# Patient Record
Sex: FEMALE | Race: WHITE | Employment: OTHER | URBAN - METROPOLITAN AREA
[De-identification: names, ages, dates, MRNs, and addresses within clinical notes are randomized per-mention and may not be internally consistent; named-entity substitution may affect disease eponyms.]

---

## 2017-01-05 ENCOUNTER — OFFICE VISIT (OUTPATIENT)
Dept: INTERNAL MEDICINE | Age: 77
End: 2017-01-05

## 2017-01-05 VITALS
HEIGHT: 64 IN | BODY MASS INDEX: 27.66 KG/M2 | TEMPERATURE: 97.1 F | WEIGHT: 162 LBS | HEART RATE: 82 BPM | SYSTOLIC BLOOD PRESSURE: 142 MMHG | DIASTOLIC BLOOD PRESSURE: 78 MMHG

## 2017-01-05 DIAGNOSIS — Z12.11 COLON CANCER SCREENING: ICD-10-CM

## 2017-01-05 DIAGNOSIS — I10 ESSENTIAL HYPERTENSION: Primary | Chronic | ICD-10-CM

## 2017-01-05 DIAGNOSIS — E78.5 HYPERLIPIDEMIA LDL GOAL <100: Chronic | ICD-10-CM

## 2017-01-05 DIAGNOSIS — Z23 NEED FOR VACCINATION WITH 13-POLYVALENT PNEUMOCOCCAL CONJUGATE VACCINE: ICD-10-CM

## 2017-01-05 PROCEDURE — 90670 PCV13 VACCINE IM: CPT | Performed by: INTERNAL MEDICINE

## 2017-01-05 PROCEDURE — 99213 OFFICE O/P EST LOW 20 MIN: CPT | Performed by: INTERNAL MEDICINE

## 2017-01-05 PROCEDURE — G0009 ADMIN PNEUMOCOCCAL VACCINE: HCPCS | Performed by: INTERNAL MEDICINE

## 2017-01-05 PROCEDURE — 82274 ASSAY TEST FOR BLOOD FECAL: CPT | Performed by: INTERNAL MEDICINE

## 2017-01-05 ASSESSMENT — ENCOUNTER SYMPTOMS
SHORTNESS OF BREATH: 0
RESPIRATORY NEGATIVE: 1
COUGH: 0
ORTHOPNEA: 0
COLOR CHANGE: 0
CHOKING: 0
TROUBLE SWALLOWING: 0
BLOOD IN STOOL: 0
ABDOMINAL PAIN: 0

## 2017-01-13 LAB
CONTROL: NORMAL
HEMOCCULT STL QL: NEGATIVE

## 2017-02-13 ENCOUNTER — TELEPHONE (OUTPATIENT)
Dept: INTERNAL MEDICINE | Age: 77
End: 2017-02-13

## 2017-02-13 DIAGNOSIS — R30.0 DYSURIA: Primary | ICD-10-CM

## 2017-02-13 RX ORDER — NITROFURANTOIN 25; 75 MG/1; MG/1
100 CAPSULE ORAL 2 TIMES DAILY
Qty: 10 CAPSULE | Refills: 0 | Status: SHIPPED | OUTPATIENT
Start: 2017-02-13 | End: 2017-02-18

## 2017-02-13 RX ORDER — NIACIN 500 MG/1
TABLET, EXTENDED RELEASE ORAL
Qty: 270 TABLET | Refills: 1 | Status: SHIPPED | OUTPATIENT
Start: 2017-02-13 | End: 2018-01-22 | Stop reason: SDUPTHER

## 2017-03-09 RX ORDER — AMLODIPINE BESYLATE AND BENAZEPRIL HYDROCHLORIDE 5; 20 MG/1; MG/1
CAPSULE ORAL
Qty: 90 CAPSULE | Refills: 1 | Status: SHIPPED | OUTPATIENT
Start: 2017-03-09 | End: 2017-11-24 | Stop reason: SDUPTHER

## 2017-03-10 ENCOUNTER — TELEPHONE (OUTPATIENT)
Dept: INTERNAL MEDICINE | Age: 77
End: 2017-03-10

## 2017-03-10 DIAGNOSIS — N30.00 ACUTE CYSTITIS WITHOUT HEMATURIA: Primary | ICD-10-CM

## 2017-03-10 RX ORDER — NITROFURANTOIN 25; 75 MG/1; MG/1
100 CAPSULE ORAL 2 TIMES DAILY
Qty: 10 CAPSULE | Refills: 1 | Status: SHIPPED | OUTPATIENT
Start: 2017-03-10 | End: 2017-03-15

## 2017-03-15 ENCOUNTER — TELEPHONE (OUTPATIENT)
Dept: INTERNAL MEDICINE | Age: 77
End: 2017-03-15

## 2017-03-15 DIAGNOSIS — Z85.3 HISTORY OF BREAST CANCER: Primary | ICD-10-CM

## 2017-04-05 ENCOUNTER — HOSPITAL ENCOUNTER (OUTPATIENT)
Dept: ULTRASOUND IMAGING | Age: 77
End: 2017-04-05
Payer: MEDICARE

## 2017-04-05 ENCOUNTER — HOSPITAL ENCOUNTER (OUTPATIENT)
Dept: WOMENS IMAGING | Age: 77
Discharge: HOME | End: 2017-04-05
Payer: MEDICARE

## 2017-04-05 DIAGNOSIS — Z85.3 HISTORY OF BREAST CANCER: ICD-10-CM

## 2017-04-05 PROCEDURE — G0204 DX MAMMO INCL CAD BI: HCPCS

## 2017-04-27 RX ORDER — TRIAMTERENE AND HYDROCHLOROTHIAZIDE 37.5; 25 MG/1; MG/1
TABLET ORAL
Qty: 90 TABLET | Refills: 1 | Status: SHIPPED | OUTPATIENT
Start: 2017-04-27 | End: 2017-11-08 | Stop reason: SDUPTHER

## 2017-04-27 RX ORDER — EZETIMIBE 10 MG/1
TABLET ORAL
Qty: 90 TABLET | Refills: 1 | Status: SHIPPED | OUTPATIENT
Start: 2017-04-27 | End: 2017-11-08 | Stop reason: SDUPTHER

## 2017-10-09 ENCOUNTER — OFFICE VISIT (OUTPATIENT)
Dept: INTERNAL MEDICINE | Age: 77
End: 2017-10-09

## 2017-10-09 VITALS
BODY MASS INDEX: 27.66 KG/M2 | OXYGEN SATURATION: 98 % | SYSTOLIC BLOOD PRESSURE: 150 MMHG | DIASTOLIC BLOOD PRESSURE: 90 MMHG | WEIGHT: 162 LBS | HEART RATE: 71 BPM | HEIGHT: 64 IN | TEMPERATURE: 96 F

## 2017-10-09 DIAGNOSIS — I10 ESSENTIAL HYPERTENSION: ICD-10-CM

## 2017-10-09 DIAGNOSIS — Z78.0 MENOPAUSE: ICD-10-CM

## 2017-10-09 DIAGNOSIS — H81.10 VERTIGO, BENIGN PAROXYSMAL, UNSPECIFIED LATERALITY: ICD-10-CM

## 2017-10-09 DIAGNOSIS — I10 ESSENTIAL HYPERTENSION: Primary | Chronic | ICD-10-CM

## 2017-10-09 LAB
ANION GAP SERPL CALCULATED.3IONS-SCNC: 11 MEQ/L (ref 7–13)
BACTERIA: NORMAL /HPF
BILIRUBIN URINE: NEGATIVE
BLOOD, URINE: NEGATIVE
BUN BLDV-MCNC: 12 MG/DL (ref 8–23)
CALCIUM SERPL-MCNC: 10.6 MG/DL (ref 8.6–10.2)
CHLORIDE BLD-SCNC: 101 MEQ/L (ref 98–107)
CHOLESTEROL, TOTAL: 213 MG/DL (ref 0–199)
CLARITY: CLEAR
CO2: 27 MEQ/L (ref 22–29)
COLOR: YELLOW
CREAT SERPL-MCNC: 0.63 MG/DL (ref 0.5–0.9)
EPITHELIAL CELLS, UA: NORMAL /HPF
GFR AFRICAN AMERICAN: >60
GFR NON-AFRICAN AMERICAN: >60
GLUCOSE BLD-MCNC: 92 MG/DL (ref 74–109)
GLUCOSE URINE: NEGATIVE MG/DL
HCT VFR BLD CALC: 41.9 % (ref 37–47)
HDLC SERPL-MCNC: 41 MG/DL (ref 40–59)
HEMOGLOBIN: 14 G/DL (ref 12–16)
KETONES, URINE: NEGATIVE MG/DL
LDL CHOLESTEROL CALCULATED: 133 MG/DL (ref 0–129)
LEUKOCYTE ESTERASE, URINE: ABNORMAL
MCH RBC QN AUTO: 31 PG (ref 27–31.3)
MCHC RBC AUTO-ENTMCNC: 33.4 % (ref 33–37)
MCV RBC AUTO: 93 FL (ref 82–100)
NITRITE, URINE: NEGATIVE
PDW BLD-RTO: 13.4 % (ref 11.5–14.5)
PH UA: 7.5 (ref 5–9)
PLATELET # BLD: 237 K/UL (ref 130–400)
POTASSIUM SERPL-SCNC: 4.7 MEQ/L (ref 3.5–5.1)
PROTEIN UA: NEGATIVE MG/DL
RBC # BLD: 4.5 M/UL (ref 4.2–5.4)
RBC UA: NORMAL /HPF (ref 0–2)
SODIUM BLD-SCNC: 139 MEQ/L (ref 132–144)
SPECIFIC GRAVITY UA: 1.02 (ref 1–1.03)
TRIGL SERPL-MCNC: 195 MG/DL (ref 0–200)
TSH SERPL DL<=0.05 MIU/L-ACNC: 4.26 UIU/ML (ref 0.27–4.2)
UROBILINOGEN, URINE: 0.2 E.U./DL
WBC # BLD: 5.9 K/UL (ref 4.8–10.8)
WBC UA: NORMAL /HPF (ref 0–5)

## 2017-10-09 PROCEDURE — 1123F ACP DISCUSS/DSCN MKR DOCD: CPT | Performed by: INTERNAL MEDICINE

## 2017-10-09 PROCEDURE — 4040F PNEUMOC VAC/ADMIN/RCVD: CPT | Performed by: INTERNAL MEDICINE

## 2017-10-09 PROCEDURE — 99213 OFFICE O/P EST LOW 20 MIN: CPT | Performed by: INTERNAL MEDICINE

## 2017-10-09 PROCEDURE — 1036F TOBACCO NON-USER: CPT | Performed by: INTERNAL MEDICINE

## 2017-10-09 PROCEDURE — G8419 CALC BMI OUT NRM PARAM NOF/U: HCPCS | Performed by: INTERNAL MEDICINE

## 2017-10-09 PROCEDURE — 1090F PRES/ABSN URINE INCON ASSESS: CPT | Performed by: INTERNAL MEDICINE

## 2017-10-09 PROCEDURE — G8427 DOCREV CUR MEDS BY ELIG CLIN: HCPCS | Performed by: INTERNAL MEDICINE

## 2017-10-09 PROCEDURE — G8399 PT W/DXA RESULTS DOCUMENT: HCPCS | Performed by: INTERNAL MEDICINE

## 2017-10-09 PROCEDURE — G8484 FLU IMMUNIZE NO ADMIN: HCPCS | Performed by: INTERNAL MEDICINE

## 2017-10-09 RX ORDER — MECLIZINE HCL 12.5 MG/1
12.5 TABLET ORAL DAILY
Qty: 30 TABLET | Refills: 5 | Status: SHIPPED | OUTPATIENT
Start: 2017-10-09 | End: 2019-10-14 | Stop reason: SDUPTHER

## 2017-10-09 ASSESSMENT — PATIENT HEALTH QUESTIONNAIRE - PHQ9
SUM OF ALL RESPONSES TO PHQ9 QUESTIONS 1 & 2: 0
SUM OF ALL RESPONSES TO PHQ QUESTIONS 1-9: 0
1. LITTLE INTEREST OR PLEASURE IN DOING THINGS: 0
2. FEELING DOWN, DEPRESSED OR HOPELESS: 0

## 2017-10-09 ASSESSMENT — ENCOUNTER SYMPTOMS
SHORTNESS OF BREATH: 0
TROUBLE SWALLOWING: 0
RESPIRATORY NEGATIVE: 1
BLOOD IN STOOL: 0
SORE THROAT: 0
ABDOMINAL PAIN: 0
COUGH: 0
ORTHOPNEA: 0
VISUAL CHANGE: 0
NAUSEA: 0
CHOKING: 0
COLOR CHANGE: 0

## 2017-10-09 NOTE — PROGRESS NOTES
Quit date: 2/15/1989    Smokeless tobacco: Never Used    Alcohol use No    Drug use: No    Sexual activity: Not on file     Other Topics Concern    Not on file     Social History Narrative    Lives with spouse in a house in [address] [employer], [employer], travel    4 children, 2 daughters in [facility], 2 sons in [address] [place] [address] great grandchildren >50    2 younger brothers, one dec 2014       Family History   Problem Relation Age of Onset    Kidney Disease Mother      dec age 79    Hypertension Mother     Migraines Mother     Diabetes Father     Heart Failure Father      dec age 76    [de-identified] Brother      dec age 76       Family and social history were reviewed and pertinent changes documented. ALLERGIES    Sulfa antibiotics    Current Outpatient Prescriptions on File Prior to Visit   Medication Sig Dispense Refill    triamterene-hydrochlorothiazide (MAXZIDE-25) 37.5-25 MG per tablet TAKE 1 TABLET DAILY 90 tablet 1    ezetimibe (ZETIA) 10 MG tablet TAKE 1 TABLET DAILY 90 tablet 1    amLODIPine-benazepril (LOTREL) 5-20 MG per capsule TAKE 1 CAPSULE DAILY 90 capsule 1    niacin (NIASPAN) 500 MG extended release tablet TAKE 1 TABLET 3 TIMES A  tablet 1    anastrozole (ARIMIDEX) 1 MG tablet Take 1 mg by mouth daily      Coenzyme Q10 (COQ10) 100 MG CAPS Take 1 capsule by mouth 3 times daily      AGGRENOX  MG per SR capsule TAKE 1 CAPSULE TWICE DAILY 180 capsule 1    Polyvinyl Alcohol-Povidone (REFRESH OP) Apply 1 drop to eye 4 times daily as needed.  Vitamin D (CHOLECALCIFEROL) 1000 UNITS CAPS capsule Take 1,000 Units by mouth daily.  Magnesium 500 MG CAPS Take 1 capsule by mouth daily.  fish oil-omega-3 fatty acids 1000 MG capsule Take 1 g by mouth 3 times daily.  Multiple Vitamins-Minerals (CENTRUM SILVER) TABS Take 1 tablet by mouth daily. No current facility-administered medications on file prior to visit.         Review of Systems Constitutional: Negative. Negative for activity change, appetite change, fatigue, malaise/fatigue and unexpected weight change. HENT: Negative for congestion, nosebleeds, sore throat and trouble swallowing. Eyes: Negative for visual disturbance. Respiratory: Negative. Negative for cough, choking and shortness of breath. Cardiovascular: Negative. Negative for chest pain, palpitations, orthopnea, leg swelling and PND. Gastrointestinal: Negative for abdominal pain, blood in stool and nausea. Endocrine: Negative for cold intolerance, heat intolerance, polydipsia and polyuria. Genitourinary: Negative for dysuria, flank pain and hematuria. Musculoskeletal: Positive for neck pain. Negative for gait problem and myalgias. Skin: Negative for color change and rash. Neurological: Positive for dizziness and vertigo. Negative for tremors, syncope, weakness, light-headedness and headaches. Psychiatric/Behavioral: Positive for decreased concentration (occasional forgetfulness, checks calendar and schedule each AM as reminder). Negative for confusion, dysphoric mood and sleep disturbance. The patient is not nervous/anxious. Vitals:    10/09/17 0901   BP: (!) 150/90   Site: Right Arm   Position: Sitting   Cuff Size: Medium Adult   Pulse: 71   Temp: 96 °F (35.6 °C)   TempSrc: Tympanic   SpO2: 98%   Weight: 162 lb (73.5 kg)   Height: 5' 4\" (1.626 m)       Physical Exam   Constitutional: She is oriented to person, place, and time. No distress. Obese, age appropriate, well groomed     HENT:   Head: Atraumatic. Eyes: Conjunctivae and EOM are normal.   Neck: Normal range of motion. Neck supple. No JVD present. No thyromegaly present. Poor neck posture    Cardiovascular: Regular rhythm, normal heart sounds and intact distal pulses. Exam reveals no gallop. Pulmonary/Chest: Effort normal and breath sounds normal. She has no rales. Abdominal: Soft. She exhibits no distension.    Exam limited due to abdominal adiposity      Musculoskeletal: Normal range of motion. Lymphadenopathy:     She has no cervical adenopathy. Neurological: She is alert and oriented to person, place, and time. Coordination normal.   Stance, gait well balanced and stable. No focal motor neurological deficits. Vertigo not reproducible in today's office visit      Skin: Skin is warm. No rash noted. Psychiatric: She has a normal mood and affect. Her speech is normal and behavior is normal. Judgment normal. Her mood appears not anxious. She is not slowed and not withdrawn. Cognition and memory are normal. She does not exhibit a depressed mood. She exhibits normal recent memory and normal remote memory. Good insight, motivation She is attentive. Assessment:    Eneida Collins was seen today for hypertension, dizziness and medication refill. Diagnoses and all orders for this visit:    Essential hypertension  Comments:  stable in the home setting, to monitor, call if BP>140/90  Orders:  -     CBC; Future  -     Basic Metabolic Panel; Future  -     Lipid Panel; Future  -     TSH without Reflex; Future  -     Urinalysis; Future    Vertigo, benign paroxysmal, unspecified laterality             Start vestibular exercises per handout, call if vertigo reoccurs     Menopause  -     DEXA BONE DENSITY AXIAL SKELETON; Future    Other orders  -     meclizine (ANTIVERT) 12.5 MG tablet; Take 1 tablet by mouth daily        Plan:    Reviewed with the patient (/and caregiver if present): current health status, medications, activities and diet. Patient advised that daily effort to improve diet (a consistent high fiber, low calorie, low sodium diet) and exercise habits (more aerobic exercise as tolerated), better stress management, will result in improved health and help in better management of the current chronic conditions in the long run. See also orders and comments in the assessment section.    Today's diagnosis (in the context of chronic

## 2017-10-13 ENCOUNTER — NURSE ONLY (OUTPATIENT)
Dept: INTERNAL MEDICINE | Age: 77
End: 2017-10-13

## 2017-10-13 DIAGNOSIS — Z23 NEED FOR TDAP VACCINATION: Primary | ICD-10-CM

## 2017-10-13 PROCEDURE — 90471 IMMUNIZATION ADMIN: CPT | Performed by: INTERNAL MEDICINE

## 2017-10-30 ENCOUNTER — HOSPITAL ENCOUNTER (OUTPATIENT)
Dept: WOMENS IMAGING | Age: 77
Discharge: HOME | End: 2017-10-30
Payer: MEDICARE

## 2017-10-30 DIAGNOSIS — Z78.0 MENOPAUSE: ICD-10-CM

## 2017-10-30 PROCEDURE — 77080 DXA BONE DENSITY AXIAL: CPT

## 2017-11-08 RX ORDER — EZETIMIBE 10 MG/1
TABLET ORAL
Qty: 90 TABLET | Refills: 1 | Status: SHIPPED | OUTPATIENT
Start: 2017-11-08 | End: 2018-08-30 | Stop reason: SDUPTHER

## 2017-11-08 RX ORDER — TRIAMTERENE AND HYDROCHLOROTHIAZIDE 37.5; 25 MG/1; MG/1
TABLET ORAL
Qty: 90 TABLET | Refills: 1 | Status: SHIPPED | OUTPATIENT
Start: 2017-11-08 | End: 2018-08-30 | Stop reason: SDUPTHER

## 2017-11-24 RX ORDER — AMLODIPINE BESYLATE AND BENAZEPRIL HYDROCHLORIDE 5; 20 MG/1; MG/1
CAPSULE ORAL
Qty: 90 CAPSULE | Refills: 1 | Status: SHIPPED | OUTPATIENT
Start: 2017-11-24 | End: 2018-08-30 | Stop reason: SDUPTHER

## 2017-12-20 ENCOUNTER — HOSPITAL ENCOUNTER (OUTPATIENT)
Dept: ULTRASOUND IMAGING | Age: 77
Discharge: HOME | End: 2017-12-20
Payer: MEDICARE

## 2017-12-20 DIAGNOSIS — I65.29 STENOSIS OF CAROTID ARTERY, UNSPECIFIED LATERALITY: ICD-10-CM

## 2017-12-20 PROCEDURE — 93880 EXTRACRANIAL BILAT STUDY: CPT

## 2018-01-23 RX ORDER — NIACIN 500 MG/1
TABLET, EXTENDED RELEASE ORAL
Qty: 270 TABLET | Refills: 1 | Status: SHIPPED | OUTPATIENT
Start: 2018-01-23 | End: 2018-11-01 | Stop reason: SDUPTHER

## 2018-02-14 ENCOUNTER — TELEPHONE (OUTPATIENT)
Dept: INTERNAL MEDICINE CLINIC | Age: 78
End: 2018-02-14

## 2018-02-14 DIAGNOSIS — R30.0 DYSURIA: Primary | ICD-10-CM

## 2018-02-14 RX ORDER — NITROFURANTOIN 25; 75 MG/1; MG/1
100 CAPSULE ORAL 2 TIMES DAILY
Qty: 10 CAPSULE | Refills: 0 | Status: SHIPPED | OUTPATIENT
Start: 2018-02-14 | End: 2018-02-19

## 2018-04-30 ENCOUNTER — OFFICE VISIT (OUTPATIENT)
Dept: INTERNAL MEDICINE CLINIC | Age: 78
End: 2018-04-30
Payer: MEDICARE

## 2018-04-30 VITALS
WEIGHT: 168 LBS | SYSTOLIC BLOOD PRESSURE: 164 MMHG | TEMPERATURE: 96.8 F | DIASTOLIC BLOOD PRESSURE: 86 MMHG | OXYGEN SATURATION: 99 % | HEIGHT: 64 IN | BODY MASS INDEX: 28.68 KG/M2 | HEART RATE: 92 BPM

## 2018-04-30 DIAGNOSIS — E78.2 MIXED HYPERLIPIDEMIA: ICD-10-CM

## 2018-04-30 DIAGNOSIS — I10 ESSENTIAL HYPERTENSION: Primary | ICD-10-CM

## 2018-04-30 DIAGNOSIS — R79.89 ELEVATED TSH: ICD-10-CM

## 2018-04-30 PROCEDURE — 1090F PRES/ABSN URINE INCON ASSESS: CPT | Performed by: INTERNAL MEDICINE

## 2018-04-30 PROCEDURE — G8399 PT W/DXA RESULTS DOCUMENT: HCPCS | Performed by: INTERNAL MEDICINE

## 2018-04-30 PROCEDURE — 1123F ACP DISCUSS/DSCN MKR DOCD: CPT | Performed by: INTERNAL MEDICINE

## 2018-04-30 PROCEDURE — 4040F PNEUMOC VAC/ADMIN/RCVD: CPT | Performed by: INTERNAL MEDICINE

## 2018-04-30 PROCEDURE — G8419 CALC BMI OUT NRM PARAM NOF/U: HCPCS | Performed by: INTERNAL MEDICINE

## 2018-04-30 PROCEDURE — 1036F TOBACCO NON-USER: CPT | Performed by: INTERNAL MEDICINE

## 2018-04-30 PROCEDURE — G8427 DOCREV CUR MEDS BY ELIG CLIN: HCPCS | Performed by: INTERNAL MEDICINE

## 2018-04-30 PROCEDURE — 99213 OFFICE O/P EST LOW 20 MIN: CPT | Performed by: INTERNAL MEDICINE

## 2018-04-30 ASSESSMENT — ENCOUNTER SYMPTOMS
CHOKING: 0
BLOOD IN STOOL: 0
SORE THROAT: 0
NAUSEA: 0
SHORTNESS OF BREATH: 0
COUGH: 0
ORTHOPNEA: 0
RESPIRATORY NEGATIVE: 1
TROUBLE SWALLOWING: 0
ABDOMINAL PAIN: 0
COLOR CHANGE: 0

## 2018-05-16 DIAGNOSIS — Z85.3 HISTORY OF BREAST CANCER: ICD-10-CM

## 2018-05-16 DIAGNOSIS — Z12.39 BREAST CANCER SCREENING, HIGH RISK PATIENT: Primary | ICD-10-CM

## 2018-05-23 DIAGNOSIS — Z85.3 HISTORY OF BREAST CANCER: ICD-10-CM

## 2018-05-23 DIAGNOSIS — Z12.39 BREAST CANCER SCREENING: Primary | ICD-10-CM

## 2018-06-06 ENCOUNTER — HOSPITAL ENCOUNTER (OUTPATIENT)
Dept: WOMENS IMAGING | Age: 78
Discharge: HOME | End: 2018-06-08
Payer: MEDICARE

## 2018-06-06 ENCOUNTER — HOSPITAL ENCOUNTER (OUTPATIENT)
Dept: ULTRASOUND IMAGING | Age: 78
Discharge: HOME | End: 2018-06-08
Payer: MEDICARE

## 2018-06-06 DIAGNOSIS — Z12.39 BREAST CANCER SCREENING: ICD-10-CM

## 2018-06-06 DIAGNOSIS — Z85.3 HISTORY OF BREAST CANCER: ICD-10-CM

## 2018-06-06 DIAGNOSIS — Z12.39 BREAST CANCER SCREENING, HIGH RISK PATIENT: ICD-10-CM

## 2018-06-06 PROCEDURE — 77066 DX MAMMO INCL CAD BI: CPT

## 2018-06-12 ENCOUNTER — HOSPITAL ENCOUNTER (OUTPATIENT)
Dept: LAB | Age: 78
Discharge: HOME | End: 2018-06-12
Payer: MEDICARE

## 2018-06-12 LAB
CHOLESTEROL, TOTAL: 199 MG/DL (ref 0–199)
HCT VFR BLD CALC: 42.4 % (ref 37–47)
HDLC SERPL-MCNC: 49 MG/DL (ref 40–59)
HEMOGLOBIN: 14.3 G/DL (ref 12–16)
LDL CHOLESTEROL CALCULATED: 129 MG/DL (ref 0–129)
MCH RBC QN AUTO: 31.9 PG (ref 27–31.3)
MCHC RBC AUTO-ENTMCNC: 33.8 % (ref 33–37)
MCV RBC AUTO: 94.4 FL (ref 82–100)
PDW BLD-RTO: 13.6 % (ref 11.5–14.5)
PLATELET # BLD: 233 K/UL (ref 130–400)
RBC # BLD: 4.49 M/UL (ref 4.2–5.4)
TRIGL SERPL-MCNC: 106 MG/DL (ref 0–200)
TSH SERPL DL<=0.05 MIU/L-ACNC: 2.92 UIU/ML (ref 0.27–4.2)
WBC # BLD: 4.9 K/UL (ref 4.8–10.8)

## 2018-06-12 PROCEDURE — 85027 COMPLETE CBC AUTOMATED: CPT

## 2018-06-12 PROCEDURE — 80061 LIPID PANEL: CPT

## 2018-06-12 PROCEDURE — 84443 ASSAY THYROID STIM HORMONE: CPT

## 2018-08-31 RX ORDER — AMLODIPINE BESYLATE AND BENAZEPRIL HYDROCHLORIDE 5; 20 MG/1; MG/1
CAPSULE ORAL
Qty: 90 CAPSULE | Refills: 1 | Status: SHIPPED | OUTPATIENT
Start: 2018-08-31 | End: 2019-02-08 | Stop reason: SDUPTHER

## 2018-08-31 RX ORDER — EZETIMIBE 10 MG/1
TABLET ORAL
Qty: 90 TABLET | Refills: 1 | Status: SHIPPED | OUTPATIENT
Start: 2018-08-31 | End: 2019-03-11 | Stop reason: SDUPTHER

## 2018-08-31 RX ORDER — TRIAMTERENE AND HYDROCHLOROTHIAZIDE 37.5; 25 MG/1; MG/1
TABLET ORAL
Qty: 90 TABLET | Refills: 1 | Status: SHIPPED | OUTPATIENT
Start: 2018-08-31 | End: 2019-03-11 | Stop reason: SDUPTHER

## 2018-10-31 ENCOUNTER — OFFICE VISIT (OUTPATIENT)
Dept: INTERNAL MEDICINE CLINIC | Age: 78
End: 2018-10-31
Payer: MEDICARE

## 2018-10-31 VITALS
HEIGHT: 65 IN | HEART RATE: 72 BPM | OXYGEN SATURATION: 98 % | BODY MASS INDEX: 26.66 KG/M2 | WEIGHT: 160 LBS | DIASTOLIC BLOOD PRESSURE: 62 MMHG | SYSTOLIC BLOOD PRESSURE: 134 MMHG

## 2018-10-31 DIAGNOSIS — E78.5 HYPERLIPIDEMIA, UNSPECIFIED HYPERLIPIDEMIA TYPE: ICD-10-CM

## 2018-10-31 DIAGNOSIS — H81.10 BENIGN PAROXYSMAL POSITIONAL VERTIGO, UNSPECIFIED LATERALITY: Primary | ICD-10-CM

## 2018-10-31 PROCEDURE — 99213 OFFICE O/P EST LOW 20 MIN: CPT | Performed by: INTERNAL MEDICINE

## 2018-10-31 PROCEDURE — G8484 FLU IMMUNIZE NO ADMIN: HCPCS | Performed by: INTERNAL MEDICINE

## 2018-10-31 PROCEDURE — G8427 DOCREV CUR MEDS BY ELIG CLIN: HCPCS | Performed by: INTERNAL MEDICINE

## 2018-10-31 PROCEDURE — G8419 CALC BMI OUT NRM PARAM NOF/U: HCPCS | Performed by: INTERNAL MEDICINE

## 2018-10-31 PROCEDURE — 1036F TOBACCO NON-USER: CPT | Performed by: INTERNAL MEDICINE

## 2018-10-31 PROCEDURE — 4040F PNEUMOC VAC/ADMIN/RCVD: CPT | Performed by: INTERNAL MEDICINE

## 2018-10-31 PROCEDURE — 1101F PT FALLS ASSESS-DOCD LE1/YR: CPT | Performed by: INTERNAL MEDICINE

## 2018-10-31 PROCEDURE — 1123F ACP DISCUSS/DSCN MKR DOCD: CPT | Performed by: INTERNAL MEDICINE

## 2018-10-31 PROCEDURE — 1090F PRES/ABSN URINE INCON ASSESS: CPT | Performed by: INTERNAL MEDICINE

## 2018-10-31 PROCEDURE — G8399 PT W/DXA RESULTS DOCUMENT: HCPCS | Performed by: INTERNAL MEDICINE

## 2018-10-31 RX ORDER — INFLUENZA A VIRUS A/MICHIGAN/45/2015 X-275 (H1N1) ANTIGEN (FORMALDEHYDE INACTIVATED), INFLUENZA A VIRUS A/SINGAPORE/INFIMH-16-0019/2016 IVR-186 (H3N2) ANTIGEN (FORMALDEHYDE INACTIVATED), AND INFLUENZA B VIRUS B/MARYLAND/15/2016 BX-69A (A B/COLORADO/6/2017-LIKE VIRUS) ANTIGEN (FORMALDEHYDE INACTIVATED) 60; 60; 60 UG/.5ML; UG/.5ML; UG/.5ML
INJECTION, SUSPENSION INTRAMUSCULAR
Refills: 0 | COMMUNITY
Start: 2018-09-23 | End: 2019-04-19 | Stop reason: ALTCHOICE

## 2018-10-31 ASSESSMENT — ENCOUNTER SYMPTOMS
SORE THROAT: 0
COLOR CHANGE: 0
VOMITING: 0
ABDOMINAL PAIN: 0
CHOKING: 0
COUGH: 0
SHORTNESS OF BREATH: 0
NAUSEA: 0
TROUBLE SWALLOWING: 0
BLOOD IN STOOL: 0

## 2018-10-31 NOTE — PROGRESS NOTES
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    Dr Favio Schaeffer (ortho)    Vitamin D deficiency        Past Surgical History:   Procedure Laterality Date    BLADDER SUSPENSION      BREAST BIOPSY Right 2/14/14    BREAST LUMPECTOMY Right 3/4/2014    Lumpectomy with SLNB, Dr [address] in [address] Marital status:      Spouse name: N/A    Number of children: 3    Years of education: N/A     Occupational History    , retired      Social History Main Topics    Smoking status: Former Smoker     Quit date: 2/15/1989    Smokeless tobacco: Never Used    Alcohol use No    Drug use: No    Sexual activity: Not on file     Other Topics Concern    Not on file     Social History Narrative    Born in [place], 2 younger brothers, one dec 2014    , retired     Lives with spouse in a house in [address] [employer], travel    4 children, 2 daughters in [facility], 2 sons in [address] [place] [address] great grandchildren >50           Family History   Problem Relation Age of Onset    Kidney Disease Mother         dec age 79    Hypertension Mother    [facility] Migraines Mother     Diabetes Father     Heart Failure Father         dec age 76    [de-identified] Brother         dec age 76       Family and socialhistory were reviewed and pertinent changes documented.     ALLERGIES    Sulfa antibiotics and Statins    Current Outpatient Prescriptions on File Prior to Visit   Medication Sig Dispense Refill    amLODIPine-benazepril (LOTREL) 5-20 MG per capsule TAKE 1 CAPSULE DAILY 90 capsule 1    triamterene-hydrochlorothiazide (MAXZIDE-25) 37.5-25 MG per tablet TAKE 1 TABLET DAILY 90 tablet 1    ezetimibe (ZETIA) 10 MG tablet TAKE 1 TABLET DAILY 90 tablet 1    niacin (NIASPAN)

## 2018-11-01 RX ORDER — NIACIN 500 MG/1
TABLET, EXTENDED RELEASE ORAL
Qty: 270 TABLET | Refills: 1 | Status: SHIPPED | OUTPATIENT
Start: 2018-11-01 | End: 2019-06-10 | Stop reason: SDUPTHER

## 2018-11-08 PROBLEM — Z17.0 ESTROGEN RECEPTOR POSITIVE STATUS (ER+): Status: ACTIVE | Noted: 2018-11-08

## 2018-11-08 PROBLEM — C77.3 SECONDARY AND UNSPECIFIED MALIGNANT NEOPLASM OF AXILLA AND UPPER LIMB LYMPH NODES (HCC): Status: ACTIVE | Noted: 2018-11-08

## 2018-11-19 DIAGNOSIS — C77.3 SECONDARY AND UNSPECIFIED MALIGNANT NEOPLASM OF AXILLA AND UPPER LIMB LYMPH NODES (HCC): ICD-10-CM

## 2018-11-19 DIAGNOSIS — Z85.3 PERSONAL HISTORY OF MALIGNANT NEOPLASM OF BREAST: ICD-10-CM

## 2018-11-19 LAB
ALBUMIN SERPL-MCNC: 4 G/DL (ref 3.9–4.9)
ALP BLD-CCNC: 106 U/L (ref 40–130)
ALT SERPL-CCNC: 17 U/L (ref 0–33)
ANION GAP SERPL CALCULATED.3IONS-SCNC: 12 MEQ/L (ref 7–13)
AST SERPL-CCNC: 19 U/L (ref 0–35)
BILIRUB SERPL-MCNC: 0.3 MG/DL (ref 0–1.2)
BUN BLDV-MCNC: 13 MG/DL (ref 8–23)
CALCIUM SERPL-MCNC: 10.8 MG/DL (ref 8.6–10.2)
CHLORIDE BLD-SCNC: 103 MEQ/L (ref 98–107)
CO2: 26 MEQ/L (ref 22–29)
CREAT SERPL-MCNC: 0.62 MG/DL (ref 0.5–0.9)
GFR AFRICAN AMERICAN: >60
GFR NON-AFRICAN AMERICAN: >60
GLOBULIN: 2.8 G/DL (ref 2.3–3.5)
GLUCOSE BLD-MCNC: 87 MG/DL (ref 74–109)
POTASSIUM SERPL-SCNC: 4 MEQ/L (ref 3.5–5.1)
SODIUM BLD-SCNC: 141 MEQ/L (ref 132–144)
TOTAL PROTEIN: 6.8 G/DL (ref 6.4–8.1)

## 2018-11-24 LAB — CA 27-29: 20 U/ML (ref 0–38)

## 2019-01-18 ENCOUNTER — HOSPITAL ENCOUNTER (OUTPATIENT)
Dept: ULTRASOUND IMAGING | Age: 79
Discharge: HOME | End: 2019-01-20
Payer: MEDICARE

## 2019-01-18 DIAGNOSIS — I65.22 STENOSIS OF LEFT CAROTID ARTERY: ICD-10-CM

## 2019-01-18 DIAGNOSIS — I65.21 STENOSIS OF RIGHT CAROTID ARTERY: ICD-10-CM

## 2019-01-18 PROCEDURE — 93880 EXTRACRANIAL BILAT STUDY: CPT

## 2019-02-08 RX ORDER — AMLODIPINE BESYLATE AND BENAZEPRIL HYDROCHLORIDE 5; 20 MG/1; MG/1
CAPSULE ORAL
Qty: 90 CAPSULE | Refills: 1 | Status: SHIPPED | OUTPATIENT
Start: 2019-02-08 | End: 2019-08-26 | Stop reason: SDUPTHER

## 2019-03-11 RX ORDER — TRIAMTERENE AND HYDROCHLOROTHIAZIDE 37.5; 25 MG/1; MG/1
TABLET ORAL
Qty: 90 TABLET | Refills: 1 | Status: SHIPPED | OUTPATIENT
Start: 2019-03-11 | End: 2019-08-26 | Stop reason: SDUPTHER

## 2019-03-11 RX ORDER — EZETIMIBE 10 MG/1
TABLET ORAL
Qty: 90 TABLET | Refills: 1 | Status: SHIPPED | OUTPATIENT
Start: 2019-03-11 | End: 2019-08-26 | Stop reason: SDUPTHER

## 2019-04-19 ENCOUNTER — OFFICE VISIT (OUTPATIENT)
Dept: INTERNAL MEDICINE CLINIC | Age: 79
End: 2019-04-19
Payer: MEDICARE

## 2019-04-19 VITALS
HEART RATE: 78 BPM | OXYGEN SATURATION: 95 % | BODY MASS INDEX: 27.19 KG/M2 | RESPIRATION RATE: 14 BRPM | DIASTOLIC BLOOD PRESSURE: 79 MMHG | TEMPERATURE: 96.9 F | SYSTOLIC BLOOD PRESSURE: 123 MMHG | HEIGHT: 66 IN | WEIGHT: 169.2 LBS

## 2019-04-19 DIAGNOSIS — I10 ESSENTIAL HYPERTENSION: Primary | ICD-10-CM

## 2019-04-19 DIAGNOSIS — E78.5 HYPERLIPIDEMIA, UNSPECIFIED HYPERLIPIDEMIA TYPE: ICD-10-CM

## 2019-04-19 PROCEDURE — 4040F PNEUMOC VAC/ADMIN/RCVD: CPT | Performed by: INTERNAL MEDICINE

## 2019-04-19 PROCEDURE — G8598 ASA/ANTIPLAT THER USED: HCPCS | Performed by: INTERNAL MEDICINE

## 2019-04-19 PROCEDURE — G8427 DOCREV CUR MEDS BY ELIG CLIN: HCPCS | Performed by: INTERNAL MEDICINE

## 2019-04-19 PROCEDURE — G8510 SCR DEP NEG, NO PLAN REQD: HCPCS | Performed by: INTERNAL MEDICINE

## 2019-04-19 PROCEDURE — 99213 OFFICE O/P EST LOW 20 MIN: CPT | Performed by: INTERNAL MEDICINE

## 2019-04-19 PROCEDURE — 1090F PRES/ABSN URINE INCON ASSESS: CPT | Performed by: INTERNAL MEDICINE

## 2019-04-19 PROCEDURE — 1036F TOBACCO NON-USER: CPT | Performed by: INTERNAL MEDICINE

## 2019-04-19 PROCEDURE — 1123F ACP DISCUSS/DSCN MKR DOCD: CPT | Performed by: INTERNAL MEDICINE

## 2019-04-19 PROCEDURE — G8399 PT W/DXA RESULTS DOCUMENT: HCPCS | Performed by: INTERNAL MEDICINE

## 2019-04-19 PROCEDURE — 3288F FALL RISK ASSESSMENT DOCD: CPT | Performed by: INTERNAL MEDICINE

## 2019-04-19 PROCEDURE — G8419 CALC BMI OUT NRM PARAM NOF/U: HCPCS | Performed by: INTERNAL MEDICINE

## 2019-04-19 RX ORDER — TIZANIDINE 4 MG/1
4 TABLET ORAL PRN
COMMUNITY
End: 2019-12-09 | Stop reason: ALTCHOICE

## 2019-04-19 ASSESSMENT — PATIENT HEALTH QUESTIONNAIRE - PHQ9
2. FEELING DOWN, DEPRESSED OR HOPELESS: 0
1. LITTLE INTEREST OR PLEASURE IN DOING THINGS: 0
SUM OF ALL RESPONSES TO PHQ QUESTIONS 1-9: 0
SUM OF ALL RESPONSES TO PHQ9 QUESTIONS 1 & 2: 0
SUM OF ALL RESPONSES TO PHQ QUESTIONS 1-9: 0

## 2019-04-19 ASSESSMENT — ENCOUNTER SYMPTOMS
SORE THROAT: 0
ABDOMINAL PAIN: 0
COUGH: 0
CHOKING: 0
TROUBLE SWALLOWING: 0
ORTHOPNEA: 0
BLOOD IN STOOL: 0
SHORTNESS OF BREATH: 0
NAUSEA: 0

## 2019-04-19 NOTE — PROGRESS NOTES
Piedad Steinberg is a [de-identified] y.o. female nonsmoker, history of ischemic strokes, osteoarthritis, asthma, vitamin D deficiency, who presents with     Chief Complaint   Patient presents with    Hypertension    Hyperlipidemia       Interim history: Since her last office visit with me 6 months ago, the patient has been well, no emergency room or hospital admissions. Medication refills requests were received by the office and done electronically. Has been following with her oncologist and with her neurologist.  She has gained 9 pounds in the interim. Mammogram is due in 2 months. The patient continues to live at home. Remains fully independent in all activities of daily living and with no perceived decline in memory or physical abilities. The following laboratory reports since the past visit were reviewed (the ones pertinent to today's visit were discussed with the patient/ caregiver): No visits with results within 3 Month(s) from this visit. Latest known visit with results is:   Orders Only on 11/19/2018   Component Date Value Ref Range Status    Sodium 11/19/2018 141  132 - 144 mEq/L Final    Potassium 11/19/2018 4.0  3.5 - 5.1 mEq/L Final    Chloride 11/19/2018 103  98 - 107 mEq/L Final    CO2 11/19/2018 26  22 - 29 mEq/L Final    Anion Gap 11/19/2018 12  7 - 13 mEq/L Final    Glucose 11/19/2018 87  74 - 109 mg/dL Final    BUN 11/19/2018 13  8 - 23 mg/dL Final    CREATININE 11/19/2018 0.62  0.50 - 0.90 mg/dL Final    GFR Non- 11/19/2018 >60.0  >60 Final    Comment: >60 mL/min/1.73m2 EGFR, calc. for ages 25 and older using the  MDRD formula (not corrected for weight), is valid for stable  renal function.  GFR  11/19/2018 >60.0  >60 Final    Comment: >60 mL/min/1.73m2 EGFR, calc. for ages 25 and older using the  MDRD formula (not corrected for weight), is valid for stable  renal function.       Calcium 11/19/2018 10.8* 8.6 - 10.2 mg/dL Final    Total Protein 11/19/2018 6.8  6.4 - 8.1 g/dL Final    Alb 11/19/2018 4.0  3.9 - 4.9 g/dL Final    Total Bilirubin 11/19/2018 0.3  0.0 - 1.2 mg/dL Final    Alkaline Phosphatase 11/19/2018 106  40 - 130 U/L Final    ALT 11/19/2018 17  0 - 33 U/L Final    AST 11/19/2018 19  0 - 35 U/L Final    Globulin 11/19/2018 2.8  2.3 - 3.5 g/dL Final    CA 27-29 11/19/2018 20  0 - 38 U/mL Final    [facility] [address] [facility], [address] [place] [address] [phone]       HPI:    Hypertension   This is a chronic problem. The current episode started more than 1 year ago. The problem is unchanged. The problem is controlled. Associated symptoms include anxiety. Pertinent negatives include no chest pain, headaches, malaise/fatigue, neck pain, orthopnea, palpitations, peripheral edema, PND or shortness of breath. Risk factors for coronary artery disease include dyslipidemia, obesity, post-menopausal state and sedentary lifestyle. Past treatments include calcium channel blockers, ACE inhibitors and diuretics. The current treatment provides moderate improvement. Compliance problems include diet and exercise. Hypertensive end-organ damage includes CVA. There is no history of angina or PVD. There is no history of renovascular disease, sleep apnea or a thyroid problem. Hyperlipidemia   This is a chronic problem. The current episode started more than 1 year ago. Recent lipid tests were reviewed and are variable. Exacerbating diseases include obesity. She has no history of diabetes, hypothyroidism or liver disease. Factors aggravating her hyperlipidemia include thiazides. Pertinent negatives include no chest pain, focal weakness, leg pain, myalgias or shortness of breath. Current antihyperlipidemic treatment includes ezetimibe and diet change. The current treatment provides mild improvement of lipids. Compliance problems include adherence to diet, adherence to exercise and medication side effects.   Risk factors for coronary artery disease include hypertension, obesity, a sedentary lifestyle, post-menopausal and dyslipidemia. More detail above in the chiefcomplaint(s), interim history and below in the review of systems.      Past Medical History:   Diagnosis Date    Asthma in remission     Elevated TSH     History of breast cancer 2014    Invasive ductal cancer right breast, Dr Pretty Vu, Dr Sona Patel HTN (hypertension)     was with [facility]    Hx of ischemic left MCA stroke     no residual, Dr Dulce Field Hx of ischemic right MCA stroke 2014    no residual, frontal lobe    Hyperlipidemia     Osteoarthritis     Tendinopathy of right gluteal region 2018    Dr My Burns (ortho)    Vitamin D deficiency        Past Surgical History:   Procedure Laterality Date    BLADDER SUSPENSION      BREAST BIOPSY Right 14    BREAST LUMPECTOMY Right 3/4/2014    Lumpectomy with SLNB, Dr Raven Noel [address] in [address] [place] [address] History     Socioeconomic History    Marital status:      Spouse name: Not on file    Number of children: 3    Years of education: Not on file    Highest education level: Not on file   Occupational History    Occupation: , retired   Social Needs    Financial resource strain: Not on file    Food insecurity:     Worry: Not on file     Inability: Not on file   [employer] needs:     Medical: Not on file     Non-medical: Not on file   Tobacco Use    Smoking status: Former Smoker     Packs/day: 0.25     Years: 37.00     Pack years: 9.25     Types: Cigarettes     Start date: 5/15/1952     Last attempt to quit: 2/15/1989     Years since quittin.1    Smokeless tobacco: Never Used   Substance and Sexual Activity    Alcohol use: No    Drug use: No    Sexual activity: Not on file   Lifestyle    Physical activity:     Days per week: Not on file     Minutes per session: Not on file    Stress: Not on file   Relationships    Social connections:     Talks on phone: Not on file     Gets together: Not on file     Attends [employer] service: Not on file     Active member of club or organization: Not on file     Attends meetings of clubs or organizations: Not on file     Relationship status: Not on file    Intimate partner violence:     Fear of current or ex partner: Not on file     Emotionally abused: Not on file     Physically abused: Not on file     Forced sexual activity: Not on file   Other Topics Concern    Not on file   Social History Narrative    Born in [place], 2 younger brothers, one dec 2014, one in [place]    , retired     Lives with spouse in a house in [facility]     of first spouse    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, owned a convenient store    [employer], travel    4 children, 2 daughters in [place] ([place] and [place]), 2 sons in [address] [place] [address] great grandchildren >50       Family History   Problem Relation Age of Onset    Kidney Disease Mother         dec age 79    Hypertension Mother     Migraines Mother     Diabetes Father     Heart Failure Father         dec age 65    Cancer Brother         dec age 76   [address] [address] Paternal Grandmother     Stroke Paternal Grandfather        Family and socialhistory were reviewed and pertinent changes documented. ALLERGIES    Sulfa antibiotics;  Iodinated diagnostic agents; and Statins    Current Outpatient Medications on File Prior to Visit   Medication Sig Dispense Refill    tiZANidine (ZANAFLEX) 4 MG tablet Take 4 mg by mouth as needed Take one half of a tablet daily      ezetimibe (ZETIA) 10 MG tablet TAKE 1 TABLET DAILY 90 tablet 1    triamterene-hydrochlorothiazide (MAXZIDE-25) 37.5-25 MG per tablet TAKE 1 TABLET DAILY 90 tablet 1    amLODIPine-benazepril (LOTREL) 5-20 MG per capsule TAKE 1 CAPSULE DAILY 90 capsule 1    gelatin 650 MG capsule Take 8 capsules by mouth daily      CINNAMON PO Take 1 capsule by mouth daily       acetaminophen (TYLENOL) 500 MG tablet Take 500 mg by mouth every 6 hours as needed for Pain      calcium carbonate (TUMS) 500 MG chewable tablet Take 1 tablet by mouth as needed for Heartburn      niacin (NIASPAN) 500 MG extended release tablet TAKE 1 TABLET 3 TIMES A  tablet 1    meclizine (ANTIVERT) 12.5 MG tablet Take 1 tablet by mouth daily 30 tablet 5    AGGRENOX  MG per SR capsule TAKE 1 CAPSULE TWICE DAILY 180 capsule 1    Polyvinyl Alcohol-Povidone (REFRESH OP) Apply 1 drop to eye 4 times daily as needed.  Vitamin D (CHOLECALCIFEROL) 1000 UNITS CAPS capsule Take 1,000 Units by mouth daily.  Magnesium 500 MG CAPS Take 1 capsule by mouth daily.  fish oil-omega-3 fatty acids 1000 MG capsule Take 1 g by mouth 3 times daily. No current facility-administered medications on file prior to visit. Review of Systems   Constitutional: Positive for unexpected weight change (weifght gain). Negative for activity change, appetite change, fatigue and malaise/fatigue. HENT: Negative for congestion, nosebleeds, sore throat and trouble swallowing. Eyes: Negative for visual disturbance. Respiratory: Negative for cough, choking and shortness of breath. Cardiovascular: Negative for chest pain, palpitations, orthopnea, leg swelling and PND. Gastrointestinal: Negative for abdominal pain, blood in stool and nausea. Endocrine: Negative for cold intolerance, heat intolerance, polydipsia and polyuria. Genitourinary: Negative for dysuria, flank pain and hematuria. Musculoskeletal: Negative for gait problem, myalgias and neck pain. Skin: Negative for rash and wound. Neurological: Negative for dizziness, tremors, focal weakness, syncope, weakness, light-headedness and headaches. Psychiatric/Behavioral: Positive for decreased concentration (occasional forgetfulness, checks calendar and schedule each AM as reminder).  Negative for confusion, dysphoric Future    Hyperlipidemia, unspecified hyperlipidemia type               Intolerance to statin due to myalgias, has been tolerating Zetia, continue to monitor lipids, continue to focus on lifestyle especially low animal fat diet        Plan:    Reviewed with the patient (/and caregiver if present): current health status, medications, activities and diet. See also orders and comments in the assessment section. Today's diagnosis (in the context ofchronic problems) was discussed with patient (/and caregiver if present), questions answered. The current medical regimen is effective;  continue present plan and medications. Laboratories ordered to monitor chronic illness status and to help direct management. Age appropriate screening tests ordered per current clinical guidelines and based on patient's age and risk factors. Orders Placed This Encounter   Procedures    Basic Metabolic Panel     Standing Status:   Future     Standing Expiration Date:   4/18/2020    CBC     Standing Status:   Future     Standing Expiration Date:   4/18/2020    Lipid Panel     Standing Status:   Future     Standing Expiration Date:   7/19/2019     Order Specific Question:   Is Patient Fasting?/# of Hours     Answer:   Yes, 12 Hours    Urinalysis     Standing Status:   Future     Standing Expiration Date:   4/18/2020     Order Specific Question:   SPECIFY(EX-CATH,MIDSTREAM,CYSTO,ETC)? Answer:   Midstream     Further workup and plan will be determined based on clinical progression and follow up test/ treatment results. Close follow up needed to evaluate treatment results and for care coordination. Return in about 6 months (around 10/19/2019). I have reviewed the patient's medical and surgical, family and social history, health maintenance schedule, and updated the computerized patient record. Please note this report has been partially produced by using speech recognition hardware.  It may contain errors related to the system, including grammar, punctuation and spelling as well as words and phrases that may seem inaccurate. For anyquestions or concerns, please feel free to contact me for clarification.         Electronically signed by Claudia Andujar MD

## 2019-06-10 ENCOUNTER — OFFICE VISIT (OUTPATIENT)
Dept: FAMILY MEDICINE CLINIC | Age: 79
End: 2019-06-10
Payer: MEDICARE

## 2019-06-10 VITALS
RESPIRATION RATE: 14 BRPM | OXYGEN SATURATION: 100 % | WEIGHT: 171.6 LBS | DIASTOLIC BLOOD PRESSURE: 68 MMHG | SYSTOLIC BLOOD PRESSURE: 118 MMHG | HEART RATE: 76 BPM | TEMPERATURE: 97 F | BODY MASS INDEX: 27.58 KG/M2 | HEIGHT: 66 IN

## 2019-06-10 DIAGNOSIS — I10 ESSENTIAL HYPERTENSION: ICD-10-CM

## 2019-06-10 DIAGNOSIS — Z12.39 SCREENING FOR BREAST CANCER: ICD-10-CM

## 2019-06-10 DIAGNOSIS — I10 ESSENTIAL HYPERTENSION: Primary | ICD-10-CM

## 2019-06-10 DIAGNOSIS — Z86.73 HX OF ISCHEMIC LEFT MCA STROKE: ICD-10-CM

## 2019-06-10 DIAGNOSIS — Z23 NEED FOR PNEUMOCOCCAL VACCINATION: ICD-10-CM

## 2019-06-10 DIAGNOSIS — Z86.73 HX OF ISCHEMIC RIGHT MCA STROKE: ICD-10-CM

## 2019-06-10 DIAGNOSIS — J45.20 MILD INTERMITTENT ASTHMA WITHOUT COMPLICATION: ICD-10-CM

## 2019-06-10 LAB
BILIRUBIN URINE: NEGATIVE
BLOOD, URINE: NEGATIVE
CLARITY: CLEAR
COLOR: YELLOW
CREATININE URINE: 32.3 MG/DL
GLUCOSE URINE: NEGATIVE MG/DL
KETONES, URINE: NEGATIVE MG/DL
LEUKOCYTE ESTERASE, URINE: NEGATIVE
MICROALBUMIN UR-MCNC: <1.2 MG/DL
MICROALBUMIN/CREAT UR-RTO: NORMAL MG/G (ref 0–30)
NITRITE, URINE: NEGATIVE
PH UA: 7.5 (ref 5–9)
PROTEIN UA: NEGATIVE MG/DL
SPECIFIC GRAVITY UA: 1.01 (ref 1–1.03)
UROBILINOGEN, URINE: 0.2 E.U./DL

## 2019-06-10 PROCEDURE — 4040F PNEUMOC VAC/ADMIN/RCVD: CPT | Performed by: FAMILY MEDICINE

## 2019-06-10 PROCEDURE — 1090F PRES/ABSN URINE INCON ASSESS: CPT | Performed by: FAMILY MEDICINE

## 2019-06-10 PROCEDURE — G8598 ASA/ANTIPLAT THER USED: HCPCS | Performed by: FAMILY MEDICINE

## 2019-06-10 PROCEDURE — 99214 OFFICE O/P EST MOD 30 MIN: CPT | Performed by: FAMILY MEDICINE

## 2019-06-10 PROCEDURE — G8419 CALC BMI OUT NRM PARAM NOF/U: HCPCS | Performed by: FAMILY MEDICINE

## 2019-06-10 PROCEDURE — G8399 PT W/DXA RESULTS DOCUMENT: HCPCS | Performed by: FAMILY MEDICINE

## 2019-06-10 PROCEDURE — G8427 DOCREV CUR MEDS BY ELIG CLIN: HCPCS | Performed by: FAMILY MEDICINE

## 2019-06-10 PROCEDURE — G0009 ADMIN PNEUMOCOCCAL VACCINE: HCPCS | Performed by: FAMILY MEDICINE

## 2019-06-10 PROCEDURE — 1123F ACP DISCUSS/DSCN MKR DOCD: CPT | Performed by: FAMILY MEDICINE

## 2019-06-10 PROCEDURE — 90732 PPSV23 VACC 2 YRS+ SUBQ/IM: CPT | Performed by: FAMILY MEDICINE

## 2019-06-10 PROCEDURE — 1036F TOBACCO NON-USER: CPT | Performed by: FAMILY MEDICINE

## 2019-06-10 RX ORDER — NIACIN 500 MG/1
TABLET, EXTENDED RELEASE ORAL
Qty: 270 TABLET | Refills: 1 | Status: SHIPPED | OUTPATIENT
Start: 2019-06-10 | End: 2019-08-23 | Stop reason: SDUPTHER

## 2019-06-10 NOTE — PROGRESS NOTES
Subjective  Mary Rogers, 78 y.o. female presents today with:  Chief Complaint   Patient presents with    Established New Doctor     no concerns            HPI    This is a new patient to me. I have reviewed the past medical and surgical history, social history and family history provided. I have reviewed  medication, previous testing and working diagnoses. I have reviewed the allergies and health maintenance information and correlated it into my decision making for this patient for care, diagnostics, consultations and treatment for today's visit. 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 Patient states that she has no neuromuscular deficits but that her short-term memory is not where she would expected to be.                         [facility], [address] [facility]                                   VASCULAR REPORT     PATIENT NAME: Yvon Peres                 :        1940  MED REC NO:   [id number]                            ROOM:  ACCOUNT NO:   [de-identified]                           ADMIT DATE: 2017  PROVIDER:     Axel Santoro MD     DATE OF PROCEDURE:  2017     REFERRING PHYSICIAN:  Dr. Dashawn Arevalo.     Melissa Goodness:  The patient has a history of stroke. Duplex scan  was done to look for any significant disease in the cervical portion.     RIGHT CAROTID SYSTEM:  Imaging of the right common carotid artery shows  intimal thickening. Plaque formation is seen in the right internal carotid  artery.     Narrative   EXAMINATION  CT BRAIN WITHOUT CONTRAST        CLINICAL INFORMATION  LEFT-SIDED NUMBNESS FOR 2 DAYS.       FINDINGS  There are no findings of acute intracranial pathology.     There are faint tiny hypodensities in the region of the basal ganglia,   probably representing lacunar infarctions of indeterminate age. Genie Dennis   is no intracranial hemorrhage or mass effect.  There are no extra axial   fluid collections.       There are no skull lesions.  The mastoids and middle ears are clear. Visualized portions of the paranasal sinuses and orbits are   unremarkable.       IMPRESSION  TINY BASAL GANGLIA LACUNAR INFARCTIONS OF INDETERMINATE   AGE.  NO INTRACRANIAL HEMORRHAGE OR MASS EFFECT.              -  RADWHERE        Read By- Jason Suarez   Released By- Jason Suarez   Released Date Time- 01/08/13 8714    This document has been electronically signed. Doppler of the right internal carotid artery shows a peak velocity of 127  cm per second with the ratio being 1.44. This shall be consistent with  50-59%.     Blood flow is antegrade in the right vertebral artery.     LEFT CAROTID SYSTEM:  Imaging of the left common carotid artery shows  intimal thickening. Plaque formation is seen in the left internal carotid  artery.     Doppler of the left internal carotid artery shows a peak velocity of 86 cm  per second. This shall be consistent with 1-15% stenosis.     Blood flow is antegrade in the left vertebral artery.     CONCLUSION:  1.  50-59% stenosis of right internal carotid artery. 2.  1-15% stenosis of left internal carotid artery. 3.  Blood flow is antegrade in the vertebral arteries. MRI OF THE BRAIN WITHOUT CONTRAST       CLINICAL HISTORY  Left-sided numbness, right-sided headache       COMPARISON  None available       FINDINGS  Multiplanar, multisequence MRI images of the brain were   obtained without contrast enhancement.       Diffusion weighted images show multiple focal areas of restricted   diffusion within the right occipital lobe, right parietal lobe and   right frontal lobe.  These may represent focal infarcts.  The scattered   distribution suggests that they are embolic in etiology.  T2 weighted   and FLAIR images show minimal edema in the area of the abnormal   diffusion.  No significant mass effect or midline shift is seen.  No   extra-axial fluid collections are evident.  There are scattered focal   nonspecific periventricular white matter changes bilaterally.  T1   weighted images show moderate generalized atrophy.       IMPRESSION  SCATTERED FOCAL AREAS OF ABNORMAL DIFFUSION WITHIN THE   RIGHT PARIETAL, FRONTAL AND OCCIPITAL LOBES SUGGESTING FOCAL ACUTE   INFARCTS. [address] BE EMBOLIC IN ETIOLOGY.       MODERATE ATROPHY.  NO SIGNIFICANT MASS EFFECT.       INTRACRANIAL MRA       CLINICAL HISTORY Headache, left-sided numbness       COMPARISON  None Available       FINDINGS  Three-D, time of flight MRA images of the [place]   and its major branches were obtained without contrast enhancement.       The images show no evidence of aneurysmal dilation, occlusion or   significant stenosis in the [place] or its major branches.  No   signal drop out in the distal arterial branches to suggest significant   distal atherosclerotic disease is evident.       IMPRESSION  NEGATIVE INTRACRANIAL MRA.                -  RADWHERE        Read By- CARLOS Jauregui   Released By- CARLOS Jauregui   Released Date Time- 01/09/13 1119     MRI BRAIN WITHOUT CONTRAST       COMPARISONS  1/9/13       CLINICAL HISTORY RIGHT-SIDED HEADACHE WITH LEFT ARM NUMBNESS. HISTORY   OF STROKE.       TECHNIQUE Multiplanar, multi-sequence MRI was performed on a 1.[employer]es[employer]   [facility] magnet.       FINDINGS  There is abnormal restricted diffusion within the right   frontal lobe, in the anterior insular region, MCA distribution. This is   consistent with acute infarct in this region. No other sites of   abnormal restricted diffusion. The ventricles are normal In position. There is no evidence for intraaxial or extraaxial hemorrhage. [place] is   no evidence for mass effect.  There is no shift of the midline   structures.  There are multiple foci of increased signal on FLAIR and   T2 ,  in the periventricular deep white matter and corona radiata,   which may be secondary to small vessel ischemic changes, demyelination,   or aging. These findings are similar to previous exam. There is one new   small lesion in the left parietal deep white matter. There is mild   prominence of the cerebral sulci and ventricles, commensurate with the   patient's age.  Flow-voids in the major intracranial blood vessels are   identified and are patent by spin-echo criteria.       The paranasal sinuses are clear. . Mastoid air cells are clear.  There   is generalized thinning of the corpus callosum.   The cerebellar   tonsils are not ectopic. The seventh eighth nerve complexes are   symmetrical.  No evidence for mass in the cerebellopontine angle. The   pituitary gland is unremarkable.       IMPRESSION       ACUTE INFARCT IN THE RIGHT FRONTAL LOBE, RIGHT ANTERIOR INSULAR REGION,   RIGHT MCA DISTRIBUTION.       CHRONIC SMALL VESSEL ISCHEMIC CHANGES DEEP WHITE MATTER.           MRA BRAIN       COMPARISONS  NONE       CLINICAL HISTORY  See above.       TECHNIQUE 3-D TIME-OF-FLIGHT MRA IMAGES OF THE [place] WERE   OBTAINED.       FINDINGS  Flow is visualized in both distal vertebral arteries,   basilar artery and posterior cerebral arteries.   Flow is visualized in   the intracranial internal carotid arteries and its branches including   anterior, middle and cerebral arteries.   No demonstrable aneurysm, AVM   is seen. There is lack of flow to a branch of the right MCA within the   right anterior insular region. This corresponds to the site of the   patient's acute CVA.       IMPRESSION  LACK OF FLOW TO A BRANCH OF THE RIGHT MCA, WITHIN THE   RIGHT ANTERIOR INSULAR REGION. THIS WOULD INDICATE OCCLUSION OR   HIGH-GRADE STENOSIS.                      -  RADWHERE        Read Shazia Garza   Released ByAntionette Garza   Released Date Time- 12/23/14 0842     Patient is here for f/u HTN. Is compliant with meds and has no side effects from them. Avoids added salt.  Tries to quit: 2/15/1989     Years since quittin.3    Smokeless tobacco: Never Used   Substance and Sexual Activity    Alcohol use: No    Drug use: No    Sexual activity: Not on file   Lifestyle    Physical activity:     Days per week: Not on file     Minutes per session: Not on file    Stress: Not on file   Relationships    Social connections:     Talks on phone: Not on file     Gets together: Not on file     Attends [employer] service: Not on file     Active member of club or organization: Not on file     Attends meetings of clubs or organizations: Not on file     Relationship status: Not on file    Intimate partner violence:     Fear of current or ex partner: Not on file     Emotionally abused: Not on file     Physically abused: Not on file     Forced sexual activity: Not on file   Other Topics Concern    Not on file   Social History Narrative    Born in [place], 2 younger brothers, one dec 2014, one in [place]    , retired     Lives with spouse in a house in [facility]     of first spouse    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, owned a convenient store    [place], travel    4 children, 2 daughters in [place] ([employer] [facility] and [place]), 2 sons in [address] [place] [address] great grandchildren >50     Family History   Problem Relation Age of Onset    Kidney Disease Mother         dec age 79    Hypertension Mother    Berman Migraines Mother     Diabetes Father     Heart Failure Father         dec age 76    Cancer Brother         dec age 76    Heart Attack Paternal Grandmother     Stroke Paternal Grandfather      Allergies   Allergen Reactions    Sulfa Antibiotics     Iodinated Diagnostic Agents      Oral & IV dye group    Statins Other (See Comments)     Current Outpatient Medications   Medication Sig Dispense Refill    tiZANidine (ZANAFLEX) 4 MG tablet Take 4 mg by mouth as needed Take one half of a tablet daily      ezetimibe (ZETIA) 10 MG tablet TAKE 1 TABLET DAILY 90 tablet 1    triamterene-hydrochlorothiazide (MAXZIDE-25) 37.5-25 MG per tablet TAKE 1 TABLET DAILY 90 tablet 1    amLODIPine-benazepril (LOTREL) 5-20 MG per capsule TAKE 1 CAPSULE DAILY 90 capsule 1    gelatin 650 MG capsule Take 8 capsules by mouth daily      CINNAMON PO Take 1 capsule by mouth daily       acetaminophen (TYLENOL) 500 MG tablet Take 500 mg by mouth every 6 hours as needed for Pain      calcium carbonate (TUMS) 500 MG chewable tablet Take 1 tablet by mouth as needed for Heartburn      niacin (NIASPAN) 500 MG extended release tablet TAKE 1 TABLET 3 TIMES A  tablet 1    meclizine (ANTIVERT) 12.5 MG tablet Take 1 tablet by mouth daily 30 tablet 5    AGGRENOX  MG per SR capsule TAKE 1 CAPSULE TWICE DAILY 180 capsule 1    Polyvinyl Alcohol-Povidone (REFRESH OP) Apply 1 drop to eye 4 times daily as needed.  Vitamin D (CHOLECALCIFEROL) 1000 UNITS CAPS capsule Take 1,000 Units by mouth daily.  Magnesium 500 MG CAPS Take 1 capsule by mouth daily.  fish oil-omega-3 fatty acids 1000 MG capsule Take 1 g by mouth 3 times daily. No current facility-administered medications for this visit. PMH, Surgical Hx, Family Hx, and Social Hxreviewed and updated. Health Maintenance reviewed. Objective    Vitals:    06/10/19 1336   BP: 118/68   Site: Left Upper Arm   Position: Sitting   Cuff Size: Medium Adult   Pulse: 76   Resp: 14   Temp: 97 °F (36.1 °C)   TempSrc: Tympanic   SpO2: 100%   Weight: 171 lb 9.6 oz (77.8 kg)   Height: 5' 5.5\" (1.664 m)        Physical Exam   Constitutional: She is oriented to person, place, and time. She appears well-developed and well-nourished. HENT:   Head: Normocephalic and atraumatic. Eyes: Conjunctivae are normal.   Neck: Neck supple. Carotid bruit is not present. No thyromegaly present. Cardiovascular: Normal rate, regular rhythm, S1 normal, S2 normal, normal heart sounds and intact distal pulses. Pulmonary/Chest: Effort normal. She has no wheezes. She has no rales. Musculoskeletal: She exhibits edema (mild, trace B/L pretibial). Lymphadenopathy:     She has no cervical adenopathy. Neurological: She is alert and oriented to person, place, and time. Skin: Skin is warm and dry. Psychiatric: She has a normal mood and affect. No results found for: LABA1C  Lab Results   Component Value Date    CREATININE 0.62 11/19/2018     Lab Results   Component Value Date    ALT 17 11/19/2018    AST 19 11/19/2018     Lab Results   Component Value Date    CHOL 199 06/12/2018    TRIG 106 06/12/2018    HDL 49 06/12/2018    LDLCALC 129 06/12/2018        Assessment & Plan   Visit Diagnoses and Associated Orders     Essential hypertension    -  Primary    Stable and well controlled on amlodipine, benazepril, triamterene with hydrochlorothiazide. Urinalysis [[id number] Custom]   - Future Order    Microalbumin / Creatinine Urine Ratio [[id number] Custom]   - Future Order    CBC With Auto Differential [[id number] Custom]   - Future Order    Basic Metabolic Panel [[id number] Custom]   - Future Order         Hx of ischemic left MCA stroke        Stable and well-controlled on Aggrenox with adequate high blood pressure control and lipid-lowering agents including Zetia and Niaspan         Hx of ischemic right MCA stroke        See above. Mild intermittent asthma without complication        Stable with no meds.          Screening for breast cancer        HUBER DIGITAL SCREEN W OR WO CAD BILATERAL [[id number] Custom]   - Future Order         Need for pneumococcal vaccination        Pneumococcal polysaccharide vaccine 23-valent greater than or equal to 1yo subcutaneous/IM [[id number] Custom]                   Reviewed with the patient: all disease processes, current clinical status, medications, activities and diet.      Side effects, adverse effects of the medication prescribed today, as well as treatment plan/ rationale and result expectations have been discussed with the patient who expresses understanding and desires to proceed.     Close follow up to evaluate treatment results and for coordination of care. I have reviewed the patient's medical history in detail and updated the computerized patient record. More than 50% of the appointment was spent in face-to-face counseling, education and care coordination. Please note this report has been partially produced using speech recognition software and may contain mistakes related to that system including errors in grammar, punctuation and spelling as well as words and phrases that may seem inappropriate. If there are questions or concerns, please feel free to contact me to clarify. Orders Placed This Encounter   Procedures    HUBER DIGITAL SCREEN W OR WO CAD BILATERAL     Standing Status:   Future     Standing Expiration Date:   8/10/2020     Order Specific Question:   Reason for exam:     Answer:   screening, breast CA dx 2014    Pneumococcal polysaccharide vaccine 23-valent greater than or equal to 3yo subcutaneous/IM     NDC 5987-9103-30 Use for 1 MDV PER PACKAGE  PNEUMOVAX 23 is approved for use in persons 48years of age or  older and persons aged . 2 years who are at increased risk for  pneumococcal disease    Urinalysis     Standing Status:   Future     Standing Expiration Date:   6/10/2020    Microalbumin / Creatinine Urine Ratio     Standing Status:   Future     Standing Expiration Date:   8/10/2019    CBC With Auto Differential     Standing Status:   Future     Standing Expiration Date:   6/10/2020    Basic Metabolic Panel     Standing Status:   Future     Standing Expiration Date:   6/10/2020     No orders of the defined types were placed in this encounter. There are no discontinued medications. Return in about 6 months (around 12/10/2019) for HTN, CVD. Controlled Substance Monitoring:    Acute and Chronic Pain Monitoring:   No flowsheet data found.         Franco Bledsoe Keri García MD

## 2019-06-18 ENCOUNTER — HOSPITAL ENCOUNTER (OUTPATIENT)
Dept: LAB | Age: 79
Discharge: HOME | End: 2019-06-18
Payer: MEDICARE

## 2019-06-18 LAB
ANION GAP SERPL CALCULATED.3IONS-SCNC: 16 MEQ/L (ref 9–15)
BASOPHILS ABSOLUTE: 0 K/UL (ref 0–0.2)
BASOPHILS RELATIVE PERCENT: 0.9 %
BUN BLDV-MCNC: 11 MG/DL (ref 8–23)
CALCIUM SERPL-MCNC: 10.1 MG/DL (ref 8.5–9.9)
CHLORIDE BLD-SCNC: 97 MEQ/L (ref 95–107)
CO2: 24 MEQ/L (ref 20–31)
CREAT SERPL-MCNC: 0.7 MG/DL (ref 0.5–0.9)
EOSINOPHILS ABSOLUTE: 0.1 K/UL (ref 0–0.7)
EOSINOPHILS RELATIVE PERCENT: 1.6 %
GFR AFRICAN AMERICAN: >60
GFR NON-AFRICAN AMERICAN: >60
GLUCOSE BLD-MCNC: 110 MG/DL (ref 70–99)
HCT VFR BLD CALC: 39.3 % (ref 37–47)
HEMOGLOBIN: 13.8 G/DL (ref 12–16)
LYMPHOCYTES ABSOLUTE: 1.6 K/UL (ref 1–4.8)
LYMPHOCYTES RELATIVE PERCENT: 29.5 %
MCH RBC QN AUTO: 32.1 PG (ref 27–31.3)
MCHC RBC AUTO-ENTMCNC: 35 % (ref 33–37)
MCV RBC AUTO: 91.8 FL (ref 82–100)
MONOCYTES ABSOLUTE: 0.6 K/UL (ref 0.2–0.8)
MONOCYTES RELATIVE PERCENT: 10.2 %
NEUTROPHILS ABSOLUTE: 3.2 K/UL (ref 1.4–6.5)
NEUTROPHILS RELATIVE PERCENT: 57.8 %
PDW BLD-RTO: 13.6 % (ref 11.5–14.5)
PLATELET # BLD: 245 K/UL (ref 130–400)
POTASSIUM SERPL-SCNC: 3.6 MEQ/L (ref 3.4–4.9)
RBC # BLD: 4.29 M/UL (ref 4.2–5.4)
SODIUM BLD-SCNC: 137 MEQ/L (ref 135–144)
WBC # BLD: 5.5 K/UL (ref 4.8–10.8)

## 2019-06-18 PROCEDURE — 85025 COMPLETE CBC W/AUTO DIFF WBC: CPT

## 2019-06-18 PROCEDURE — 36415 COLL VENOUS BLD VENIPUNCTURE: CPT

## 2019-06-18 PROCEDURE — 80048 BASIC METABOLIC PNL TOTAL CA: CPT

## 2019-06-19 NOTE — RESULT ENCOUNTER NOTE
Sugar was a little bit high at 110. I would like to recheck it again in 3 months when you are fasting.

## 2019-07-29 ENCOUNTER — HOSPITAL ENCOUNTER (OUTPATIENT)
Dept: WOMENS IMAGING | Age: 79
Discharge: HOME | End: 2019-07-31
Payer: MEDICARE

## 2019-07-29 DIAGNOSIS — Z12.39 SCREENING FOR BREAST CANCER: ICD-10-CM

## 2019-07-29 PROCEDURE — 77067 SCR MAMMO BI INCL CAD: CPT

## 2019-08-21 ENCOUNTER — OFFICE VISIT (OUTPATIENT)
Dept: FAMILY MEDICINE CLINIC | Age: 79
End: 2019-08-21
Payer: MEDICARE

## 2019-08-21 VITALS
WEIGHT: 168.8 LBS | BODY MASS INDEX: 27.13 KG/M2 | OXYGEN SATURATION: 98 % | SYSTOLIC BLOOD PRESSURE: 136 MMHG | DIASTOLIC BLOOD PRESSURE: 68 MMHG | HEART RATE: 62 BPM | TEMPERATURE: 98.6 F | RESPIRATION RATE: 16 BRPM | HEIGHT: 66 IN

## 2019-08-21 DIAGNOSIS — R30.0 DYSURIA: Primary | ICD-10-CM

## 2019-08-21 DIAGNOSIS — N30.00 ACUTE CYSTITIS WITHOUT HEMATURIA: ICD-10-CM

## 2019-08-21 LAB
BILIRUBIN, POC: ABNORMAL
BLOOD URINE, POC: ABNORMAL
CLARITY, POC: ABNORMAL
COLOR, POC: YELLOW
GLUCOSE URINE, POC: ABNORMAL
KETONES, POC: ABNORMAL
LEUKOCYTE EST, POC: ABNORMAL
NITRITE, POC: ABNORMAL
PH, POC: 6
PROTEIN, POC: ABNORMAL
SPECIFIC GRAVITY, POC: 1.01
UROBILINOGEN, POC: ABNORMAL

## 2019-08-21 PROCEDURE — 99213 OFFICE O/P EST LOW 20 MIN: CPT | Performed by: PHYSICIAN ASSISTANT

## 2019-08-21 PROCEDURE — G8419 CALC BMI OUT NRM PARAM NOF/U: HCPCS | Performed by: PHYSICIAN ASSISTANT

## 2019-08-21 PROCEDURE — 81002 URINALYSIS NONAUTO W/O SCOPE: CPT | Performed by: PHYSICIAN ASSISTANT

## 2019-08-21 PROCEDURE — 4040F PNEUMOC VAC/ADMIN/RCVD: CPT | Performed by: PHYSICIAN ASSISTANT

## 2019-08-21 PROCEDURE — 1036F TOBACCO NON-USER: CPT | Performed by: PHYSICIAN ASSISTANT

## 2019-08-21 PROCEDURE — G8399 PT W/DXA RESULTS DOCUMENT: HCPCS | Performed by: PHYSICIAN ASSISTANT

## 2019-08-21 PROCEDURE — G8598 ASA/ANTIPLAT THER USED: HCPCS | Performed by: PHYSICIAN ASSISTANT

## 2019-08-21 PROCEDURE — 1123F ACP DISCUSS/DSCN MKR DOCD: CPT | Performed by: PHYSICIAN ASSISTANT

## 2019-08-21 PROCEDURE — 1090F PRES/ABSN URINE INCON ASSESS: CPT | Performed by: PHYSICIAN ASSISTANT

## 2019-08-21 PROCEDURE — G8427 DOCREV CUR MEDS BY ELIG CLIN: HCPCS | Performed by: PHYSICIAN ASSISTANT

## 2019-08-21 RX ORDER — NITROFURANTOIN 25; 75 MG/1; MG/1
100 CAPSULE ORAL 2 TIMES DAILY
Qty: 14 CAPSULE | Refills: 0 | Status: SHIPPED | OUTPATIENT
Start: 2019-08-21 | End: 2019-08-28

## 2019-08-21 ASSESSMENT — ENCOUNTER SYMPTOMS
VOMITING: 0
NAUSEA: 0

## 2019-08-21 NOTE — PROGRESS NOTES
16   Temp: 98.6 °F (37 °C)   TempSrc: Temporal   SpO2: 98%   Weight: 168 lb 12.8 oz (76.6 kg)   Height: 5' 5.5\" (1.664 m)       Physical Exam   Constitutional: She appears well-developed and well-nourished. HENT:   Head: Normocephalic and atraumatic. Eyes: Conjunctivae are normal. Right eye exhibits no discharge. Left eye exhibits no discharge. Cardiovascular: Normal rate, regular rhythm and normal heart sounds. Pulmonary/Chest: Effort normal and breath sounds normal.   Abdominal: Soft. Bowel sounds are normal. She exhibits no distension and no mass. There is no tenderness. There is no rebound, no guarding and no CVA tenderness. Skin: She is not diaphoretic. Vitals reviewed. Assessment and Plan      ICD-10-CM    1. Dysuria R30.0 POCT Urinalysis no Micro   2. Acute cystitis without hematuria N30.00 Urine Culture       Orders Placed This Encounter   Procedures    Urine Culture     Standing Status:   Future     Standing Expiration Date:   8/21/2020     Order Specific Question:   Specify (ex-cath, midstream, cysto, etc)? Answer:   midstream    POCT Urinalysis no Micro       Orders Placed This Encounter   Medications    nitrofurantoin, macrocrystal-monohydrate, (MACROBID) 100 MG capsule     Sig: Take 1 capsule by mouth 2 times daily for 7 days     Dispense:  14 capsule     Refill:  0       Reviewed with the patient: current clinicalstatus, medications, activities and diet. Side effects, adverse effects of the medication prescribed today, as well as treatment plan and result expectations have been discussed with the patient who expressesunderstanding and desires to proceed. Close follow up to evaluate treatment results and for coordination of care. I have reviewed the patient's medical history in detail and updated the computerized patientrecord. Return if symptoms worsen or fail to improve, for follow up with PCP.     ALYCIA Burch

## 2019-08-24 LAB
ORGANISM: ABNORMAL
URINE CULTURE, ROUTINE: ABNORMAL

## 2019-08-26 RX ORDER — TRIAMTERENE AND HYDROCHLOROTHIAZIDE 37.5; 25 MG/1; MG/1
TABLET ORAL
Qty: 90 TABLET | Refills: 1 | Status: SHIPPED | OUTPATIENT
Start: 2019-08-26 | End: 2020-03-16 | Stop reason: SDUPTHER

## 2019-08-26 RX ORDER — EZETIMIBE 10 MG/1
TABLET ORAL
Qty: 90 TABLET | Refills: 1 | Status: SHIPPED | OUTPATIENT
Start: 2019-08-26 | End: 2020-03-16 | Stop reason: SDUPTHER

## 2019-08-26 RX ORDER — AMLODIPINE BESYLATE AND BENAZEPRIL HYDROCHLORIDE 5; 20 MG/1; MG/1
CAPSULE ORAL
Qty: 90 CAPSULE | Refills: 1 | Status: SHIPPED | OUTPATIENT
Start: 2019-08-26 | End: 2020-02-05

## 2019-08-26 RX ORDER — NIACIN 500 MG/1
TABLET, EXTENDED RELEASE ORAL
Qty: 270 TABLET | Refills: 1 | Status: SHIPPED | OUTPATIENT
Start: 2019-08-26 | End: 2020-03-16 | Stop reason: SDUPTHER

## 2019-08-28 ENCOUNTER — TELEPHONE (OUTPATIENT)
Dept: FAMILY MEDICINE CLINIC | Age: 79
End: 2019-08-28

## 2019-08-28 RX ORDER — CIPROFLOXACIN 500 MG/1
500 TABLET, FILM COATED ORAL 2 TIMES DAILY
Qty: 14 TABLET | Refills: 0 | Status: SHIPPED | OUTPATIENT
Start: 2019-08-28 | End: 2019-08-29

## 2019-08-28 NOTE — TELEPHONE ENCOUNTER
I spoke with Dennis Arrington. She has a new prescription was never called in for her. I will call in the new prescription now and informed her to stop the Macrobid due to resistance. She understands.

## 2019-08-29 ENCOUNTER — TELEPHONE (OUTPATIENT)
Dept: FAMILY MEDICINE CLINIC | Age: 79
End: 2019-08-29

## 2019-08-29 RX ORDER — AMPICILLIN 500 MG/1
500 CAPSULE ORAL 4 TIMES DAILY
Qty: 28 CAPSULE | Refills: 0 | Status: SHIPPED | OUTPATIENT
Start: 2019-08-29 | End: 2019-08-30 | Stop reason: SDUPTHER

## 2019-08-30 RX ORDER — AMPICILLIN 500 MG/1
500 CAPSULE ORAL 4 TIMES DAILY
Qty: 28 CAPSULE | Refills: 0 | Status: SHIPPED | OUTPATIENT
Start: 2019-08-30 | End: 2019-09-06

## 2019-08-30 NOTE — TELEPHONE ENCOUNTER
Patient stated her medication went to the wrong pharmacy. She needs it to go to [facility] in [employer] [facility].

## 2019-09-05 ENCOUNTER — TELEPHONE (OUTPATIENT)
Dept: FAMILY MEDICINE CLINIC | Age: 79
End: 2019-09-05

## 2019-09-27 ENCOUNTER — HOSPITAL ENCOUNTER (OUTPATIENT)
Dept: WOMENS IMAGING | Age: 79
Discharge: HOME | End: 2019-09-29
Payer: MEDICARE

## 2019-09-27 DIAGNOSIS — M81.0 POSTMENOPAUSAL OSTEOPOROSIS: ICD-10-CM

## 2019-09-27 PROCEDURE — 77080 DXA BONE DENSITY AXIAL: CPT

## 2019-10-14 RX ORDER — MECLIZINE HCL 12.5 MG/1
12.5 TABLET ORAL DAILY
Qty: 90 TABLET | Refills: 1 | Status: SHIPPED | OUTPATIENT
Start: 2019-10-14 | End: 2021-05-03 | Stop reason: SDUPTHER

## 2019-12-09 ENCOUNTER — OFFICE VISIT (OUTPATIENT)
Dept: FAMILY MEDICINE CLINIC | Age: 79
End: 2019-12-09
Payer: MEDICARE

## 2019-12-09 VITALS
TEMPERATURE: 97.2 F | DIASTOLIC BLOOD PRESSURE: 80 MMHG | SYSTOLIC BLOOD PRESSURE: 134 MMHG | OXYGEN SATURATION: 99 % | HEART RATE: 68 BPM | RESPIRATION RATE: 16 BRPM | HEIGHT: 66 IN | WEIGHT: 167 LBS | BODY MASS INDEX: 26.84 KG/M2

## 2019-12-09 DIAGNOSIS — E78.5 HYPERLIPIDEMIA, UNSPECIFIED HYPERLIPIDEMIA TYPE: ICD-10-CM

## 2019-12-09 DIAGNOSIS — J45.20 MILD INTERMITTENT ASTHMA WITHOUT COMPLICATION: ICD-10-CM

## 2019-12-09 DIAGNOSIS — I10 ESSENTIAL HYPERTENSION: Primary | ICD-10-CM

## 2019-12-09 DIAGNOSIS — I67.9 CEREBROVASCULAR DISEASE: Chronic | ICD-10-CM

## 2019-12-09 DIAGNOSIS — I10 ESSENTIAL HYPERTENSION: ICD-10-CM

## 2019-12-09 PROBLEM — C77.3 SECONDARY AND UNSPECIFIED MALIGNANT NEOPLASM OF AXILLA AND UPPER LIMB LYMPH NODES (HCC): Status: RESOLVED | Noted: 2018-11-08 | Resolved: 2019-12-09

## 2019-12-09 LAB
ALBUMIN SERPL-MCNC: 4.3 G/DL (ref 3.5–4.6)
ALP BLD-CCNC: 133 U/L (ref 40–130)
ALT SERPL-CCNC: 17 U/L (ref 0–33)
ANION GAP SERPL CALCULATED.3IONS-SCNC: 12 MEQ/L (ref 9–15)
AST SERPL-CCNC: 20 U/L (ref 0–35)
BILIRUB SERPL-MCNC: 0.3 MG/DL (ref 0.2–0.7)
BUN BLDV-MCNC: 11 MG/DL (ref 8–23)
CALCIUM SERPL-MCNC: 10 MG/DL (ref 8.5–9.9)
CHLORIDE BLD-SCNC: 101 MEQ/L (ref 95–107)
CHOLESTEROL, FASTING: 195 MG/DL (ref 0–199)
CO2: 26 MEQ/L (ref 20–31)
CREAT SERPL-MCNC: 0.74 MG/DL (ref 0.5–0.9)
GFR AFRICAN AMERICAN: >60
GFR NON-AFRICAN AMERICAN: >60
GLOBULIN: 2.9 G/DL (ref 2.3–3.5)
GLUCOSE BLD-MCNC: 100 MG/DL (ref 70–99)
HDLC SERPL-MCNC: 61 MG/DL (ref 40–59)
LDL CHOLESTEROL CALCULATED: 118 MG/DL (ref 0–129)
POTASSIUM SERPL-SCNC: 3.6 MEQ/L (ref 3.4–4.9)
SODIUM BLD-SCNC: 139 MEQ/L (ref 135–144)
TOTAL PROTEIN: 7.2 G/DL (ref 6.3–8)
TRIGLYCERIDE, FASTING: 79 MG/DL (ref 0–150)

## 2019-12-09 PROCEDURE — G8399 PT W/DXA RESULTS DOCUMENT: HCPCS | Performed by: FAMILY MEDICINE

## 2019-12-09 PROCEDURE — 1090F PRES/ABSN URINE INCON ASSESS: CPT | Performed by: FAMILY MEDICINE

## 2019-12-09 PROCEDURE — G8482 FLU IMMUNIZE ORDER/ADMIN: HCPCS | Performed by: FAMILY MEDICINE

## 2019-12-09 PROCEDURE — 1036F TOBACCO NON-USER: CPT | Performed by: FAMILY MEDICINE

## 2019-12-09 PROCEDURE — 1123F ACP DISCUSS/DSCN MKR DOCD: CPT | Performed by: FAMILY MEDICINE

## 2019-12-09 PROCEDURE — 4040F PNEUMOC VAC/ADMIN/RCVD: CPT | Performed by: FAMILY MEDICINE

## 2019-12-09 PROCEDURE — 99214 OFFICE O/P EST MOD 30 MIN: CPT | Performed by: FAMILY MEDICINE

## 2019-12-09 PROCEDURE — G8427 DOCREV CUR MEDS BY ELIG CLIN: HCPCS | Performed by: FAMILY MEDICINE

## 2019-12-09 PROCEDURE — G8598 ASA/ANTIPLAT THER USED: HCPCS | Performed by: FAMILY MEDICINE

## 2019-12-09 PROCEDURE — G8417 CALC BMI ABV UP PARAM F/U: HCPCS | Performed by: FAMILY MEDICINE

## 2019-12-10 RX ORDER — ASPIRIN AND DIPYRIDAMOLE 25; 200 MG/1; MG/1
1 CAPSULE, EXTENDED RELEASE ORAL 2 TIMES DAILY
Qty: 180 CAPSULE | Refills: 0 | Status: SHIPPED | OUTPATIENT
Start: 2019-12-10 | End: 2020-02-04 | Stop reason: SDUPTHER

## 2020-01-27 ENCOUNTER — HOSPITAL ENCOUNTER (OUTPATIENT)
Dept: ULTRASOUND IMAGING | Age: 80
Discharge: HOME | End: 2020-01-29
Payer: MEDICARE

## 2020-01-27 PROCEDURE — 93880 EXTRACRANIAL BILAT STUDY: CPT

## 2020-02-03 NOTE — TELEPHONE ENCOUNTER
Pharmacy is requesting medication refill.  Please approve or deny this request.    Rx requested:  Requested Prescriptions     Pending Prescriptions Disp Refills    amLODIPine-benazepril (LOTREL) 5-20 MG per capsule [Pharmacy Med Name: AMLOD/BENAZE CAP 5-20MG] 90 capsule 1     Sig: TAKE 1 CAPSULE DAILY         Last Office Visit:   12/9/2019      Next Visit Date:  Future Appointments   Date Time Provider [facility]   2/4/2020  1:45 PM Betzy Whiteside  [place]   6/8/2020  9:30 AM Gillian Jeans, MD [address] [place] [address]

## 2020-02-04 ENCOUNTER — OFFICE VISIT (OUTPATIENT)
Dept: NEUROLOGY | Age: 80
End: 2020-02-04
Payer: MEDICARE

## 2020-02-04 VITALS — WEIGHT: 166.8 LBS | BODY MASS INDEX: 27.33 KG/M2 | DIASTOLIC BLOOD PRESSURE: 64 MMHG | SYSTOLIC BLOOD PRESSURE: 136 MMHG

## 2020-02-04 PROBLEM — G25.0 ESSENTIAL TREMOR: Status: ACTIVE | Noted: 2020-02-04

## 2020-02-04 PROBLEM — H81.13 BENIGN PAROXYSMAL VERTIGO OF BOTH EARS: Status: ACTIVE | Noted: 2020-02-04

## 2020-02-04 PROBLEM — G43.909 MIGRAINES: Status: ACTIVE | Noted: 2020-02-04

## 2020-02-04 PROBLEM — R42 DIZZINESS AND GIDDINESS: Status: ACTIVE | Noted: 2020-02-04

## 2020-02-04 PROBLEM — I66.01 OCCLUSION AND STENOSIS OF RIGHT MIDDLE CEREBRAL ARTERY: Status: ACTIVE | Noted: 2020-02-04

## 2020-02-04 PROCEDURE — G8399 PT W/DXA RESULTS DOCUMENT: HCPCS | Performed by: PSYCHIATRY & NEUROLOGY

## 2020-02-04 PROCEDURE — 1123F ACP DISCUSS/DSCN MKR DOCD: CPT | Performed by: PSYCHIATRY & NEUROLOGY

## 2020-02-04 PROCEDURE — 4040F PNEUMOC VAC/ADMIN/RCVD: CPT | Performed by: PSYCHIATRY & NEUROLOGY

## 2020-02-04 PROCEDURE — 1036F TOBACCO NON-USER: CPT | Performed by: PSYCHIATRY & NEUROLOGY

## 2020-02-04 PROCEDURE — G8417 CALC BMI ABV UP PARAM F/U: HCPCS | Performed by: PSYCHIATRY & NEUROLOGY

## 2020-02-04 PROCEDURE — G8482 FLU IMMUNIZE ORDER/ADMIN: HCPCS | Performed by: PSYCHIATRY & NEUROLOGY

## 2020-02-04 PROCEDURE — 99214 OFFICE O/P EST MOD 30 MIN: CPT | Performed by: PSYCHIATRY & NEUROLOGY

## 2020-02-04 PROCEDURE — 1090F PRES/ABSN URINE INCON ASSESS: CPT | Performed by: PSYCHIATRY & NEUROLOGY

## 2020-02-04 PROCEDURE — G8427 DOCREV CUR MEDS BY ELIG CLIN: HCPCS | Performed by: PSYCHIATRY & NEUROLOGY

## 2020-02-04 RX ORDER — ASPIRIN AND DIPYRIDAMOLE 25; 200 MG/1; MG/1
1 CAPSULE, EXTENDED RELEASE ORAL 2 TIMES DAILY
Qty: 180 CAPSULE | Refills: 3 | Status: SHIPPED | OUTPATIENT
Start: 2020-02-04 | End: 2021-04-07

## 2020-02-04 RX ORDER — TIZANIDINE 4 MG/1
4 TABLET ORAL EVERY 6 HOURS PRN
COMMUNITY
End: 2020-06-22 | Stop reason: ALTCHOICE

## 2020-02-04 ASSESSMENT — ENCOUNTER SYMPTOMS
NAUSEA: 0
SHORTNESS OF BREATH: 0
BACK PAIN: 0
VOMITING: 0
TROUBLE SWALLOWING: 0
PHOTOPHOBIA: 0
CHOKING: 0

## 2020-02-04 NOTE — PROGRESS NOTES
Subjective:      Patient ID: Charlotte Mir is a 78 y.o. female who presents today for:  Chief Complaint   Patient presents with    Follow-up     pateitn state that she is doing pretty good at this time. HPI 79-year right-handed female with history of transient ischemic events without recurrence. Patient is  actually doing well on Aggrenox. Patient has not had  any recurrent symptoms since we have started on Aggrenox many years ago. She does not have any headaches with this. She has some minor tremors which we are following. She does have some vestibular dysfunction with a mild palatal tremor which has not worsened she started difficulty swallowing. Patient still continues to have the palatal tremor is not bothersome to her. She is not any memory issues and she functions independently in her activity of daily living. One mechanical fall without injuries. A carotid ultrasound shows less than 50% stenosis.     Past Medical History:   Diagnosis Date    Asthma     Asthma in remission 1990    Elevated TSH     History of artificial lens replacement 3/24/2015    History of breast cancer 02/2014    Invasive ductal cancer right breast,  [facility], Dr Harris Ruiz HTN (hypertension) 1980    was with [facility]    Hx of ischemic left MCA stroke 2013    no residual, Dr Nini Solis Hx of ischemic right MCA stroke 12/2014    no residual, frontal lobe    Hyperlipidemia     Osteoarthritis     Secondary and unspecified malignant neoplasm of axilla and upper limb lymph nodes ([address] 11/8/2018    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    Dr Lily Maria (ortho)    Vitamin D deficiency      Past Surgical History:   Procedure Laterality Date    BLADDER SUSPENSION      BREAST BIOPSY Right 2/14/14    BREAST LUMPECTOMY Right 3/4/2014    Lumpectomy with SLNB, Dr Carlyn Little Dr in [address] [place] [address] History     Socioeconomic History    Marital status: Hypertension Mother    Jeffry Benítez Migraines Mother     Diabetes Father     Heart Failure Father         dec age 76    [de-identified] Brother         dec age 76    Heart Attack Paternal Grandmother     Stroke Paternal Grandfather      Allergies   Allergen Reactions    Sulfa Antibiotics     Iodinated Diagnostic Agents      Oral & IV dye group    Statins Other (See Comments)         Review of Systems   Constitutional: Negative for fever. HENT: Negative for ear pain, tinnitus and trouble swallowing. Eyes: Negative for photophobia and visual disturbance. Respiratory: Negative for choking and shortness of breath. Cardiovascular: Negative for chest pain and palpitations. Gastrointestinal: Negative for nausea and vomiting. Musculoskeletal: Negative for back pain, gait problem, joint swelling, myalgias, neck pain and neck stiffness. Neurological: Negative for dizziness, tremors, seizures, syncope, facial asymmetry, speech difficulty, weakness, light-headedness, numbness and headaches. Psychiatric/Behavioral: Negative for behavioral problems, confusion, hallucinations and sleep disturbance. Objective:   /64 (Site: Left Upper Arm, Position: Sitting, Cuff Size: Medium Adult)   Wt 166 lb 12.8 oz (75.7 kg)   BMI 27.33 kg/m²     Physical Exam  Vitals signs reviewed. Eyes:      Pupils: Pupils are equal, round, and reactive to light. Neck:      Musculoskeletal: Normal range of motion. Cardiovascular:      Rate and Rhythm: Normal rate and regular rhythm. Heart sounds: No murmur. Pulmonary:      Effort: Pulmonary effort is normal.      Breath sounds: Normal breath sounds. Musculoskeletal: Normal range of motion. Neurological:      Mental Status: She is alert and oriented to person, place, and time. Cranial Nerves: No cranial nerve deficit. Sensory: No sensory deficit. Motor: No abnormal muscle tone.       Coordination: Coordination normal.      Deep Tendon Reflexes: Reflexes are normal and symmetric. Babinski sign absent on the right side. Babinski sign absent on the left side. Us Carotid Artery Bilateral    Result Date: 1/27/2020  Study: Carotid artery duplex Reason for exam: Hypertension Technique: Imaging and analysis of flow which incorporated color and spectral Doppler waveform performed bilaterally. Findings: Imaging and analysis of flow revealed no significant stenosis in the carotid vasculature on either side. Estimated stenosis less than 50% in the carotid vasculature bilaterally. Antegrade vertebral flow noted bilaterally. Velocities in centimeters per second recorded as follows: Right side: Common carotid artery 99, proximal internal carotid 116, mid internal carotid 111, distal internal carotid 85, external carotid artery 101, ICA/CCA ratio 1.23. Left side:  Common carotid artery 94, proximal internal carotid 70, mid internal carotid 80, distal internal carotid 80, external carotid artery 125, ICA/CCA ratio 0.85. NARROWING IN THE CAROTID VASCULATURE LESS THAN 50% BILATERALLY. NO CHANGE FROM JANUARY 18, 2019. Stenosis based on consensus criteria of Society of Ultrasound Radiologists.        Lab Results   Component Value Date    WBC 5.5 06/18/2019    RBC 4.29 06/18/2019    RBC 4.66 05/16/2012    HGB 13.8 06/18/2019    HCT 39.3 06/18/2019    MCV 91.8 06/18/2019    MCH 32.1 06/18/2019    MCHC 35.0 06/18/2019    RDW 13.6 06/18/2019     06/18/2019    MPV 8.1 05/11/2015     Lab Results   Component Value Date     12/09/2019    K 3.6 12/09/2019     12/09/2019    CO2 26 12/09/2019    BUN 11 12/09/2019    CREATININE 0.74 12/09/2019    GFRAA >60.0 12/09/2019    LABGLOM >60.0 12/09/2019    GLUCOSE 100 12/09/2019    GLUCOSE 91 05/16/2012    PROT 7.2 12/09/2019    LABALBU 4.3 12/09/2019    LABALBU 4.7 05/16/2012    CALCIUM 10.0 12/09/2019    BILITOT 0.3 12/09/2019    ALKPHOS 133 12/09/2019    AST 20 12/09/2019    ALT 17 12/09/2019     Lab Results   Component Value Date    PROTIME 10.0 12/22/2014    INR 1.0 12/22/2014     Lab Results   Component Value Date    TSH 2.920 06/12/2018     Lab Results   Component Value Date    TRIG 106 06/12/2018    HDL 61 12/09/2019    LDLCALC 118 12/09/2019     No results found for: Oneal Elise, LABBENZ, CANNAB, COCAINESCRN, LABMETH, OPIATESCREENURINE, PHENCYCLIDINESCREENURINE, PPXUR, ETOH  No results found for: LITHIUM, DILFRTOT, VALPROATE    Assessment:       Diagnosis Orders   1. Cerebrovascular accident (CVA) due to stenosis of right middle cerebral artery ([address]  US Carotid Artery Bilateral   2. Hx of ischemic left MCA stroke     3. Essential tremor     4. Hx of ischemic right MCA stroke     Transient Ischemic attack without any recurrence . Patient has been on Aggrenox for multiple years without any side effects and without any recurrence. Patient does have minor tremors which he had not noticed. These are essential tremors that she has a palatal tremor which is noticeable. This has not worsened. She is not any recent falls injuries or trauma she denies any bleeding bruising choking drooling and she is independent in her activity of daily living. We have reviewed her risk factors for cerebrovascular disease and she actually is doing quite well on this. We will continue to follow her with a carotid ultrasound in the next 12 months. Her recent carotid ultrasounds did not show anything significant. Plan:      Orders Placed This Encounter   Procedures    US Carotid Artery Bilateral     Standing Status:   Future     Standing Expiration Date:   2/4/2021     Scheduling Instructions: One week before appt     Orders Placed This Encounter   Medications    aspirin-dipyridamole (AGGRENOX)  MG per extended release capsule     Sig: Take 1 capsule by mouth 2 times daily     Dispense:  180 capsule     Refill:  3       Return in about 1 year (around 2/4/2021).       Evy Pink MD

## 2020-02-05 RX ORDER — AMLODIPINE BESYLATE AND BENAZEPRIL HYDROCHLORIDE 5; 20 MG/1; MG/1
CAPSULE ORAL
Qty: 90 CAPSULE | Refills: 1 | Status: SHIPPED | OUTPATIENT
Start: 2020-02-05 | End: 2020-03-16 | Stop reason: SDUPTHER

## 2020-03-16 RX ORDER — TRIAMTERENE AND HYDROCHLOROTHIAZIDE 37.5; 25 MG/1; MG/1
TABLET ORAL
Qty: 90 TABLET | Refills: 1 | Status: SHIPPED | OUTPATIENT
Start: 2020-03-16 | End: 2020-11-16

## 2020-03-16 RX ORDER — EZETIMIBE 10 MG/1
TABLET ORAL
Qty: 90 TABLET | Refills: 1 | Status: SHIPPED | OUTPATIENT
Start: 2020-03-16 | End: 2020-08-24

## 2020-03-16 RX ORDER — AMLODIPINE BESYLATE AND BENAZEPRIL HYDROCHLORIDE 5; 20 MG/1; MG/1
1 CAPSULE ORAL DAILY
Qty: 90 CAPSULE | Refills: 1 | Status: SHIPPED | OUTPATIENT
Start: 2020-03-16 | End: 2020-08-24

## 2020-03-16 RX ORDER — NIACIN 500 MG/1
TABLET, EXTENDED RELEASE ORAL
Qty: 270 TABLET | Refills: 1 | Status: SHIPPED | OUTPATIENT
Start: 2020-03-16 | End: 2020-11-16

## 2020-06-08 ENCOUNTER — VIRTUAL VISIT (OUTPATIENT)
Dept: FAMILY MEDICINE CLINIC | Age: 80
End: 2020-06-08
Payer: MEDICARE

## 2020-06-08 PROCEDURE — 99441 PR PHYS/QHP TELEPHONE EVALUATION 5-10 MIN: CPT | Performed by: FAMILY MEDICINE

## 2020-06-08 NOTE — PROGRESS NOTES
Smokeless tobacco: Never Used   Substance Use Topics    Alcohol use: No    Drug use: No        Allergies   Allergen Reactions    Sulfa Antibiotics     Iodinated Diagnostic Agents      Oral & IV dye group    Statins Other (See Comments)   ,   Past Medical History:   Diagnosis Date    Asthma     Asthma in remission 1990    Elevated TSH     History of artificial lens replacement 3/24/2015    History of breast cancer 02/2014    Invasive ductal cancer right breast, Dr Maynard, Dr Tamra Murillo HTN (hypertension) 1980    [facility]    Hx of ischemic left MCA stroke 2013    no residual, Dr Betsy Mahmood Hx of ischemic right MCA stroke 12/2014    no residual, frontal lobe    Hyperlipidemia     Osteoarthritis     Secondary and unspecified malignant neoplasm of axilla and upper limb lymph nodes ([address] 11/8/2018    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 Respiratory effort appears normal. Mood appears stable and speech and thought are grossly normal.    Lab Results   Component Value Date    WBC 5.5 06/18/2019    HGB 13.8 06/18/2019    HCT 39.3 06/18/2019     06/18/2019    CHOL 199 06/12/2018    TRIG 106 06/12/2018    HDL 61 (H) 12/09/2019    ALT 17 12/09/2019    AST 20 12/09/2019     12/09/2019    K 3.6 12/09/2019     12/09/2019    CREATININE 0.74 12/09/2019    BUN 11 12/09/2019    CO2 26 12/09/2019    TSH 2.920 06/12/2018    INR 1.0 12/22/2014    LABMICR <1.20 06/10/2019       ASSESSMENT/PLAN:    There are no diagnoses linked to this encounter.       Bernard Flores is a 78 y.o. female being evaluated by a Virtual Visit (telephonic visit) encounter to address

## 2020-06-22 ENCOUNTER — VIRTUAL VISIT (OUTPATIENT)
Dept: FAMILY MEDICINE CLINIC | Age: 80
End: 2020-06-22
Payer: MEDICARE

## 2020-06-22 PROCEDURE — 99441 PR PHYS/QHP TELEPHONE EVALUATION 5-10 MIN: CPT | Performed by: FAMILY MEDICINE

## 2020-06-22 RX ORDER — CIPROFLOXACIN 250 MG/1
250 TABLET, FILM COATED ORAL 2 TIMES DAILY
Qty: 10 TABLET | Refills: 0 | Status: SHIPPED | OUTPATIENT
Start: 2020-06-22 | End: 2020-09-24 | Stop reason: SDUPTHER

## 2020-06-22 NOTE — PROGRESS NOTES
Amara Griffiths is a 78 y.o. female evaluated via telephone on 2020. Consent:  She and/or health care decision maker is aware that that she may receive a bill for this telephone service, depending on her insurance coverage, and has provided verbal consent to proceed: Yes      Documentation:  I communicated with the patient and/or health care decision maker about see below. Details of this discussion including any medical advice provided: see below    I affirm this is a Patient Initiated Episode with an Established Patient who has not had a related appointment within my department in the past 7 days or scheduled within the next 24 hours. Total Time: 5-10 minutes  Note: not billable if this call serves to triage the patient into an appointment for the relevant concern      Josette FLYNN     2020    TELEHEALTH EVALUATION -- Audio (During [address] emergency)    HPI:    Amara Griffiths (:  1940) has requested an audio evaluation for the following concern(s):    Urinary Tract Infection  Patient complains of dysuria, frequency, urgency, burning with urination, nocturia She has had symptoms for 1 day. Patient also complains of none. Patient denies back pain, fever and stomach ache. Patient does not have a history of recurrent UTI. Patient does not have a history of pyelonephritis. Review of Systems    Prior to Visit Medications    Medication Sig Taking?  Authorizing Provider   ciprofloxacin (CIPRO) 250 MG tablet Take 1 tablet by mouth 2 times daily for 5 days Yes Galina Sarmiento MD   niacin (NIASPAN) 500 MG extended release tablet TAKE 1 TABLET 3 TIMES A DAY Yes Galina Sarmiento MD   ezetimibe (ZETIA) 10 MG tablet TAKE 1 TABLET DAILY Yes Galina Sarmiento MD   triamterene-hydrochlorothiazide (MAXZIDE-25) 37.5-25 MG per tablet TAKE 1 TABLET DAILY Yes Galina Sarmiento MD   amLODIPine-benazepril (LOTREL) 5-20 MG per

## 2020-07-08 ENCOUNTER — TELEPHONE (OUTPATIENT)
Dept: NEUROLOGY | Age: 80
End: 2020-07-08

## 2020-07-08 NOTE — TELEPHONE ENCOUNTER
[facility] Zan called, generic aggrenox 25-200mg is on backorder and there is no time frame for when it will be back. I called the local [facility] use and it is not in stock there either. He did a search and is showing availible at a few other [facility] stores locally. I was not able to reach patient by phone to discuss sending to a local [facility].     If we are unable to reach her is there something else that this can be changed to? Please advise.  Thanks    REF # [id number]  [phone] callback ##

## 2020-08-06 ENCOUNTER — OFFICE VISIT (OUTPATIENT)
Dept: FAMILY MEDICINE CLINIC | Age: 80
End: 2020-08-06
Payer: MEDICARE

## 2020-08-06 VITALS
RESPIRATION RATE: 15 BRPM | SYSTOLIC BLOOD PRESSURE: 136 MMHG | DIASTOLIC BLOOD PRESSURE: 86 MMHG | TEMPERATURE: 97.8 F | WEIGHT: 166 LBS | OXYGEN SATURATION: 98 % | HEIGHT: 66 IN | BODY MASS INDEX: 26.68 KG/M2 | HEART RATE: 79 BPM

## 2020-08-06 LAB
BILIRUBIN, POC: NORMAL
BLOOD URINE, POC: NORMAL
CLARITY, POC: CLEAR
COLOR, POC: YELLOW
GLUCOSE URINE, POC: NORMAL
KETONES, POC: NORMAL
LEUKOCYTE EST, POC: NORMAL
NITRITE, POC: NORMAL
PH, POC: 7
PROTEIN, POC: NORMAL
SPECIFIC GRAVITY, POC: 1.01
UROBILINOGEN, POC: NORMAL

## 2020-08-06 PROCEDURE — 1036F TOBACCO NON-USER: CPT | Performed by: NURSE PRACTITIONER

## 2020-08-06 PROCEDURE — 99213 OFFICE O/P EST LOW 20 MIN: CPT | Performed by: NURSE PRACTITIONER

## 2020-08-06 PROCEDURE — G0444 DEPRESSION SCREEN ANNUAL: HCPCS | Performed by: NURSE PRACTITIONER

## 2020-08-06 PROCEDURE — G8417 CALC BMI ABV UP PARAM F/U: HCPCS | Performed by: NURSE PRACTITIONER

## 2020-08-06 PROCEDURE — 4040F PNEUMOC VAC/ADMIN/RCVD: CPT | Performed by: NURSE PRACTITIONER

## 2020-08-06 PROCEDURE — 1123F ACP DISCUSS/DSCN MKR DOCD: CPT | Performed by: NURSE PRACTITIONER

## 2020-08-06 PROCEDURE — 81003 URINALYSIS AUTO W/O SCOPE: CPT | Performed by: NURSE PRACTITIONER

## 2020-08-06 PROCEDURE — G8399 PT W/DXA RESULTS DOCUMENT: HCPCS | Performed by: NURSE PRACTITIONER

## 2020-08-06 PROCEDURE — G8427 DOCREV CUR MEDS BY ELIG CLIN: HCPCS | Performed by: NURSE PRACTITIONER

## 2020-08-06 PROCEDURE — 1090F PRES/ABSN URINE INCON ASSESS: CPT | Performed by: NURSE PRACTITIONER

## 2020-08-06 RX ORDER — AMOXICILLIN AND CLAVULANATE POTASSIUM 875; 125 MG/1; MG/1
1 TABLET, FILM COATED ORAL 2 TIMES DAILY
Qty: 14 TABLET | Refills: 0 | Status: SHIPPED | OUTPATIENT
Start: 2020-08-06 | End: 2020-08-13

## 2020-08-06 ASSESSMENT — ENCOUNTER SYMPTOMS
SINUS PAIN: 0
VOMITING: 0
ABDOMINAL PAIN: 0
WHEEZING: 0
COLOR CHANGE: 0
COUGH: 0
SORE THROAT: 0
SHORTNESS OF BREATH: 0
DIARRHEA: 0
NAUSEA: 0

## 2020-08-06 ASSESSMENT — PATIENT HEALTH QUESTIONNAIRE - PHQ9
4. FEELING TIRED OR HAVING LITTLE ENERGY: 1
10. IF YOU CHECKED OFF ANY PROBLEMS, HOW DIFFICULT HAVE THESE PROBLEMS MADE IT FOR YOU TO DO YOUR WORK, TAKE CARE OF THINGS AT HOME, OR GET ALONG WITH OTHER PEOPLE: 0
3. TROUBLE FALLING OR STAYING ASLEEP: 0
5. POOR APPETITE OR OVEREATING: 3
7. TROUBLE CONCENTRATING ON THINGS, SUCH AS READING THE NEWSPAPER OR WATCHING TELEVISION: 3
SUM OF ALL RESPONSES TO PHQ QUESTIONS 1-9: 10
2. FEELING DOWN, DEPRESSED OR HOPELESS: 2
SUM OF ALL RESPONSES TO PHQ QUESTIONS 1-9: 10
6. FEELING BAD ABOUT YOURSELF - OR THAT YOU ARE A FAILURE OR HAVE LET YOURSELF OR YOUR FAMILY DOWN: 0
8. MOVING OR SPEAKING SO SLOWLY THAT OTHER PEOPLE COULD HAVE NOTICED. OR THE OPPOSITE, BEING SO FIGETY OR RESTLESS THAT YOU HAVE BEEN MOVING AROUND A LOT MORE THAN USUAL: 0
9. THOUGHTS THAT YOU WOULD BE BETTER OFF DEAD, OR OF HURTING YOURSELF: 0
1. LITTLE INTEREST OR PLEASURE IN DOING THINGS: 1
SUM OF ALL RESPONSES TO PHQ9 QUESTIONS 1 & 2: 3

## 2020-08-06 NOTE — PATIENT INSTRUCTIONS
Urine culture sent, we will call if medicine needs changed  See PCP if symptoms continue  Fever, chills, back pain n/v occur go to ER  Patient Education        Painful Urination (Dysuria): Care Instructions  Your Care Instructions  Burning pain with urination (dysuria) is a common symptom of a urinary tract infection or other urinary problems. The bladder may become inflamed. This can cause pain when the bladder fills and empties. You may also feel pain if the tube that carries urine from the bladder to the outside of the body (urethra) gets irritated or infected. Sexually transmitted infections (STIs) also may cause pain when you urinate. Sometimes the pain can be caused by things other than an infection. The urethra can be irritated by soaps, perfumes, or foreign objects in the urethra. Kidney stones can cause pain when they pass through the urethra. The cause may be hard to find. You may need tests. Treatment for painful urination depends on the cause. Follow-up care is a key part of your treatment and safety. Be sure to make and go to all appointments, and call your doctor if you are having problems. It's also a good idea to know your test results and keep a list of the medicines you take. How can you care for yourself at home? · Drink extra water for the next day or two. This will help make the urine less concentrated. (If you have kidney, heart, or liver disease and have to limit fluids, talk with your doctor before you increase the amount of fluids you drink.)  · Avoid drinks that are carbonated or have caffeine. They can irritate the bladder. · Urinate often. Try to empty your bladder each time. For women:  · Urinate right after you have sex. · After going to the bathroom, wipe from front to back. · Avoid douches, bubble baths, and feminine hygiene sprays. And avoid other feminine hygiene products that have deodorants. When should you call for help?    Call your doctor now or seek immediate medical care if:  · You have new symptoms, such as fever, nausea, or vomiting. · You have new or worse symptoms of a urinary problem. For example:  ? You have blood or pus in your urine. ? You have chills or body aches. ? It hurts worse to urinate. ? You have groin or belly pain. ? You have pain in your back just below your rib cage (the flank area). Watch closely for changes in your health, and be sure to contact your doctor if you have any problems. Where can you learn more? Go to [email or web address] and sign in to your [employer] account. Enter Z613 in the [employer] box to learn more about \"Painful Urination (Dysuria): Care Instructions. \"     If you do not have an account, please click on the \"Sign Up Now\" link. Current as of: August 22, 2019               Content Version: 12.5  © 0576-9222 [employer]wise, Incorporated. Care instructions adapted under license by [facility] ([facility]). If you have questions about a medical condition or this instruction, always ask your healthcare professional. [place] [address] any warranty or liability for your use of this information.

## 2020-08-06 NOTE — PROGRESS NOTES
Subjective  Amanda Locus, 78 y.o. female presents today with:  Chief Complaint   Patient presents with    Dysuria     Patient present today with C/O pain with urination. Patient states that this has been going on for ten days and has showed no improvement. HPI  Patient reports dysuria, frequency and urgency x 10 days   She takes diuretic for bp so urgency is common   no blood in her urine   No fevers, no chills. No flank pain or abd pain no n/v/d     Feels like uti hx of uti in past last UTI 6/22 never hospitalized for kidney infection       Review of Systems   Constitutional: Negative for chills and fever. HENT: Negative for congestion, sinus pain and sore throat. Respiratory: Negative for cough, shortness of breath and wheezing. Cardiovascular: Negative for chest pain and palpitations. Gastrointestinal: Negative for abdominal pain, diarrhea, nausea and vomiting. Genitourinary: Positive for dysuria and urgency. Negative for flank pain and hematuria. Musculoskeletal: Negative for arthralgias and myalgias. Skin: Negative for color change and wound. Allergic/Immunologic: Negative for environmental allergies and food allergies. Neurological: Positive for dizziness (occ takes antivert). Negative for weakness and headaches.        Past Medical History:   Diagnosis Date    Asthma     Asthma in remission 1990    Elevated TSH     History of artificial lens replacement 3/24/2015    History of breast cancer 02/2014    Invasive ductal cancer right breast, Dr Josey Guzman, Dr Bert Stevens HTN (hypertension) 1980    was with [facility]    Hx of ischemic left MCA stroke 2013    no residual, Dr Shawn Dunham Hx of ischemic right MCA stroke 12/2014    no residual, frontal lobe    Hyperlipidemia     Osteoarthritis     Secondary and unspecified malignant neoplasm of axilla and upper limb lymph nodes ([facility] [address] 11/8/2018    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    Dr Tomasz Harley (ortho)    Vitamin D deficiency      Past Surgical History:   Procedure Laterality Date    BLADDER SUSPENSION      BREAST BIOPSY Right 14    BREAST LUMPECTOMY Right 3/4/2014    Lumpectomy with SLNB, Dr Marlys Storm [address] in [address] History     Socioeconomic History    Marital status:      Spouse name: Not on file    Number of children: 3    Years of education: Not on file    Highest education level: Not on file   Occupational History    Occupation: , retired   Social Needs    Financial resource strain: Not on file    Food insecurity     Worry: Not on file     Inability: Not on file   [employer] needs     Medical: Not on file     Non-medical: Not on file   Tobacco Use    Smoking status: Former Smoker     Packs/day: 0.25     Years: 37.00     Pack years: 9.25     Types: Cigarettes     Start date: 5/15/1952     Last attempt to quit: 2/15/1989     Years since quittin.4    Smokeless tobacco: Never Used   Substance and Sexual Activity    Alcohol use: No    Drug use: No    Sexual activity: Not on file   Lifestyle    Physical activity     Days per week: Not on file     Minutes per session: Not on file    Stress: Not on file   Relationships    Social connections     Talks on phone: Not on file     Gets together: Not on file     Attends Christianity service: Not on file     Active member of club or organization: Not on file     Attends meetings of clubs or organizations: Not on file     Relationship status: Not on file    Intimate partner violence     Fear of current or ex partner: Not on file     Emotionally abused: Not on file     Physically abused: Not on file     Forced sexual activity: Not on file   Other Topics Concern    Not on file   Social History Narrative    Born in [place], 2 younger brothers, one dec 2014, one in [place]    , retired     Lives with spouse in a house in [facility]     of first spouse    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, owned a convenient store    [address], travel    4 children, 2 daughters in [place] ([place] and [place]), 2 sons in [address] [place] [address] great grandchildren >50     Family History   Problem Relation Age of Onset    Kidney Disease Mother         dec age 79    Hypertension Mother    [employer] Mother     Diabetes Father     Heart Failure Father         dec age 76    Cancer Brother         dec age 76    Heart Attack Paternal Grandmother     Stroke Paternal Grandfather      Allergies   Allergen Reactions    Sulfa Antibiotics      Unsure of reaction     Iodinated Diagnostic Agents      Oral & IV dye group    Statins Other (See Comments)     Current Outpatient Medications   Medication Sig Dispense Refill    amoxicillin-clavulanate (AUGMENTIN) 875-125 MG per tablet Take 1 tablet by mouth 2 times daily for 7 days 14 tablet 0    niacin (NIASPAN) 500 MG extended release tablet TAKE 1 TABLET 3 TIMES A  tablet 1    ezetimibe (ZETIA) 10 MG tablet TAKE 1 TABLET DAILY 90 tablet 1    triamterene-hydrochlorothiazide (MAXZIDE-25) 37.5-25 MG per tablet TAKE 1 TABLET DAILY 90 tablet 1    amLODIPine-benazepril (LOTREL) 5-20 MG per capsule Take 1 capsule by mouth daily 90 capsule 1    Cyanocobalamin (VITAMIN B 12 PO) Take by mouth      meclizine (ANTIVERT) 12.5 MG tablet Take 1 tablet by mouth daily 90 tablet 1    gelatin 650 MG capsule Take 8 capsules by mouth daily      CINNAMON PO Take 1 capsule by mouth daily       acetaminophen (TYLENOL) 500 MG tablet Take 500 mg by mouth every 6 hours as needed for Pain      calcium carbonate (TUMS) 500 MG chewable tablet Take 1 tablet by mouth as needed for Heartburn      Polyvinyl Alcohol-Povidone (REFRESH OP) Apply 1 drop to eye 4 times daily as needed.  Vitamin D (CHOLECALCIFEROL) 1000 UNITS CAPS capsule Take 1,000 Units by mouth daily.         Magnesium 500 MG CAPS Take 1 capsule by mouth daily.  fish oil-omega-3 fatty acids 1000 MG capsule Take 1 g by mouth 3 times daily.  aspirin-dipyridamole (AGGRENOX)  MG per extended release capsule Take 1 capsule by mouth 2 times daily 180 capsule 3     No current facility-administered medications for this visit. PMH, Surgical Hx, Family Hx, and Social Hx reviewed and updated. Health Maintenance reviewed. Objective  Vitals:    08/06/20 1126   BP: 136/86   Pulse: 79   Resp: 15   Temp: 97.8 °F (36.6 °C)   TempSrc: Oral   SpO2: 98%   Weight: 166 lb (75.3 kg)   Height: 5' 5.5\" (1.664 m)     BP Readings from Last 3 Encounters:   08/06/20 136/86   02/04/20 136/64   12/09/19 134/80     Wt Readings from Last 3 Encounters:   08/06/20 166 lb (75.3 kg)   02/04/20 166 lb 12.8 oz (75.7 kg)   12/09/19 167 lb (75.8 kg)     Physical Exam  Constitutional:       Appearance: Normal appearance. Neck:      Musculoskeletal: Normal range of motion. Cardiovascular:      Rate and Rhythm: Normal rate and regular rhythm. Pulses: Normal pulses. Heart sounds: Normal heart sounds. Pulmonary:      Effort: Pulmonary effort is normal. No respiratory distress. Breath sounds: Normal breath sounds. No wheezing. Abdominal:      General: Bowel sounds are normal. There is no distension. Palpations: Abdomen is soft. Tenderness: There is no abdominal tenderness. There is no right CVA tenderness or left CVA tenderness. Genitourinary:     Comments: Deferred   Musculoskeletal: Normal range of motion. Skin:     General: Skin is warm and dry. Neurological:      General: No focal deficit present. Mental Status: She is alert and oriented to person, place, and time. Psychiatric:         Mood and Affect: Mood normal.         Behavior: Behavior normal.       Assessment & Plan    Diagnosis Orders   1. Dysuria  POCT Urinalysis No Micro (Auto)    Culture, Urine    amoxicillin-clavulanate (AUGMENTIN) 875-125 MG per tablet   2.  Cystitis amoxicillin-clavulanate (AUGMENTIN) 875-125 MG per tablet   urine neg, will send for culture hx of uti in the past, afebrile , no flank pain  patient's last culture resistance to macrobid, sulfa allergy . Discussed side effects cipro patient would like to try PCN. discussed s/s to go toER or return   If culture neg should see PCP to discuss other conditions that can cause dysuria   Orders Placed This Encounter   Procedures    Culture, Urine     Standing Status:   Future     Standing Expiration Date:   8/6/2021     Order Specific Question:   Specify (ex-cath, midstream, cysto, etc)? Answer:   midstream    POCT Urinalysis No Micro (Auto)     Orders Placed This Encounter   Medications    amoxicillin-clavulanate (AUGMENTIN) 875-125 MG per tablet     Sig: Take 1 tablet by mouth 2 times daily for 7 days     Dispense:  14 tablet     Refill:  0     There are no discontinued medications. Return in about 1 week (around 8/13/2020), or if symptoms worsen or fail to improve. Reviewed with the patient: current clinical status,medications, activities and diet. Side effects, adverse effects of the medication prescribed today, as well as treatment plan/ rationale and result expectations have been discussed with the patient who expresses understanding and desires to proceed. Close follow up to evaluate treatment results and for coordination of care. I have reviewed the patient's medical history in detail and updated the computerized patient record.     Frandy Oquendo, CORONA - CNP

## 2020-08-07 DIAGNOSIS — R30.0 DYSURIA: ICD-10-CM

## 2020-08-09 LAB
ORGANISM: ABNORMAL
URINE CULTURE, ROUTINE: ABNORMAL

## 2020-08-18 ENCOUNTER — TELEPHONE (OUTPATIENT)
Dept: NEUROLOGY | Age: 80
End: 2020-08-18

## 2020-08-18 NOTE — TELEPHONE ENCOUNTER
Patient called, stated [facility] contacted her, they are no longer able to get Aggrenox and wanted you to give an alternative.   Please advise  Thanks  Margie Yeh

## 2020-08-20 ENCOUNTER — PATIENT MESSAGE (OUTPATIENT)
Dept: FAMILY MEDICINE CLINIC | Age: 80
End: 2020-08-20

## 2020-08-20 RX ORDER — CLOPIDOGREL BISULFATE 75 MG/1
75 TABLET ORAL DAILY
Qty: 90 TABLET | Refills: 3 | Status: SHIPPED | OUTPATIENT
Start: 2020-08-20 | End: 2021-06-06 | Stop reason: CLARIF

## 2020-08-20 NOTE — TELEPHONE ENCOUNTER
Need to know what their alternative is,  If  no substitute , then will change to plavix,  75 mgs, please pend

## 2020-08-20 NOTE — TELEPHONE ENCOUNTER
From: Dimitri Portillo  To: Martinez Messina MD  Sent: 8/20/2020 2:49 PM EDT  Subject: Prescription Question    I recently read that it is not safe for anyone to be taking Niacin for life. When can I stop?  Is there any reason I have to continue it? jagdish

## 2020-08-23 NOTE — TELEPHONE ENCOUNTER
Pharmacy is  requesting medication refill.  Please approve or deny this request.    Rx requested:  Requested Prescriptions     Pending Prescriptions Disp Refills    amLODIPine-benazepril (LOTREL) 5-20 MG per capsule [Pharmacy Med Name: AMLOD/BENAZE CAP 5-20MG] 90 capsule 1     Sig: TAKE 1 CAPSULE DAILY    ezetimibe (Congolese Sacha) 10 MG tablet [Pharmacy Med Name: EZETIMIBE TAB 10MG] 90 tablet 1     Sig: TAKE 1 TABLET DAILY         Last Office Visit:   6/22/2020      Next Visit Date:  Future Appointments   Date Time Provider Jose Manzo   12/9/2020 11:30 AM Gayathri Henderson MD [facility]   2/9/2021  2:15 PM Lillie Churchill MD [address] [place] [place] [address]

## 2020-08-24 RX ORDER — EZETIMIBE 10 MG/1
TABLET ORAL
Qty: 90 TABLET | Refills: 1 | Status: SHIPPED | OUTPATIENT
Start: 2020-08-24 | End: 2021-01-27

## 2020-08-24 RX ORDER — AMLODIPINE BESYLATE AND BENAZEPRIL HYDROCHLORIDE 5; 20 MG/1; MG/1
CAPSULE ORAL
Qty: 90 CAPSULE | Refills: 1 | Status: SHIPPED | OUTPATIENT
Start: 2020-08-24 | End: 2021-04-02 | Stop reason: SDUPTHER

## 2020-09-24 ENCOUNTER — VIRTUAL VISIT (OUTPATIENT)
Dept: FAMILY MEDICINE CLINIC | Age: 80
End: 2020-09-24
Payer: MEDICARE

## 2020-09-24 PROCEDURE — 99214 OFFICE O/P EST MOD 30 MIN: CPT | Performed by: FAMILY MEDICINE

## 2020-09-24 PROCEDURE — 1123F ACP DISCUSS/DSCN MKR DOCD: CPT | Performed by: FAMILY MEDICINE

## 2020-09-24 PROCEDURE — 4040F PNEUMOC VAC/ADMIN/RCVD: CPT | Performed by: FAMILY MEDICINE

## 2020-09-24 PROCEDURE — G8399 PT W/DXA RESULTS DOCUMENT: HCPCS | Performed by: FAMILY MEDICINE

## 2020-09-24 PROCEDURE — 1090F PRES/ABSN URINE INCON ASSESS: CPT | Performed by: FAMILY MEDICINE

## 2020-09-24 PROCEDURE — G8427 DOCREV CUR MEDS BY ELIG CLIN: HCPCS | Performed by: FAMILY MEDICINE

## 2020-09-24 RX ORDER — CIPROFLOXACIN 250 MG/1
250 TABLET, FILM COATED ORAL 2 TIMES DAILY
Qty: 14 TABLET | Refills: 0 | Status: SHIPPED | OUTPATIENT
Start: 2020-09-24 | End: 2020-10-01

## 2020-09-24 NOTE — PROGRESS NOTES
Nuno Chambers is a 78 y.o. female evaluated via telephone on 2020. Consent:  She and/or health care decision maker is aware that that she may receive a bill for this telephone service, depending on her insurance coverage, and has provided verbal consent to proceed: Yes      Documentation:  I communicated with the patient and/or health care decision maker about see below. Details of this discussion including any medical advice provided: see below      I affirm this is a Patient Initiated Episode with an Established Patient who has not had a related appointment within my department in the past 7 days or scheduled within the next 24 hours. Total Time: minutes: 11-20 minutes    Note: not billable if this call serves to triage the patient into an appointment for the relevant concern      Yusuf Obando GEE     2020    TELEHEALTH EVALUATION -- Audio (During [facility] public health emergency)    HPI:    Nuno Chambers (:  1940) has requested an audio evaluation for the following concern(s):    Urinary Tract Infection    Urinary Tract Infection  Patient complains of dysuria, frequency, urgency, burning with urination She has had symptoms for 2 days. Patient also complains of back pain. Patient denies fever and stomach ache. Patient does have a history of recurrent UTI. Patient does not have a history of pyelonephritis. Review of Systems    Prior to Visit Medications    Medication Sig Taking?  Authorizing Provider   ciprofloxacin (CIPRO) 250 MG tablet Take 1 tablet by mouth 2 times daily for 7 days Yes Pancho Joiner MD   amLODIPine-benazepril (LOTREL) 5-20 MG per capsule TAKE 1 CAPSULE DAILY  Pancho Joiner MD   ezetimibe (ZETIA) 10 MG tablet TAKE 1 TABLET DAILY  Pancho Joiner MD   clopidogrel (PLAVIX) 75 MG tablet Take 1 tablet by mouth daily  Andres Ho MD   niacin (NIASPAN) 500 MG extended release tablet TAKE 1 TABLET 3 TIMES A Aurelia aDvis MD   triamterene-hydrochlorothiazide (MAXZIDE-25) 37.5-25 MG per tablet TAKE 1 TABLET DAILY  Francisco Javier Lu MD   Cyanocobalamin (VITAMIN B 12 PO) Take by mouth  Historical Provider, MD   aspirin-dipyridamole (AGGRENOX)  MG per extended release capsule Take 1 capsule by mouth 2 times daily  Amira Taylor MD   meclizine (ANTIVERT) 12.5 MG tablet Take 1 tablet by mouth daily  Francisco Javier Lu MD   gelatin 650 MG capsule Take 8 capsules by mouth daily  Historical Provider, MD   CINNAMON PO Take 1 capsule by mouth daily   Historical Provider, MD   acetaminophen (TYLENOL) 500 MG tablet Take 500 mg by mouth every 6 hours as needed for Pain  Historical Provider, MD   calcium carbonate (TUMS) 500 MG chewable tablet Take 1 tablet by mouth as needed for Heartburn  Historical Provider, MD   Polyvinyl Alcohol-Povidone (REFRESH OP) Apply 1 drop to eye 4 times daily as needed. Historical Provider, MD   Vitamin D (CHOLECALCIFEROL) 1000 UNITS CAPS capsule Take 1,000 Units by mouth daily. Historical Provider, MD   Magnesium 500 MG CAPS Take 1 capsule by mouth daily. Historical Provider, MD   fish oil-omega-3 fatty acids 1000 MG capsule Take 1 g by mouth 3 times daily.   Historical Provider, MD       Social History     Tobacco Use    Smoking status: Former Smoker     Packs/day: 0.25     Years: 37.00     Pack years: 9.25     Types: Cigarettes     Start date: 5/15/1952     Last attempt to quit: 2/15/1989     Years since quittin.6    Smokeless tobacco: Never Used   Substance Use Topics    Alcohol use: No    Drug use: No        Allergies   Allergen Reactions    Sulfa Antibiotics      Unsure of reaction     Iodinated Diagnostic Agents      Oral & IV dye group    Statins Other (See Comments)   ,   Past Medical History:   Diagnosis Date    Asthma     Asthma in remission     Elevated TSH     History of artificial lens replacement 3/24/2015    History of without hematuria  Comments:  Cipro 250 mg BID x 5 days  Orders:  -     ciprofloxacin (CIPRO) 250 MG tablet; Take 1 tablet by mouth 2 times daily for 7 days          Return if symptoms worsen or fail to improve. Juan F Madrigal is a 78 y.o. female being evaluated by a Virtual Visit (telephonic visit) encounter to address concerns as mentioned above. A caregiver was present when appropriate. Due to this being a TeleHealth encounter (During [facility] hospital[facility] public [facility] emergency), evaluation of the following organ systems was limited: Vitals/Constitutional/EENT/Resp/CV/GI//MS/Neuro/Skin/Heme-Lymph-Imm. Pursuant to the emergency declaration under the [address] [place] [address] authority and the [employer] and [employer] Act, this Virtual Visit was conducted with patient's (and/or legal guardian's) consent, to reduce the patient's risk of exposure to COVID-19 and provide necessary medical care. The patient (and/or legal guardian) has also been advised to contact this office for worsening conditions or problems, and seek emergency medical treatment and/or call 911 if deemed necessary. Services were provided through a telephonic synchronous discussion virtually to substitute for in-person clinic visit. Patient and provider were located at their individual homes. --Angie Carias MD on 9/26/2020 at 11:51 AM    An electronic signature was used to authenticate this note.

## 2020-10-20 ENCOUNTER — HOSPITAL ENCOUNTER (OUTPATIENT)
Dept: WOMENS IMAGING | Age: 80
Discharge: HOME | End: 2020-10-22
Payer: MEDICARE

## 2020-10-20 PROCEDURE — 77063 BREAST TOMOSYNTHESIS BI: CPT

## 2020-11-30 ENCOUNTER — VIRTUAL VISIT (OUTPATIENT)
Dept: FAMILY MEDICINE CLINIC | Age: 80
End: 2020-11-30
Payer: MEDICARE

## 2020-11-30 PROBLEM — N39.0 RECURRENT UTI: Status: ACTIVE | Noted: 2020-11-30

## 2020-11-30 PROCEDURE — 4040F PNEUMOC VAC/ADMIN/RCVD: CPT | Performed by: FAMILY MEDICINE

## 2020-11-30 PROCEDURE — 1090F PRES/ABSN URINE INCON ASSESS: CPT | Performed by: FAMILY MEDICINE

## 2020-11-30 PROCEDURE — 99214 OFFICE O/P EST MOD 30 MIN: CPT | Performed by: FAMILY MEDICINE

## 2020-11-30 PROCEDURE — 1123F ACP DISCUSS/DSCN MKR DOCD: CPT | Performed by: FAMILY MEDICINE

## 2020-11-30 PROCEDURE — G8399 PT W/DXA RESULTS DOCUMENT: HCPCS | Performed by: FAMILY MEDICINE

## 2020-11-30 PROCEDURE — G8427 DOCREV CUR MEDS BY ELIG CLIN: HCPCS | Performed by: FAMILY MEDICINE

## 2020-11-30 RX ORDER — CEPHALEXIN 250 MG/1
1 TABLET ORAL DAILY
Qty: 90 TABLET | Refills: 4 | Status: SHIPPED | OUTPATIENT
Start: 2020-11-30 | End: 2021-04-01 | Stop reason: SDUPTHER

## 2020-11-30 RX ORDER — CIPROFLOXACIN 250 MG/1
250 TABLET, FILM COATED ORAL 2 TIMES DAILY
Qty: 14 TABLET | Refills: 0 | Status: SHIPPED | OUTPATIENT
Start: 2020-11-30 | End: 2020-12-07

## 2020-11-30 NOTE — PROGRESS NOTES
Claudia Arriaga is a 78 y.o. female evaluated via telephone on 2020. Consent:  She and/or health care decision maker is aware that that she may receive a bill for this telephone service, depending on her insurance coverage, and has provided verbal consent to proceed: Yes      Documentation:  I communicated with the patient and/or health care decision maker about see below. Details of this discussion including any medical advice provided: see below      I affirm this is a Patient Initiated Episode with an Established Patient who has not had a related appointment within my department in the past 7 days or scheduled within the next 24 hours. Total Time: minutes: 5-10 minutes    Note: not billable if this call serves to triage the patient into an appointment for the relevant concern      [place]-BIANCA     2020    TELEHEALTH EVALUATION -- Audio (During [facility]-11 public health emergency)    HPI:    Claudia Arriaga (:  1940) has requested an audio evaluation for the following concern(s):    Urinary Tract Infection (x2 weeks)    Presumed UTI x 2 weeks. Frequency, nocturia, dysuria are worsening. Urine is malodorous. Occasional, intermittent, low abdominal pain. No nausea, vomiting, dizziness or fevers. No diarrhea except for the one day prior to start of these symptoms. She states she is drinking lots of water. Several UTIs this year. Most recent 1 have been due to E. coli and Klebsiella pneumoniae sensitive to penicillin, first generation cephalosporin, ciprofloxacin and Bactrim to which she is allergic. She was most recently treated with Cipro but has been treated with amoxicillin or Augmentin many times in the recent past.    Review of Systems See above. Prior to Visit Medications    Medication Sig Taking?  Authorizing Provider   ciprofloxacin (CIPRO) 250 MG tablet Take 1 tablet by mouth 2 times daily for 7 days Yes Saturnino Garcia MD   Cephalexin 250 MG TABS Take 1 tablet by mouth daily Yes Katlyn Miles MD   triamterene-hydroCHLOROthiazide (MAXZIDE-25) 37.5-25 MG per tablet TAKE 1 TABLET DAILY Yes Katlyn Miles MD   niacin (NIASPAN) 500 MG extended release tablet TAKE 1 TABLET 3 TIMES A DAY Yes Katlyn Miles MD   amLODIPine-benazepril (LOTREL) 5-20 MG per capsule TAKE 1 CAPSULE DAILY Yes Kaltyn Miles MD   ezetimibe (ZETIA) 10 MG tablet TAKE 1 TABLET DAILY Yes Katyln Miles MD   Cyanocobalamin (VITAMIN B 12 PO) Take by mouth Yes Historical Provider, MD   meclizine (ANTIVERT) 12.5 MG tablet Take 1 tablet by mouth daily Yes Katlyn Miles MD   gelatin 650 MG capsule Take 8 capsules by mouth daily Yes Historical Provider, MD   CINNAMON PO Take 1 capsule by mouth daily  Yes Historical Provider, MD   acetaminophen (TYLENOL) 500 MG tablet Take 500 mg by mouth every 6 hours as needed for Pain Yes Historical Provider, MD   calcium carbonate (TUMS) 500 MG chewable tablet Take 1 tablet by mouth as needed for Heartburn Yes Historical Provider, MD   Polyvinyl Alcohol-Povidone (REFRESH OP) Apply 1 drop to eye 4 times daily as needed. Yes Historical Provider, MD   Vitamin D (CHOLECALCIFEROL) 1000 UNITS CAPS capsule Take 1,000 Units by mouth daily. Yes Historical Provider, MD   Magnesium 500 MG CAPS Take 1 capsule by mouth daily. Yes Historical Provider, MD   fish oil-omega-3 fatty acids 1000 MG capsule Take 1 g by mouth 3 times daily.  Yes Historical Provider, MD   clopidogrel (PLAVIX) 75 MG tablet Take 1 tablet by mouth daily  Arnaldo Miller MD   aspirin-dipyridamole (AGGRENOX)  MG per extended release capsule Take 1 capsule by mouth 2 times daily  Keke Mcguire MD       Social History     Tobacco Use    Smoking status: Former Smoker     Packs/day: 0.25     Years: 14.00     Pack years: 3.50     Types: Cigarettes    Smokeless tobacco: Never Used    Tobacco comment: started at age 11yo and quit at age 31yo   Substance Use Topics    Alcohol use: No    Drug use: No        Allergies   Allergen Reactions    Sulfa Antibiotics      Unsure of reaction     Iodinated Diagnostic Agents      Oral & IV dye group    Statins Other (See Comments)   ,   Past Medical History:   Diagnosis Date    Asthma     Asthma in remission 1990    Elevated TSH     History of artificial lens replacement 3/24/2015    History of breast cancer 02/2014    Invasive ductal cancer right breast, Dr Chi Woodson, Dr Fannie Dai    HTN (hypertension) 1980    was with [facility]    Hx of ischemic left MCA stroke 2013    no residual, Dr Stephanie Christian    Hx of ischemic right MCA stroke 12/2014    no residual, frontal lobe    Hyperlipidemia     Osteoarthritis     Secondary and unspecified malignant neoplasm of axilla and upper limb lymph nodes ([facility] [address] 11/8/2018    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    Dr Theodora Smith (ortho)    Vitamin D deficiency    ,   Past Surgical History:   Procedure Laterality Date    BLADDER SUSPENSION      BREAST BIOPSY Right 2/14/14    BREAST LUMPECTOMY Right 3/4/2014    Lumpectomy with SLNB, Dr Mirta [address] in [address]     ,   Social History     Tobacco Use    Smoking status: Former Smoker     Packs/day: 0.25     Years: 14.00     Pack years: 3.50     Types: Cigarettes    Smokeless tobacco: Never Used    Tobacco comment: started at age 11yo and quit at age 31yo   Substance Use Topics    Alcohol use: No    Drug use: No       PHYSICAL EXAMINATION:  [ INSTRUCTIONS:  \"[x]\" Indicates a positive item  \"[]\" Indicates a negative item  -- DELETE ALL ITEMS NOT EXAMINED]  Vital Signs: unavailable    Patient appears to be alert and oriented to person, place, time, situation and is in no acute distress.   Respiratory effort appears normal. Mood appears stable and speech and thought are grossly normal.    ASSESSMENT/PLAN:  Jessie Franco was seen today for urinary tract infection. Diagnoses and all orders for this visit:    Recurrent UTI  Comments:  Cipro 250 p.o. twice daily for 7 days then Keflex 250 mg daily for prophylaxis. Orders:  -     ciprofloxacin (CIPRO) 250 MG tablet; Take 1 tablet by mouth 2 times daily for 7 days  -     Cephalexin 250 MG TABS; Take 1 tablet by mouth daily          Return for Dec 9 for scheduled appt. Sai Vivar is a 78 y.o. female being evaluated by a Virtual Visit (telephonic visit) encounter to address concerns as mentioned above. A caregiver was present when appropriate. Due to this being a TeleHealth encounter (During [facility]-94 public health emergency), evaluation of the following organ systems was limited: Vitals/Constitutional/EENT/Resp/CV/GI//MS/Neuro/Skin/Heme-Lymph-Imm. Pursuant to the emergency declaration under the [address] [place] [address], [address] [place] [address] and the [employer] and [employer] Act, this Virtual Visit was conducted with patient's (and/or legal guardian's) consent, to reduce the patient's risk of exposure to COVID-19 and provide necessary medical care. The patient (and/or legal guardian) has also been advised to contact this office for worsening conditions or problems, and seek emergency medical treatment and/or call 911 if deemed necessary. Services were provided through a telephonic synchronous discussion virtually to substitute for in-person clinic visit. Patient and provider were located at their individual homes. --Joanne Mir MD on 11/30/2020 at 12:03 PM    An electronic signature was used to authenticate this note.

## 2020-12-08 ENCOUNTER — TELEPHONE (OUTPATIENT)
Dept: INTERNAL MEDICINE CLINIC | Age: 80
End: 2020-12-08

## 2020-12-08 NOTE — TELEPHONE ENCOUNTER
Disregard this patient call please. Patient thought today was her audio visit with doctor Ashok Payne. Her audio visit is Wednesday, 12/09/20 at 11:30.

## 2020-12-09 ENCOUNTER — VIRTUAL VISIT (OUTPATIENT)
Dept: FAMILY MEDICINE CLINIC | Age: 80
End: 2020-12-09
Payer: MEDICARE

## 2020-12-09 PROCEDURE — 1090F PRES/ABSN URINE INCON ASSESS: CPT | Performed by: FAMILY MEDICINE

## 2020-12-09 PROCEDURE — 1123F ACP DISCUSS/DSCN MKR DOCD: CPT | Performed by: FAMILY MEDICINE

## 2020-12-09 PROCEDURE — G8399 PT W/DXA RESULTS DOCUMENT: HCPCS | Performed by: FAMILY MEDICINE

## 2020-12-09 PROCEDURE — 99214 OFFICE O/P EST MOD 30 MIN: CPT | Performed by: FAMILY MEDICINE

## 2020-12-09 PROCEDURE — G8427 DOCREV CUR MEDS BY ELIG CLIN: HCPCS | Performed by: FAMILY MEDICINE

## 2020-12-09 PROCEDURE — 4040F PNEUMOC VAC/ADMIN/RCVD: CPT | Performed by: FAMILY MEDICINE

## 2020-12-09 NOTE — PROGRESS NOTES
Akhil Lim is a 78 y.o. female evaluated via telephone on 2020. Consent:  She and/or health care decision maker is aware that that she may receive a bill for this telephone service, depending on her insurance coverage, and has provided verbal consent to proceed: Yes      Documentation:  I communicated with the patient and/or health care decision maker about see below. Details of this discussion including any medical advice provided: see below      I affirm this is a Patient Initiated Episode with an Established Patient who has not had a related appointment within my department in the past 7 days or scheduled within the next 24 hours. Total Time: minutes: 11-20 minutes    Note: not billable if this call serves to triage the patient into an appointment for the relevant concern      Osmar FLYNN     2020    TELEHEALTH EVALUATION -- Audio (During [facility] public health emergency)    HPI:    Akhil Lim (:  1940) has requested an audio evaluation for the following concern(s):    6 Month Follow-Up (HTN,CVA,HDL) and Health Maintenance (Pt do for AWV)      Patient is here for f/u HTN. Is compliant with meds and has no side effects from them. Avoids added salt. Tries to eat healthy. Exercises occasionally. Has no chest pain, shortness of breath, palpitations or edema. No dizziness or headaches. CVA. No new deficits. No facial droop. No speech impairment. No unilateral weakness. Tends to list to the left since CVA. No falls. Patient is here for hyperlipidemia. Is compliant with medications and has no apparent side effects from them. Recurrent UTI - improving. Less irritate than it was. Review of Systems No fevers, chills, sweats. No unintended weight loss. No abdominal pain, nausea, vomiting, diarrhea, constipation, bloody stools, black tarry stools. No rashes. No swollen glands. Prior to Visit Medications    Medication Sig Taking?  Authorizing Provider   Cephalexin 250 MG TABS Take 1 tablet by mouth daily Yes Chloé Ambrose MD   triamterene-hydroCHLOROthiazide (MAXZIDE-25) 37.5-25 MG per tablet TAKE 1 TABLET DAILY Yes Chloé Ambrose MD   niacin (NIASPAN) 500 MG extended release tablet TAKE 1 TABLET 3 TIMES A DAY Yes Chloé Ambrose MD   amLODIPine-benazepril (LOTREL) 5-20 MG per capsule TAKE 1 CAPSULE DAILY Yes Chloé Ambrose MD   ezetimibe (ZETIA) 10 MG tablet TAKE 1 TABLET DAILY Yes Chloé Ambrose MD   Cyanocobalamin (VITAMIN B 12 PO) Take by mouth Yes Historical Provider, MD   aspirin-dipyridamole (AGGRENOX)  MG per extended release capsule Take 1 capsule by mouth 2 times daily Yes Efe Lyles MD   meclizine (ANTIVERT) 12.5 MG tablet Take 1 tablet by mouth daily Yes Chloé Ambrose MD   gelatin 650 MG capsule Take 8 capsules by mouth daily Yes Historical Provider, MD   CINNAMON PO Take 1 capsule by mouth daily  Yes Historical Provider, MD   acetaminophen (TYLENOL) 500 MG tablet Take 500 mg by mouth every 6 hours as needed for Pain Yes Historical Provider, MD   calcium carbonate (TUMS) 500 MG chewable tablet Take 1 tablet by mouth as needed for Heartburn Yes Historical Provider, MD   Polyvinyl Alcohol-Povidone (REFRESH OP) Apply 1 drop to eye 4 times daily as needed. Yes Historical Provider, MD   Vitamin D (CHOLECALCIFEROL) 1000 UNITS CAPS capsule Take 1,000 Units by mouth daily. Yes Historical Provider, MD   Magnesium 500 MG CAPS Take 1 capsule by mouth daily. Yes Historical Provider, MD   fish oil-omega-3 fatty acids 1000 MG capsule Take 1 g by mouth 3 times daily.  Yes Historical Provider, MD   clopidogrel (PLAVIX) 75 MG tablet Take 1 tablet by mouth daily  Efe Lyles MD       Social History     Tobacco Use    Smoking status: Former Smoker     Packs/day: 0.25     Years: 14.00     Pack years: 3.50     Types: Cigarettes    Smokeless tobacco: Never Used   Erika Kaljordy Tobacco comment: started at age 11yo and quit at age 31yo   Substance Use Topics    Alcohol use: No    Drug use: No        Allergies   Allergen Reactions    Sulfa Antibiotics      Unsure of reaction     Iodinated Diagnostic Agents      Oral & IV dye group    Statins Other (See Comments)   ,   Past Medical History:   Diagnosis Date    Asthma     Asthma in remission 1990    Elevated TSH     History of artificial lens replacement 3/24/2015    History of breast cancer 02/2014    Invasive ductal cancer right breast, Dr Donald Carias, Dr Ivanna Billings    HTN (hypertension) 1980    was with [facility]    Hx of ischemic left MCA stroke 2013    no residual, Dr Armin Espinal    Hx of ischemic right MCA stroke 12/2014    no residual, frontal lobe    Hyperlipidemia     Osteoarthritis     Secondary and unspecified malignant neoplasm of axilla and upper limb lymph nodes ([address] 11/8/2018    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    Dr Kelsi Machado (ortho)    Vitamin D deficiency    ,   Past Surgical History:   Procedure Laterality Date    BLADDER SUSPENSION      BREAST BIOPSY Right 2/14/14    BREAST LUMPECTOMY Right 3/4/2014    Lumpectomy with SLNB, Dr Roman [address] in [address]     ,   Social History     Tobacco Use    Smoking status: Former Smoker     Packs/day: 0.25     Years: 14.00     Pack years: 3.50     Types: Cigarettes    Smokeless tobacco: Never Used    Tobacco comment: started at age 11yo and quit at age 31yo   Substance Use Topics    Alcohol use: No    Drug use: No   ,   Family History   Problem Relation Age of Onset    Kidney Disease Mother         dec age 79    Hypertension Mother    [facility] Migraines Mother     Diabetes Father     Heart Failure Father         dec age 76    [de-identified] Brother         dec age 76    Heart Attack Paternal Grandmother     Stroke Paternal Grandfather        PHYSICAL EXAMINATION:  [ INSTRUCTIONS:  \"[x]\" Indicates a positive item guardian) has also been advised to contact this office for worsening conditions or problems, and seek emergency medical treatment and/or call 911 if deemed necessary. Services were provided through a telephonic synchronous discussion virtually to substitute for in-person clinic visit. Patient and provider were located at their individual homes. --Katlyn Ballesteros MD on 12/9/2020 at 12:05 PM    An electronic signature was used to authenticate this note.

## 2021-01-08 ENCOUNTER — OFFICE VISIT (OUTPATIENT)
Dept: FAMILY MEDICINE CLINIC | Age: 81
End: 2021-01-08
Payer: MEDICARE

## 2021-01-08 DIAGNOSIS — Z00.00 ROUTINE GENERAL MEDICAL EXAMINATION AT A HEALTH CARE FACILITY: ICD-10-CM

## 2021-01-08 DIAGNOSIS — I10 ESSENTIAL HYPERTENSION: Primary | ICD-10-CM

## 2021-01-08 PROCEDURE — G0439 PPPS, SUBSEQ VISIT: HCPCS | Performed by: FAMILY MEDICINE

## 2021-01-08 PROCEDURE — G8484 FLU IMMUNIZE NO ADMIN: HCPCS | Performed by: FAMILY MEDICINE

## 2021-01-08 PROCEDURE — 4040F PNEUMOC VAC/ADMIN/RCVD: CPT | Performed by: FAMILY MEDICINE

## 2021-01-08 PROCEDURE — 1123F ACP DISCUSS/DSCN MKR DOCD: CPT | Performed by: FAMILY MEDICINE

## 2021-01-08 SDOH — ECONOMIC STABILITY: TRANSPORTATION INSECURITY
IN THE PAST 12 MONTHS, HAS THE LACK OF TRANSPORTATION KEPT YOU FROM MEDICAL APPOINTMENTS OR FROM GETTING MEDICATIONS?: NO

## 2021-01-08 SDOH — ECONOMIC STABILITY: INCOME INSECURITY: HOW HARD IS IT FOR YOU TO PAY FOR THE VERY BASICS LIKE FOOD, HOUSING, MEDICAL CARE, AND HEATING?: NOT HARD AT ALL

## 2021-01-08 ASSESSMENT — PATIENT HEALTH QUESTIONNAIRE - PHQ9
2. FEELING DOWN, DEPRESSED OR HOPELESS: 0
1. LITTLE INTEREST OR PLEASURE IN DOING THINGS: 1
SUM OF ALL RESPONSES TO PHQ9 QUESTIONS 1 & 2: 2
SUM OF ALL RESPONSES TO PHQ QUESTIONS 1-9: 2
SUM OF ALL RESPONSES TO PHQ QUESTIONS 1-9: 2
SUM OF ALL RESPONSES TO PHQ QUESTIONS 1-9: 0
1. LITTLE INTEREST OR PLEASURE IN DOING THINGS: 0

## 2021-01-08 ASSESSMENT — LIFESTYLE VARIABLES
AUDIT TOTAL SCORE: INCOMPLETE
HOW OFTEN DO YOU HAVE A DRINK CONTAINING ALCOHOL: NEVER
HOW OFTEN DO YOU HAVE A DRINK CONTAINING ALCOHOL: 0
HOW OFTEN DO YOU HAVE A DRINK CONTAINING ALCOHOL: 0
AUDIT-C TOTAL SCORE: INCOMPLETE

## 2021-01-08 NOTE — PROGRESS NOTES
Medicare Annual Wellness Visit  Are Name: Cricket Adams Date: 2021   MRN: [id number] Sex: Female   Age: 78 y.o. Ethnicity: Non-/Non    : 1940 Race: Jefe Oliva is here for Medicare AWV    Screenings for behavioral, psychosocial and functional/safety risks, and cognitive dysfunction are all negative except as indicated below. These results, as well as other patient data from the [address] form, are documented in Flowsheets linked to this Encounter. Allergies   Allergen Reactions    Sulfa Antibiotics      Unsure of reaction     Iodinated Diagnostic Agents      Oral & IV dye group    Statins Other (See Comments)       Prior to Visit Medications    Medication Sig Taking?  Authorizing Provider   Cephalexin 250 MG TABS Take 1 tablet by mouth daily Yes Bethene Brunner, MD   triamterene-hydroCHLOROthiazide (MAXZIDE-25) 37.5-25 MG per tablet TAKE 1 TABLET DAILY Yes Bethene Brunner, MD   niacin (NIASPAN) 500 MG extended release tablet TAKE 1 TABLET 3 TIMES A DAY Yes Bethene Brunner, MD   amLODIPine-benazepril (LOTREL) 5-20 MG per capsule TAKE 1 CAPSULE DAILY Yes Bethene Brunner, MD   ezetimibe (ZETIA) 10 MG tablet TAKE 1 TABLET DAILY Yes Bethene Brunner, MD   Cyanocobalamin (VITAMIN B 12 PO) Take by mouth Yes Historical Provider, MD   meclizine (ANTIVERT) 12.5 MG tablet Take 1 tablet by mouth daily Yes Bethene Brunner, MD   gelatin 650 MG capsule Take 8 capsules by mouth daily Yes Historical Provider, MD   CINNAMON PO Take 1 capsule by mouth daily  Yes Historical Provider, MD   acetaminophen (TYLENOL) 500 MG tablet Take 500 mg by mouth every 6 hours as needed for Pain Yes Historical Provider, MD   calcium carbonate (TUMS) 500 MG chewable tablet Take 1 tablet by mouth as needed for Heartburn Yes Historical Provider, MD Polyvinyl Alcohol-Povidone (REFRESH OP) Apply 1 drop to eye 4 times daily as needed. Yes Historical Provider, MD   Vitamin D (CHOLECALCIFEROL) 1000 UNITS CAPS capsule Take 1,000 Units by mouth daily. Yes Historical Provider, MD   Magnesium 500 MG CAPS Take 1 capsule by mouth daily. Yes Historical Provider, MD   fish oil-omega-3 fatty acids 1000 MG capsule Take 1 g by mouth 3 times daily.  Yes Historical Provider, MD   clopidogrel (PLAVIX) 75 MG tablet Take 1 tablet by mouth daily  Arnaldo Rojo MD   aspirin-dipyridamole (AGGRENOX)  MG per extended release capsule Take 1 capsule by mouth 2 times daily  Chapis Lopez MD       Past Medical History:   Diagnosis Date    Asthma     Asthma in remission 1990    Elevated TSH     History of artificial lens replacement 3/24/2015    History of breast cancer 02/2014    Invasive ductal cancer right breast, Dr Shira Vega, Dr Lazo Men HTN (hypertension) 1980    was with [facility]    Hx of ischemic left MCA stroke 2013    no residual, Dr Sharon Streeter    Hx of ischemic right MCA stroke 12/2014    no residual, frontal lobe    Hyperlipidemia     Osteoarthritis     Secondary and unspecified malignant neoplasm of axilla and upper limb lymph nodes ([facility] [address] 11/8/2018    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    Dr Citlali Jones (ortho)    Vitamin D deficiency        Past Surgical History:   Procedure Laterality Date    BLADDER SUSPENSION      BREAST BIOPSY Right 2/14/14    BREAST LUMPECTOMY Right 3/4/2014    Lumpectomy with SLNB, Dr Sumanth Zuniga Dr in [address]         Family History   Problem Relation Age of Onset    Kidney Disease Mother         dec age 79    Hypertension Mother     Migraines Mother     Diabetes Father     Heart Failure Father         dec age 76    [de-identified] Brother         dec age 76    Heart Attack Paternal Grandmother     Stroke Paternal Grandfather Have you had an eye exam within the past year?: Yes  Hearing/Vision Interventions:  · Hearing concerns:  patient declines any further evaluation/treatment for hearing issues  Has wax blockage in both ears and has been using drops and flushes in left ear. No dizziness. Personalized Preventive Plan   Current Health Maintenance Status  Immunization History   Administered Date(s) Administered    Influenza Vaccine, unspecified formulation 11/08/2007, 09/28/2011, 09/22/2015, 11/01/2016    Influenza Virus Vaccine 10/20/2014, 10/01/2019    Influenza Whole 10/20/2014    Influenza, High Dose (Fluzone 65 yrs and older) 11/01/2016, 09/25/2017, 09/23/2018, 10/01/2019    Pneumococcal Conjugate 13-valent ([id number]) 01/05/2017    Pneumococcal Polysaccharide ([id number]) 10/28/2005, 06/10/2019    Tdap (Boostrix, Adacel) 10/13/2017    Zoster Live (Zostavax) 09/25/2012        Health Maintenance   Topic Date Due    Shingles Vaccine (2 of 3) 11/20/2012    Annual Wellness Visit (AWV)  05/29/2019    Potassium monitoring  12/09/2020    Creatinine monitoring  12/09/2020    DTaP/Tdap/Td vaccine (2 - Td) 10/13/2027    DEXA (modify frequency per FRAX score)  Completed    Flu vaccine  Completed    Pneumococcal 65+ years Vaccine  Completed    Hepatitis A vaccine  Aged Out    Hepatitis B vaccine  Aged Out    Hib vaccine  Aged Out    Meningococcal (ACWY) vaccine  Aged Out     Recommendations for [place] Due: see orders and patient instructions/AVS.  . Recommended screening schedule for the next 5-10 years is provided to the patient in written form: see Patient Instructions/AVS.    There are no diagnoses linked to this encounter. Victor Manuel Keller is a 78 y.o. female being evaluated by a Virtual Visit (phone) encounter to address concerns as mentioned above. A caregiver was present when appropriate. Due to this being a TeleHealth encounter (During [facility]-76 public health emergency), evaluation of the following organ systems was limited: Vitals/Constitutional/EENT/Resp/CV/GI//MS/Neuro/Skin/Heme-Lymph-Imm. Pursuant to the emergency declaration under the [address] [place] [address] and the [employer] Act, this Virtual Visit was conducted with patient's (and/or legal guardian's) consent, to reduce the patient's risk of exposure to COVID-19 and provide necessary medical care. The patient (and/or legal guardian) has also been advised to contact this office for worsening conditions or problems, and seek emergency medical treatment and/or call 911 if deemed necessary. Patient identification was verified at the start of the visit: Yes    Services were provided through phone to substitute for in-person clinic visit. Patient and provider were located at their individual homes. --Mike Block MD on 1/8/2021 at 12:31 PM    An electronic signature was used to authenticate this note.

## 2021-01-08 NOTE — PATIENT INSTRUCTIONS
Personalized Preventive Plan for Gilmar Li - 1/8/2021  Medicare offers a range of preventive health benefits. Some of the tests and screenings are paid in full while other may be subject to a deductible, co-insurance, and/or copay. Some of these benefits include a comprehensive review of your medical history including lifestyle, illnesses that may run in your family, and various assessments and screenings as appropriate. After reviewing your medical record and screening and assessments performed today your provider may have ordered immunizations, labs, imaging, and/or referrals for you. A list of these orders (if applicable) as well as your Preventive Care list are included within your After Visit Summary for your review. Other Preventive Recommendations:    · A preventive eye exam performed by an eye specialist is recommended every 1-2 years to screen for glaucoma; cataracts, macular degeneration, and other eye disorders. · A preventive dental visit is recommended every 6 months. · Try to get at least 150 minutes of exercise per week or 10,000 steps per day on a pedometer . · Order or download the FREE \"Exercise & Physical Activity: Your Everyday Guide\" from [employer]. Call 9-559.656.5272 or search [employer] online. · You need 2233-5602 mg of calcium and 7100-6077 IU of vitamin D per day. It is possible to meet your calcium requirement with diet alone, but a vitamin D supplement is usually necessary to meet this goal.  · When exposed to the sun, use a sunscreen that protects against both UVA and UVB radiation with an SPF of 30 or greater. Reapply every 2 to 3 hours or after sweating, drying off with a towel, or swimming. · Always wear a seat belt when traveling in a car. Always wear a helmet when riding a bicycle or motorcycle. Heart-Healthy Diet   Sodium, Fat, and Cholesterol Controlled Diet       What Is a Heart Healthy Diet? A heart-healthy diet is one that limits sodium , certain types of fat , and cholesterol . This type of diet is recommended for:   People with any form of cardiovascular disease (eg, coronary heart disease , peripheral vascular disease , previous heart attack , previous stroke )   People with risk factors for cardiovascular disease, such as high blood pressure , high cholesterol , or diabetes   Anyone who wants to lower their risk of developing cardiovascular disease   Sodium    Sodium is a mineral found in many foods. In general, most people consume much more sodium than they need. Diets high in sodium can increase blood pressure and lead to edema (water retention). On a heart-healthy diet, you should consume no more than 2,300 mg (milligrams) of sodium per dayabout the amount in one teaspoon of table salt. The foods highest in sodium include table salt (about 50% sodium), processed foods, convenience foods, and preserved foods. Cholesterol    Cholesterol is a fat-like, waxy substance in your blood. Our bodies make some cholesterol. It is also found in animal products, with the highest amounts in fatty meat, egg yolks, whole milk, cheese, shellfish, and organ meats. On a heart-healthy diet, you should limit your cholesterol intake to less than 200 mg per day. It is normal and important to have some cholesterol in your bloodstream. But too much cholesterol can cause plaque to build up within your arteries, which can eventually lead to a heart attack or stroke. The two types of cholesterol that are most commonly referred to are:   Low-density lipoprotein (LDL) cholesterol  Also known as bad cholesterol, this is the cholesterol that tends to build up along your arteries. Bad cholesterol levels are increased by eating fats that are saturated or hydrogenated. Optimal level of this cholesterol is less than 100. Over 130 starts to get risky for heart disease. High-density lipoprotein (HDL) cholesterol  Also known as good cholesterol, this type of cholesterol actually carries cholesterol away from your arteries and may, therefore, help lower your risk of having a heart attack. You want this level to be high (ideally greater than 60). It is a risk to have a level less than 40. You can raise this good cholesterol by eating olive oil, canola oil, avocados, or nuts. Exercise raises this level, too. Fat    Fat is calorie dense and packs a lot of calories into a small amount of food. Even though fats should be limited due to their high calorie content, not all fats are bad. In fact, some fats are quite healthful. Fat can be broken down into four main types.    The good-for-you fats are:   Monounsaturated fat  found in oils such as olive and canola, avocados, and nuts and natural nut butters; can decrease cholesterol levels, while keeping levels of HDL cholesterol high   Polyunsaturated fat  found in oils such as safflower, sunflower, soybean, corn, and sesame; can decrease total cholesterol and LDL cholesterol   Omega-3 fatty acids  particularly those found in fatty fish (such as salmon, trout, tuna, mackerel, herring, and sardines); can decrease risk of arrhythmias, decrease triglyceride levels, and slightly lower blood pressure   The fats that you want to limit are:   Saturated fat  found in animal products, many fast foods, and a few vegetables; increases total blood cholesterol, including LDL levels   Animal fats that are saturated include: butter, lard, whole-milk dairy products, meat fat, and poultry skin   Vegetable fats that are saturated include: hydrogenated shortening, palm oil, coconut oil, cocoa butter   Hydrogenated or trans fat  found in margarine and vegetable shortening, most shelf stable snack foods, and fried foods; increases LDL and decreases HDL Lean cuts of fresh or frozen beef, veal, lamb, or pork (look for the word loin) Fresh or frozen poultry without the skin Fresh or frozen fish and some shellfish Egg whites and egg substitutes (Limit whole eggs to three per week) Tofu Nuts or seeds (unsalted, dry-roasted), low-sodium peanut butter Dried peas, beans, and lentils   Any smoked, cured, salted, or canned meat, fish, or poultry (including persaud, chipped beef, cold cuts, hot dogs, sausages, sardines, and anchovies) Poultry skins Breaded and/or fried fish or meats Canned peas, beans, and lentils Salted nuts   Fats and Oils   Olive oil and canola oil Low-sodium, low-fat salad dressings and mayonnaise   Butter, margarine, coconut and palm oils, persaud fat   Snacks, Sweets, and Condiments   Low-sodium or unsalted versions of broths, soups, soy sauce, and condiments Pepper, herbs, and spices; vinegar, lemon, or lime juice Low-fat frozen desserts (yogurt, sherbet, fruit bars) Sugar, cocoa powder, honey, syrup, jam, and preserves Low-fat, trans-fat free cookies, cakes, and pies Tavo and animal crackers, fig bars, halima snaps   High-fat desserts Broth, soups, gravies, and sauces, made from instant mixes or other high-sodium ingredients Salted snack foods Canned olives Meat tenderizers, seasoning salt, and most flavored vinegars   Beverages   Low-sodium carbonated beverages Tea and coffee in moderation Soy milk   Commercially softened water   Suggestions   Make whole grains, fruits, and vegetables the base of your diet. Choose heart-healthy fats such as canola, olive, and flaxseed oil, and foods high in heart-healthy fats, such as nuts, seeds, soybeans, tofu, and fish. Eat fish at least twice per week; the fish highest in omega-3 fatty acids and lowest in mercury include salmon, herring, mackerel, sardines, and canned chunk light tuna. If you eat fish less than twice per week or have high triglycerides, talk to your doctor about taking fish oil supplements. Read food labels. For products low in fat and cholesterol, look for fat free, low-fat, cholesterol free, saturated fat free, and trans fat freeAlso scan the Nutrition Facts Label, which lists saturated fat, trans fat, and cholesterol amounts. For products low in sodium, look for sodium free, very low sodium, low sodium, no added salt, and unsalted   Skip the salt when cooking or at the table; if food needs more flavor, get creative and try out different herbs and spices. Garlic and onion also add substantial flavor to foods. Trim any visible fat off meat and poultry before cooking, and drain the fat off after echavarria. Use cooking methods that require little or no added fat, such as grilling, boiling, baking, poaching, broiling, roasting, steaming, stir-frying, and sauting. Avoid fast food and convenience food. They tend to be high in saturated and trans fat and have a lot of added salt. Talk to a registered dietitian for individualized diet advice. Last Reviewed: March 2011 Vishal Brandon MS, MPH, RD   Updated: 3/29/2011   ·   Patient information: Weight loss treatments    INTRODUCTION  Obesity is a major international problem, and Americans are among the heaviest people in the world. The percentage of obese people in the [place] has risen steadily. Many people find that although they initially lose weight by dieting, they quickly regain the weight after the diet ends. Because it so hard to keep weight off over time, it is important to have as much information and support as possible before starting a diet. You are most likely to be successful in losing weight and keeping it off when you believe that your body weight can be controlled. STARTING A WEIGHT LOSS PROGRAM  Some people like to talk to their doctor or nurse to get help choosing the best plan, monitoring progress, and getting advice and support along the way. To know what treatment (or combination of treatments) will work best, determine your body mass index (BMI) and waist circumference (measurement). The BMI is calculated from your height and weight. A person with a BMI between 25 and 29.9 is considered overweight   A person with a BMI of 30 or greater is considered to be obese  A waist circumference greater than 35 inches (88 cm) in women and 40 inches (102 cm) in men increases the risk of obesity-related complications, such as heart disease and diabetes. People who are obese and who have a larger waist size may need more aggressive weight loss treatment than others. Talk to your doctor or nurse for advice. Types of treatment  Based on your measurements and your medical history, your doctor or nurse can determine what combination of weight loss treatments would work best for you. Treatments may include changes in lifestyle, exercise, dieting, and, in some cases, weight loss medicines or weight loss surgery. Weight loss surgery, also called bariatric surgery, is reserved for people with severe obesity who have not responded to other weight loss treatments. SETTING A WEIGHT LOSS GOAL  It is important to set a realistic weight loss goal. Your first goal should be to avoid gaining more weight and staying at your current weight (or within 5 percent). Many people have a \"dream\" weight that is difficult or impossible to achieve. People at high risk of developing diabetes who are able to lose 5 percent of their body weight and maintain this weight will reduce their risk of developing diabetes by about 50 percent and reduce their blood pressure. This is a success. Losing more than 15 percent of your body weight and staying at this weight is an extremely good result, even if you never reach your \"dream\" or \"ideal\" weight. LIFESTYLE CHANGES  Programs that help you to change your lifestyle are usually run by psychologists or other professionals. The goals of lifestyle changes are to help you change your eating habits, become more active, and be more aware of how much you eat and exercise, helping you to make healthier choices. This type of treatment can be broken down into three steps: The triggers that make you want to eat   Eating   What happens after you eat  Triggers to eat  Determining what triggers you to eat involves figuring out what foods you eat and where and when you eat. To figure out what triggers you to eat, keep a record for a few days of everything you eat, the places where you eat, how often you eat, and the emotions you were feeling when you ate. For some people, the trigger is related to a certain time of day or night. For others, the trigger is related to a certain place, like sitting at a desk working. Eating  You can change your eating habits by breaking the chain of events between the trigger for eating and eating itself. There are many ways to do this.  For instance, you can:  Limit where you eat to a few places (eg, dining room)   Restrict the number of utensils (eg, only a fork) used for eating   Drink a sip of water between each bite Chew your food a certain number of times   Get up and stop eating every few minutes  What happens after you eat  Rewarding yourself for good eating behaviors can help you to develop better habits. This is not a reward for weight loss; instead, it is a reward for changing unhealthy behaviors. Do not use food as a reward. Some people find money, clothing, or personal care (eg, a hair cut, manicure, or massage) to be effective rewards. Treat yourself immediately after making better eating choices to reinforce the value of the good behavior. You need to have clear behavior goals, and you must have a time frame for reaching your goals. Reward small changes along the way to your final goal.  Other factors that contribute to successful weight loss  Changing your behavior involves more than just changing unhealthy eating habits; it also involves finding people around you to support your weight loss, reducing stress, and learning to be strong when tempted by food. Establish a \"narayan\" system  Having a friend or family member available to provide support and reinforce good behavior is very helpful. The support person needs to understand your goals. Learn to be strong  Learning to be strong when tempted by food is an important part of losing weight. As an example, you will need to learn how to say \"no\" and continue to say no when urged to eat at parties and social gatherings. Develop strategies for events before you go, such as eating before you go or taking low-calorie snacks and drinks with you. Develop a support system  Having a support system is helpful when losing weight. This is why many commercial groups are successful. Family support is also essential; if your family does not support your efforts to lose weight, this can slow your progress or even keep you from losing weight. Positive thinking  People often have conversations with themselves in their head; these conversations can be positive or negative. If you eat a piece of cake that was not planned, you may respond by thinking, \"Oh, you stupid idiot, you've blown your diet! \" and as a result, you may eat more cake. A positive thought for the same event could be, \"Well, I ate cake when it was not on my plan. Now I should do something to get back on track. \" A positive approach is much more likely to be successful than a negative one. Reduce stress  Although stress is a part of everyday life, it can trigger uncontrolled eating in some people. It is important to find a way to get through these difficult times without eating or by eating low-calorie food, like raw vegetables. It may be helpful to imagine a relaxing place that allows you to temporarily escape from stress. With deep breaths and closed eyes, you can imagine this relaxing place for a few minutes. Self-help programs  Self-help programs like [employer] Watchers®, Overeaters Anonymous®, and Take Off Bertin ([employer])© work for some people. As with all weight loss programs, you are most likely to be successful with these plans if you make long-term changes in how you eat. CHOOSING A DIET  A calorie is a unit of energy found in food. Your body needs calories to function. The goal of any diet is to burn up more calories than you eat. How quickly you lose weight depends upon several factors, such as your age, gender, and starting weight. Older people have a slower metabolism than young people, so they lose weight more slowly. Men lose more weight than women of similar height and weight when dieting because they use more energy. People who are extremely overweight lose weight more quickly than those who are only mildly overweight. Try not to drink alcohol or drinks with added sugar, and most sweets (candy, cakes, cookies), since they rarely contain important nutrients. Portion-controlled diets  One simple way to diet is to buy packaged foods, like frozen low-calorie meals or meal-replacement canned drinks. A typical meal plan for one day may include:  A meal-replacement drink or breakfast bar for breakfast   A meal-replacement drink or a frozen low-calorie (250 to 350 calories) meal for lunch   A frozen low-calorie meal or other prepackaged, calorie-controlled meal, along with extra vegetables for dinner  This would give you 1000 to 1500 calories per day. Low-fat diet  To reduce the amount of fat in your diet, you can:  Eat low-fat foods. Low-fat foods are those that contain less than 30 percent of calories from fat. Fat is listed on the food facts label (figure 1). Count fat grams. For a 1500 calorie diet, this would mean about 45 g or fewer of fat per day. Low-carbohydrate diet  Low- and very-low-carbohydrate diets (eg, Atkins diet, [employer]) have become popular ways to lose weight quickly. With a very-low-carbohydrate diet, you eat between 0 and 60 grams of carbohydrates per day (a standard diet contains 200 to 300 grams of carbohydrates)   With a low-carbohydrate diet, you eat between 60 and 130 grams of carbohydrates per day  Carbohydrates are found in fruits, vegetables, and grains (including breads, rice, pasta, and cereal), alcoholic beverages, and in dairy products. Meat and fish do not contain carbohydrates. Side effects of very-low-carbohydrate diets can include constipation, headache, bad breath, muscle cramps, diarrhea, and weakness. Mediterranean diet  The term \"Mediterranean diet\" refers to a way of eating that is common in [place]growing regions around the [facility]. Although there is some variation in Mediterranean diets, there are some similarities.  Most Mediterranean diets include: A high level of monounsaturated fats (from olive or canola oil, walnuts, pecans, almonds) and a low level of saturated fats (from butter)   A high amount of vegetables, fruits, legumes, and grains (7 to 10 servings of fruits and vegetables per day)   A moderate amount of milk and dairy products, mostly in the form of cheese. Use low-fat dairy products (skim milk, fat-free yogurt, low-fat cheese). A relatively low amount of red meat and meat products. Substitute fish or poultry for red meat. For those who drink alcohol, a modest amount (mainly as red wine) may help to protect against cardiovascular disease. A modest amount is up to one (4 ounce) glass per day for women and up to two glasses per day for men. Which diet is best?  Studies have compared different diets, including:  Very-low-carbohydrate (Atkins)   Macronutrient balance controlling glycemic load (Zone®)   Reduced-calorie (Weight Watchers®)   Very-low-fat (Ornish)  No one diet is \"best\" for weight loss. Any diet will help you to lose weight if you stick with the diet. Therefore, it is important to choose a diet that includes foods you like. Fad diets  Fad diets often promise quick weight loss (more than 1 to 2 pounds per week) and may claim that you do not need to exercise or give up favorite foods. Some fad diets cost a lot of money, because you have to pay for seminars or pills. Fad diets generally lack any scientific evidence that they are safe and effective, but instead rely on \"before\" and \"after\" photos or testimonials. Diets that sound too good to be true usually are. These plans are a waste of time and money and are not recommended. A doctor, nurse, or nutritionist can help you find a safe and effective way to lose weight and keep it off. WEIGHT LOSS [place] a weight loss medicine may be helpful when used in combination with diet, exercise, and lifestyle changes. However, it is important to understand the risks and benefits of these medicines. It is also important to be realistic about your goal weight using a weight loss medicine; you may not reach your \"dream\" weight, but you may be able to reduce your risk of diabetes or heart disease. Weight loss medicines may be recommended for people who have not been able to lose weight with diet and exercise who have a:  BMI of 30 or more    BMI between 27 and 29.9 and have other medical problems, such as diabetes, high cholesterol, or high blood pressure  Two weight loss medicines are approved in the [place] for long-term use. These are sibutramine and orlistat. Other weight loss medicines (phentermine, diethylpropion) are available but are only approved for short-term use (up to 12 weeks). Sibutramine  Sibutramine (Meridia®, Reductil®) is a medicine that reduces your appetite. In people who take the medicine for one year, the average weight loss is 10 percent of the initial body weight (5 percent more than those who took a placebo treatment). Side effects of sibutramine include insomnia, dry mouth, and constipation. Increases in blood pressure can occur. Therefore, blood pressure is usually monitored during treatment. There is no evidence that sibutramine causes heart or lung problems (like dexfenfluramine and fenfluramine (Phen/Fen)). However, experts agree that sibutramine should not used by people with coronary heart disease, heart failure, uncontrolled hypertension, stroke, irregular heart rhythms, or peripheral vascular disease (poor circulation in the legs). Orlistat  Orlistat (Xenical® 120 mg capsules) is a medicine that reduces the amount of fat your body absorbs from the foods you eat. A lower-dose version is now available without a prescription (Bill® 60 mg capsules) in many countries, including the [place]. The medicine is recommended three times per day, taken with a meal; you can skip a dose if you skip a meal or if the meal contains no fat. After one year of treatment with orlistat, the average weight loss is approximately 8 to 10 percent of initial body weight (4 percent more than in those who took a placebo). Cholesterol levels often improve, and blood pressure sometimes falls. In people with diabetes, orlistat may help control blood sugar levels. Side effects occur in 15 to 10 percent of people and may include stomach cramps, gas, diarrhea, leakage of stool, or oily stools. These problems are more likely when you take orlistat with a high-fat meal (if more than 30 percent of calories in the meal are from fat). Side effects usually improve as you learn to avoid high-fat foods. Severe liver injury has been reported rarely in patients taking orlistat, but it is not known if orlistat caused the liver problems. Diet supplements  Diet supplements are widely used by people who are trying to lose weight, although the safety and efficacy of these supplements are often unproven. A few of the more common diet supplements are discussed below; none of these are recommended because they have not been studied carefully, and there is no proof they are safe or effective. Chitosan and wheat dextrin are ineffective for weight loss, and their use is not recommended. Ephedra, a compound related to ephedrine, is no longer available in the [place] due to safety concerns. Many nonprescription diet pills previously contained ephedra. Although some studies have shown that ephedra helps with weight loss, there can be serious side effects (psychiatric symptoms, palpitations, and stomach upset), including death. There are not enough data about the safety and efficacy of chromium, ginseng, glucomannan, green tea, hydroxycitric acid, L carnitine, psyllium, pyruvate supplements, Bates wort, and conjugated linoleic acid. Two supplements from [facility], [address] [place] (also known as the Nathalie Hernandez 15 pill) and Herbathin dietary supplement, have been shown to contain prescription drugs. Hoodia gordonii is a dietary supplement derived from a plant in [place]. It is not recommended because there is no proof that it is safe or effective. Bitter orange (Citrus aurantium) can increase your heart rate and blood pressure and is not recommended. SHOULD I HAVE SURGERY TO LOSE WEIGHT?  Weight loss surgery is recommended ONLY for people with one of the following:  Severe obesity (body mass index above 40) (calculator 1 and calculator 2) who have not responded to diet, exercise, or weight loss medicines   Body mass index between 35 and 40, along with a serious medical problem (including diabetes, severe joint pain, or sleep apnea) that would improve with weight loss  You should be sure that you understand the potential risks and benefits of weight loss surgery. You must be motivated and willing to make lifelong changes in how you eat to reach and maintain a healthier weight after surgery. You must also be realistic about weight loss after surgery (see 'Effectiveness of weight loss surgery' below). PREPARING FOR WEIGHT LOSS SURGERY  Most people who have weight loss surgery will meet with several specialists before surgery is scheduled. This often includes a dietitian, mental health counselor, a doctor who specializes in care of obese people, and a surgeon who performs weight loss surgery (bariatric surgeon). You may need to work with these providers for several weeks or months before surgery. The nutritionist will explain what and how much you will be able to eat after surgery. You may also need to lose a small amount of weight before surgery. The mental health specialist will help you to cope with stress and other factors that can make it harder to lose weight or trigger you to eat   The medical doctor will determine whether you need other tests, counseling, or treatment before surgery. He or she might also help you begin a medical weight loss program so that you can lose some weight before surgery. The bariatric surgeon will meet with you to discuss the surgeries available to treat obesity. He or she will also make sure you are a good candidate for surgery. TYPES OF WEIGHT LOSS SURGERY  There are several types of weight loss surgeries, the most common being lap banding, gastric bypass, and gastric sleeve. Lap banding  Laparoscopic adjustable gastric banding (LAGB), or lap banding, is a surgery that uses an adjustable band around the opening to the stomach (figure 1). This reduces the amount of food that you can eat at one time. Lap banding is done through small incisions, with a laparoscope. The band can be adjusted after surgery, allowing you to eat more or less food. Adjustments to the size and tightness of the band are made by using a needle to add or remove fluid from a port (a small container under the skin that is connected to the band). Adding fluid to the band makes it tighter which restricts the amount of food you can eat and may help you to lose more weight. Lap banding is a popular choice because it is relatively simple to perform, can be adjusted or removed, and has a low risk of serious complications immediately after surgery. However, weight loss with the lap band depends on your ability to follow the program closely. You will need to prepare nutritious meals that [facility] with\" the band, not against it. For example, the lap band will not work well if you eat or drink a large amount of liquid calories (like ice cream). The band will not help you to feel full when you eat/drink liquid calories. Weight loss ranges from 45 to 75 percent after two years. As an example, a person who is 120 pounds overweight could expect to lose approximately 54 to 90 pounds in the two years after lap banding. Gastric bypass  Cory-en-Y gastric bypass, also called gastric bypass, helps you to lose weight by reducing the amount of food you can eat and reducing the number of calories and nutrients you absorb from the food you eat. To perform gastric bypass, a surgeon creates a small stomach pouch by dividing the stomach and attaching it to the small intestine. This helps you to lose weight in two ways: The smaller stomach can hold less food than before surgery. This causes you to feel full after eating a very small amount of food or liquid. Over time, the pouch might stretch, allowing you to eat more food. The body absorbs fewer calories, since food bypasses most of the stomach as well as the upper small intestine. This new arrangement seems to decrease your appetite and change how you break down foods by changing the release of various hormones. Gastric bypass can be performed as open surgery (through an incision on the abdomen) or laparoscopically, which uses smaller incisions and smaller instruments. Both the laparoscopic and open techniques have risks and benefits. You and your surgeon should work together to decide which surgery, if any, is right for you. Gastric bypass has a high success rate, and people lose an average of 62 to 68 percent of their excess body weight in the first year. Weight loss typically levels off after one to two years, with an overall excess weight loss between 50 and 75 percent. For a person who is 120 pounds overweight, an average of 60 to 90 pounds of weight loss would be expected. Gastric sleeve  Gastric sleeve, also known as sleeve gastrectomy, is a surgery that reduces the size of the stomach and makes it into a narrow tube (figure 3). The new stomach is much smaller and produces less of the hormone (ghrelin) that causes hunger, helping you feel satisfied with less food. Sleeve gastrectomy is safer than gastric bypass because the intestines are not rearranged, and there is less chance of malnutrition. It also appears to control hunger better than lap banding. It might be safer than the lap banding because no foreign materials are used. The gastric sleeve has a good success rate, and people lose an average of 33 percent of their excess body weight in the first year. For a person who is 120 pounds overweight, this would mean losing about 40 pounds in the first year. WEIGHT LOSS SURGERY COMPLICATIONS  A variety of complications can occur with weight loss surgery. The risks of surgery depend upon which surgery you have and any medical problems you had before surgery. Some of the more common early surgical complications (one to six weeks after surgery) include:  Bleeding   Infection   Blockage or tear in the bowels   Need for further surgery  Important medical complications after surgery can include blood clots in the legs or lungs, heart attack, pneumonia, and urinary tract infection. Complications are less likely when surgery is performed in centers that are experienced in weight loss surgery.  In general, centers with experience in weight loss surgery have:  Board-certified doctors and surgeons A team of support staff (dietitians, counselors, nurses)   Long-term follow-up after surgery   Hospital staff experienced with the care of weight loss patients. This includes nurses who are trained in the care of patients immediately after surgery and anesthesiologists who are experienced in caring for the morbidly obese. EFFECTIVENESS OF WEIGHT LOSS SURGERY  The goal of weight loss surgery is to reduce the risk of illness or death associated with obesity. Weight loss surgery can also help you to feel and look better, reduce the amount of money you spend on medicines, and cut down on sick days. As an example, weight loss surgery can improve health problems related to obesity (diabetes, high blood pressure, high cholesterol, sleep apnea) to the point that you need less or no medicine. Finally, weight loss surgery might reduce your risk of developing heart disease, cancer, and certain infections. AFTER WEIGHT LOSS SURGERY  You will need to stay in the hospital until your team feels that it is safe for you to leave (on average, one to three days). Do not drive if you are taking prescription pain medicine. Begin exercising as soon as possible once you have healed; most weight loss centers will design an exercise program for you. Once you are home, it is important to eat and drink exactly what your doctor and dietitian recommend. You will see your doctor, nurse, and dietitian on a regular basis after surgery to monitor your health, diet, and weight loss.    You will be able to slowly increase how much you eat over time, although it will always be important to:  Eat small, frequent meals and not skip meals   Chew your food slowly and completely   Avoid eating while \"distracted\" (such as eating while watching TV)   Stop eating when you feel full   Drink liquids at least 30 minutes before or after eating   Avoid foods high in fat or sugar   Take vitamin supplements, as recommended It can take several months to learn to listen to your body so that you know when you are hungry and when you are full. You may dislike foods you previously loved, and you may begin to prefer new foods. This can be a frustrating process for some people, so talk to your dietitian if you are having trouble. It usually takes between one and two years to lose weight after surgery. After reaching their goal weight, some people have plastic surgery (called \"body contouring\") to remove excess skin from the body, particularly in the abdominal area. Before you decide to have weight loss surgery, you must commit to staying healthy for life. This includes following up with your healthcare team, exercising most days of the week, and eating a sensible diet every day. It can be difficult to develop new eating and exercise habits after weight loss surgery, and you will have to work hard to stick to your goals. Recovering from surgery and losing weight can be stressful and emotional, and it is important to have the support of family and friends. Working with a , therapist, or support group can help you through the ups and downs. WHERE TO GET MORE INFORMATION  Your healthcare provider is the best source of information for questions and concerns related to your medical problem. This article will be updated as needed every four months on our Web site ([email or web address])  ·     High-Fiber Diet     What Is Fiber? Dietary fiber is a form of carbohydrate found in plants that cannot be digested by humans. All plants contain fiber, including fruits, vegetables, grains, and legumes. Fiber is often classified into two categories: soluble and insoluble. Soluble fiber draws water into the bowel and can help slow digestion. Examples of foods that are high in soluble fiber include oatmeal, oat bran, barley, legumes (eg, beans and peas), apples, and strawberries. Insoluble fiber speeds digestion and can add bulk to the stool. Examples of foods that are high in insoluble fiber include whole-wheat products, wheat bran, cauliflower, green beans, and potatoes. Why Follow a High-Fiber Diet? A high-fiber diet is often recommended to prevent and treat constipation , hemorrhoids , diverticulitis , and irritable bowel syndrome . Eating a high-fiber diet can also help improve your cholesterol levels, lower your risk of coronary heart disease , reduce your risk of type 2 diabetes , and lower your weight. For people with type 1 or 2 diabetes, a high-fiber diet can also help stabilize blood sugar levels. How Much Fiber Should I Eat? A high-fiber diet should contain  20-35 grams  of fiber a day. This is actually the amount recommended for the general adult population; however, most Americans eat only 15 grams of fiber per day. Digestion of Fiber   Eating a higher fiber diet than usual can take some getting used to by your body's digestive system. To avoid the side effects of sudden increases in dietary fiber (eg, gas, cramping, bloating, and diarrhea), increase fiber gradually and be sure to drink plenty of fluids every day. Tips for Increasing Fiber Intake   Whenever possible, choose whole grains over refined grains (eg, brown rice instead of white rice, whole-wheat bread instead of white bread). Include a variety of grains in your diet, such as wheat, rye, barley, oats, quinoa, and bulgur. Eat more vegetarian-based meals. Here are some ideas: black bean burgers, eggplant lasagna, and veggie tofu stir-españa. Choose high-fiber snacks, such as fruits, popcorn, whole-grain crackers, and nuts. Make whole-grain cereal or whole-grain toast part of your daily breakfast regime. When eating out, whether ordering a sandwich or dinner, ask for extra vegetables. When baking, replace part of the white flour with whole-wheat flour. Whole-wheat flour is particularly easy to incorporate into a recipe. High-Fiber Diet Eating Guide   Food Category   Foods Recommended   Notes   Grains   Whole-grain breads, muffins, bagels, or marlena bread Rye bread Whole-wheat crackers or crisp breads Whole-grain or bran cereals Oatmeal, oat bran, or grits Wheat germ Whole-wheat pasta and brown rice   Read the ingredients list on food labels. Look for products that list \"whole\" as the first ingredient (eg, whole-wheat, whole oats). Choose cereals with at least 2 grams of fiber per serving. Vegetables   All vegetables, especially asparagus, bean sprouts, broccoli, [place] sprouts, cabbage, carrots, cauliflower, celery, corn, greens, green beans, green pepper, onions, peas, potatoes (with skin), snow peas, spinach, squash, sweet potatoes, tomatoes, zucchini   For maximum fiber intake, eat the peels of fruits and vegetablesjust be sure to wash them well first.   Fruits   All fruits, especially apples, berries, grapefruits, mangoes, nectarines, oranges, peaches, pears, dried fruits (figs, dates, prunes, raisins)   Choose raw fruits and vegetables over juice, cooked, or cannedraw fruit has more fiber. Dried fruit is also a good source of fiber. Milk   With the exception of yogurt containing inulin (a type of fiber), dairy foods provide little fiber. Add more fiber by topping your yogurt or cottage cheese with fresh fruit, whole grain or bran cereals, nuts, or seeds.    Meats and Beans   All beans and peas, especially Garbanzo beans, kidney beans, lentils, lima beans, split peas, and grijalva beans All nuts and seeds, especially almonds, peanuts, [place] nuts, cashews, peanut butter, walnuts, sesame and sunflower seeds All meat, poultry, fish, and eggs Increase fiber in meat dishes by adding grijalva beans, kidney beans, black-eyed peas, bran, or oatmeal. If you are following a low-fat diet, use nuts and seeds only in moderation. Fats and Oils   All in moderation   Fats and oils do not provide fiber   Snacks, Sweets, and Condiments   Fruit Nuts Popcorn, whole-wheat pretzels, or trail mix made with dried fruits, nuts, and seeds Cakes, breads, and cookies made with oatmeal or whole-wheat flour   Most snack foods do not provide much fiber. Choose snacks with at least 2 grams of fiber per serving. Last Reviewed: March 2011 Tamica Coleman MS, MPH, RD   Updated: 3/29/2011   ·     Keep Your Memory [place]       Many factors can affect your ability to remembera hectic lifestyle, aging, stress, chronic disease, and certain medicines. But, there are steps you can take to sharpen your mind and help preserve your memory. Challenge Your Brain   Regularly challenging your mind may help keeps it in top shape. Good mental exercises include:   Crossword puzzlesUse a dictionary if you need it; you will learn more that way. Brainteasers Try some! Crafts, such as wood working and sewing   Hobbies, such as gardening and building model airplanes   SocializingVisit old friends or join groups to meet new ones. Reading   Learning a new language   Taking a class, whether it be art history or tariq chi   TravelingExperience the food, history, and culture of your destination   Learning to use a computer   Going to museums, the theater, or thought-provoking movies   Changing things in your daily life, such as reversing your pattern in the grocery store or brushing your teeth using your nondominant hand   Use Memory Aids   There is no need to remember every detail on your own. These memory aids can help:   Calendars and day planners   Electronic organizers to store all sorts of helpful informationThese devices can \"beep\" to remind you of appointments. A book of days to record birthdays, anniversaries, and other occasions that occur on the same date every year   Detailed \"to-do\" lists and strategically placed sticky notes   Quick \"study\" sessionsBefore a gathering, review who will be there so their names will be fresh in your mind. Establish routinesFor example, keep your keys, wallet, and umbrella in the same place all the time or take medicine with your 8:00 AM glass of juice   Live a Healthy Life   Many actions that will keep your body strong will do the same for your mind. For example:   Talk to Your Doctor About Herbs and Supplements    Malnutrition and vitamin deficiencies can impair your mental function. For example, vitamin B12 deficiency can cause a range of symptoms, including confusion. But, what if your nutritional needs are being met? Can herbs and supplements still offer a benefit? Researchers have investigated a range of natural remedies, such as ginkgo , ginseng , and the supplement phosphatidylserine (PS). So far, though, the evidence is inconsistent as to whether these products can improve memory or thinking. If you are interested in taking herbs and supplements, talk to your doctor first because they may interact with other medicines that you are taking. Exercise Regularly    Among the many benefits of regular exercise are increased blood flow to the brain and decreased risk of certain diseases that can interfere with memory function. One study found that even moderate exercise has a beneficial effect. Examples of \"moderate\" exercise include:   Playing 18 holes of golf once a week, without a cart   Playing tennis twice a week   Walking one mile per day   Manage Stress    It can be tough to remember what is important when your mind is cluttered. Make time for relaxation. Choose activities that calm you down, and make it routine.    Manage Chronic Conditions Side effects of high blood pressure , diabetes, and heart disease can interfere with mental function. Many of the lifestyle steps discussed here can help manage these conditions. Strive to eat a healthy diet, exercise regularly, get stress under control, and follow your doctor's advice for your condition. Minimize Medications    Talk to your doctor about the medicines that you take. Some may be unnecessary. Also, healthy lifestyle habits may lower the need for certain drugs. Last Reviewed: April 2010 Dorian Restrepo MD   Updated: 4/13/2010   ·     [address]       As we get older, changes in balance, gait, strength, vision, hearing, and cognition make even the most youthful senior more prone to accidents. Falls are one of the leading health risks for older people. This increased risk of falling is related to:   Aging process (eg, decreased muscle strength, slowed reflexes)   Higher incidence of chronic health problems (eg, arthritis, diabetes) that may limit mobility, agility or sensory awareness   Side effects of medicine (eg, dizziness, blurred vision)especially medicines like prescription pain medicines and drugs used to treat mental health conditions   Depending on the brittleness of your bones, the consequences of a fall can be serious and long lasting. Home Life   Research by the Association of Aging [facility]) shows that some home accidents among older adults can be prevented by making simple lifestyle changes and basic modifications and repairs to the home environment. Here are some lifestyle changes that experts recommend:   Have your hearing and vision checked regularly. Be sure to wear prescription glasses that are right for you. Speak to your doctor or pharmacist about the possible side effects of your medicines. A number of medicines can cause dizziness. If you have problems with sleep, talk to your doctor. Limit your intake of alcohol. If necessary, use a cane or walker to help maintain your balance. Wear supportive, rubber-soled shoes, even at home. If you live in a region that gets wintry weather, you may want to put special cleats on your shoes to prevent you from slipping on the snow and ice. Exercise regularly to help maintain muscle tone, agility, and balance. Always hold the banister when going up or down stairs. Also, use  bars when getting in or out of the bath or shower, or using the toilet. To avoid dizziness, get up slowly from a lying down position. Sit up first, dangling your legs for a minute or two before rising to a standing position. Overall Home Safety Check   According to the Consumer Product Safety Commision's \"Older Consumer Home Safety Checklist,\" it is important to check for potential hazards in each room. And remember, proper lighting is an essential factor in home safety. If you cannot see clearly, you are more likely to fall. Important questions to ask yourself include:   Are lamp, electric, extension, and telephone cords placed out of the flow of traffic and maintained in good condition? Have frayed cords been replaced? Are all small rugs and runners slip resistant? If not, you can secure them to the floor with a special double-sided carpet tape. Are smoke detectors properly locatedone on every floor of your home and one outside of every sleeping area? Are they in good working order? Are batteries replaced at least once a year? Do you have a well-maintained carbon monoxide detector outside every sleeping are in your home? Does your furniture layout leave plenty of space to maneuver between and around chairs, tables, beds, and sofas? Are hallways, stairs and passages between rooms well lit? Can you reach a lamp without getting out of bed? Are floor surfaces well maintained? Shag rugs, high-pile carpeting, tile floors, and polished wood floors can be particularly slippery. Stairs should always have handrails and be carpeted or fitted with a non-skid tread. Is your telephone easily reachable. Is the cord safely tucked away? Room by Room   According to the Association of Aging, bathrooms and bob are the two most potentially hazardous rooms in your home. In the Kitchen    Be sure your stove is in proper working order and always make sure burners and the oven are off before you go out or go to sleep. Keep pots on the back burners, turn handles away from the front of the stove, and keep stove clean and free of grease build-up. Kitchen ventilation systems and range exhausts should be working properly. Keep flammable objects such as towels and pot holders away from the cooking area except when in use. Make sure kitchen curtains are tied back. Move cords and appliances away from the sink and hot surfaces. If extension cords are needed, install wiring guides so they do not hang over the sink, range, or working areas. Look for coffee pots, kettles and toaster ovens with automatic shut-offs. Keep a mop handy in the kitchen so you can wipe up spills instantly. You should also have a small fire extinguisher. Arrange your kitchen with frequently used items on lower shelves to avoid the need to stand on a stepstool to reach them. Make sure countertops are well-lit to avoid injuries while cutting and preparing food. In the Bathroom    Use a non-slip mat or decals in the tub and shower, since wet, soapy tile or porcelain surfaces are extremely slippery. Make sure bathroom rugs are non-skid or tape them firmly to the floor. Bathtubs should have at least one, preferably two, grab bars, firmly attached to structural supports in the wall.  (Do not use built-in soap holders or glass shower doors as grab bars.) Tub seats fitted with non-slip material on the legs allow you to wash sitting down. For people with limited mobility, bathtub transfer benches allow you to slide safely into the tub. Raised toilet seats and toilet safety rails are helpful for those with knee or hip problems. In the [facility]    Make sure you use a nightlight and that the area around your bed is clear of potential obstacles. Be careful with electric blankets and never go to sleep with a heating pad, which can cause serious burns even if on a low setting. Use fire-resistant mattress covers and pillows, and NEVER smoke in bed. Keep a phone next to the bed that is programmed to dial 911 at the push of a button. If you have a chronic condition, you may want to sign on with an automatic call-in service. Typically the system includes a small pendant that connects directly to an emergency medical voice-response system. You should also make arrangements to stay in contact with someonefriend, neighbor, family memberon a regular schedule. Fire Prevention   According to the [employer]. (Smoke Alarms for Every) [address] [place] [address], senior citizens are one of the two highest risk groups for death and serious injuries due to residential fires. When cooking, wear short-sleeved items, never a bulky long-sleeved robe. The [facility]'s Safety Checklist for Older Consumers emphasizes the importance of checking basements, garages, workshops and storage areas for fire hazards, such as volatile liquids, piles of old rags or clothing and overloaded circuits. Never smoke in bed or when lying down on a couch or recliner chair. Small portable electric or kerosene heaters are responsible for many home fires and should be used cautiously if at all. If you do use one, be sure to keep them away from flammable materials. In case of fire, make sure you have a pre-established emergency exit plan. Have a professional check your fireplace and other fuel-burning appliances yearly. Helping Hands   Baby boomers entering the ackerman years will continue to see the development of new products to help older adults live safely and independently in spite of age-related changes. Making Life More Livable  , by Deshaun Ruiz, lists over 1,000 products for \"living well in the mature years,\" such as bathing and mobility aids, household security devices, ergonomically designed knives and peelers, and faucet valves and knobs for temperature control. Medical supply stores and organizations are good sources of information about products that improve your quality of life and insure your safety.      Last Reviewed: November 2009 Yobani Turcios MD   Updated: 3/7/2011     ·

## 2021-01-15 ENCOUNTER — HOSPITAL ENCOUNTER (OUTPATIENT)
Dept: ULTRASOUND IMAGING | Age: 81
Discharge: HOME | End: 2021-01-17
Payer: MEDICARE

## 2021-01-15 DIAGNOSIS — I63.511 CEREBROVASCULAR ACCIDENT (CVA) DUE TO STENOSIS OF RIGHT MIDDLE CEREBRAL ARTERY (HCC): ICD-10-CM

## 2021-01-15 PROCEDURE — 93880 EXTRACRANIAL BILAT STUDY: CPT

## 2021-02-03 ENCOUNTER — TELEPHONE (OUTPATIENT)
Dept: NEUROLOGY | Age: 81
End: 2021-02-03

## 2021-04-01 DIAGNOSIS — N39.0 RECURRENT UTI: ICD-10-CM

## 2021-04-01 RX ORDER — CEPHALEXIN 250 MG/1
1 TABLET ORAL DAILY
Qty: 30 TABLET | Refills: 0 | Status: SHIPPED | OUTPATIENT
Start: 2021-04-01 | End: 2021-06-09 | Stop reason: SDUPTHER

## 2021-04-07 RX ORDER — ASPIRIN AND DIPYRIDAMOLE 25; 200 MG/1; MG/1
CAPSULE, EXTENDED RELEASE ORAL
Qty: 180 CAPSULE | Refills: 3 | Status: SHIPPED | OUTPATIENT
Start: 2021-04-07 | End: 2022-05-23

## 2021-04-22 DIAGNOSIS — N39.0 RECURRENT UTI: ICD-10-CM

## 2021-04-22 DIAGNOSIS — E78.5 HYPERLIPIDEMIA, UNSPECIFIED HYPERLIPIDEMIA TYPE: ICD-10-CM

## 2021-04-22 DIAGNOSIS — I10 ESSENTIAL HYPERTENSION: ICD-10-CM

## 2021-04-22 LAB
ALBUMIN SERPL-MCNC: 4.5 G/DL (ref 3.5–4.6)
ALP BLD-CCNC: 143 U/L (ref 40–130)
ALT SERPL-CCNC: 25 U/L (ref 0–33)
ANION GAP SERPL CALCULATED.3IONS-SCNC: 10 MEQ/L (ref 9–15)
AST SERPL-CCNC: 26 U/L (ref 0–35)
BASOPHILS ABSOLUTE: 0 K/UL (ref 0–0.2)
BASOPHILS RELATIVE PERCENT: 0.4 %
BILIRUB SERPL-MCNC: 0.4 MG/DL (ref 0.2–0.7)
BILIRUBIN URINE: NEGATIVE
BLOOD, URINE: NEGATIVE
BUN BLDV-MCNC: 15 MG/DL (ref 8–23)
CALCIUM SERPL-MCNC: 10.6 MG/DL (ref 8.5–9.9)
CHLORIDE BLD-SCNC: 99 MEQ/L (ref 95–107)
CLARITY: CLEAR
CO2: 26 MEQ/L (ref 20–31)
COLOR: YELLOW
CREAT SERPL-MCNC: 0.71 MG/DL (ref 0.5–0.9)
EOSINOPHILS ABSOLUTE: 0 K/UL (ref 0–0.7)
EOSINOPHILS RELATIVE PERCENT: 0.5 %
GFR AFRICAN AMERICAN: >60
GFR NON-AFRICAN AMERICAN: >60
GLOBULIN: 2.7 G/DL (ref 2.3–3.5)
GLUCOSE BLD-MCNC: 97 MG/DL (ref 70–99)
GLUCOSE URINE: NEGATIVE MG/DL
HCT VFR BLD CALC: 42.4 % (ref 37–47)
HEMOGLOBIN: 14.2 G/DL (ref 12–16)
KETONES, URINE: NEGATIVE MG/DL
LEUKOCYTE ESTERASE, URINE: NEGATIVE
LYMPHOCYTES ABSOLUTE: 1.7 K/UL (ref 1–4.8)
LYMPHOCYTES RELATIVE PERCENT: 23.8 %
MCH RBC QN AUTO: 31 PG (ref 27–31.3)
MCHC RBC AUTO-ENTMCNC: 33.5 % (ref 33–37)
MCV RBC AUTO: 92.6 FL (ref 82–100)
MONOCYTES ABSOLUTE: 0.8 K/UL (ref 0.2–0.8)
MONOCYTES RELATIVE PERCENT: 10.7 %
NEUTROPHILS ABSOLUTE: 4.6 K/UL (ref 1.4–6.5)
NEUTROPHILS RELATIVE PERCENT: 64.6 %
NITRITE, URINE: NEGATIVE
PDW BLD-RTO: 14.2 % (ref 11.5–14.5)
PH UA: 6 (ref 5–9)
PLATELET # BLD: 292 K/UL (ref 130–400)
POTASSIUM SERPL-SCNC: 4 MEQ/L (ref 3.4–4.9)
PROTEIN UA: NEGATIVE MG/DL
RBC # BLD: 4.58 M/UL (ref 4.2–5.4)
SODIUM BLD-SCNC: 135 MEQ/L (ref 135–144)
SPECIFIC GRAVITY UA: 1.01 (ref 1–1.03)
TOTAL PROTEIN: 7.2 G/DL (ref 6.3–8)
UROBILINOGEN, URINE: 0.2 E.U./DL
WBC # BLD: 7.2 K/UL (ref 4.8–10.8)

## 2021-04-23 DIAGNOSIS — E83.52 HYPERCALCEMIA: Primary | ICD-10-CM

## 2021-05-02 NOTE — TELEPHONE ENCOUNTER
Rx request   Requested Prescriptions     Pending Prescriptions Disp Refills    meclizine (ANTIVERT) 12.5 MG tablet 90 tablet 1     Sig: Take 1 tablet by mouth daily     LOV 1/8/2021  Next Visit Date:  Future Appointments   Date Time Provider Jose Manzo   5/13/2021  9:30 AM [facility]   6/1/2021  2:15 PM Sparkle Ta  [address]   1/13/2022 12:00 PM Carlos Lorenzo MD [address] [place] [address]

## 2021-05-03 RX ORDER — MECLIZINE HCL 12.5 MG/1
12.5 TABLET ORAL DAILY
Qty: 90 TABLET | Refills: 1 | Status: SHIPPED | OUTPATIENT
Start: 2021-05-03 | End: 2021-10-22 | Stop reason: SDUPTHER

## 2021-05-06 DIAGNOSIS — E83.52 HYPERCALCEMIA: ICD-10-CM

## 2021-05-06 LAB — LACTATE DEHYDROGENASE: 203 U/L (ref 135–214)

## 2021-05-10 LAB
ALBUMIN SERPL-MCNC: 4.15 G/DL (ref 3.75–5.01)
ALPHA-1-GLOBULIN: 0.23 G/DL (ref 0.19–0.46)
ALPHA-2-GLOBULIN: 0.96 G/DL (ref 0.48–1.05)
BETA GLOBULIN: 0.91 G/DL (ref 0.48–1.1)
GAMMA GLOBULIN: 0.96 G/DL (ref 0.62–1.51)
PROTEIN ELECTROPHORESIS, SERUM: NORMAL
SPE/IFE INTERPRETATION: NORMAL
TOTAL PROTEIN: 7.2 G/DL (ref 6.3–8.2)

## 2021-06-01 ENCOUNTER — OFFICE VISIT (OUTPATIENT)
Dept: NEUROLOGY | Age: 81
End: 2021-06-01
Payer: MEDICARE

## 2021-06-01 VITALS
WEIGHT: 158 LBS | BODY MASS INDEX: 25.89 KG/M2 | SYSTOLIC BLOOD PRESSURE: 124 MMHG | HEART RATE: 60 BPM | OXYGEN SATURATION: 90 % | DIASTOLIC BLOOD PRESSURE: 80 MMHG

## 2021-06-01 DIAGNOSIS — Z86.73 HX OF ISCHEMIC RIGHT MCA STROKE: ICD-10-CM

## 2021-06-01 DIAGNOSIS — G25.0 ESSENTIAL TREMOR: ICD-10-CM

## 2021-06-01 DIAGNOSIS — G25.0 ESSENTIAL PALATAL TREMOR: ICD-10-CM

## 2021-06-01 DIAGNOSIS — I10 ESSENTIAL HYPERTENSION: ICD-10-CM

## 2021-06-01 DIAGNOSIS — Z86.73 HX OF ISCHEMIC LEFT MCA STROKE: Primary | ICD-10-CM

## 2021-06-01 DIAGNOSIS — R42 DIZZINESS AND GIDDINESS: ICD-10-CM

## 2021-06-01 DIAGNOSIS — E78.5 HYPERLIPIDEMIA, UNSPECIFIED HYPERLIPIDEMIA TYPE: ICD-10-CM

## 2021-06-01 PROCEDURE — 99214 OFFICE O/P EST MOD 30 MIN: CPT | Performed by: PSYCHIATRY & NEUROLOGY

## 2021-06-01 PROCEDURE — G8417 CALC BMI ABV UP PARAM F/U: HCPCS | Performed by: PSYCHIATRY & NEUROLOGY

## 2021-06-01 PROCEDURE — 1090F PRES/ABSN URINE INCON ASSESS: CPT | Performed by: PSYCHIATRY & NEUROLOGY

## 2021-06-01 PROCEDURE — G8427 DOCREV CUR MEDS BY ELIG CLIN: HCPCS | Performed by: PSYCHIATRY & NEUROLOGY

## 2021-06-01 PROCEDURE — 1036F TOBACCO NON-USER: CPT | Performed by: PSYCHIATRY & NEUROLOGY

## 2021-06-01 PROCEDURE — 4040F PNEUMOC VAC/ADMIN/RCVD: CPT | Performed by: PSYCHIATRY & NEUROLOGY

## 2021-06-01 PROCEDURE — G8399 PT W/DXA RESULTS DOCUMENT: HCPCS | Performed by: PSYCHIATRY & NEUROLOGY

## 2021-06-01 PROCEDURE — 1123F ACP DISCUSS/DSCN MKR DOCD: CPT | Performed by: PSYCHIATRY & NEUROLOGY

## 2021-06-01 ASSESSMENT — ENCOUNTER SYMPTOMS
TROUBLE SWALLOWING: 0
CHOKING: 0
SHORTNESS OF BREATH: 0
BACK PAIN: 0
NAUSEA: 0
VOMITING: 0
PHOTOPHOBIA: 0
COLOR CHANGE: 0

## 2021-06-01 NOTE — PROGRESS NOTES
Surgical History:   Procedure Laterality Date    BLADDER SUSPENSION      BREAST BIOPSY Right 2/14/14    BREAST LUMPECTOMY Right 3/4/2014    Lumpectomy with SLNB, Dr Mary Kate Rehman Dr in [address] [place] [address] History     Socioeconomic History    Marital status:      Spouse name: Not on file    Number of children: 3    Years of education: Not on file    Highest education level: Not on file   Occupational History    Occupation: , retired   Tobacco Use    Smoking status: Former Smoker     Packs/day: 0.25     Years: 14.00     Pack years: 3.50     Types: Cigarettes    Smokeless tobacco: Never Used    Tobacco comment: started at age 9yo and quit at age 29yo   Substance and Sexual Activity    Alcohol use: No    Drug use: No    Sexual activity: Not on file   Other Topics Concern    Not on file   Social History Narrative    Born in [place], 2 younger brothers, one dec 2014, one in [place]     July 2020    , retired     Lives in a house in [facility]     of first spouse    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, owned a convenient store    [employer], travel    4 children, 2 daughters in [place] ([place] and [place]), 2 sons in [address] [place] [address] great grandchildren >50        Son lives with her temporarily (01/08/2021)     Social Determinants of Health     Financial Resource Strain: Low Risk     Difficulty of Paying Living Expenses: Not hard at all   Food Insecurity: No Food Insecurity    Worried About [address] in the Last Year: Never true    [facility] in the Last Year: Never true   Transportation Needs: No Transportation Needs    Lack of Transportation (Medical): No    Lack of Transportation (Non-Medical):  No   Physical Activity:     Days of Exercise per Week:     Minutes of Exercise per Session:    Stress:     Feeling of Stress :    Social Connections:     Frequency of Communication with Friends and Family:     Frequency of Social Gatherings with Friends and Family:     Attends Mormonism Services:     Active Member of Clubs or Organizations:     Attends Club or Organization Meetings:     Marital Status:    Intimate Partner Violence:     Fear of Current or Ex-Partner:     Emotionally Abused:     Physically Abused:     Sexually Abused:      Family History   Problem Relation Age of Onset    Kidney Disease Mother         dec age 79    Hypertension Mother     Migraines Mother     Diabetes Father     Heart Failure Father         dec age 76    Cancer Brother         dec age 76    Heart Attack Paternal Grandmother     Stroke Paternal Grandfather      Allergies   Allergen Reactions    Sulfa Antibiotics      Unsure of reaction     Iodinated Diagnostic Agents      Oral & IV dye group    Statins Other (See Comments)       Current Outpatient Medications   Medication Sig Dispense Refill    meclizine (ANTIVERT) 12.5 MG tablet Take 1 tablet by mouth daily 90 tablet 1    aspirin-dipyridamole (AGGRENOX)  MG per extended release capsule TAKE 1 CAPSULE TWICE DAILY 180 capsule 3    amLODIPine-benazepril (LOTREL) 5-20 MG per capsule Take 1 capsule by mouth daily TAKE 1 CAPSULE DAILY 90 capsule 4    ezetimibe (ZETIA) 10 MG tablet TAKE 1 TABLET DAILY 90 tablet 4    Cyanocobalamin (VITAMIN B 12 PO) Take by mouth      CINNAMON PO Take 1 capsule by mouth daily       acetaminophen (TYLENOL) 500 MG tablet Take 500 mg by mouth every 6 hours as needed for Pain      calcium carbonate (TUMS) 500 MG chewable tablet Take 1 tablet by mouth as needed for Heartburn      Polyvinyl Alcohol-Povidone (REFRESH OP) Apply 1 drop to eye 4 times daily as needed.  Vitamin D (CHOLECALCIFEROL) 1000 UNITS CAPS capsule Take 1,000 Units by mouth daily.  Magnesium 500 MG CAPS Take 1 capsule by mouth daily.         fish oil-omega-3 fatty acids 1000 MG capsule Take 1 g by breath sounds. Abdominal:      General: Bowel sounds are normal.   Musculoskeletal:         General: Normal range of motion. Cervical back: Normal range of motion. Skin:     General: Skin is warm. Neurological:      Mental Status: She is alert and oriented to person, place, and time. Cranial Nerves: No cranial nerve deficit. Sensory: No sensory deficit. Motor: No abnormal muscle tone. Coordination: Coordination normal.      Deep Tendon Reflexes: Reflexes are normal and symmetric. Babinski sign absent on the right side. Babinski sign absent on the left side. Psychiatric:         Mood and Affect: Mood normal.     Minor palatal tremor is notable. US CAROTID ARTERY BILATERAL    Result Date: 1/15/2021  EXAMINATION: US CAROTID ARTERY BILATERAL DATE AND TIME:1/15/2021 10:33 AM CLINICAL HISTORY: Carotid artery stenosis  I63.511 Cerebrovascular accident (CVA) due to stenosis of right middle cerebral artery ([address] ICD10 COMPARISON:None TECHNIQUE:Carotid duplex sonograms which include gray scale and color flow evaluation are complimented with spectral waveform analysis. Please refer to chart below for specific carotid velocity measurements. The degree of stenosis recorded on this exam uses the same method of stratification used in the NASCET trials. This complies with ACR practice guidelines and the Society of radiology in ultrasound consensus statement and provides adequate information for clinical decision making. Society of Radiologists in Ultrasound (SRU) consensus statement was used to estimate internal carotid artery stenosis. FINDINGS: There is no significant plaque identified within the internal carotid arteries bilaterally. No significant increase in systolic flow or turbulence to indicate any hemodynamically significant narrowing in the right or left internal carotid arteries. There is antegrade flow identified in both vertebral arteries.  ARTERIAL BLOOD FLOW VELOCITY RIGHT PS Prox CCA    61 cm/s             Mid CCA     70 cm/s              Dist CCA    68 cm/s              Prox ICA    93 cm/s               Mid ICA     60 cm/s            Dist ICA    55 cm/s             Prox ECA    109 cm/s             Prox VERT   41 cm/s              ICA/CCA     1.33                        LEFT PS Prox CCA    87 cm/s Mid CCA     93 cm/s Dist CCA    82 cm/s Prox ICA    74 cm/s Mid ICA     92 cm/s Dist ICA    91 cm/s Prox ECA    93 cm/s Prox VERT   59 cm/s ICA/CCA     0.99      <50  % STENOSIS IN THE RIGHT ICA  <50 % STENOSIS IN THE LEFT ICA       Lab Results   Component Value Date    WBC 7.2 04/22/2021    RBC 4.58 04/22/2021    RBC 4.66 05/16/2012    HGB 14.2 04/22/2021    HCT 42.4 04/22/2021    MCV 92.6 04/22/2021    MCH 31.0 04/22/2021    MCHC 33.5 04/22/2021    RDW 14.2 04/22/2021     04/22/2021    MPV 8.1 05/11/2015     Lab Results   Component Value Date     04/22/2021    K 4.0 04/22/2021    CL 99 04/22/2021    CO2 26 04/22/2021    BUN 15 04/22/2021    CREATININE 0.71 04/22/2021    GFRAA >60.0 04/22/2021    LABGLOM >60.0 04/22/2021    GLUCOSE 97 04/22/2021    GLUCOSE 91 05/16/2012    PROT 7.2 05/06/2021    LABALBU 4.15 05/06/2021    LABALBU 4.7 05/16/2012    CALCIUM 10.6 04/22/2021    BILITOT 0.4 04/22/2021    ALKPHOS 143 04/22/2021    AST 26 04/22/2021    ALT 25 04/22/2021     Lab Results   Component Value Date    PROTIME 10.0 12/22/2014    INR 1.0 12/22/2014     Lab Results   Component Value Date    TSH 2.920 06/12/2018     Lab Results   Component Value Date    TRIG 106 06/12/2018    HDL 61 12/09/2019    LDLCALC 118 12/09/2019     No results found for: LABAMPH, BARBSCNU, LABBENZ, CANNAB, COCAINESCRN, LABMETH, OPIATESCREENURINE, PHENCYCLIDINESCREENURINE, PPXUR, ETOH  No results found for: LITHIUM, DILFRTOT, VALPROATE    Assessment:       Diagnosis Orders   1. Hx of ischemic left MCA stroke     2. Hx of ischemic right MCA stroke     3.  Essential tremor     4. Essential hypertension     5. Hyperlipidemia, unspecified hyperlipidemia type     6. Dizziness and giddiness     7. Essential palatal tremor     History of bilateral cerebral TIAs initially unresponsive to Plavix. Patient has been on Aggrenox for many years without any current symptoms. Patient has risk factors for cerebrovascular's which are reviewed and they are mostly all controlled. Is patient is complains of crawling sensation on the left side this could be cervical spine though no myelopathic signs are noted we will watch this for now and if it becomes intolerable she will call me. Next patient has tremors and most of this appears to palatal tremor. Patient has not developed any dysphagia or dysphonia from the same. She is not developed any parkinsonian features. And so her dizziness has not reoccurred. She otherwise is doing well without any memory issues and functions independently in her activity of daily living. We will reassess her in 1 year      Plan:      No orders of the defined types were placed in this encounter. No orders of the defined types were placed in this encounter. No follow-ups on file.       Blake Sharp MD

## 2021-06-04 DIAGNOSIS — N39.0 RECURRENT UTI: ICD-10-CM

## 2021-06-04 RX ORDER — CEPHALEXIN 250 MG/1
1 TABLET ORAL DAILY
Qty: 30 TABLET | Refills: 0 | Status: CANCELLED | OUTPATIENT
Start: 2021-06-04

## 2021-06-04 NOTE — TELEPHONE ENCOUNTER
Patient  requesting medication refill.  Please approve or deny this request.    Rx requested:  Requested Prescriptions     Pending Prescriptions Disp Refills    Cephalexin 250 MG TABS 30 tablet 0     Sig: Take 1 tablet by mouth daily         Last Office Visit:   1/8/2021      Next Visit Date:  Future Appointments   Date Time Provider Jose Manzo   6/22/2021  9:00 AM Anny Loredo MD [facility]   1/13/2022 12:00 PM Anny Loredo MD [facility]   6/7/2022  1:45 PM Carleen Burnham MD [address] [place] [address]

## 2021-06-08 DIAGNOSIS — N39.0 RECURRENT UTI: ICD-10-CM

## 2021-06-08 RX ORDER — CEPHALEXIN 250 MG/1
1 TABLET ORAL DAILY
Qty: 30 TABLET | Refills: 0 | Status: CANCELLED | OUTPATIENT
Start: 2021-06-08

## 2021-06-08 NOTE — TELEPHONE ENCOUNTER
Patient requesting medication refill.  Please approve or deny this request.    Rx requested:  Requested Prescriptions     Pending Prescriptions Disp Refills    Cephalexin 250 MG TABS 30 tablet 0     Sig: Take 1 tablet by mouth daily         Last Office Visit:   1/8/2021      Next Visit Date:  Future Appointments   Date Time Provider Jose Manzo   6/22/2021  9:00 AM Tarah Olivarez MD [facility]   1/13/2022 12:00 PM Tarah Olivarez MD [facility]   6/7/2022  1:45 PM Rudi Pablo MD [address] [place] [address]

## 2021-06-09 RX ORDER — CEPHALEXIN 250 MG/1
1 TABLET ORAL DAILY
Qty: 90 TABLET | Refills: 5 | Status: SHIPPED | OUTPATIENT
Start: 2021-06-09 | End: 2021-06-14 | Stop reason: SDUPTHER

## 2021-06-12 DIAGNOSIS — N39.0 RECURRENT UTI: ICD-10-CM

## 2021-06-12 NOTE — TELEPHONE ENCOUNTER
Dear [facility],    My apologies I know that you already approved this medication, but I sent it to the wrong pharmacy. Sorry for the inconvenience.     Sincerely,  Jaxon Carpio

## 2021-06-14 RX ORDER — CEPHALEXIN 250 MG/1
1 TABLET ORAL DAILY
Qty: 90 TABLET | Refills: 5 | Status: SHIPPED | OUTPATIENT
Start: 2021-06-14 | End: 2022-07-25 | Stop reason: SDUPTHER

## 2021-06-22 ENCOUNTER — OFFICE VISIT (OUTPATIENT)
Dept: FAMILY MEDICINE CLINIC | Age: 81
End: 2021-06-22
Payer: MEDICARE

## 2021-06-22 VITALS
BODY MASS INDEX: 24.75 KG/M2 | RESPIRATION RATE: 15 BRPM | WEIGHT: 154 LBS | HEIGHT: 66 IN | DIASTOLIC BLOOD PRESSURE: 68 MMHG | SYSTOLIC BLOOD PRESSURE: 122 MMHG | TEMPERATURE: 96.5 F | HEART RATE: 63 BPM | OXYGEN SATURATION: 98 %

## 2021-06-22 DIAGNOSIS — N39.0 RECURRENT UTI: ICD-10-CM

## 2021-06-22 DIAGNOSIS — Z23 NEED FOR SHINGLES VACCINE: ICD-10-CM

## 2021-06-22 DIAGNOSIS — I10 ESSENTIAL HYPERTENSION: ICD-10-CM

## 2021-06-22 DIAGNOSIS — E78.5 HYPERLIPIDEMIA, UNSPECIFIED HYPERLIPIDEMIA TYPE: ICD-10-CM

## 2021-06-22 DIAGNOSIS — H81.13 BENIGN PAROXYSMAL VERTIGO OF BOTH EARS: ICD-10-CM

## 2021-06-22 DIAGNOSIS — I67.9 CEREBROVASCULAR DISEASE: Primary | Chronic | ICD-10-CM

## 2021-06-22 PROCEDURE — G8427 DOCREV CUR MEDS BY ELIG CLIN: HCPCS | Performed by: FAMILY MEDICINE

## 2021-06-22 PROCEDURE — 4040F PNEUMOC VAC/ADMIN/RCVD: CPT | Performed by: FAMILY MEDICINE

## 2021-06-22 PROCEDURE — 1036F TOBACCO NON-USER: CPT | Performed by: FAMILY MEDICINE

## 2021-06-22 PROCEDURE — 1123F ACP DISCUSS/DSCN MKR DOCD: CPT | Performed by: FAMILY MEDICINE

## 2021-06-22 PROCEDURE — G8399 PT W/DXA RESULTS DOCUMENT: HCPCS | Performed by: FAMILY MEDICINE

## 2021-06-22 PROCEDURE — 99214 OFFICE O/P EST MOD 30 MIN: CPT | Performed by: FAMILY MEDICINE

## 2021-06-22 PROCEDURE — 1090F PRES/ABSN URINE INCON ASSESS: CPT | Performed by: FAMILY MEDICINE

## 2021-06-22 PROCEDURE — G8417 CALC BMI ABV UP PARAM F/U: HCPCS | Performed by: FAMILY MEDICINE

## 2021-06-22 NOTE — PROGRESS NOTES
Subjective  Qi Coy, 78 y.o. female presents today with:  Chief Complaint   Patient presents with    Hypertension     6 month follow up            HPI    CVA. No new deficits. No facial droop. No speech impairment. No new unilateral weakness. Has chronic intermittent dizziness which is fairly well-controlled. If she keeps her neck covered it is prevented. Patient is here for f/u HTN. Is compliant with meds and has no side effects from them. Avoids added salt. Tries to eat healthy. Exercises occasionally. Has no chest pain, no new shortness of breath, palpitations or edema. Chronic UTI - tries to get off Keflex every once in a while but recently found that she needs it. Appetite is good. Normal BMs. No dysuria. Lost her  to smoking related illness July 23, 2020. Belongs to [employer] and is thinking about going back to [employer]. Does overnighters with daughters. Youngest of her 4 children moved in with her recently. Her children will not get COVID shots.     No other questions and or concerns for today's visit    Past Medical History:   Diagnosis Date    Asthma     Asthma in remission 1990    Elevated TSH     History of artificial lens replacement 3/24/2015    History of breast cancer 02/2014    Invasive ductal cancer right breast, Dr Rogelio Dickerson, Dr Kamar Lee HTN (hypertension) 1980    was with [facility]    Hx of ischemic left MCA stroke 2013    no residual, Dr Robi Mederos Hx of ischemic right MCA stroke 12/2014    no residual, frontal lobe    Hyperlipidemia     Osteoarthritis     Secondary and unspecified malignant neoplasm of axilla and upper limb lymph nodes ([facility] [address] 11/8/2018    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    Dr Jin Sy (ortho)    Vitamin D deficiency      Past Surgical History:   Procedure Laterality Date    BLADDER SUSPENSION      BREAST BIOPSY Right 2/14/14    BREAST LUMPECTOMY Right 3/4/2014    Lumpectomy with SLNB, Dr Modesta Guaman  in [address]       Social History     Socioeconomic History    Marital status:      Spouse name: Not on file    Number of children: 3    Years of education: Not on file    Highest education level: Not on file   Occupational History    Occupation: , retired   Tobacco Use    Smoking status: Former Smoker     Packs/day: 0.25     Years: 14.00     Pack years: 3.50     Types: Cigarettes    Smokeless tobacco: Never Used    Tobacco comment: started at age 9yo and quit at age 31yo   Substance and Sexual Activity    Alcohol use: No    Drug use: No    Sexual activity: Not on file   Other Topics Concern    Not on file   Social History Narrative    Born in [place], 2 younger brothers, one dec 2014, one in [place]     July 2020    , retired     Lives in a house in [facility]     of first spouse    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, owned a convenient store    [address], travel    4 children, 2 daughters in [place] ([place]), 2 sons in [address] [place] [address] grandchildren >50        Son lives with her temporarily (01/08/2021)     Social Determinants of Health     Financial Resource Strain: Low Risk     Difficulty of Paying Living Expenses: Not hard at all   Food Insecurity: No Food Insecurity    Worried About [address] in the Last Year: Never true    [address] [address] in the Last Year: Never true   Transportation Needs: No Transportation Needs    Lack of Transportation (Medical): No    Lack of Transportation (Non-Medical):  No   Physical Activity:     Days of Exercise per Week:     Minutes of Exercise per Session:    Stress:     Feeling of Stress :    Social Connections:     Frequency of Communication with Friends and Family:     Frequency of Social Gatherings with Friends and Family:     Attends Judaism Services:     Active Member of Clubs or Organizations:     Attends Club or Organization Meetings:     Marital Status:    Intimate Partner Violence:     Fear of Current or Ex-Partner:     Emotionally Abused:     Physically Abused:     Sexually Abused:      Family History   Problem Relation Age of Onset    Kidney Disease Mother         dec age 79    Hypertension Mother    Diana Amaral Migraines Mother     Diabetes Father     Heart Failure Father         dec age 76    Cancer Brother         dec age 76    Heart Attack Paternal Grandmother     Stroke Paternal Grandfather      Allergies   Allergen Reactions    Sulfa Antibiotics      Unsure of reaction     Iodinated Diagnostic Agents      Oral & IV dye group    Statins Other (See Comments)     Current Outpatient Medications   Medication Sig Dispense Refill    zoster recombinant adjuvanted vaccine (SHINGRIX) 50 MCG/0.5ML SUSR injection Inject 0.5 mLs into the muscle See Admin Instructions 1 dose now and repeat in 2-6 months 0.5 mL 0    Cephalexin 250 MG TABS Take 1 tablet by mouth daily 90 tablet 5    meclizine (ANTIVERT) 12.5 MG tablet Take 1 tablet by mouth daily 90 tablet 1    aspirin-dipyridamole (AGGRENOX)  MG per extended release capsule TAKE 1 CAPSULE TWICE DAILY 180 capsule 3    amLODIPine-benazepril (LOTREL) 5-20 MG per capsule Take 1 capsule by mouth daily TAKE 1 CAPSULE DAILY 90 capsule 4    ezetimibe (ZETIA) 10 MG tablet TAKE 1 TABLET DAILY 90 tablet 4    triamterene-hydroCHLOROthiazide (MAXZIDE-25) 37.5-25 MG per tablet TAKE 1 TABLET DAILY 90 tablet 4    niacin (NIASPAN) 500 MG extended release tablet TAKE 1 TABLET 3 TIMES A  tablet 4    Cyanocobalamin (VITAMIN B 12 PO) Take by mouth      CINNAMON PO Take 1 capsule by mouth daily       acetaminophen (TYLENOL) 500 MG tablet Take 500 mg by mouth every 6 hours as needed for Pain      Polyvinyl Alcohol-Povidone (REFRESH OP) Apply 1 drop to eye 4 times daily as needed.       Vitamin D (CHOLECALCIFEROL) 1000 UNITS CAPS capsule Take 1,000 Units by mouth Assessment & Plan   Visit Diagnoses and Associated Orders     Cerebrovascular disease    -  Primary    Stable and well-controlled on current meds         Essential hypertension        Stable and well-controlled on current meds         Benign paroxysmal vertigo of both ears        Stable and well-controlled on current meds nd with self-directed PT and covering neck         Hyperlipidemia, unspecified hyperlipidemia type        Stable and well-controlled on current meds         Recurrent UTI        Stable and well-controlled on current meds         Need for shingles vaccine        zoster recombinant adjuvanted vaccine [facility]) 50 MCG/0.5ML SUSR injection [[id number]]                 Reviewed with the patient: all disease processes, current clinical status, medications, activities and diet.      Side effects, adverse effects of the medication prescribed today, as well as treatment plan/ rationale and result expectations have been discussed with the patient who expresses understanding and desires to proceed.     Close follow up to evaluate treatment results and for coordination of care. I have reviewed the patient's medical history in detail and updated the computerized patient record. More than 50% of the appointment was spent in face-to-face counseling, education and care coordination. No orders of the defined types were placed in this encounter. Orders Placed This Encounter   Medications    zoster recombinant adjuvanted vaccine (SHINGRIX) 50 MCG/0.5ML SUSR injection     Sig: Inject 0.5 mLs into the muscle See Admin Instructions 1 dose now and repeat in 2-6 months     Dispense:  0.5 mL     Refill:  0     Medications Discontinued During This Encounter   Medication Reason    calcium carbonate (TUMS) 500 MG chewable tablet LIST CLEANUP    gelatin 650 MG capsule Therapy completed     Return in about 4 months (around 10/22/2021) for HTN - OV and in late Feb VV for same.         Controlled Substance Monitoring:    Acute and Chronic Pain Monitoring:   No flowsheet data found.         Maria De Jesus Tate MD

## 2021-08-02 ENCOUNTER — PATIENT MESSAGE (OUTPATIENT)
Dept: FAMILY MEDICINE CLINIC | Age: 81
End: 2021-08-02

## 2021-08-02 NOTE — TELEPHONE ENCOUNTER
From: Noy Garza  To: Jose J Harrison MD  Sent: 8/2/2021 2:25 PM EDT  Subject: Non-Urgent Medical Question    Am I due for a mammogram? Don't remember last time I had one. Thank you.

## 2021-09-14 ENCOUNTER — TELEPHONE (OUTPATIENT)
Dept: FAMILY MEDICINE CLINIC | Age: 81
End: 2021-09-14

## 2021-09-14 DIAGNOSIS — Z12.31 ENCOUNTER FOR SCREENING MAMMOGRAM FOR MALIGNANT NEOPLASM OF BREAST: Primary | ICD-10-CM

## 2021-09-14 NOTE — TELEPHONE ENCOUNTER
Pt is requesting an order for a HUBER and would like to have this scheduled. Please advise and thank you.

## 2021-09-22 DIAGNOSIS — Z12.31 ENCOUNTER FOR SCREENING MAMMOGRAM FOR BREAST CANCER: Primary | ICD-10-CM

## 2021-09-30 ENCOUNTER — HOSPITAL ENCOUNTER (OUTPATIENT)
Age: 81
Setting detail: OBSERVATION
Discharge: HOME | End: 2021-10-01
Attending: STUDENT IN AN ORGANIZED HEALTH CARE EDUCATION/TRAINING PROGRAM | Admitting: SURGERY
Payer: MEDICARE

## 2021-09-30 ENCOUNTER — APPOINTMENT (OUTPATIENT)
Dept: CT IMAGING | Age: 81
End: 2021-09-30
Payer: MEDICARE

## 2021-09-30 ENCOUNTER — APPOINTMENT (OUTPATIENT)
Dept: GENERAL RADIOLOGY | Age: 81
End: 2021-09-30
Payer: MEDICARE

## 2021-09-30 DIAGNOSIS — M25.532 LEFT WRIST PAIN: ICD-10-CM
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
## 2021-09-30 DIAGNOSIS — S09.90XA CLOSED HEAD INJURY, INITIAL ENCOUNTER: Primary | ICD-10-CM

## 2021-09-30 DIAGNOSIS — M25.551 RIGHT HIP PAIN: ICD-10-CM

## 2021-09-30 DIAGNOSIS — T14.8XXA HEMATOMA: ICD-10-CM

## 2021-09-30 DIAGNOSIS — S06.0X0D CLOSED HEAD INJURY WITH CONCUSSION, WITHOUT LOSS OF CONSCIOUSNESS, SUBSEQUENT ENCOUNTER: ICD-10-CM

## 2021-09-30 PROBLEM — S06.0XAA CLOSED HEAD INJURY WITH CONCUSSION: Status: ACTIVE | Noted: 2021-09-30

## 2021-09-30 LAB
ABO/RH: NORMAL
ALBUMIN SERPL-MCNC: 4 G/DL (ref 3.5–4.6)
ALP BLD-CCNC: 121 U/L (ref 40–130)
ALT SERPL-CCNC: 17 U/L (ref 0–33)
ANION GAP SERPL CALCULATED.3IONS-SCNC: 11 MEQ/L (ref 9–15)
ANTIBODY SCREEN: NORMAL
APTT: 25.7 SEC (ref 24.4–36.8)
AST SERPL-CCNC: 22 U/L (ref 0–35)
BASOPHILS ABSOLUTE: 0 K/UL (ref 0–0.2)
BASOPHILS RELATIVE PERCENT: 0.4 %
BILIRUB SERPL-MCNC: 0.3 MG/DL (ref 0.2–0.7)
BUN BLDV-MCNC: 13 MG/DL (ref 8–23)
CALCIUM SERPL-MCNC: 10 MG/DL (ref 8.5–9.9)
CHLORIDE BLD-SCNC: 100 MEQ/L (ref 95–107)
CO2: 24 MEQ/L (ref 20–31)
CREAT SERPL-MCNC: 0.63 MG/DL (ref 0.5–0.9)
EOSINOPHILS ABSOLUTE: 0 K/UL (ref 0–0.7)
EOSINOPHILS RELATIVE PERCENT: 0.4 %
GFR AFRICAN AMERICAN: >60
GFR NON-AFRICAN AMERICAN: >60
GLOBULIN: 2.4 G/DL (ref 2.3–3.5)
GLUCOSE BLD-MCNC: 158 MG/DL (ref 70–99)
HCT VFR BLD CALC: 40 % (ref 37–47)
HEMOGLOBIN: 13.4 G/DL (ref 12–16)
INR BLD: 0.9
LACTIC ACID: 0.9 MMOL/L (ref 0.5–2.2)
LYMPHOCYTES ABSOLUTE: 1.6 K/UL (ref 1–4.8)
LYMPHOCYTES RELATIVE PERCENT: 16.9 %
MAGNESIUM: 2 MG/DL (ref 1.7–2.4)
MCH RBC QN AUTO: 30.5 PG (ref 27–31.3)
MCHC RBC AUTO-ENTMCNC: 33.4 % (ref 33–37)
MCV RBC AUTO: 91.4 FL (ref 82–100)
MONOCYTES ABSOLUTE: 0.7 K/UL (ref 0.2–0.8)
MONOCYTES RELATIVE PERCENT: 7.4 %
NEUTROPHILS ABSOLUTE: 7.2 K/UL (ref 1.4–6.5)
NEUTROPHILS RELATIVE PERCENT: 74.9 %
PDW BLD-RTO: 13.6 % (ref 11.5–14.5)
PLATELET # BLD: 304 K/UL (ref 130–400)
POTASSIUM SERPL-SCNC: 3.4 MEQ/L (ref 3.4–4.9)
PROTHROMBIN TIME: 12.3 SEC (ref 12.3–14.9)
RBC # BLD: 4.38 M/UL (ref 4.2–5.4)
SODIUM BLD-SCNC: 135 MEQ/L (ref 135–144)
TOTAL PROTEIN: 6.4 G/DL (ref 6.3–8)
WBC # BLD: 9.5 K/UL (ref 4.8–10.8)

## 2021-09-30 PROCEDURE — 96375 TX/PRO/DX INJ NEW DRUG ADDON: CPT

## 2021-09-30 PROCEDURE — 86901 BLOOD TYPING SEROLOGIC RH(D): CPT

## 2021-09-30 PROCEDURE — 6370000000 HC RX 637 (ALT 250 FOR IP): Performed by: SURGERY

## 2021-09-30 PROCEDURE — 86850 RBC ANTIBODY SCREEN: CPT

## 2021-09-30 PROCEDURE — 83605 ASSAY OF LACTIC ACID: CPT

## 2021-09-30 PROCEDURE — 6370000000 HC RX 637 (ALT 250 FOR IP): Performed by: PHYSICIAN ASSISTANT

## 2021-09-30 PROCEDURE — 36415 COLL VENOUS BLD VENIPUNCTURE: CPT

## 2021-09-30 PROCEDURE — 85730 THROMBOPLASTIN TIME PARTIAL: CPT

## 2021-09-30 PROCEDURE — 6830039000 HC L3 TRAUMA ALERT

## 2021-09-30 PROCEDURE — 96374 THER/PROPH/DIAG INJ IV PUSH: CPT

## 2021-09-30 PROCEDURE — 70450 CT HEAD/BRAIN W/O DYE: CPT

## 2021-09-30 PROCEDURE — G0378 HOSPITAL OBSERVATION PER HR: HCPCS

## 2021-09-30 PROCEDURE — 86900 BLOOD TYPING SEROLOGIC ABO: CPT

## 2021-09-30 PROCEDURE — 71250 CT THORAX DX C-: CPT

## 2021-09-30 PROCEDURE — 73130 X-RAY EXAM OF HAND: CPT

## 2021-09-30 PROCEDURE — 85610 PROTHROMBIN TIME: CPT

## 2021-09-30 PROCEDURE — 6360000002 HC RX W HCPCS: Performed by: STUDENT IN AN ORGANIZED HEALTH CARE EDUCATION/TRAINING PROGRAM

## 2021-09-30 PROCEDURE — 72125 CT NECK SPINE W/O DYE: CPT

## 2021-09-30 PROCEDURE — 99218 PR INITIAL OBSERVATION CARE/DAY 30 MINUTES: CPT | Performed by: SURGERY

## 2021-09-30 PROCEDURE — 74176 CT ABD & PELVIS W/O CONTRAST: CPT

## 2021-09-30 PROCEDURE — 83735 ASSAY OF MAGNESIUM: CPT

## 2021-09-30 PROCEDURE — 73110 X-RAY EXAM OF WRIST: CPT

## 2021-09-30 PROCEDURE — 2580000003 HC RX 258: Performed by: SURGERY

## 2021-09-30 PROCEDURE — 99285 EMERGENCY DEPT VISIT HI MDM: CPT

## 2021-09-30 PROCEDURE — 81003 URINALYSIS AUTO W/O SCOPE: CPT

## 2021-09-30 PROCEDURE — 93005 ELECTROCARDIOGRAM TRACING: CPT | Performed by: STUDENT IN AN ORGANIZED HEALTH CARE EDUCATION/TRAINING PROGRAM

## 2021-09-30 PROCEDURE — 85025 COMPLETE CBC W/AUTO DIFF WBC: CPT

## 2021-09-30 PROCEDURE — 80053 COMPREHEN METABOLIC PANEL: CPT

## 2021-09-30 RX ORDER — SODIUM CHLORIDE 0.9 % (FLUSH) 0.9 %
5-40 SYRINGE (ML) INJECTION PRN
Status: DISCONTINUED | OUTPATIENT
Start: 2021-09-30 | End: 2021-10-01 | Stop reason: HOSPADM

## 2021-09-30 RX ORDER — SODIUM CHLORIDE 9 MG/ML
25 INJECTION, SOLUTION INTRAVENOUS PRN
Status: DISCONTINUED | OUTPATIENT
Start: 2021-09-30 | End: 2021-10-01 | Stop reason: HOSPADM

## 2021-09-30 RX ORDER — EZETIMIBE 10 MG/1
10 TABLET ORAL DAILY
Status: DISCONTINUED | OUTPATIENT
Start: 2021-09-30 | End: 2021-10-01 | Stop reason: HOSPADM

## 2021-09-30 RX ORDER — ONDANSETRON 4 MG/1
4 TABLET, ORALLY DISINTEGRATING ORAL EVERY 8 HOURS PRN
Status: DISCONTINUED | OUTPATIENT
Start: 2021-09-30 | End: 2021-10-01 | Stop reason: HOSPADM

## 2021-09-30 RX ORDER — AMLODIPINE BESYLATE 5 MG/1
5 TABLET ORAL DAILY
Status: DISCONTINUED | OUTPATIENT
Start: 2021-09-30 | End: 2021-10-01 | Stop reason: HOSPADM

## 2021-09-30 RX ORDER — HYDRALAZINE HYDROCHLORIDE 20 MG/ML
10 INJECTION INTRAMUSCULAR; INTRAVENOUS EVERY 4 HOURS PRN
Status: DISCONTINUED | OUTPATIENT
Start: 2021-09-30 | End: 2021-10-01 | Stop reason: HOSPADM

## 2021-09-30 RX ORDER — MECLIZINE HCL 12.5 MG/1
12.5 TABLET ORAL DAILY
Status: DISCONTINUED | OUTPATIENT
Start: 2021-09-30 | End: 2021-10-01 | Stop reason: HOSPADM

## 2021-09-30 RX ORDER — ACETAMINOPHEN 325 MG/1
650 TABLET ORAL EVERY 6 HOURS PRN
Status: DISCONTINUED | OUTPATIENT
Start: 2021-09-30 | End: 2021-09-30

## 2021-09-30 RX ORDER — AMLODIPINE BESYLATE AND BENAZEPRIL HYDROCHLORIDE 2.5; 1 MG/1; MG/1
2 CAPSULE ORAL DAILY
Status: DISCONTINUED | OUTPATIENT
Start: 2021-09-30 | End: 2021-09-30 | Stop reason: RX

## 2021-09-30 RX ORDER — ONDANSETRON 2 MG/ML
4 INJECTION INTRAMUSCULAR; INTRAVENOUS ONCE
Status: COMPLETED | OUTPATIENT
Start: 2021-09-30 | End: 2021-09-30

## 2021-09-30 RX ORDER — SODIUM PHOSPHATE, DIBASIC AND SODIUM PHOSPHATE, MONOBASIC 7; 19 G/133ML; G/133ML
1 ENEMA RECTAL DAILY PRN
Status: DISCONTINUED | OUTPATIENT
Start: 2021-09-30 | End: 2021-09-30

## 2021-09-30 RX ORDER — ONDANSETRON 2 MG/ML
4 INJECTION INTRAMUSCULAR; INTRAVENOUS EVERY 6 HOURS PRN
Status: DISCONTINUED | OUTPATIENT
Start: 2021-09-30 | End: 2021-10-01 | Stop reason: HOSPADM

## 2021-09-30 RX ORDER — MORPHINE SULFATE 4 MG/ML
4 INJECTION, SOLUTION INTRAMUSCULAR; INTRAVENOUS ONCE
Status: COMPLETED | OUTPATIENT
Start: 2021-09-30 | End: 2021-09-30

## 2021-09-30 RX ORDER — SODIUM CHLORIDE 0.9 % (FLUSH) 0.9 %
5-40 SYRINGE (ML) INJECTION EVERY 12 HOURS SCHEDULED
Status: DISCONTINUED | OUTPATIENT
Start: 2021-09-30 | End: 2021-10-01 | Stop reason: HOSPADM

## 2021-09-30 RX ORDER — TRIAMTERENE AND HYDROCHLOROTHIAZIDE 37.5; 25 MG/1; MG/1
1 TABLET ORAL DAILY
Status: DISCONTINUED | OUTPATIENT
Start: 2021-09-30 | End: 2021-10-01 | Stop reason: HOSPADM

## 2021-09-30 RX ORDER — LISINOPRIL 20 MG/1
20 TABLET ORAL DAILY
Status: DISCONTINUED | OUTPATIENT
Start: 2021-09-30 | End: 2021-10-01 | Stop reason: HOSPADM

## 2021-09-30 RX ORDER — ACETAMINOPHEN 325 MG/1
650 TABLET ORAL EVERY 6 HOURS
Status: DISCONTINUED | OUTPATIENT
Start: 2021-09-30 | End: 2021-10-01 | Stop reason: HOSPADM

## 2021-09-30 RX ORDER — SENNA AND DOCUSATE SODIUM 50; 8.6 MG/1; MG/1
1 TABLET, FILM COATED ORAL 2 TIMES DAILY
Status: DISCONTINUED | OUTPATIENT
Start: 2021-09-30 | End: 2021-10-01 | Stop reason: HOSPADM

## 2021-09-30 RX ADMIN — ACETAMINOPHEN 650 MG: 325 TABLET ORAL at 17:57

## 2021-09-30 RX ADMIN — LISINOPRIL 20 MG: 20 TABLET ORAL at 17:57

## 2021-09-30 RX ADMIN — AMLODIPINE BESYLATE 5 MG: 5 TABLET ORAL at 17:57

## 2021-09-30 RX ADMIN — ONDANSETRON 4 MG: 2 INJECTION INTRAMUSCULAR; INTRAVENOUS at 12:10

## 2021-09-30 RX ADMIN — MORPHINE SULFATE 4 MG: 4 INJECTION, SOLUTION INTRAMUSCULAR; INTRAVENOUS at 12:10

## 2021-09-30 RX ADMIN — MECLIZINE 12.5 MG: 12.5 TABLET ORAL at 17:57

## 2021-09-30 RX ADMIN — DOCUSATE SODIUM 50 MG AND SENNOSIDES 8.6 MG 1 TABLET: 8.6; 5 TABLET, FILM COATED ORAL at 20:59

## 2021-09-30 RX ADMIN — SODIUM CHLORIDE, PRESERVATIVE FREE 10 ML: 5 INJECTION INTRAVENOUS at 20:59

## 2021-09-30 ASSESSMENT — PAIN DESCRIPTION - PAIN TYPE: TYPE: ACUTE PAIN

## 2021-09-30 ASSESSMENT — PAIN DESCRIPTION - FREQUENCY: FREQUENCY: CONTINUOUS

## 2021-09-30 ASSESSMENT — ENCOUNTER SYMPTOMS
SHORTNESS OF BREATH: 0
BACK PAIN: 0
FACIAL SWELLING: 1
ABDOMINAL PAIN: 0
EYE PAIN: 0
VOMITING: 0
NAUSEA: 1

## 2021-09-30 ASSESSMENT — PAIN SCALES - GENERAL
PAINLEVEL_OUTOF10: 8
PAINLEVEL_OUTOF10: 0
PAINLEVEL_OUTOF10: 2
PAINLEVEL_OUTOF10: 8
PAINLEVEL_OUTOF10: 0
PAINLEVEL_OUTOF10: 0

## 2021-09-30 ASSESSMENT — PAIN DESCRIPTION - ONSET: ONSET: ON-GOING

## 2021-09-30 ASSESSMENT — PAIN DESCRIPTION - LOCATION: LOCATION: HEAD

## 2021-09-30 ASSESSMENT — PAIN DESCRIPTION - ORIENTATION: ORIENTATION: RIGHT

## 2021-09-30 ASSESSMENT — PAIN DESCRIPTION - DESCRIPTORS: DESCRIPTORS: ACHING;THROBBING

## 2021-09-30 NOTE — ED NOTES
Bed: 29  Expected date: 9/30/21  Expected time:   Means of arrival:   Comments:  79 yo female fall  + LOC, + thinners  211/130, 86 NSR  Paged at [address] [place] [address], RN  09/30/21 4214

## 2021-09-30 NOTE — LETTER
INCIDENTAL FINDINGS REPORT  10/01/21    Adan Gunter record number: [id number]        Dear Ev Alejandra:    While reviewing the diagnostic tests performed by the [id number] [facility], we discovered an abnormality that is not associated with the your current reason for admission. You have received a copy of the report from the diagnostic test(s), CT abdomen/pelvis, CT c-spine with the abnormality(s) listed below. - Mild to moderate atherosclerotic plaquing of a normal caliber abdominal aorta  - calcification bilateral carotid arteries  - defined, nodular areas of decreased attenuation, bilateral lobes of thyroid, measuring up to 2.5mm on the right, an 11mm on the left    Per our discussion, you have been notified of these incidental findings and have agreed to follow up with a Primary Care Physician. If a Primary Care Physician is needed, please call [phone]. For any questions or concerns, please call our office at [phone].     Sincerely,        Maximino Cooper PA-C  [facility]  Emergency General Surgery Service    cc:  Medical Records      I have read and understand the above recommendations:        Signature (Relationship to patient)

## 2021-09-30 NOTE — ACP (ADVANCE CARE PLANNING)
Advance Care Planning     Advance Care Planning Activator (Inpatient)  Conversation Note      Date of ACP Conversation: 9/30/2021     Conversation Conducted with: Patient with Decision Making Capacity    ACP Activator: [address] Decision Maker:     Current Designated Health Care Decision Maker:     Primary Decision Maker: Gabriele Mcconnell - [phone]    Primary Decision Maker: Joyce Delcid - [phone]    Pt states that she does have a living will and that it is current with her wishes. Care Preferences    Ventilation: \"If you were in your present state of health and suddenly became very ill and were unable to breathe on your own, what would your preference be about the use of a ventilator (breathing machine) if it were available to you? \"      Would the patient desire the use of ventilator (breathing machine)?: yes    \"If your health worsens and it becomes clear that your chance of recovery is unlikely, what would your preference be about the use of a ventilator (breathing machine) if it were available to you? \"     Would the patient desire the use of ventilator (breathing machine)?: No      Resuscitation  \"CPR works best to restart the heart when there is a sudden event, like a heart attack, in someone who is otherwise healthy. Unfortunately, CPR does not typically restart the heart for people who have serious health conditions or who are very sick. \"    \"In the event your heart stopped as a result of an underlying serious health condition, would you want attempts to be made to restart your heart (answer \"yes\" for attempt to resuscitate) or would you prefer a natural death (answer \"no\" for do not attempt to resuscitate)? \" yes       [x] Yes   [] No   Educated Patient / Mimi Valerio regarding differences between Advance Directives and portable DNR orders.     Length of ACP Conversation in minutes:  10    Conversation Outcomes:  [x] ACP discussion completed  [] Existing advance directive reviewed with patient; no changes to patient's previously recorded wishes  [] New Advance Directive completed  [] Portable Do Not Rescitate prepared for Provider review and signature  [] POLST/POST/MOLST/MOST prepared for Provider review and signature      Follow-up plan:    [] Schedule follow-up conversation to continue planning  [] Referred individual to Provider for additional questions/concerns   [] Advised patient/agent/surrogate to review completed ACP document and update if needed with changes in condition, patient preferences or care setting    [x] This note routed to one or more involved healthcare providers

## 2021-09-30 NOTE — CARE COORDINATION
[facility] Case Management   Initial Discharge Assessment    Met with patient and her daughter at bedside to discuss discharge plan. PCP: Yohannes Strickland MD                                  Date of Last Visit: 6/22/2021  If no PCP, list provided? N/A    Discharge Planning    Living Arrangements: Patient lives independently at home in a [address] with 2 NATACHA. Who do you live with? Son lives with patient. Who helps you with your care: Patient is independent at baseline. If lives at home: Do you have any barriers navigating in your home? No    Patient can perform ADL? Yes    Current Services (outpatient and in home): None    Dialysis: No    Is transportation available to get to your appointments? Yes - Patient drives or family / friends can transport. DME Equipment: None    Respiratory equipment: None    Respiratory provider: SRUTHI     Pharmacy: [facility] in [facility] with Medication Assistance Program?  No      Patient agreeable to [place] [address]? Yes, [place]    Patient agreeable to SNF/Rehab? Declined    Other discharge needs identified? Other - TBD based on patient's recovery from injury s/p fall. Does Patient Have a High-Risk for Readmission Diagnosis (CHF, PN, MI, COPD)? No     History: htn, OA, left and right MCA CVA, breast cancer w/ lymph involvement. Initial Discharge Plan? (Note: please see concurrent daily documentation for any updates after initial note). Home with son, [facility] if ordered/needed.     Readmission Risk              Risk of Unplanned Readmission:  0         Electronically signed by Josh Ramirez RN on 9/30/2021 at 4:39 PM

## 2021-09-30 NOTE — ED TRIAGE NOTES
Pt arrived after fall from standing. Pt does not remember the event. After the fall, she drove with a friend who noticed the hematoma to her head and called EMS. Pt arrives AOx4 with confusion regarding event. Pain in head, left wrist, right hip. C collar applied on arrival.  MSP's are intact. No uncontrolled bleeding. Pt takes daily ASA.

## 2021-09-30 NOTE — H&P
Trauma Consult / H & P Note    Reason for Consult: Trauma  Consulting Provider: Tony Betancourt MD      BASIC INJURY INFORMATION:  Level of activation: CAT 2  Mode of transport: EMS  Mechanism of injury: Fall from Standing  Complicating features: NA  Protective measures: NA    HISTORY OF PRESENT INJURY:   Stalin Lebron is a 78 y.o. female with a PMHx of HTN, HLD, BL MCA strokes presents tot he ED after a fall from standing. Patient does not remember the events. She had a fall outside of her car and hit her head. She then drove to  a friend and they went to [employer]. The patient was nauseated and was complaining of a headache and EMS was called. She is on ASA. PRIMARY SURVEY:  Airway: Intact  Breathing: Normal   Breath Sounds: Breath Sounds Equal Bilaterally  Circulation:    Pulses: Normal   Skin: Normal skin color, texture and turgor  Disability:   Pupils: PERRL   GCS:    Best Eyes: 4    Best Verbal: 5    Best Motor: 6    Total: 15    Vitals:   Vitals:    09/30/21 1136 09/30/21 1315 09/30/21 1345 09/30/21 1449   BP: (!) 188/69 (!) 187/63 (!) 187/61 (!) 179/71   Pulse: 83 74 66 59   Resp: 18 15 18 18   Temp: 97.5 °F (36.4 °C)   97.7 °F (36.5 °C)   TempSrc: Oral   Oral   SpO2: 96% 96% 98% 97%   Weight: 140 lb (63.5 kg)      Height: 5' 6\" (1.676 m)            SECONDARY SURVEY:  Neurologic: Alert and Oriented, Appropriate, Moves all Extremities, Strength Symmetrical and No Sensory Deficits   HEENT:   Head: No lacerations, bony step-offs, or abrasions, right face hematoma   Eyes: PERRL, Corneas/Conjunctiva without lesions and EOM intact   Ears: No Hemotympanum   Nose: No bloody discharge;    Throat: Oral cavity without trauma   Neck: No midline tenderness  Pulmonary: External exam: no crepitus or pain with palpation, no contusions or abrasions; and Lung exam: breath sounds clear, no wheezes, no rales  Cardiovascular:    Pulses: Bilateral radial, femoral, DP and PT pulses are normal;  Abdomen: Appearance: Non-distended, No scars, lacerations, contusions; and Palpation: no tenderness   Rectal: Not performed  Pelvis/Perineum: Pelvis is stable to palpation;  Musculoskeletal:    Back/Spine: Thoracolumbar spinal column non-tender; no step off or deformity; Extremities: Large right hip hematoma    PAST MEDICAL HISTORY:  Past Medical History:   Diagnosis Date    Asthma     Asthma in remission 1990    Elevated TSH     History of artificial lens replacement 3/24/2015    History of breast cancer 02/2014    Invasive ductal cancer right breast, Dr Jeremy Gill, Dr Corina Jones HTN (hypertension) 1980    was with 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  Authorizing Provider   zoster recombinant adjuvanted vaccine [facility]) 50 MCG/0.5ML SUSR injection Inject 0.5 mLs into the muscle See Admin Instructions 1 dose now and repeat in 2-6 months 6/22/21 12/19/21  Clark Watt MD   Cephalexin 250 MG TABS Take 1 tablet by mouth daily 6/14/21   Clark Watt MD   meclizine (ANTIVERT) 12.5 MG tablet Take 1 tablet by mouth daily 5/3/21   Clark Watt MD   aspirin-dipyridamole (AGGRENOX)  MG per extended release capsule TAKE 1 CAPSULE TWICE DAILY 4/7/21   Марина Fernandes MD   amLODIPine-benazepril (LOTREL) 5-20 MG per capsule Take 1 capsule by mouth daily TAKE 1 CAPSULE DAILY 4/2/21   Kim Panda MD   ezetimibe (ZETIA) 10 MG tablet TAKE 1 TABLET DAILY 1/27/21   Kim Panda MD   triamterene-hydroCHLOROthiazide [facility]) 37.5-25 MG per tablet TAKE 1 TABLET DAILY 11/16/20   Kim Panda MD   niacin (NIASPAN) 500 MG extended release tablet TAKE 1 TABLET 3 TIMES A DAY 11/16/20   Kim Panda MD   Cyanocobalamin (VITAMIN B 12 PO) Take by mouth    Historical Provider, MD   CINNAMON PO Take 1 capsule by mouth daily     Historical Provider, MD   acetaminophen (TYLENOL) 500 MG tablet Take 500 mg by mouth every 6 hours as needed for Pain    Historical Provider, MD   Polyvinyl Alcohol-Povidone (REFRESH OP) Apply 1 drop to eye 4 times daily as needed. Historical Provider, MD   Vitamin D (CHOLECALCIFEROL) 1000 UNITS CAPS capsule Take 1,000 Units by mouth daily. Historical Provider, MD   Magnesium 500 MG CAPS Take 1 capsule by mouth daily. Historical Provider, MD   fish oil-omega-3 fatty acids 1000 MG capsule Take 1 g by mouth 3 times daily. Historical Provider, MD       ALLERGIES:  Sulfa antibiotics, Iodinated diagnostic agents, and Statins    SOCIAL HISTORY:   Social History     Socioeconomic History    Marital status:       Spouse name: None    Number of children: 4    Years of education: None    Highest education level: None   Occupational History    Occupation: , retired   Tobacco Use    Smoking status: Former Smoker     Packs/day: 0.25     Years: 14.00     Pack years: 3.50     Types: Cigarettes    Smokeless tobacco: Never Used    Tobacco comment: started at age 9yo and quit at age 29yo   Substance and Sexual Activity    Alcohol use: No    Drug use: No    Sexual activity: None   Other Topics Concern    None   Social History Narrative    Born in [place], 3 younger brothers, one [de-identified] 200, one in [place]     July 2020    , retired     Lives in a house in [facility] [place]     of first spouse    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, owned a convenient store    Hobbies reading, [employer], travel    4 children, 2 daughters in [place] ([place] and [place]), 2 sons in [address] [place] [address] grandchildren >50        Son lives with her temporarily (01/08/2021)     Social Determinants of Health     Financial Resource Strain: Low Risk     Difficulty of Paying Living Expenses: Not hard at all   Food Insecurity: No Food Insecurity    Worried About [address] in the Last Year: Never true   [address] in the Last Year: Never true   Transportation Needs: No Transportation Needs    Lack of Transportation (Medical): No    Lack of Transportation (Non-Medical):  No   Physical Activity:     Days of Exercise per Week:     Minutes of Exercise per Session:    Stress:     Feeling of Stress :    Social Connections:     Frequency of Communication with Friends and Family:     Frequency of Social Gatherings with Friends and Family:     Attends Alevism Services:     Active Member of Clubs or Organizations:     Attends Club or Organization Meetings:     Marital Status:    Intimate Partner Violence:     Fear of Current or Ex-Partner:     Emotionally Abused:     Physically Abused:     Sexually Abused:        FAMILY HISTORY:  Family History   Problem Relation Age of Onset    Kidney Disease Mother         dec age 79    Hypertension Mother     Migraines Mother     Diabetes Father     Heart Failure Father         dec age 76    Cancer Brother         dec age 76    Heart Attack Paternal Grandmother     Stroke Paternal Grandfather            REVIEW OF SYSTEMS:   Constitutional: Negative for weight loss  HENT: Negative for congestion, facial swelling and bloody nose  Eyes: Negative for vision changes  Respiratory: Negative for shortness of breath, difficulty breathing  Cardiovascular: Negative for chest wall pain. Gastrointestinal: Negative for abdominal distention, abdominal pain and vomiting. Genitourinary: Negative for hematuria  Musculoskeletal: Negative for gait difficulties   Skin: Negative for bruising, abrasions  Neurological: Negative for dizziness, weakness and light-headedness. Hematological: Negative for easy bruising/bleeding  Psychiatric/Behavioral: Negative for behavioral problems. Except as noted above the remainder of the review of systems was reviewed and negative.      BASIC LABS:   CBC with Differential:    Lab Results   Component Value Date    WBC 9.5 09/30/2021    RBC 4.38 09/30/2021    RBC 4.66 05/16/2012    HGB 13.4 09/30/2021    HCT 40.0 09/30/2021     09/30/2021    MCV 91.4 09/30/2021    MCH 30.5 09/30/2021    MCHC 33.4 09/30/2021    RDW 13.6 09/30/2021    LYMPHOPCT 16.9 09/30/2021    MONOPCT 7.4 09/30/2021    BASOPCT 0.4 09/30/2021    MONOSABS 0.7 09/30/2021    LYMPHSABS 1.6 09/30/2021    EOSABS 0.0 09/30/2021    BASOSABS 0.0 09/30/2021     CMP:   Lab Results   Component Value Date     09/30/2021    K 3.4 09/30/2021     09/30/2021    CO2 24 09/30/2021    BUN 13 09/30/2021    CREATININE 0.63 09/30/2021    GLUCOSE 158 (H) 09/30/2021    CALCIUM 10.0 (H) 09/30/2021    PROT 6.4 09/30/2021    LABALBU 4.0 09/30/2021    BILITOT 0.3 09/30/2021    ALKPHOS 121 09/30/2021    AST 22 09/30/2021    ALT 17 09/30/2021    LABGLOM >60.0 09/30/2021    GFRAA >60.0 09/30/2021    GLOB 2.4 09/30/2021     Magnesium:   Lab Results   Component Value Date    MG 2.0 09/30/2021     PT/INR:   Recent Labs     09/30/21  1215   PROTIME 12.3   INR 0.9     APTT:   Recent Labs     09/30/21  1215   APTT 25.7     RADIOLOGY: (Personally reviewed)  CT Head: Negative    CT C-Spine: Negative    CT Chest: Negative    CT Abdomen/Pelvis: Negative    XR hand left: Negative    XR hand right: Negative      ASSESSMENT:  Mary Lou Martin Opal Singh is a 78 y.o. female presenting to the ED following a fall from standing. She has concussive symptoms with a large right hip hematoma    Trauma workup found no injury other than the hematoma      PLAN:  1. Will admit to obs to monitor hematoma and mental status  2. PT/OT consult for help with ambulation with her hip hematoma  3. Home meds restarted  4.  Regular diet        Luana Redmond MD  Trauma/Critical Care/General Surgery  [phone] (8M-5P)  [phone]

## 2021-09-30 NOTE — PROGRESS NOTES
Pt admitted to unit vitals taken pt. oriented to room, no complaints of discomfort daughter at bedside while pt is sleeping

## 2021-10-01 VITALS
SYSTOLIC BLOOD PRESSURE: 161 MMHG | RESPIRATION RATE: 18 BRPM | HEIGHT: 66 IN | OXYGEN SATURATION: 99 % | DIASTOLIC BLOOD PRESSURE: 63 MMHG | HEART RATE: 55 BPM | WEIGHT: 140 LBS | BODY MASS INDEX: 22.5 KG/M2 | TEMPERATURE: 98.2 F

## 2021-10-01 PROBLEM — G89.11 ACUTE PAIN DUE TO TRAUMA: Status: ACTIVE | Noted: 2021-09-30

## 2021-10-01 PROBLEM — S70.11XA HEMATOMA OF RIGHT THIGH: Status: ACTIVE | Noted: 2021-09-30

## 2021-10-01 LAB
ANION GAP SERPL CALCULATED.3IONS-SCNC: 9 MEQ/L (ref 9–15)
BASOPHILS ABSOLUTE: 0 K/UL (ref 0–0.2)
BASOPHILS RELATIVE PERCENT: 0.4 %
BILIRUBIN URINE: NEGATIVE
BLOOD, URINE: NEGATIVE
BUN BLDV-MCNC: 10 MG/DL (ref 8–23)
CALCIUM SERPL-MCNC: 9.6 MG/DL (ref 8.5–9.9)
CHLORIDE BLD-SCNC: 102 MEQ/L (ref 95–107)
CLARITY: CLEAR
CO2: 26 MEQ/L (ref 20–31)
COLOR: YELLOW
CREAT SERPL-MCNC: 0.65 MG/DL (ref 0.5–0.9)
EKG ATRIAL RATE: 69 BPM
EKG P AXIS: 70 DEGREES
EKG P-R INTERVAL: 190 MS
EKG Q-T INTERVAL: 422 MS
EKG QRS DURATION: 102 MS
EKG QTC CALCULATION (BAZETT): 452 MS
EKG R AXIS: -9 DEGREES
EKG T AXIS: 37 DEGREES
EKG VENTRICULAR RATE: 69 BPM
EOSINOPHILS ABSOLUTE: 0.1 K/UL (ref 0–0.7)
EOSINOPHILS RELATIVE PERCENT: 0.9 %
GFR AFRICAN AMERICAN: >60
GFR NON-AFRICAN AMERICAN: >60
GLUCOSE BLD-MCNC: 124 MG/DL (ref 70–99)
GLUCOSE URINE: NEGATIVE MG/DL
HCT VFR BLD CALC: 36.2 % (ref 37–47)
HEMOGLOBIN: 12.5 G/DL (ref 12–16)
KETONES, URINE: ABNORMAL MG/DL
LEUKOCYTE ESTERASE, URINE: NEGATIVE
LYMPHOCYTES ABSOLUTE: 1.4 K/UL (ref 1–4.8)
LYMPHOCYTES RELATIVE PERCENT: 20.6 %
MCH RBC QN AUTO: 32 PG (ref 27–31.3)
MCHC RBC AUTO-ENTMCNC: 34.6 % (ref 33–37)
MCV RBC AUTO: 92.4 FL (ref 82–100)
MONOCYTES ABSOLUTE: 0.6 K/UL (ref 0.2–0.8)
MONOCYTES RELATIVE PERCENT: 7.9 %
NEUTROPHILS ABSOLUTE: 4.9 K/UL (ref 1.4–6.5)
NEUTROPHILS RELATIVE PERCENT: 70.2 %
NITRITE, URINE: NEGATIVE
PDW BLD-RTO: 13.5 % (ref 11.5–14.5)
PH UA: 6 (ref 5–9)
PLATELET # BLD: 251 K/UL (ref 130–400)
POTASSIUM REFLEX MAGNESIUM: 3.8 MEQ/L (ref 3.4–4.9)
PROTEIN UA: NEGATIVE MG/DL
RBC # BLD: 3.92 M/UL (ref 4.2–5.4)
SODIUM BLD-SCNC: 137 MEQ/L (ref 135–144)
SPECIFIC GRAVITY UA: 1.01 (ref 1–1.03)
UROBILINOGEN, URINE: 0.2 E.U./DL
WBC # BLD: 7 K/UL (ref 4.8–10.8)

## 2021-10-01 PROCEDURE — 97165 OT EVAL LOW COMPLEX 30 MIN: CPT

## 2021-10-01 PROCEDURE — 80048 BASIC METABOLIC PNL TOTAL CA: CPT

## 2021-10-01 PROCEDURE — 99225 PR SBSQ OBSERVATION CARE/DAY 25 MINUTES: CPT | Performed by: SURGERY

## 2021-10-01 PROCEDURE — G0378 HOSPITAL OBSERVATION PER HR: HCPCS

## 2021-10-01 PROCEDURE — 6370000000 HC RX 637 (ALT 250 FOR IP): Performed by: PHYSICIAN ASSISTANT

## 2021-10-01 PROCEDURE — 36415 COLL VENOUS BLD VENIPUNCTURE: CPT

## 2021-10-01 PROCEDURE — 6370000000 HC RX 637 (ALT 250 FOR IP): Performed by: SURGERY

## 2021-10-01 PROCEDURE — 2580000003 HC RX 258: Performed by: SURGERY

## 2021-10-01 PROCEDURE — 99217 PR OBSERVATION CARE DISCHARGE MANAGEMENT: CPT | Performed by: PHYSICIAN ASSISTANT

## 2021-10-01 PROCEDURE — 97162 PT EVAL MOD COMPLEX 30 MIN: CPT

## 2021-10-01 PROCEDURE — 93010 ELECTROCARDIOGRAM REPORT: CPT | Performed by: INTERNAL MEDICINE

## 2021-10-01 PROCEDURE — 85025 COMPLETE CBC W/AUTO DIFF WBC: CPT

## 2021-10-01 RX ORDER — ASPIRIN AND DIPYRIDAMOLE 25; 200 MG/1; MG/1
1 CAPSULE, EXTENDED RELEASE ORAL 2 TIMES DAILY
Status: DISCONTINUED | OUTPATIENT
Start: 2021-10-01 | End: 2021-10-01

## 2021-10-01 RX ORDER — POTASSIUM CHLORIDE 750 MG/1
20 TABLET, FILM COATED, EXTENDED RELEASE ORAL ONCE
Status: COMPLETED | OUTPATIENT
Start: 2021-10-01 | End: 2021-10-01

## 2021-10-01 RX ORDER — ASPIRIN AND DIPYRIDAMOLE 25; 200 MG/1; MG/1
1 CAPSULE, EXTENDED RELEASE ORAL 2 TIMES DAILY
Status: DISCONTINUED | OUTPATIENT
Start: 2021-10-01 | End: 2021-10-01 | Stop reason: HOSPADM

## 2021-10-01 RX ORDER — POLYETHYLENE GLYCOL 3350 17 G/17G
17 POWDER, FOR SOLUTION ORAL DAILY PRN
Status: DISCONTINUED | OUTPATIENT
Start: 2021-10-01 | End: 2021-10-01 | Stop reason: HOSPADM

## 2021-10-01 RX ADMIN — DOCUSATE SODIUM 50 MG AND SENNOSIDES 8.6 MG 1 TABLET: 8.6; 5 TABLET, FILM COATED ORAL at 09:10

## 2021-10-01 RX ADMIN — ASPRIN AND EXTENDED-RELEASE DIPYRIDAMOLE 1 CAPSULE: 25; 200 CAPSULE ORAL at 10:56

## 2021-10-01 RX ADMIN — ACETAMINOPHEN 650 MG: 325 TABLET ORAL at 05:28

## 2021-10-01 RX ADMIN — SODIUM CHLORIDE, PRESERVATIVE FREE 10 ML: 5 INJECTION INTRAVENOUS at 09:10

## 2021-10-01 RX ADMIN — AMLODIPINE BESYLATE 5 MG: 5 TABLET ORAL at 09:10

## 2021-10-01 RX ADMIN — LISINOPRIL 20 MG: 20 TABLET ORAL at 09:10

## 2021-10-01 RX ADMIN — TRIAMTERENE AND HYDROCHLOROTHIAZIDE 1 TABLET: 37.5; 25 TABLET ORAL at 09:10

## 2021-10-01 RX ADMIN — ACETAMINOPHEN 650 MG: 325 TABLET ORAL at 00:08

## 2021-10-01 RX ADMIN — POTASSIUM CHLORIDE 20 MEQ: 750 TABLET, EXTENDED RELEASE ORAL at 10:57

## 2021-10-01 RX ADMIN — EZETIMIBE 10 MG: 10 TABLET ORAL at 09:10

## 2021-10-01 ASSESSMENT — PAIN - FUNCTIONAL ASSESSMENT: PAIN_FUNCTIONAL_ASSESSMENT: ACTIVITIES ARE NOT PREVENTED

## 2021-10-01 ASSESSMENT — PAIN SCALES - GENERAL
PAINLEVEL_OUTOF10: 0
PAINLEVEL_OUTOF10: 0
PAINLEVEL_OUTOF10: 2
PAINLEVEL_OUTOF10: 0
PAINLEVEL_OUTOF10: 5
PAINLEVEL_OUTOF10: 2
PAINLEVEL_OUTOF10: 0

## 2021-10-01 ASSESSMENT — PAIN DESCRIPTION - PAIN TYPE: TYPE: ACUTE PAIN

## 2021-10-01 ASSESSMENT — PAIN DESCRIPTION - LOCATION: LOCATION: NECK

## 2021-10-01 ASSESSMENT — PAIN DESCRIPTION - FREQUENCY: FREQUENCY: INTERMITTENT

## 2021-10-01 ASSESSMENT — PAIN DESCRIPTION - DESCRIPTORS: DESCRIPTORS: TENDER

## 2021-10-01 ASSESSMENT — PAIN DESCRIPTION - ONSET: ONSET: SUDDEN

## 2021-10-01 ASSESSMENT — PAIN DESCRIPTION - ORIENTATION: ORIENTATION: ANTERIOR;LOWER

## 2021-10-01 NOTE — PROGRESS NOTES
TRAUMA DAILY PROGRESS NOTE      Patient Name: Kayla Irwin  Admission Date 2021    Hospital Day: 0  Patient seen and examined on 10/1/2021    INTERVAL HISTORY/EVENTS     Background:  Kayla Irwin is a 78 y.o. female with a PMHx of HTN, HLD, BL MCA strokes (on ASA-dipyridamole). Patient presented on 21 s/p unwitnessed fall from standing. Patient is amnestic to fall. She remembers driving to  her friend for breakfast/picnic. On the way to the picnic, the patient reports not feeling well, which is when her friend saw the large forehead hematoma/ecchymosis. (+)head strike. (?)LOC. (+)ASA-dipyridamole. ED work up significant for large right hip hematoma. No acute intracranial injuries on imaging. Trauma was consulted and patient was admitted to [facility] under Trauma Team for observation of the hematoma and PT/OT. Hospital Course:  2021: Unwitnessed FFS. Amnestic to event. Imaging significant for large right hip hematoma. Admitted to [facility] under Trauma Surgery to observe hematoma and for PT/OT. 24 Hour Events:  No acute events overnight. Patient reports feeling better than yesterday. Patient with mild headache and mild-moderate right hip pain. Patient still amnestic to fall. No nausea/vomiting/new focal weakness/numbness/tingling. Tolerating PO. Voiding spontaneously. No BMs since admission. Vitals reviewed: afebrile. Not tachycardic. Normotensive. SPO2 96-98% on RA. Labs reviewed: No leukocytosis. H/H with slight downtrend to 12.5/36.2 (13.4/40.0). Electrolytes appropriate. BUN/Cr appropriate at 10/0.65. Intake: 340cc   IVF: 10cc   PO: 330cc  Output: 300cc   UOP: 300cc with x1 unmeasured occurence    BM x0 since admit on 21      PHYSICAL EXAM     Vitals Average, Min and Max for last 24 hours:  Temp: Temp: 98.1 °F (36.7 °C);  Temp  Av.1 °F (36.7 °C)  Min: 97.5 °F (36.4 °C)  Max: 99.2 °F (37.3 °C)  Respirations: Resp  Av.7  Min: 15  Max: 24  Pulse: Pulse Av.7  Min: 57  Max: 83  Blood Pressure: Systolic (89QBX), FXO:567 , Min:135 , VWF:481   ; Diastolic (97ZER), KUR:76, Min:52, Max:71    SpO2: SpO2  Av.1 %  Min: 96 %  Max: 98 %    24hr Intake/Output:   Intake/Output Summary (Last 24 hours) at 10/1/2021 0719  Last data filed at 10/1/2021 0454  Gross per 24 hour   Intake 340 ml   Output 300 ml   Net 40 ml       Vitals: BP (!) 140/52   Pulse 64   Temp 98.1 °F (36.7 °C) (Axillary)   Resp 18   Ht 5' 6\" (1.676 m)   Wt 140 lb (63.5 kg)   SpO2 97%   BMI 22.60 kg/m²     Physical Exam:  Constitutional: Sitting up comfortably in bed. Eating breakfast. Appears comfortable. HEENT: Right sided forehead/scalp hematoma with overlying ecchymosis. Ecchymosis migrating down to lateral aspect of gabriela-orbital region today. TTP over hematoma. EOM intact. Cardiovascular: Regular rate and rhythm. Pulmonary: Clear to ausculation bilaterally. No wheezing, rhonchi or rales. Abdominal: Soft. Non-distended. Non-tender. Musculoskeletal: Right upper lateral thigh with large but stable appearing hematoma. Hematoma is soft/compressible to palpation. Mild TTP over hematoma. Good ROM in all extremities. No edema. Neurological: Alert, awake, and orientated x 3. Motor and sensory grossly intact. No focal deficits. GCS of 15.   Skin: warm/dry    LABORATORY RESULTS (LAST 24 HOURS)     CBC with Differential:    Lab Results   Component Value Date    WBC 7.0 10/01/2021    RBC 3.92 10/01/2021    RBC 4.66 2012    HGB 12.5 10/01/2021    HCT 36.2 10/01/2021     10/01/2021    MCV 92.4 10/01/2021    MCH 32.0 10/01/2021    MCHC 34.6 10/01/2021    RDW 13.5 10/01/2021    LYMPHOPCT 20.6 10/01/2021    MONOPCT 7.9 10/01/2021    BASOPCT 0.4 10/01/2021    MONOSABS 0.6 10/01/2021    LYMPHSABS 1.4 10/01/2021    EOSABS 0.1 10/01/2021    BASOSABS 0.0 10/01/2021     CMP:    Lab Results   Component Value Date     2021    K 3.4 2021     2021    CO2 24 2021 Continue home Triamterene-hydrochlorothiazide 37.5-25mg daily  - Start home ASA-dipyridamole 25-200mg BID     Respiratory: No acute respiratory issues  - Maintain O2 sats > 92%, encourage IS. GI/Diet:   - Continue regular diet  - Continue bowel regimen with SENOKOT-S BID and Miralax prn     Renal/Electrolytes:   - HLIV  - Administer x1 dose of K-Dur 20meq for K+ of 3.8 (goal = 4)  - Replenish electrolytes as needed  - Monitor I&O q shift  - AM BMP     ID: No active infection. Remains afebrile, normotensive, and without leukocytosis. - No indication for abx at this time    Heme: Large right hip hematoma. Soft and compressible to palpation. H/H with slight downtrend over course of 18hrs to 12.5/36.2 (13.4/40.0). Do not suspect active bleeding at this time  - No indication for transfusion at this time  - Medically cleared for discharge from hematoma standpoint. Will obtain AM CBC if patient remains on service overnight. Endocrine:   - No acute glycemic issues     MSK:   - Spines: cleared  - Weight Bearing Restrictions: none  - Activity: OOBAT  - PT/OT: evaluation pending    Prophylaxis:   - SCDs and start Lovenox 30mg BID for VTE PPx    Lines/Tubes/Drains:  - Maintain PIVs  - No indication for ruelas catheter, monitor for urinary retention    Dispo: Hematoma stable. Medically cleared for discharge. Pending PT/OT evaluation for appropriate discharge recommendations. Accom Status: General Status      -No need to follow up with Trauma Surgery. Can follow up as needed.  -Follow up with Primary Care Provider regarding incidental findings. Please call for any questions or concerns. Barrington Maguire PA-C  Trauma/Critical Care  [phone] ([phone]) [phone]     This patient's plan of care was discussed and made in collaboration with Trauma Attending physician, Luana Redmond MD.    Teaching Physician Note:  I saw and evaluated the patient.   I personally obtained the key and critical portions of the history and physical exam.  I reviewed the GRETA's documentation and discussed the patient with the GRETA. I agree with the GRETA's medical decision making as documented in the GRETA note.       Brittanie Crowley MD

## 2021-10-01 NOTE — PROGRESS NOTES
Physical Therapy Med Surg Initial Assessment  Facility/Department: [facility]  Room: N223/N223-       NAME: Jerry Mae  : 1940 (9 y.o.)  MRN: [id number]  CODE STATUS: Full Code    Date of Service: 10/1/2021    Patient Diagnosis(es): Hematoma [T14. 8XXA]  Right hip pain [M25.551]  Left wrist pain [M25.532]  Closed head injury, initial encounter [B06.00VO]  Fall, initial encounter [W19. XXXA]  Closed head injury with concussion, without loss of consciousness, initial encounter [S06.0X0A]   Chief Complaint   Patient presents with    Fall     pain in left wrist, right hip, headache     Patient Active Problem List    Diagnosis Date Noted    Closed head injury with concussion 2021    Recurrent UTI 2020    Migraines 2020    Essential palatal tremor 2020    Dizziness and giddiness 2020    Benign paroxysmal vertigo of both ears 2020    Occlusion and stenosis of right middle cerebral artery 2020    Cerebrovascular disease 2019    Estrogen receptor positive status (ER+) 2018    Hypertensive retinopathy 2015    Retinal pigment epithelial atrophy 2015    Tear film insufficiency 2015    Vitreous degeneration 2015    Hx of ischemic right MCA stroke 2014    Personal history of malignant neoplasm of breast 2014    Hx of ischemic left MCA stroke     Senile cataract 2012    Vitamin D deficiency 2012    HTN (hypertension)     Hyperlipidemia     Osteoarthritis     Low back pain 2007        Past Medical History:   Diagnosis Date    Asthma     Asthma in remission     Elevated TSH     History of artificial lens replacement 3/24/2015    History of breast cancer 2014    Invasive ductal cancer right breast, Dr Vale Lind, Dr Kevon Zheng    HTN (hypertension)     was with [facility]    Hx of ischemic left MCA stroke     no residual, Dr Yuko Maier Hx of ischemic right MCA stroke 2014 no residual, frontal lobe    Hyperlipidemia     Osteoarthritis     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 \"    Pain  Pre Treatment Pain Screening  Comments / Details: denies    Post Treatment Pain Screening:   Pain Assessment  Pain Assessment:  (unchanged)    Prior Level of Function:  Social/Functional History  Lives With: Son  Type of Home: House  Home Layout: One level, Laundry in basement (9 steps down to basement)  Home Access: Stairs to enter with rails  Entrance Stairs - Number of Steps: 3  Bathroom Shower/Tub: Tub/Shower unit  Bathroom Equipment: Shower chair, Grab bars in shower, Hand-held shower  Home Equipment: Cane, 4 wheeled walker (Does not use)  ADL Assistance: Independent  Homemaking Assistance: Independent  Ambulation Assistance: Independent (No AD typically. takes [facility] with her in car)  Transfer Assistance: Independent  Active : Yes    OBJECTIVE:   Vision:  (Intermittent saccadic corrections during smooth pursuits when tracking rom R>L)  Vision Exceptions: Wears glasses for reading  Hearing: Within functional limits    Cognition:  Overall Orientation Status: Within Functional Limits  Follows Commands: Within Functional Limits    Observation/Palpation  Observation: No acute distress noted.  Pleasant and cooperative. Bruising noted R temple and orbit. ROM:  RLE PROM: WFL  LLE PROM: WFL    Strength:  Strength RLE  Strength RLE: WFL  Strength LLE  Strength LLE: WFL    Neuro:  Balance  Sitting - Static: Good  Sitting - Dynamic: Good  Standing - Static: Good  Standing - Dynamic: Good  Comments: Retrieves objects from floor without difficulty. Motor Control  Gross Motor?: WFL  Coordination  Finger to Nose: Normal  Sensation  Overall Sensation Status: WFL    Bed mobility  Comment: NT - pt up at EOB    Transfers  Sit to Stand: Modified independent  Stand to sit: Modified independent  Bed to Chair: Modified independent  Comment: Use of hands to assist in standing and sitting. Safe technique - no LOB    Ambulation  Ambulation?: Yes  Ambulation 1  Surface: level tile  Device: No Device  Assistance: Independent; Modified Independent  Quality of Gait: Slow pacing initially, however pt improves with distance. Gait Deviations: None  Distance: 125ft  Comments: Able to walk with horizontal and vertical head turns, heel walk, toe walk, and walker laterally without LOB    Stairs/Curb  Stairs?: No         Activity Tolerance  Activity Tolerance: Patient Tolerated treatment well          PT Education  PT Education: PT Role  Patient Education: See assessment. ASSESSMENT:   Decision Making: Medium Complexity  History: High  Exam: High  Clinical Presentation: Med  No Skilled PT: Safe to return home    Prognosis: Good  Patient Education: See assessment. Barriers to Learning: none    DISCHARGE RECOMMENDATIONS:  Discharge Recommendations: Home independently, Outpatient PT  No Skilled PT: Safe to return home    Assessment: Pt completes basic mobility at indep level of function. Pt denies concerns for DC plan and is ready to return home. No further PT indicated at this time. Rec DC home with OP PT at ambulatory level of function. Discussed mental rest and avoiding stimulating/loud environments, screen time and reading initially. Discussed concussion recommendations and red flags including increased lethargy, nausea/vom, etc. Rec OP PT if foggy symptoms persist. Rec family members stay with patient over weekend. REQUIRES PT FOLLOW UP: Yes      PLAN OF CARE:  Plan  Plan Comment: eval only  Safety Devices  Type of devices: All fall risk precautions in place    Goals:  Long term goals  Long term goal 1: NA    [facility] (6 CLICK) BASIC MOBILITY  AM-PAC Inpatient Mobility Raw Score : 24     Therapy Time:   Individual   Time In 0915   Time Out 0925   Minutes Norma Barbara , [place], 10/01/21 at 10:54 AM         Definitions for assistance levels  Independent = pt does not require any physical supervision or assistance from another person for activity completion. Device may be needed.   Stand by assistance = pt requires verbal cues or instructions from another person, close to but not touching, to perform the activity  Minimal assistance= pt performs 75% or more of the activity; assistance is required to complete the activity  Moderate assistance= pt performs 50% of the activity; assistance is required to complete the activity  Maximal assistance = pt performs 25% of the activity; assistance is required to complete the activity  Dependent = pt requires total physical assistance to accomplish the task

## 2021-10-01 NOTE — FLOWSHEET NOTE
7158: Patient assessment and VS completed. Patient is alert and oriented x4. Patient denies pain, SOB, chest pain, nausea, and dizziness.  Patient was up to the bathroom and did very well with standby assist. Electronically signed by Dimitri Granado RN on 10/1/21 at 9:20 AM EDT

## 2021-10-01 NOTE — ED PROVIDER NOTES
[facility]  eMERGENCY dEPARTMENT eNCOUnter      Pt Name: Kayla Irwin  MRN: [id number]  [place] 1940  Date of evaluation: 9/30/2021  Provider: Rabia Spivey MD        HISTORY OF PRESENT ILLNESS      The history is provided by the Patient and EMS. Kayla Irwin is a 78 y.o. female with a PMH clinically significant for Migraines, HTN, HLD, Breast CA and CVA presenting to the ED via EMS c/o headache, confusion, facial swelling, left wrist and right hip pain status post fall occurring earlier today. EMS states that the patient was picked up in a parking lot after driving her car there. States that she had stable vital signs in route, no interventions. Patient states she thinks she was going to an event and attempted to  her friend from her house. Thinks that she fell at that house on her head and right hip. Unsure what happened prior to the fall. Does not remember the fall exactly and does not remember the period of time after that where she drove her friend to the parking lot. Currently states headache is aching, constant, moderate to severe, worst around the right frontal region. Denies any blood thinners. Denies any other pain from the event. Mildly altered, poor historian in the ED.     PAST MEDICAL HISTORY     Past Medical History:   Diagnosis Date    Asthma     Asthma in remission 1990    Elevated TSH     History of artificial lens replacement 3/24/2015    History of breast cancer 02/2014    Invasive ductal cancer right breast, Dr Artie Botello, Dr Merry Lanes HTN (hypertension) 1980    was with [facility]    Hx of ischemic left MCA stroke 2013    no residual, Dr Etelvina Mederos Hx of ischemic right MCA stroke 12/2014    no residual, frontal lobe    Hyperlipidemia     Osteoarthritis     Secondary and unspecified malignant neoplasm of axilla and upper limb lymph nodes ([facility] [address] 11/8/2018    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    Dr Timi Heller (ortho)    Vitamin D deficiency SURGICAL HISTORY       Past Surgical History:   Procedure Laterality Date    BLADDER SUSPENSION      BREAST BIOPSY Right 2/14/14    BREAST LUMPECTOMY Right 3/4/2014    Lumpectomy with SLNB, Dr Rodger Beaver Dr in [address] HISTORY       Family History   Problem Relation Age of Onset    Kidney Disease Mother         dec age 79    Hypertension Mother     Migraines Mother     Diabetes Father     Heart Failure Father         dec age 76    Cancer Brother         dec age 76    Heart Attack Paternal Grandmother     Stroke Paternal Grandfather        SOCIAL HISTORY       Social History     Socioeconomic History    Marital status:       Spouse name: None    Number of children: 4    Years of education: None    Highest education level: None   Occupational History    Occupation: , retired   Tobacco Use    Smoking status: Former Smoker     Packs/day: 0.25     Years: 14.00     Pack years: 3.50     Types: Cigarettes    Smokeless tobacco: Never Used    Tobacco comment: started at age 11yo and quit at age 29yo   Substance and Sexual Activity    Alcohol use: No    Drug use: No    Sexual activity: None   Other Topics Concern    None   Social History Narrative    Born in [place], 2 younger brothers, one dec 2014, one in [place]     July 2020    , retired     Lives in a house in [facility]     of first spouse    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, owned a convenient store    [employer], travel    4 children, 2 daughters in [place] ([place] and [place]), 2 sons in [address] [place] [address] great grandchildren >50        Son lives with her temporarily (01/08/2021)     Social Determinants of Health     Financial Resource Strain: Low Risk     Difficulty of Paying Living Expenses: Not hard at all   Food Insecurity: No Food Insecurity    Worried About [address] in the Last Year: Never true    Ran Out of Food in the Last Year: Never true   Transportation Needs: No Transportation Needs    Lack of Transportation (Medical): No    Lack of Transportation (Non-Medical):  No   Physical Activity:     Days of Exercise per Week:     Minutes of Exercise per Session:    Stress:     Feeling of Stress :    Social Connections:     Frequency of Communication with Friends and Family:     Frequency of Social Gatherings with Friends and Family:     Attends Jain Services:     Active Member of Clubs or Organizations:     Attends Club or Organization Meetings:     Marital Status:    Intimate Partner Violence:     Fear of Current or Ex-Partner:     Emotionally Abused:     Physically Abused:     Sexually Abused:        CURRENT MEDICATIONS       Current Discharge Medication List      CONTINUE these medications which have NOT CHANGED    Details   zoster recombinant adjuvanted vaccine (SHINGRIX) 50 MCG/0.5ML SUSR injection Inject 0.5 mLs into the muscle See Admin Instructions 1 dose now and repeat in 2-6 months  Qty: 0.5 mL, Refills: 0    Associated Diagnoses: Need for shingles vaccine      Cephalexin 250 MG TABS Take 1 tablet by mouth daily  Qty: 90 tablet, Refills: 5    Associated Diagnoses: Recurrent UTI      meclizine (ANTIVERT) 12.5 MG tablet Take 1 tablet by mouth daily  Qty: 90 tablet, Refills: 1      aspirin-dipyridamole (AGGRENOX)  MG per extended release capsule TAKE 1 CAPSULE TWICE DAILY  Qty: 180 capsule, Refills: 3      amLODIPine-benazepril (LOTREL) 5-20 MG per capsule Take 1 capsule by mouth daily TAKE 1 CAPSULE DAILY  Qty: 90 capsule, Refills: 4      ezetimibe (ZETIA) 10 MG tablet TAKE 1 TABLET DAILY  Qty: 90 tablet, Refills: 4      triamterene-hydroCHLOROthiazide (MAXZIDE-25) 37.5-25 MG per tablet TAKE 1 TABLET DAILY  Qty: 90 tablet, Refills: 4      niacin (NIASPAN) 500 MG extended release tablet TAKE 1 TABLET 3 TIMES A DAY  Qty: 270 tablet, Refills: 4 Cyanocobalamin (VITAMIN B 12 PO) Take by mouth      CINNAMON PO Take 1 capsule by mouth daily       acetaminophen (TYLENOL) 500 MG tablet Take 500 mg by mouth every 6 hours as needed for Pain      Polyvinyl Alcohol-Povidone (REFRESH OP) Apply 1 drop to eye 4 times daily as needed. Vitamin D (CHOLECALCIFEROL) 1000 UNITS CAPS capsule Take 1,000 Units by mouth daily. Magnesium 500 MG CAPS Take 1 capsule by mouth daily. fish oil-omega-3 fatty acids 1000 MG capsule Take 1 g by mouth 3 times daily. Associated Diagnoses: HTN (hypertension); Hyperlipidemia; Osteopenia             ALLERGIES     Sulfa antibiotics, Iodinated diagnostic agents, and Statins    REVIEW OF SYSTEMS       Review of Systems   Constitutional: Positive for activity change. Negative for fever. HENT: Positive for facial swelling. Negative for nosebleeds. Eyes: Negative for pain and visual disturbance. Respiratory: Negative for shortness of breath. Cardiovascular: Negative for chest pain. Gastrointestinal: Positive for nausea. Negative for abdominal pain and vomiting. Genitourinary: Negative for flank pain. Musculoskeletal: Positive for arthralgias and myalgias. Negative for back pain and neck pain. Skin: Positive for wound. Neurological: Positive for dizziness, weakness (generalized), light-headedness and headaches. Negative for numbness. PHYSICAL EXAM       ED Triage Vitals [09/30/21 1136]   BP Temp Temp Source Pulse Resp SpO2 Height Weight   (!) 188/69 97.5 °F (36.4 °C) Oral 83 18 96 % 5' 6\" (1.676 m) 140 lb (63.5 kg)       Physical Exam  Vitals and nursing note reviewed. Constitutional:       General: She is awake. Appearance: She is not toxic-appearing or diaphoretic. HENT:      Head: Normocephalic. Contusion present. No right periorbital erythema or left periorbital erythema. Jaw: There is normal jaw occlusion.         Mouth/Throat:      Mouth: Mucous membranes are moist.      Pharynx: following components:       Result Value    Glucose 158 (*)     Calcium 10.0 (*)     All other components within normal limits   CBC WITH AUTO DIFFERENTIAL - Abnormal; Notable for the following components:    Neutrophils Absolute 7.2 (*)     All other components within normal limits   MAGNESIUM   LACTIC ACID, PLASMA   PROTIME-INR   APTT   URINALYSIS   BASIC METABOLIC PANEL W/ REFLEX TO MG FOR LOW K   CBC WITH AUTO DIFFERENTIAL   TYPE AND SCREEN       ED Course as of Sep 30 2336   Thu Sep 30, 2021   1424 EKG showing normal sinus rhythm, rate of 69 bpm.  Normal axis, normal intervals. No acute ST-T wave abnormalities. EKG 12 Lead [NA]      ED Course User Index  [NA] Donna Bai MD       78 y.o. female with a PMH clinically significant for Migraines, HTN, HLD, Breast CA and CVA presenting to the ED via EMS c/o headache, confusion, facial swelling, left wrist and right hip pain status post fall occurring earlier today. Upon initial evaluation, Pt with GCS of 14 due to confusion but Primary survey otherwise intact. PE as noted above. Labs, , EKG, and Imaging as noted above. Given findings, clinical presentation most likely consistent w/ fall causing cephalohematoma, large hematoma to the right sided hip and scattered abrasions. Obtained CT imaging the head which did not show any evidence of acute ICH or other abnormalities. Remainder of imaging without evidence of acute traumatic injuries. Although desired CT scan with contrast, patient allergic to contrast dye. Unable to evaluate for active extravasation into cephalhematoma and right hip hematoma. No significant drop in hemoglobin however. Patient hemodynamically stable. Given significant altered mental status and confusion, concussion highly likely. Will therefore be admitted under trauma surgery for further evaluation management. Family amenable to the plan of care.    Pt was administered   Medications   Tetanus-Diphth-Acell Pertussis ([address]) injection 0.5 mL (0.5 mLs IntraMUSCular Not Given 9/30/21 1218)   ezetimibe (ZETIA) tablet 10 mg (10 mg Oral Not Given 9/30/21 1900)   meclizine (ANTIVERT) tablet 12.5 mg (12.5 mg Oral Given 9/30/21 1757)   triamterene-hydroCHLOROthiazide (MAXZIDE-25) 37.5-25 MG per tablet 1 tablet (1 tablet Oral Not Given 9/30/21 1901)   sodium chloride flush 0.9 % injection 5-40 mL (10 mLs IntraVENous Given 9/30/21 2059)   sodium chloride flush 0.9 % injection 5-40 mL (has no administration in time range)   0.9 % sodium chloride infusion (has no administration in time range)   ondansetron (ZOFRAN-ODT) disintegrating tablet 4 mg (has no administration in time range)     Or   ondansetron (ZOFRAN) injection 4 mg (has no administration in time range)   sennosides-docusate sodium (SENOKOT-S) 8.6-50 MG tablet 1 tablet (1 tablet Oral Given 9/30/21 2059)   hydrALAZINE (APRESOLINE) injection 10 mg (has no administration in time range)   acetaminophen (TYLENOL) tablet 650 mg (650 mg Oral Given 9/30/21 1757)   amLODIPine (NORVASC) tablet 5 mg (5 mg Oral Given 9/30/21 1757)     And   lisinopril (PRINIVIL;ZESTRIL) tablet 20 mg (20 mg Oral Given 9/30/21 1757)   morphine injection 4 mg (4 mg IntraVENous Given 9/30/21 1210)   ondansetron (ZOFRAN) injection 4 mg (4 mg IntraVENous Given 9/30/21 1210)       Plan: Admit to Trauma for further evaluation and management. Report given to Dr. Ene Montague. and Patient understanding and amenable to the POC. CRITICAL CARE TIME   Total CriticalCare time was 0 minutes, excluding separately reportable procedures. There was a high probability of clinically significant/life threatening deterioration in the patient's condition which required my urgent intervention. FINAL IMPRESSION      1. Closed head injury, initial encounter    2. Fall, initial encounter    3. Hematoma    4. Left wrist pain    5.  Right hip pain          DISPOSITION/PLAN   DISPOSITION Admitted 09/30/2021 01:46:47 PM      Current Discharge Medication List           Steven Cheadle, MD Steven Cheadle, MD  09/30/21 8616

## 2021-10-01 NOTE — PROGRESS NOTES
[facility] OCCUPATIONAL THERAPY EVALUATION - ACUTE     NAME: Whit Lomax  : 1940 (75 y.o.)  MRN: [id number]  CODE STATUS: Full Code  Room: O771/O705-59    Date of Service: 10/1/2021    Patient Diagnosis(es): Hematoma [T14. 8XXA]  Right hip pain [M25.551]  Left wrist pain [M25.532]  Closed head injury, initial encounter [F96.83KF]  Fall, initial encounter [W19. XXXA]  Closed head injury with concussion, without loss of consciousness, initial encounter [S06.0X0A]   Chief Complaint   Patient presents with    Fall     pain in left wrist, right hip, headache     Patient Active Problem List    Diagnosis Date Noted    Closed head injury with concussion 2021    Recurrent UTI 2020    Migraines 2020    Essential palatal tremor 2020    Dizziness and giddiness 2020    Benign paroxysmal vertigo of both ears 2020    Occlusion and stenosis of right middle cerebral artery 2020    Cerebrovascular disease 2019    Estrogen receptor positive status (ER+) 2018    Hypertensive retinopathy 2015    Retinal pigment epithelial atrophy 2015    Tear film insufficiency 2015    Vitreous degeneration 2015    Hx of ischemic right MCA stroke 2014    Personal history of malignant neoplasm of breast 2014    Hx of ischemic left MCA stroke     Senile cataract 2012    Vitamin D deficiency 2012    HTN (hypertension)     Hyperlipidemia     Osteoarthritis     Low back pain 2007        Past Medical History:   Diagnosis Date    Asthma     Asthma in remission     Elevated TSH     History of artificial lens replacement 3/24/2015    History of breast cancer 2014    Invasive ductal cancer right breast,  [facility], Dr Rod Sarmiento    HTN (hypertension)     was with [facility]    Hx of ischemic left MCA stroke     no residual, Dr Luana Weiss Hx of ischemic right MCA stroke 2014    no residual, frontal lobe    Hyperlipidemia     Osteoarthritis     Secondary and unspecified malignant neoplasm of axilla and upper limb lymph nodes ([address] 11/8/2018    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    Dr Mai Dahl (ortho)    Vitamin D deficiency      Past Surgical History:   Procedure Laterality Date    BLADDER SUSPENSION      BREAST BIOPSY Right 2/14/14    BREAST LUMPECTOMY Right 3/4/2014    Lumpectomy with SLNB, Dr Susie Kong Dr in [facility]        Safety Devices: Safety Devices  Safety Devices in place: Yes  Type of devices: All fall risk precautions in place   Initially in place: No    Subjective:\"I don't know what happened. \"  \"My friend said that I was on my knees when she saw me. \"       Pain Reassessment: 0/10 pain reported. Prior Level of Function:  Social/Functional History  Lives With: Son  Type of Home: House  Home Layout: One level, Laundry in basement (9 steps down to basement)  Home Access: Stairs to enter with rails  Entrance Stairs - Number of Steps: 3  Bathroom Shower/Tub: Tub/Shower unit  Bathroom Equipment: Shower chair, Grab bars in shower, Hand-held shower  Home Equipment: Cane, 4 wheeled walker (Does not use)  ADL Assistance: Independent  Homemaking Assistance: Independent  Ambulation Assistance: Independent (No AD typically.  takes [facility] with her in car)  Transfer Assistance: Independent  Active : Yes    OBJECTIVE:     Orientation Status:  Orientation  Overall Orientation Status: Within Functional Limits    Observation:  Observation/Palpation  Posture: Good  Observation: bruising on face on right side    Cognition Status:  Cognition  Overall Cognitive Status: WFL    Perception Status:  Perception  Overall Perceptual Status: WFL    Sensation Status:  Sensation  Overall Sensation Status: WFL    Vision and Hearing Status:  Vision  Vision:  (Intermittent saccadic corrections during smooth pursuits when tracking rom R>L)  Vision Exceptions: Wears glasses for reading  Hearing  Hearing: Within functional limits     ROM:   LUE AROM (degrees)  LUE AROM : WFL  Left Hand AROM (degrees)  Left Hand AROM: WFL  RUE AROM (degrees)  RUE AROM : WFL  Right Hand AROM (degrees)  Right Hand AROM: WFL    Strength:  LUE Strength  Gross LUE Strength: WFL  L Hand General: 4/5  RUE Strength  Gross RUE Strength: WFL  R Hand General: 4/5    Coordination, Tone, Quality of Movement:    Tone RUE  RUE Tone: Normotonic  Tone LUE  LUE Tone: Normotonic  Coordination  Movements Are Fluid And Coordinated: Yes    Hand Dominance:  Hand Dominance  Hand Dominance: Left    ADL Status:  ADL  Feeding: Unable to assess(comment)  Grooming: Setup  UE Bathing: Setup  LE Bathing: Setup  UE Dressing: Setup  LE Dressing: Setup  Toileting: Unable to assess(comment)          Therapy key for assistance levels -   Independent = Pt. is able to perform task with no assistance but may require a device   Stand by assistance = Pt. does not perform task at an independent level but does not need physical assistance, requires verbal cues  Minimal, Moderate, Maximal Assistance = Pt. requires physical assistance (25%, 50%, 75% assist from helper) for task but is able to actively participate in task   Dependent = Pt. requires total assistance with task and is not able to actively participate with task completion     Functional Mobility:     Transfers  Sit to stand: Supervision  Stand to sit: Supervision    Bed Mobility       Seated and Standing Balance:  Balance  Sitting Balance: Independent  Standing Balance: Supervision    Functional Endurance:  Activity Tolerance  Activity Tolerance: Patient Tolerated treatment well    D/C Recommendations:  OT D/C RECOMMENDATIONS  REQUIRES OT FOLLOW UP: No    Equipment Recommendations:       OT Education:        OT Follow Up:  OT D/C RECOMMENDATIONS  REQUIRES OT FOLLOW UP: No       Assessment/Discharge Disposition:     Performance deficits / Impairments: Decreased ADL status, Decreased high-level IADLs  Prognosis: Good  Discharge Recommendations: Continue to assess pending progress  No Skilled OT: At baseline function  Decision Making: Low Complexity  History: 6 complexities  Exam: 2 deficits  Assistance / Modification: no assist    Six Click Score    How much help for putting on and taking off regular lower body clothing?: None  How much help for Bathing?: None  How much help for Toileting?: None  How much help for putting on and taking off regular upper body clothing?: None  How much help for taking care of personal grooming?: None  How much help for eating meals?: None  AM-[facility] Inpatient Daily Activity Raw Score: 24  AM-PAC Inpatient ADL T-Scale Score : 57.54  ADL Inpatient CMS 0-100% Score: 0    Plan:  Plan  Times per week: N/A    Goals:   Patient will:      Patient Goal: Patient goals :  To return to home      Discussed and agreed upon: Yes Comments:     Therapy Time:   OT Individual Minutes  Time In: 0900  Time Out: 5776  Minutes: 25    Eval: 25 minutes     Electronically signed by:    GEOVANY Shirley, EDENILSON/L  47/7/9919, 10:48 AM Electronically signed by GEOVANY Shirley on 35/9/58 at 10:47 AM EDT

## 2021-10-01 NOTE — ACP (ADVANCE CARE PLANNING)
Incidental Findings     Discussion held with patient regarding incidental findings from the diagnostic test(s,) CT-Cspine and CT abd/pelvis. The incidental findings letter and a copy of the radiographic report has been provided to the patient, as well as filed in the patients medical record.      The patient has agreed to follow up with their PCP in regards to these findings:    - Mild to moderate atherosclerotic plaquing of a normal caliber abdominal aorta  - calcification bilateral carotid arteries  - defined, nodular areas of decreased attenuation, bilateral lobes of thyroid, measuring up to 2.5mm on the right, an 11mm on the left      Felicitas Block, [address]  Emergency General Surgery

## 2021-10-01 NOTE — CARE COORDINATION
MET WITH PATIENT AND SON IN LAW KRISTOFER AT BEDSIDE. PT DENIES NEEDS AT DISCHARGE. DECLINES [facility], STATES GOOD FAMILY SUPPORT AT HOME. HAS ROLLATOR AND CANE. WILL CONTINUE TO FOLLOW AS NEEDED.

## 2021-10-01 NOTE — DISCHARGE SUMMARY
[address]  [address]  [place], [address] [facility] Coni Pastor  MRN: [id number]  YOB: 1940  78 y. o.female      Attending  No att. providers found ? Date of Admission  9/30/2021 Date of Discharge  10/1/2021      ? DIAGNOSES:  Active Problems:    Closed head injury with concussion  Resolved Problems:    * No resolved hospital problems. *         PROCEDURES:  None  ? DISCHARGE MEDICATIONS:    Maynoralvaro Blackman   Home Medication Instructions HRF:[id number]    Printed on:10/01/21 9899   Medication Information                      acetaminophen (TYLENOL) 500 MG tablet  Take 500 mg by mouth every 6 hours as needed for Pain             amLODIPine-benazepril (LOTREL) 5-20 MG per capsule  Take 1 capsule by mouth daily TAKE 1 CAPSULE DAILY             aspirin-dipyridamole (AGGRENOX)  MG per extended release capsule  TAKE 1 CAPSULE TWICE DAILY             Cephalexin 250 MG TABS  Take 1 tablet by mouth daily             CINNAMON PO  Take 1 capsule by mouth daily              Cyanocobalamin (VITAMIN B 12 PO)  Take by mouth             ezetimibe (ZETIA) 10 MG tablet  TAKE 1 TABLET DAILY             fish oil-omega-3 fatty acids 1000 MG capsule  Take 1 g by mouth 3 times daily. Magnesium 500 MG CAPS  Take 1 capsule by mouth daily. meclizine (ANTIVERT) 12.5 MG tablet  Take 1 tablet by mouth daily             niacin (NIASPAN) 500 MG extended release tablet  TAKE 1 TABLET 3 TIMES A DAY             Polyvinyl Alcohol-Povidone (REFRESH OP)  Apply 1 drop to eye 4 times daily as needed. triamterene-hydroCHLOROthiazide (MAXZIDE-25) 37.5-25 MG per tablet  TAKE 1 TABLET DAILY             Vitamin D (CHOLECALCIFEROL) 1000 UNITS CAPS capsule  Take 1,000 Units by mouth daily.                zoster recombinant adjuvanted vaccine [facility]) 50 MCG/0.5ML SUSR injection  Inject 0.5 mLs into the muscle See Admin Instructions 1 dose now and repeat in 2-6 months                   REASON FOR HOSPITALIZATION:  Coni Pastor is a 78 y.o. female with a PMHx of HTN, HLD, BL MCA strokes (on ASA-dipyridamole). Patient presented on 9/30/21 s/p unwitnessed fall from standing. Patient is amnestic to fall. She remembers driving to  her friend for breakfast/picnic. On the way to the picnic, the patient reports not feeling well, which is when her friend saw the large forehead hematoma/ecchymosis. (+)head strike. (?)LOC. (+)ASA-dipyridamole.      ED work up significant for large right hip hematoma. No acute intracranial injuries on imaging. Trauma was consulted and patient was admitted to [facility] under Trauma Team for observation of the hematoma and PT/OT. SIGNIFICANT FINDINGS:  Catalog of Injuries:   1. S/p unwitnessed [facility] 9/30/21  2. Large anterolateral right hip hematoma  3. Acute pain due to trauma    Incidental Findings: (discussed with patient by RAL on 10/1/2021)  - Mild to moderate atherosclerotic plaquing of a normal caliber abdominal aorta  - calcification bilateral carotid arteries  - defined, nodular areas of decreased attenuation, bilateral lobes of thyroid, measuring up to 2.5mm on the right, an 11mm on the left       HOSPITAL COURSE:  9/30/2021: Unwitnessed FFS. Amnestic to event. Imaging significant for large right hip hematoma. Admitted to [facility] under Trauma Surgery to observe hematoma and for PT/OT. 10/2021: H/H with slight downtrend over 18hrs. Do not suspect active bleeding. Right lateral thigh hematoma is soft/compressible. Hematoma stable. Performed well with PT/OT. Medically and functionally cleared for home without HHC. At time of discharge, Arianne Horvath was tolerating a regular diet, having bowel movements, and had adequate analgesia on oral pain medications. Pt's activity level was OOBAT. The patient had no signs or symptoms of complications.  Patient was determined stable for discharge to: Home    The patient was seen and examined on the day of discharge with the following findings:  Constitutional: Sitting up comfortably in bed. Eating breakfast. Appears comfortable. HEENT: Right sided forehead/scalp hematoma with overlying ecchymosis. Ecchymosis migrating down to lateral aspect of gabriela-orbital region today. TTP over hematoma. EOM intact. Cardiovascular: Regular rate and rhythm. Pulmonary: Clear to ausculation bilaterally. No wheezing, rhonchi or rales. Abdominal: Soft. Non-distended. Non-tender. Musculoskeletal: Right upper lateral thigh with large but stable appearing hematoma. Hematoma is soft/compressible to palpation. Mild TTP over hematoma. Good ROM in all extremities. No edema. Neurological: Alert, awake, and orientated x 3. Motor and sensory grossly intact. No focal deficits. GCS of 15. Skin: warm/dry        ANTICIPATED FOLLOW UP:  Future Appointments   Date Time Provider Jose Manzo   10/22/2021 10:00 AM Jessica Greene MD [facility]   1/13/2022 12:00 PM Jessica Greene MD [facility]   2/9/2022 10:00 AM Jessica Greene MD [facility]   6/7/2022  1:45 PM Guillermo Luke MD [facility]     Discharge Procedure Orders   [facility] Physical Therapy [facility]   Referral Priority: Routine Referral Type: Eval and Treat   Referral Reason: Specialty Services Required   Requested Specialty: Physical Therapy   Number of Visits Requested: [address] [place] [address]   Referral Priority: Routine Referral Type: Eval and Treat   Referral Reason: Specialty Services Required   Requested Specialty: Speech Pathology   Number of Visits Requested: 1       Other indicated follow up and instructions for scheduling:  -No need to follow up with Trauma Surgery. Can follow up as needed.  -Follow up with Primary Care Provider regarding incidental findings.       VTE RISK AT DISCHARGE:  Per trauma program protocol, the patient does not require post-discharge VTE prophylaxis due to: NWB single LE or BLE fractures limiting mobility. --  Sergio Ballesteros PA-C  Trauma/Critical Care  Emergency General Surgery  [phone] ([phone])  [phone]      40 minutes were spent on the discharge of this patient including final examination of the patient, discussion of the hospital stay, instructions for continuing care to all relevant caregivers, preparation of discharge records, prescriptions and referral forms, and clear identification of reasons to return to clinic or to emergency room.

## 2021-10-04 ENCOUNTER — CARE COORDINATION (OUTPATIENT)
Dept: CASE MANAGEMENT | Age: 81
End: 2021-10-04

## 2021-10-04 DIAGNOSIS — S06.0X0A CLOSED HEAD INJURY WITH CONCUSSION, WITHOUT LOSS OF CONSCIOUSNESS, INITIAL ENCOUNTER: Primary | ICD-10-CM

## 2021-10-04 PROCEDURE — 1111F DSCHRG MED/CURRENT MED MERGE: CPT | Performed by: FAMILY MEDICINE

## 2021-10-04 NOTE — CARE COORDINATION
[facility] Transitions Initial Follow Up Call    Call within 2 business days of discharge: Yes    Patient: Hung Laguna Patient : 1940   MRN: [id number]  Reason for Admission: 2021 - 10/1/2021 Fall, Concussion. Discharge Date: 10/1/21 RARS: No data recorded  NR  CT    Last Discharge [facility]       Complaint Diagnosis Description Type Department Provider    21 Fall Closed head injury, initial encounter . .. ED to Hosp-Admission (Discharged) (ADMITTED) Bee Smith MD; Liliam Stover. .. Declines HHC needs    PCP 10/22 10:00. Pt advised. Transitions of Care Initial Call    Was this an external facility discharge? No Discharge Facility:     Challenges to be reviewed by the provider   Additional needs identified to be addressed with provider: No  none             Method of communication with provider : none      Advance Care Planning:   Does patient have an Advance Directive: reviewed and current. Was this a readmission? No  Patient stated reason for admission: Liliana Pichardo shares she fell outdoors on concrete hitting head on step and hip to concrete. States black eye bruising has migrated to both eyes now and reports facial soreness/tenderness to touch. Enc to alternate heat and cold for comfort and swelling reduction. States no broken skin. States she had LOC and does not remember the cause for the fall. Using cane indoors consistently and rollator for outdoors aw/ assist at this time. Son lives w/ her. States she feels \"a little fuzzy\" but denies any acute mental fogginess/confusion. Denies HA, N/V, or any HHC needs. Has/taking meds as directed. Patients top risk factors for readmission: medical condition-Risk for falls. Reviewed home falls prevention, v/u.    Care Transition Nurse (CTN) contacted the patient by telephone to perform post hospital discharge assessment. Verified name and  with patient as identifiers.  Provided introduction to self, and
No

## 2021-10-11 ENCOUNTER — CARE COORDINATION (OUTPATIENT)
Dept: CASE MANAGEMENT | Age: 81
End: 2021-10-11

## 2021-10-11 NOTE — CARE COORDINATION
[facility] Transitions Follow Up Call    10/11/2021    Patient: Jerry Mae  Patient : 1940   MRN: [id number]  Reason for Admission: 2021 - 10/1/2021 Fall, Concussion  Discharge Date: 10/1/21 RARS: No data recorded       PCP 10/22 10:00    Subsequent CT outreach. Left Hippa VM. Care Transitions Subsequent and Final Call    Subsequent and Final Calls  Care Transitions Interventions  Other Interventions:            Follow Up  Future Appointments   Date Time Provider Jose Manzo   10/20/2021 12:40 PM [facility] MAMMOGRAPHY ROOM 2 [facility]   10/22/2021 10:00 AM Dilan Pena MD [facility]   10/28/2021 10:00 AM Lauri Hazel, [address] [place] [address]   2022 12:00 PM Dilan Pena MD [facility]   2022 10:00 AM Dilan Pena MD [facility]   2022  1:45 PM Arnaldo Reilly MD [address] [facility],2Nd Floor,2Nd Floor, [facility]

## 2021-10-18 ENCOUNTER — CARE COORDINATION (OUTPATIENT)
Dept: CASE MANAGEMENT | Age: 81
End: 2021-10-18

## 2021-10-18 NOTE — CARE COORDINATION
[facility] Transitions Follow Up Call    10/18/2021    Patient: Ev Alejandra  Patient : 1940   MRN: [id number]  Reason for Admission:  2021 - 10/1/2021 Fall, Concussion  Discharge Date: 10/1/21 RARS: No data recorded  NR  CT     PCP 10/22 10:00    Care Transitions Follow Up Call    Needs to be reviewed by the provider   Additional needs identified to be addressed with provider: No  none             Method of communication with provider : none      Care Transition Nurse (CTN) contacted the patient by telephone to follow up after admission. Verified name and  with patient as identifiers. Addressed changes since last contact: David Little reports she is doing well. Denies any more falls. Uses walk aides. States she feels steady w/ ambulation. States swellings have decreased and bruising is starting to fade. No HA, vision changes, sensory concerns, dizziness, or acute pain concerns. Denies any mental fogginess or changes in sleep cycle. Denies any [facility] needs. Aware of her upcoming appts and plans to attend. Discussed follow-up appointments. If no appointment was previously scheduled, appointment scheduling offered: pending. Is follow up appointment scheduled within 7 days of discharge? NA. Advance Care Planning:   Does patient have an Advance Directive: NR.     CTN reviewed discharge instructions, medical action plan and red flags with patient and discussed any barriers to care and/or understanding of plan of care after discharge. Discussed appropriate site of care based on symptoms and resources available to patient including: When to call 911. The patient agrees to contact the PCP office for questions related to their healthcare. Patients top risk factors for readmission: Recent fall  Interventions to address risk factors: Reviewed falls prevention, clutter free halls, and night time lighing.       Non-[facility] follow up appointment(s):     CTN provided contact information for future needs. No further follow-up call indicated based on severity of symptoms and risk factors. Pt declines need for further telephonic fu. CTN s/o. Care Transitions Subsequent and Final Call    Subsequent and Final Calls  Do you have any ongoing symptoms?: No  Have your medications changed?: No  Do you have any questions related to your medications?: No  Do you currently have any active services?: No  Do you have any needs or concerns that I can assist you with?: No  Identified Barriers: Lack of Education  Care Transitions Interventions  Other Interventions:            Follow Up  Future Appointments   Date Time Provider Jose Mnazo   10/20/2021 12:40 PM [facility]HY [facility] 2 [facility]   10/22/2021 10:00 AM MD Robi Grayson [facility]   10/28/2021 10:00 AM Lauri Hazel, [address] [place] [address]   1/13/2022 12:00 PM MD Robi Grayson [facility] Robi   2/9[facility]2022 10:00 [facility]   6/7/2022  1:45 [place] [address] [facility],2Nd Floor,2Nd Floor, [facility]

## 2021-10-20 ENCOUNTER — HOSPITAL ENCOUNTER (OUTPATIENT)
Dept: WOMENS IMAGING | Age: 81
Discharge: HOME | End: 2021-10-22
Payer: MEDICARE

## 2021-10-20 VITALS — BODY MASS INDEX: 22.94 KG/M2 | HEIGHT: 66 IN

## 2021-10-20 DIAGNOSIS — Z12.31 ENCOUNTER FOR SCREENING MAMMOGRAM FOR BREAST CANCER: ICD-10-CM

## 2021-10-20 PROCEDURE — 77067 SCR MAMMO BI INCL CAD: CPT

## 2021-10-22 ENCOUNTER — OFFICE VISIT (OUTPATIENT)
Dept: FAMILY MEDICINE CLINIC | Age: 81
End: 2021-10-22
Payer: MEDICARE

## 2021-10-22 VITALS
DIASTOLIC BLOOD PRESSURE: 80 MMHG | BODY MASS INDEX: 25.55 KG/M2 | TEMPERATURE: 97.2 F | WEIGHT: 159 LBS | RESPIRATION RATE: 18 BRPM | HEART RATE: 62 BPM | OXYGEN SATURATION: 100 % | SYSTOLIC BLOOD PRESSURE: 178 MMHG | HEIGHT: 66 IN

## 2021-10-22 DIAGNOSIS — I67.9 CEREBROVASCULAR DISEASE: Primary | Chronic | ICD-10-CM

## 2021-10-22 DIAGNOSIS — I10 PRIMARY HYPERTENSION: ICD-10-CM

## 2021-10-22 DIAGNOSIS — R73.09 ABNORMAL GLUCOSE: ICD-10-CM

## 2021-10-22 DIAGNOSIS — H81.13 BENIGN PAROXYSMAL VERTIGO OF BOTH EARS: ICD-10-CM

## 2021-10-22 DIAGNOSIS — S70.11XD HEMATOMA OF RIGHT THIGH, SUBSEQUENT ENCOUNTER: ICD-10-CM

## 2021-10-22 DIAGNOSIS — E78.5 HYPERLIPIDEMIA, UNSPECIFIED HYPERLIPIDEMIA TYPE: ICD-10-CM

## 2021-10-22 DIAGNOSIS — S06.0X0D CLOSED HEAD INJURY WITH CONCUSSION, WITHOUT LOSS OF CONSCIOUSNESS, SUBSEQUENT ENCOUNTER: ICD-10-CM

## 2021-10-22 PROCEDURE — 1036F TOBACCO NON-USER: CPT | Performed by: FAMILY MEDICINE

## 2021-10-22 PROCEDURE — G8427 DOCREV CUR MEDS BY ELIG CLIN: HCPCS | Performed by: FAMILY MEDICINE

## 2021-10-22 PROCEDURE — 99214 OFFICE O/P EST MOD 30 MIN: CPT | Performed by: FAMILY MEDICINE

## 2021-10-22 PROCEDURE — 1090F PRES/ABSN URINE INCON ASSESS: CPT | Performed by: FAMILY MEDICINE

## 2021-10-22 PROCEDURE — G8417 CALC BMI ABV UP PARAM F/U: HCPCS | Performed by: FAMILY MEDICINE

## 2021-10-22 PROCEDURE — G8484 FLU IMMUNIZE NO ADMIN: HCPCS | Performed by: FAMILY MEDICINE

## 2021-10-22 PROCEDURE — 4040F PNEUMOC VAC/ADMIN/RCVD: CPT | Performed by: FAMILY MEDICINE

## 2021-10-22 PROCEDURE — G8399 PT W/DXA RESULTS DOCUMENT: HCPCS | Performed by: FAMILY MEDICINE

## 2021-10-22 PROCEDURE — 1123F ACP DISCUSS/DSCN MKR DOCD: CPT | Performed by: FAMILY MEDICINE

## 2021-10-22 RX ORDER — EZETIMIBE 10 MG/1
TABLET ORAL
Qty: 90 TABLET | Refills: 4 | Status: SHIPPED | OUTPATIENT
Start: 2021-10-22 | End: 2022-05-16

## 2021-10-22 RX ORDER — TRIAMTERENE AND HYDROCHLOROTHIAZIDE 37.5; 25 MG/1; MG/1
TABLET ORAL
Qty: 90 TABLET | Refills: 4 | Status: SHIPPED | OUTPATIENT
Start: 2021-10-22 | End: 2021-12-29

## 2021-10-22 RX ORDER — NIACIN 500 MG/1
TABLET, EXTENDED RELEASE ORAL
Qty: 270 TABLET | Refills: 4 | Status: SHIPPED | OUTPATIENT
Start: 2021-10-22 | End: 2022-05-10 | Stop reason: SDUPTHER

## 2021-10-22 RX ORDER — MECLIZINE HCL 12.5 MG/1
12.5 TABLET ORAL DAILY
Qty: 90 TABLET | Refills: 4 | Status: SHIPPED | OUTPATIENT
Start: 2021-10-22

## 2021-10-22 NOTE — PROGRESS NOTES
Subjective  Kayla Irwin, 78 y.o. female presents today with:  Chief Complaint   Patient presents with    Hypertension     4 month f/u           HPI    Was recently hospitalized for unwitnessed fall with head injury and possible LOC with amnesia as well as large hip hematoma. DC summary of 10/1/2021 as follows:    REASON FOR HOSPITALIZATION:  Fern Recio is a 78 y. o. female with a PMHx of HTN, HLD, BL MCA strokes (on ASA-dipyridamole). Patient presented on 9/30/21 s/p unwitnessed fall from standing. Patient is amnestic to fall. She remembers driving to  her friend for breakfast/picnic. On the way to the picnic, the patient reports not feeling well, which is when her friend saw the large forehead hematoma/ecchymosis. (+)head strike. (?)LOC. (+)ASA-dipyridamole.      ED work up significant for large right hip hematoma. No acute intracranial injuries on imaging. Trauma was consulted and patient was admitted to [facility] under Trauma Team for observation of the hematoma and PT/OT.          SIGNIFICANT FINDINGS:  Catalog of Injuries:   1. S/p unwitnessed [facility] 9/30/21  2. Large anterolateral right hip hematoma  3. Acute pain due to trauma     Incidental Findings: (discussed with patient by RAL on 10/1/2021)  - Mild to moderate atherosclerotic plaquing of a normal caliber abdominal aorta  - calcification bilateral carotid arteries  - defined, nodular areas of decreased attenuation, bilateral lobes of thyroid, measuring up to 2.5mm on the right, an 11mm on the left        HOSPITAL COURSE:  9/30/2021: Unwitnessed FFS. Amnestic to event. Imaging significant for large right hip hematoma. Admitted to [facility] under Trauma Surgery to observe hematoma and for PT/OT. 10/2021: H/H with slight downtrend over 18hrs. Do not suspect active bleeding. Right lateral thigh hematoma is soft/compressible. Hematoma stable. Performed well with PT/OT.  Medically and functionally cleared for home without HHC.     At time of discharge, Homar Patterson was tolerating a regular diet, having bowel movements, and had adequate analgesia on oral pain medications. Pt's activity level was OOBAT. The patient had no signs or symptoms of complications. Patient was determined stable for discharge to: Home    Now, has no headache, vision change, confusion, hypersomnolence, chest pain, SOB, palpitations, nausea, vomiting, diarrhea, appetite change. Has not checked BP lately but is taking meds as prescribed. No other questions and or concerns for today's visit      Review of Systems      Past Medical History:   Diagnosis Date    Asthma     Asthma in remission 1990    Breast cancer ([facility] 2014    right breast    Elevated TSH     History of artificial lens replacement 3/24/2015    History of breast cancer 02/2014    Invasive ductal cancer right breast, Dr Erika Moreno, Dr Sun Rocha History of therapeutic radiation     HTN (hypertension) 1980    was with [facility]    Hx of ischemic left MCA stroke 2013    no residual, Dr Luana Weiss Hx of ischemic right MCA stroke 12/2014    no residual, frontal lobe    Hyperlipidemia     Osteoarthritis     Secondary and unspecified malignant neoplasm of axilla and upper limb lymph nodes ([facility] 11/8/2018    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    Dr Jorge Hughes (ortho)    Vitamin D deficiency      Past Surgical History:   Procedure Laterality Date    BLADDER SUSPENSION      BREAST BIOPSY Right 2/14/14    BREAST LUMPECTOMY Right 3/4/2014    Lumpectomy with SLNB, Dr Casey Stone [address] in [address] [place] [address] History     Socioeconomic History    Marital status:       Spouse name: Not on file    Number of children: 3    Years of education: Not on file    Highest education level: Not on file   Occupational History    Occupation: , retired   Tobacco Use    Smoking status: Former Smoker     Packs/day: 0.25     Years: 14.00     Pack years: 3.50     Types: Cigarettes    Smokeless tobacco: Never Used    Tobacco comment: started at age 9yo and quit at age 31yo   Substance and Sexual Activity    Alcohol use: No    Drug use: No    Sexual activity: Not on file   Other Topics Concern    Not on file   Social History Narrative    Born in [place], 2 younger brothers, one dec 2014, one in [place]     July 2020    , retired     Lives in a house in [facility]     of first spouse    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, owned a convenient store    Hobbies reading, [employer], travel    4 children, 2 daughters in [place] ([place] and [place]), 2 sons in [address] [place] [address] great grandchildren >50        Son lives with her temporarily (01/08/2021)     Social Determinants of Health     Financial Resource Strain: Low Risk     Difficulty of Paying Living Expenses: Not hard at all   Food Insecurity: No Food Insecurity    Worried About [address] in the Last Year: Never true    [address] in the Last Year: Never true   Transportation Needs: No Transportation Needs    Lack of Transportation (Medical): No    Lack of Transportation (Non-Medical):  No   Physical Activity:     Days of Exercise per Week:     Minutes of Exercise per Session:    Stress:     Feeling of Stress :    Social Connections:     Frequency of Communication with Friends and Family:     Frequency of Social Gatherings with Friends and Family:     Attends Catholic Services:     Active Member of Clubs or Organizations:     Attends Club or Organization Meetings:     Marital Status:    Intimate Partner Violence:     Fear of Current or Ex-Partner:     Emotionally Abused:     Physically Abused:     Sexually Abused:      Family History   Problem Relation Age of Onset    Kidney Disease Mother         dec age 79    Hypertension Mother     Migraines Mother     Diabetes Father     Heart Failure Father         dec age 76    [de-identified] Brother         dec age 76  Heart Attack Paternal Grandmother     Stroke Paternal Grandfather     Breast Cancer Paternal Cousin      Allergies   Allergen Reactions    Sulfa Antibiotics      Unsure of reaction     Iodinated Diagnostic Agents      Oral & IV dye group    Statins Other (See Comments)     Current Outpatient Medications   Medication Sig Dispense Refill    meclizine (ANTIVERT) 12.5 MG tablet Take 1 tablet by mouth daily 90 tablet 4    niacin (NIASPAN) 500 MG extended release tablet TAKE 1 TABLET 3 TIMES A  tablet 4    triamterene-hydroCHLOROthiazide (MAXZIDE-25) 37.5-25 MG per tablet TAKE 1 TABLET DAILY 90 tablet 4    ezetimibe (ZETIA) 10 MG tablet TAKE 1 TABLET DAILY 90 tablet 4    Cephalexin 250 MG TABS Take 1 tablet by mouth daily 90 tablet 5    aspirin-dipyridamole (AGGRENOX)  MG per extended release capsule TAKE 1 CAPSULE TWICE DAILY 180 capsule 3    amLODIPine-benazepril (LOTREL) 5-20 MG per capsule Take 1 capsule by mouth daily TAKE 1 CAPSULE DAILY 90 capsule 4    Cyanocobalamin (VITAMIN B 12 PO) Take by mouth      CINNAMON PO Take 1 capsule by mouth daily       Polyvinyl Alcohol-Povidone (REFRESH OP) Apply 1 drop to eye 4 times daily as needed.  Vitamin D (CHOLECALCIFEROL) 1000 UNITS CAPS capsule Take 1,000 Units by mouth daily.  Magnesium 500 MG CAPS Take 1 capsule by mouth daily.  fish oil-omega-3 fatty acids 1000 MG capsule Take 1 g by mouth 3 times daily. No current facility-administered medications for this visit. PMH, Surgical Hx, Family Hx, and Social Hxreviewed and updated. Health Maintenance reviewed. Objective    Vitals:    10/22/21 0959   BP: (!) 178/80   Site: Left Upper Arm   Position: Sitting   Cuff Size: Medium Adult   Pulse: 62   Resp: 18   Temp: 97.2 °F (36.2 °C)   TempSrc: Temporal   SpO2: 100%   Weight: 159 lb (72.1 kg)   Height: 5' 5.5\" (1.664 m)        Physical Exam  Constitutional:       General: She is not in acute distress. Appearance: She is well-developed. HENT:      Head: Normocephalic and atraumatic. Eyes:      General: No scleral icterus. Conjunctiva/sclera: Conjunctivae normal.   Cardiovascular:      Rate and Rhythm: Normal rate and regular rhythm. Heart sounds: Normal heart sounds. Pulmonary:      Effort: No respiratory distress. Breath sounds: No wheezing or rales. Skin:     General: Skin is warm and dry. Findings: Bruising (resolving, right side of face and inferior to OD;  RLE) present. Neurological:      Mental Status: She is alert and oriented to person, place, and time. Mental status is at baseline. Gait: Gait normal.   Psychiatric:         Mood and Affect: Mood normal.         Behavior: Behavior normal.         Thought Content: Thought content normal.         Judgment: Judgment normal.       Lab Results   Component Value Date    WBC 7.0 10/01/2021    HGB 12.5 10/01/2021    HCT 36.2 (L) 10/01/2021     10/01/2021    CHOL 199 06/12/2018    TRIG 106 06/12/2018    HDL 61 (H) 12/09/2019    ALT 17 09/30/2021    AST 22 09/30/2021     10/01/2021    K 3.8 10/01/2021     10/01/2021    CREATININE 0.65 10/01/2021    BUN 10 10/01/2021    CO2 26 10/01/2021    TSH 2.920 06/12/2018    INR 0.9 09/30/2021    LABMICR <1.20 06/10/2019         No results found for: LABA1C  Lab Results   Component Value Date    LABMICR <1.20 06/10/2019    CREATININE 0.65 10/01/2021     Lab Results   Component Value Date    ALT 17 09/30/2021    AST 22 09/30/2021     Lab Results   Component Value Date    CHOL 199 06/12/2018    TRIG 106 06/12/2018    HDL 61 (H) 12/09/2019    LDLCALC 118 12/09/2019        Assessment & Plan   Visit Diagnoses and Associated Orders     Cerebrovascular disease    -  Primary    Stable and well-controlled; suspect fall was mechanical         Primary hypertension        Elevated. Follow at home for 5 days then submit BP reading via [employer].     triamterene-hydroCHLOROthiazide (MAXZIDE-25) per tablet     Sig: TAKE 1 TABLET DAILY     Dispense:  90 tablet     Refill:  4    ezetimibe (ZETIA) 10 MG tablet     Sig: TAKE 1 TABLET DAILY     Dispense:  90 tablet     Refill:  4     Medications Discontinued During This Encounter   Medication Reason    acetaminophen (TYLENOL) 500 MG tablet LIST CLEANUP    zoster recombinant adjuvanted vaccine (SHINGRIX) 50 MCG/0.5ML SUSR injection LIST CLEANUP    triamterene-hydroCHLOROthiazide (MAXZIDE-25) 37.5-25 MG per tablet REORDER    niacin (NIASPAN) 500 MG extended release tablet REORDER    ezetimibe (ZETIA) 10 MG tablet REORDER    meclizine (ANTIVERT) 12.5 MG tablet REORDER     Return for please make upcoming 2 appts on same day in office or DOXY 15min each. Controlled Substance Monitoring:    Acute and Chronic Pain Monitoring:   No flowsheet data found.         Cliff Perez MD

## 2021-11-08 ENCOUNTER — HOSPITAL ENCOUNTER (OUTPATIENT)
Dept: PHYSICAL THERAPY | Age: 81
Setting detail: THERAPIES SERIES
Discharge: HOME | End: 2021-11-08
Payer: MEDICARE

## 2021-11-08 PROCEDURE — 97162 PT EVAL MOD COMPLEX 30 MIN: CPT

## 2021-11-08 NOTE — PLAN OF CARE
[address] [facility]   Radha Meyer. [address] [place], [place]  Phone:  [phone]   Fax:  [phone]    [] Certification  [] Recertification []  Plan of Care  [] Progress Note   [] Discharge        To:  Karthikeyan TONG  From:  Raghav Shoemaker, DAVID  Patient: Jann Potter     : 1940  Diagnosis: Closed head injury, right hip pain, fall     Date: 2021  Treatment Diagnosis: impaired balance, impaired LE strength, impaired gait       Progress Report Period from: 21  to 2021    Total # of Visits to Date: 1   No Show: 0    Canceled Appointment: 0     OBJECTIVE:   Short Term Goals =Long term goals    Long Term Goals - Time Frame for Long term goals : 4 weeks  Goals Current/ Discharge status Met   Long term goal 1: Patient will increase strength in bilateral hips to 4+/5 for improved functional tolerance. Strength RLE  R Hip Flexion: 4-/5  R Hip Extension: 4-/5  R Hip ABduction: 4/5  R Knee Flexion: 4/5  R Knee Extension: 5/5  R Ankle Dorsiflexion: 5/5  Strength LLE  L Hip Flexion: 4-/5  L Hip Extension: 4-/5  L Hip ABduction: 4/5  L Knee Flexion: 4/5  L Knee Extension: 5/5  L Ankle Dorsiflexion: 5/5            [] yes  [x] no   Long term goal 2: Patient will ambulate 150ft independently with improved symmetry and decrease sway. Ambulation 1  Device: No Device  Assistance: Modified Independent  Quality of Gait: decreased stance on right LE, increased lateral sway  Gait Deviations: Slow Nell     [] yes  [x] no   Long term goal 3: Hollins balance >/= 52/56 to demonstrate improved balance. 46/56 [] yes  [x] no   Long term goal 4: DGI >/= /24 to demonstrate improved dynamic balance. DGI:  [] yes  [x] no   Long term goal 5: Patient will be independent with HEP. Patient issued HEP.  [] yes  [x] no     Body structures, Functions, Activity limitations: Decreased strength, Decreased balance, Decreased endurance  Assessment: Patient reports fall at end of Sept causing closed head injury with right hip pain. Upon PT evaluation, patient demonstrates some difficulty with both static and dynamic with decreased hip strength. Further PT recommended to improve strength and balance for overall quality of life. Prognosis: Excellent  Discharge Recommendations: Continue to assess pending progress  New Education Provided: PT Education: Goals, PT Role, Plan of Care, Home Exercise Program    PLAN: [x] Evaluate and Treat  Frequency/Duration:  Plan  Times per week: 1-2  Plan weeks: 4  Current Treatment Recommendations: Strengthening, Balance Training, Functional Mobility Training, Gait Training, Neuromuscular Re-education, Manual Therapy - Soft Tissue Mobilization, Home Exercise Program, Patient/Caregiver Education & Training, Equipment Evaluation, Education, & procurement, Modalities     Precautions:             Patient Status:[x] Continue/ Initate plan of Care    [] Discharge PT. Recommend pt continue with HEP. [] Additional visits requested, Please re-certify for additional visits:        Signature: Electronically signed by Jarvis Wise PT on 11/8/21 at 2:00 PM EST      If you have any questions or concerns, please don't hesitate to call. Thank you for your referral.    I have reviewed this plan of care and certify a need for medically necessary rehabilitation services.     Physician Signature:__________________________________________________________  Date:  Please sign and return

## 2021-11-08 NOTE — PROGRESS NOTES
[facility]   Outpatient Physical Therapy   Evaluation      [] [address]  [x] [address] [facility]     Date: 2021  Patient: Charan Blanton  : 1940  ACCT #: [de-identified]  Referring physician: Referring Practitioner: Cyn TONG    Referring Practitioner: Cyn TONG    Referral Date : 10/01/21    Diagnosis: Closed head injury, right hip pain, fall    Treatment Diagnosis: impaired balance, impaired LE strength, impaired gait  PT Insurance Information: Medicare  Total # of Visits Approved:  (follow [facility] guidelines)   Total # of Visits to Date: 1  No Show: 0  Canceled Appointment: 0          History   has a past medical history of Asthma, Asthma in remission, Breast cancer ([address] Elevated TSH, History of artificial lens replacement, History of breast cancer, History of therapeutic radiation, HTN (hypertension), Hx of ischemic left MCA stroke, Hx of ischemic right MCA stroke, Hyperlipidemia, Osteoarthritis, Secondary and unspecified malignant neoplasm of axilla and upper limb lymph nodes ([address] Tendinopathy of right gluteal region, and Vitamin D deficiency. has a past surgical history that includes Cholecystectomy; Tonsillectomy; bladder suspension; Breast biopsy (Right, 14); Breast lumpectomy (Right, 3/4/2014); and Tubal ligation.     Allergies   Allergen Reactions    Sulfa Antibiotics      Unsure of reaction     Iodinated Diagnostic Agents      Oral & IV dye group    Statins Other (See Comments)     Current Outpatient Medications on File Prior to Encounter   Medication Sig Dispense Refill    meclizine (ANTIVERT) 12.5 MG tablet Take 1 tablet by mouth daily 90 tablet 4    niacin (NIASPAN) 500 MG extended release tablet TAKE 1 TABLET 3 TIMES A  tablet 4    triamterene-hydroCHLOROthiazide (MAXZIDE-25) 37.5-25 MG per tablet TAKE 1 TABLET DAILY 90 tablet 4    ezetimibe (ZETIA) 10 MG tablet TAKE 1 TABLET DAILY 90 Extension: 5/5  R Ankle Dorsiflexion: 5/5  Strength LLE  L Hip Flexion: 4-/5  L Hip Extension: 4-/5  L Hip ABduction: 4/5  L Knee Flexion: 4/5  L Knee Extension: 5/5  L Ankle Dorsiflexion: 5/5     AROM RLE (degrees)  RLE General AROM: right hip ROM WFLs     AROM LLE (degrees)  LLE AROM : WFL                            Additional Measures  Special Tests: + Ely's test  Other: Hollins 46/56, DGI: 13/24  Sensation  Overall Sensation Status: WFL   Bed mobility  Supine to Sit: Independent  Sit to Supine: Independent     Transfers  Sit to Stand: Independent  Stand to sit: Independent  Bed to Chair: Independent  Ambulation 1  Device: No Device  Assistance: Modified Independent  Quality of Gait: decreased stance on right LE, increased lateral sway  Gait Deviations: Slow Nell  Stairs  # Steps : 4  Stairs Height: 8\"  Rails: Right ascending  Device: No Device  Assistance: Modified independent   Comment: non-reciprocal pattern           Hollins Balance Score: 46  Dynamic Gait Total Score: 13    Exercises:   Exercises  Exercise 1: SLR x 10  Exercise 2: hip adduction with ball 5s x 10  Exercise 3: S/L hip abduction, clamshells with band*  Exercise 4: hip extension*  Exercise 5: bridges*  Exercise 6: sink exercises*  Exercise 7: Static balance: tandem, SLS*  Exercise 8: gait drills*  Exercise 9: dual task*  Exercise 10: 180 deg turns, 360 deg turns*  Exercise 20: HEP: SLR, hip adduction with ball    Modalities:  Modalities  Cryotherapy (Minutes\Location): PRN, right hip*      ** indicates treatment to be performed at future treatment     POST-PAIN    Pain Rating (0-10 pain scale): 0/10  Location and Pain Description same as pre-pain unless otherwise indicated.   Action: [] NA  [] Call Physician  [] Perform HEP  [] Meds as prescribed     Assessment   Conditions Requiring Skilled Therapeutic Intervention  Body structures, Functions, Activity limitations: Decreased strength, Decreased balance, Decreased endurance  Assessment: Patient reports fall at end of Sept causing closed head injury with right hip pain. Upon PT evaluation, patient demonstrates some difficulty with both static and dynamic with decreased hip strength. Further PT recommended to improve strength and balance for overall quality of life. Treatment Diagnosis: impaired balance, impaired LE strength, impaired gait  Prognosis: Excellent  Decision Making: Medium Complexity  History: OA, CVA, age  Exam: impaired balance, impaired LE strength, impaired gait  Clinical Presentation: evolving  REQUIRES PT FOLLOW UP: Yes  Discharge Recommendations: Continue to assess pending progress    Patient Education   PT Education: Goals, PT Role, Plan of Care, Home Exercise Program    Pt verbalized/demonstrated good understanding:     [x] Yes         [] No, pt required further clarification. Goals   Patient goal: Patient goals : improve         Long term goals  Time Frame for Long term goals : 4 weeks  Long term goal 1: Patient will increase strength in bilateral hips to 4+/5 for improved functional tolerance. Long term goal 2: Patient will ambulate 150ft independently with improved symmetry and decrease sway. Long term goal 3: Hollins balance >/= 52/56 to demonstrate improved balance. Long term goal 4: DGI >/= 20/24 to demonstrate improved dynamic balance. Long term goal 5: Patient will be independent with HEP. Plan:  Plan  Times per week: 1-2  Plan weeks: 4  Current Treatment Recommendations: Strengthening, Balance Training, Functional Mobility Training, Gait Training, Neuromuscular Re-education, Manual Therapy - Soft Tissue Mobilization, Home Exercise Program, Patient/Caregiver Education & Training, Equipment Evaluation, Education, & procurement, Modalities       Evaluation and patient rights have been reviewed and patient agrees with plan of care.   Yes  [x]  No  []   Explain:     Signature: Electronically signed by Mauricio Phillips PT on 11/8/2021 at 1:59 PM      PT Individual Minutes  Time In: 1105  Time Out: 1200  Minutes: 55  Timed Code Treatment Minutes: 5 Minutes  Procedure Minutes:50 PT evaluation minutes    Funez Fall Risk Assessment  Risk Factor Scale  Score   History of Falls [x] Yes  [] No 25  0 25   Secondary Diagnosis [] Yes  [x] No 15  0 0   Ambulatory Aid [] Furniture  [] Crutches/cane/walker  [x] None/bedrest/wheelchair/nurse 30  15  0 0   IV/Heparin Lock [] Yes  [x] No 20  0 0   Gait/Transferring [] Impaired  [x] Weak  [] Normal/bedrest/immobile 20  10  0 10   Mental Status [] Forgets limitations  [x] Oriented to own ability 15  0 0      Total: 35     Based on the Assessment score: check the appropriate box.   []  No intervention needed   Low =   Score of 0-24  [x]  Use standard prevention interventions Moderate =  Score of 24-44   [x] Discuss fall prevention strategies   [x] Indicate moderate falls risk on eval  []  Use high risk prevention interventions High = Score of 45 and higher   [] Discuss fall prevention strategies   [] Provide supervision during treatment time

## 2021-11-15 ENCOUNTER — APPOINTMENT (OUTPATIENT)
Dept: PHYSICAL THERAPY | Age: 81
End: 2021-11-15
Payer: MEDICARE

## 2021-11-16 ENCOUNTER — APPOINTMENT (OUTPATIENT)
Dept: SPEECH THERAPY | Age: 81
End: 2021-11-16
Payer: MEDICARE

## 2021-11-22 ENCOUNTER — HOSPITAL ENCOUNTER (OUTPATIENT)
Dept: PHYSICAL THERAPY | Age: 81
Setting detail: THERAPIES SERIES
Discharge: HOME | End: 2021-11-22
Payer: MEDICARE

## 2021-11-22 PROCEDURE — 97112 NEUROMUSCULAR REEDUCATION: CPT

## 2021-11-22 PROCEDURE — 97110 THERAPEUTIC EXERCISES: CPT

## 2021-11-22 NOTE — PROGRESS NOTES
[facility] of [address] [facility]  Outpatient Physical Therapy    Treatment Note        Date: 2021  Patient: Charan Blanton  : 1940  ACCT #: [de-identified]  Referring Practitioner: Cyn TONG  Diagnosis: Closed head injury, right hip pain, fall  Treatment Diagnosis: impaired balance, impaired LE strength, impaired gait    Visit Information:  PT Visit Information  PT Insurance Information: Medicare  Total # of Visits Approved:  (follow [facility] guidelines)  Total # of Visits to Date: 2  No Show: 0  Canceled Appointment: 0  Progress Note Counter: -    Subjective: Patient reports compliance with HEP. Denies recent LOB/falls. C/o mild right hip weakness. HEP Compliance:  [x] Good [] Fair [] Poor [] Reports not doing due to:    Vital Signs  Patient Currently in Pain: No   Pain Screening  Patient Currently in Pain: No    OBJECTIVE:   Exercises  Exercise 1: SLR x 12  Exercise 2: hip adduction with ball 5s x 15  Exercise 3: S/L hip abduction x10 , clamshells with YTB  Exercise 4: hip extension*  Exercise 5: bridges 3\"x10  Exercise 6: sink exercises*  Exercise 7: Static balance: tandem, SLS*  Exercise 8: gait drills: F/L/R/march/ horizontal, vertical head turns  Exercise 9: dual task- gait ladder with ball toss to self  Exercise 10: 180 deg turns, 360 deg turns*  Exercise 11: single steps over small kenyon x10 F/L  Exercise 12: Gait ladder: forward/ diagonal  Exercise 13: single steps forward with horizontal head turn x10  Exercise 20: HEP: SLR, hip adduction with ball  *Indicates exercise, modality, or manual techniques to be initiated when appropriate    Assessment: Body structures, Functions, Activity limitations: Decreased strength, Decreased balance, Decreased endurance  Assessment: Initiated exercises per POC with focus on bilateral hip strength and balance activities to decrease falls risk. Patient most challenged with head turns during stepping/ambulating.  Demonstrates several small LOB and path deviations- corrects with physical assist.  Treatment Diagnosis: impaired balance, impaired LE strength, impaired gait  Prognosis: Excellent       Goals:  Long term goals  Time Frame for Long term goals : 4 weeks  Long term goal 1: Patient will increase strength in bilateral hips to 4+/5 for improved functional tolerance. Long term goal 2: Patient will ambulate 150ft independently with improved symmetry and decrease sway. Long term goal 3: Hollins balance >/= 52/56 to demonstrate improved balance. Long term goal 4: DGI >/= 20/24 to demonstrate improved dynamic balance. Long term goal 5: Patient will be independent with HEP. Progress toward goals:continue towards all   POST-PAIN       Pain Rating (0-10 pain scale):   0/10   Location and pain description same as pre-treatment unless indicated. Action: [] NA   [] Perform HEP  [] Meds as prescribed  [] Modalities as prescribed   [] Call Physician     Frequency/Duration:  Plan  Times per week: 1-2  Plan weeks: 4  Current Treatment Recommendations: Strengthening, Balance Training, Functional Mobility Training, Gait Training, Neuromuscular Re-education, Manual Therapy - Soft Tissue Mobilization, Home Exercise Program, Patient/Caregiver Education & Training, Equipment Evaluation, Education, & procurement, Modalities     Pt to continue current HEP. See objective section for any therapeutic exercise changes, additions or modifications this date.   PT Individual Minutes  Time In: 1400  Time Out: 9025  Minutes: 53  Timed Code Treatment Minutes: 53 Minutes  Procedure Minutes:0   Timed Activity Minutes Units   Ther Ex 23 2   Neuro  30 2       Signature:  Electronically signed by Rik Neff PTA on 11/22/21 at 3:22 PM EST

## 2021-11-23 DIAGNOSIS — R73.09 ABNORMAL GLUCOSE: ICD-10-CM

## 2021-11-25 LAB — HBA1C MFR BLD: 5.7 % (ref 4.8–5.9)

## 2021-11-29 ENCOUNTER — HOSPITAL ENCOUNTER (OUTPATIENT)
Dept: PHYSICAL THERAPY | Age: 81
Setting detail: THERAPIES SERIES
Discharge: HOME | End: 2021-11-29
Payer: MEDICARE

## 2021-11-29 PROCEDURE — 97112 NEUROMUSCULAR REEDUCATION: CPT

## 2021-11-29 PROCEDURE — 97110 THERAPEUTIC EXERCISES: CPT

## 2021-11-29 NOTE — PROGRESS NOTES
[facility]   Outpatient Physical Therapy   Treatment Note  [] [address]  [] [facility]            of [address]  Date: 2021  Patient: Ferny Lantigua  : 1940  ACCT #: [de-identified]  Referring Practitioner: Sesar TONG  Diagnosis: Closed head injury, right hip pain, fall  Treatment Diagnosis: impaired balance, impaired LE strength, impaired gait     Visit Information:  PT Visit Information  PT Insurance Information: Medicare  Total # of Visits Approved:  (follow [facility] guidelines)  Total # of Visits to Date: 3  No Show: 0  Canceled Appointment: 0  Progress Note Counter: 3/4-    SUBJECTIVE:   Subjective  Subjective: Patient without new reports this date. HEP Compliance:  [x] Good [] Fair [] Poor [] Reports not doing due to:    PAIN   Location:    denies   Pain Rating (0-10 pain scale):     Pain Description:     Action:  [] Acceptable for treatment  []  Other:    OBJECTIVE:   Exercises  Exercise 1: SLR x 15  Exercise 2: hip adduction with ball 5s x 15  Exercise 3: S/L hip abduction x10 , clamshells with YTB x10  Exercise 4: hip extension*  Exercise 5: bridges 3\"x15  Exercise 6: sink [id number]  Exercise 7: Static balance: tandem, SLS*  Exercise 8: gait drills: F/L/R/march/ horizontal, vertical head turns  Exercise 9: 4 square stepping- forward, diagonals, turns  Exercise 10: 180 degree turns B x5 ea  Exercise 11: single steps over st cane with horizontal head turn x10  Exercise 12: Nustep level 4 6 minutes  Exercise 13: Air ex: static standing with horizontal/vertical head turns, step ups  Exercise 20: HEP: SLR, hip adduction with ball  HEP  Continue with current Home Exercise Program.  See Objective section for progression of HEP. Comments:       POST-PAIN    Pain Rating (0-10 pain scale):   Location and Pain Description same as pre-pain unless otherwise indicated.   Action: [] NA  [] Call Physician  [] Perform HEP  [] Meds as prescribed ASSESSMENT:     Conditions Requiring Skilled Therapeutic Intervention  Body structures, Functions, Activity limitations: Decreased strength, Decreased balance, Decreased endurance  Assessment: Continued exercise progressions with focus on dynamic balance activities to allow patient to ambulate on even surfaces without LOB. Patient continues to be most challenged with head turns during gait. Demonstrates several episodes of staggering- no LOB observed. Treatment Diagnosis: impaired balance, impaired LE strength, impaired gait  Prognosis: Excellent  Decision Making: Medium Complexity  History: OA, CVA, age  Exam: impaired balance, impaired LE strength, impaired gait  Clinical Presentation: evolving  REQUIRES PT FOLLOW UP: Yes  Discharge Recommendations: Continue to assess pending progress  Tolerance to treatment:  [] Good   [] Fair   [] Poor  [] Fatigued   [] Increased pain   Limited by:    Goals:  Patient goals : improve     Long term goals  Time Frame for Long term goals : 4 weeks  Long term goal 1: Patient will increase strength in bilateral hips to 4+/5 for improved functional tolerance. Long term goal 2: Patient will ambulate 150ft independently with improved symmetry and decrease sway. Long term goal 3: Hollins balance >/= 52/56 to demonstrate improved balance. Long term goal 4: DGI >/= 20/24 to demonstrate improved dynamic balance. Long term goal 5: Patient will be independent with HEP. Progress toward goals:continue towards all   Goals Met:    []  See updated POC   Comments:    PLAN:  [x] Continue POC to pt tolerance  [] Hold PT for ___ days.   See note to physician  [] Discharge PT    Signature:   Electronically signed by Laureano Soto PTA on 11/29/21 at 3:17 PM EST    PT Individual Minutes  Time In: 1400  Time Out: 1500  Minutes: 60  Timed Code Treatment Minutes: 60 Minutes    Activity Minutes Units   Ther Ex 25 2   Neuro Ed 35 2

## 2021-11-30 ENCOUNTER — APPOINTMENT (OUTPATIENT)
Dept: SPEECH THERAPY | Age: 81
End: 2021-11-30
Payer: MEDICARE

## 2021-12-06 ENCOUNTER — HOSPITAL ENCOUNTER (OUTPATIENT)
Dept: PHYSICAL THERAPY | Age: 81
Setting detail: THERAPIES SERIES
Discharge: HOME | End: 2021-12-06
Payer: MEDICARE

## 2021-12-06 PROCEDURE — 97112 NEUROMUSCULAR REEDUCATION: CPT

## 2021-12-06 PROCEDURE — 97110 THERAPEUTIC EXERCISES: CPT

## 2021-12-06 NOTE — PROGRESS NOTES
[facility]   Outpatient Physical Therapy   Treatment Note  [] [address]  [] [facility]            of [address]  Date: 2021  Patient: Anneliese Clemons  : 1940  ACCT #: [de-identified]  Referring Practitioner: Lila TONG  Diagnosis: Closed head injury, right hip pain, fall  Treatment Diagnosis: impaired balance, impaired LE strength, impaired gait     Visit Information:  PT Visit Information  PT Insurance Information: Medicare  Total # of Visits Approved:  (follow [facility] guidelines)  Total # of Visits to Date: 4  No Show: 0  Canceled Appointment: 0  Progress Note Counter: -    SUBJECTIVE:   Subjective  Subjective: Patient reports she tripped over her dog last yesterday. Chidi Apley backwards and landed on her back. Denies injury. HEP Compliance:  [x] Good [] Fair [] Poor [] Reports not doing due to:    PAIN   Location:     denies   Pain Rating (0-10 pain scale):     Pain Description:     Action:  [] Acceptable for treatment  []  Other:    OBJECTIVE:   Exercises  Exercise 1: SLR x 20  Exercise 2: hip adduction with ball 5s x 20  Exercise 3: S/L hip abduction x15 , [id number]  Exercise 5: bridges 3\"x15  Exercise 11: single steps over st cane with horizontal head turn x10  Exercise 20: HEP: ststic standing balance various MARINA with head turns, SLS with UE support    Strength: [] NT  Strength RLE  R Hip Flexion: 4+/5  R Hip Extension: 4-/5  R Hip ABduction: 4/5  R Knee Flexion: 4+/5, 4/5  R Knee Extension: 5/5  R Ankle Dorsiflexion: 5/5  Strength LLE  L Hip Flexion: 4/5  L Hip Extension: 4-/5  L Hip ABduction: 4/5  L Knee Flexion: 4/5  L Knee Extension: 5/5  L Ankle Dorsiflexion: 5/5      Outcomes Measures:  Hollins Balance Score: 49  Dynamic Gait Total Score: 16   HEP  Continue with current Home Exercise Program.  See Objective section for progression of HEP.       Comments:       POST-PAIN    Pain Rating (0-10 pain scale): denies   Location and Pain Description same as pre-pain unless otherwise indicated. Action: [] NA  [] Call Physician  [] Perform HEP  [] Meds as prescribed     ASSESSMENT:     Conditions Requiring Skilled Therapeutic Intervention  Body structures, Functions, Activity limitations: Decreased strength, Decreased balance, Decreased endurance  Assessment: Patient demonstrates progress towards balance goals. LUNA and DGI improved by 3 points since initial evaluation. Continues to have frequent LOB/path deviations with head turns during ambulation. Treatment Diagnosis: impaired balance, impaired LE strength, impaired gait  Prognosis: Excellent  Decision Making: Medium Complexity  History: OA, CVA, age  Exam: impaired balance, impaired LE strength, impaired gait  Clinical Presentation: evolving  REQUIRES PT FOLLOW UP: Yes  Discharge Recommendations: Continue to assess pending progress  Tolerance to treatment:  [] Good   [] Fair   [] Poor  [] Fatigued   [] Increased pain   Limited by:    Goals:  Patient goals : improve     Long term goals  Time Frame for Long term goals : 4 weeks  Long term goal 1: Patient will increase strength in bilateral hips to 4+/5 for improved functional tolerance. Long term goal 2: Patient will ambulate 150ft independently with improved symmetry and decrease sway. Long term goal 3: Luna balance >/= 52/56 to demonstrate improved balance. Long term goal 4: DGI >/= 20/24 to demonstrate improved dynamic balance. Long term goal 5: Patient will be independent with HEP. Progress toward goals: continue towards all   Goals Met:    []  See updated POC   Comments:    PLAN:  [x] Continue POC to pt tolerance  [] Hold PT for ___ days.   See note to physician  [] Discharge PT    Therapy Time   Individual   Time In 1300   Time Out 1357   Minutes 57       Signature:   Electronically signed by Ariel Mulligan PTA on 12/6/21 at 3:09 PM EST    Activity Minutes Units   Ther Ex 20 1   Neuro Ed 37 3

## 2021-12-06 NOTE — PROGRESS NOTES
[facility]   Radha Meyer. [address] [place], [place]  Phone:  [phone]   Fax:  [phone]    [] Certification  [x] Recertification [x]  Plan of Care  [] Progress Note   [] Discharge        To:  Crispin TONG  From:  Jennifer Christy, PT  Patient: Madison Millard     : 1940  Diagnosis: Closed head injury, right hip pain, fall     Date: 2021  Treatment Diagnosis: impaired balance, impaired LE strength, impaired gait    Plan of Care/Certification Expiration Date: 22  Progress Report Period from: 2021  to 2021    Total # of Visits to Date: 4   No Show: 0    Canceled Appointment: 0     OBJECTIVE:     Long Term Goals - Time Frame for Long term goals : 4 weeks  Goals Current/ Discharge status Met   Long term goal 1: Patient will increase strength in bilateral hips to 4+/5 for improved functional tolerance. Strength RLE  R Hip Flexion: 4+/5  R Hip Extension: 4-/5  R Hip ABduction: 4/5  R Knee Flexion: 4+/5, 4/5  R Knee Extension: 5/5  R Ankle Dorsiflexion: 5/5  Strength LLE  L Hip Flexion: 4/5  L Hip Extension: 4-/5  L Hip ABduction: 4/5  L Knee Flexion: 4/5  L Knee Extension: 5/5  L Ankle Dorsiflexion: 5/5   [] yes  [x] no   Long term goal 2: Patient will ambulate 150ft independently with improved symmetry and decrease sway. Patient ambulates >150 feet Independently  [x] yes  [] no   Long term goal 3: Luna balance >/= 52/56 to demonstrate improved balance. 49/56 [] yes  [x] no   Long term goal 4: DGI >/= 20/24 to demonstrate improved dynamic balance. 16/24 [] yes  [x] no   Long term goal 5: Patient will be independent with HEP. Independent with HEP, progressions ongoing  [] yes  [x] no     Body structures, Functions, Activity limitations: Decreased strength, Decreased balance, Decreased endurance  Assessment: Patient demonstrates progress towards balance goals. LUNA and DGI improved by 3 points since initial evaluation.  Continues to have frequent LOB/path deviations with head turns during ambulation. Patient would continue to benefit from PT to improve balance and strength to reduce fall risk. Prognosis: Excellent  Discharge Recommendations: Continue to assess pending progress  New Education Provided:  HEP progressions for balance     PLAN: [x] Evaluate and Treat  Frequency/Duration:        Precautions:             Patient Status:[] Continue/ Initate plan of Care    [] Discharge PT. Recommend pt continue with HEP. [x] Additional visits requested, Please re-certify for additional visits:        Signature: Electronically signed by Efrem Hackett PTA on 12/6/21 at 3:11 PM EST  Electronically signed by Sandrine Pena PT on 12/7/2021 at 3:48 PM        If you have any questions or concerns, please don't hesitate to call. Thank you for your referral.    I have reviewed this plan of care and certify a need for medically necessary rehabilitation services.     Physician Signature:__________________________________________________________  Date:  Please sign and return

## 2021-12-13 ENCOUNTER — HOSPITAL ENCOUNTER (OUTPATIENT)
Dept: PHYSICAL THERAPY | Age: 81
Setting detail: THERAPIES SERIES
Discharge: HOME | End: 2021-12-13
Payer: MEDICARE

## 2021-12-13 NOTE — PROGRESS NOTES
Therapy                            Cancellation/No-show Note      Date:  2021  Patient Name:  Jorge Valdez  :  1940   MRN:  [id number]  Referring Practitioner: Rubi TONG  Diagnosis: Closed head injury, right hip pain, fall    Visit Information:  PT Visit Information  PT Insurance Information: Medicare  Total # of Visits Approved:  (follow [facility] guidelines)  Total # of Visits to Date: 4  Plan of Care/Certification Expiration Date: 22  No Show: 0  Canceled Appointment: 1  Progress Note Counter: -8 cx 21    For today's appointment patient:  [x]  Cancelled  []  Rescheduled appointment  []  No-show   []  Called pt to remind of next appointment     Reason given by patient:  []  Patient ill  []  Conflicting appointment  []  No transportation    []  Conflict with work  [x]  No reason given; pt reports she will call back at the beginning of next year to schedule  []  Other:      [] Pt has future appointments scheduled, no follow up needed  [] Pt requests to be on hold.     Reason:   If > 2 weeks please discuss with therapist.  [] Therapist to call pt for follow up     Comments:       Signature: Electronically signed by Sandrine Pena PT on 21 at 1:32 PM EST

## 2021-12-17 ENCOUNTER — OFFICE VISIT (OUTPATIENT)
Dept: FAMILY MEDICINE CLINIC | Age: 81
End: 2021-12-17
Payer: MEDICARE

## 2021-12-17 VITALS
OXYGEN SATURATION: 99 % | RESPIRATION RATE: 16 BRPM | TEMPERATURE: 96.9 F | SYSTOLIC BLOOD PRESSURE: 130 MMHG | WEIGHT: 157 LBS | BODY MASS INDEX: 25.23 KG/M2 | HEIGHT: 66 IN | DIASTOLIC BLOOD PRESSURE: 80 MMHG | HEART RATE: 69 BPM

## 2021-12-17 DIAGNOSIS — H60.501 ACUTE OTITIS EXTERNA OF RIGHT EAR, UNSPECIFIED TYPE: ICD-10-CM

## 2021-12-17 DIAGNOSIS — H61.23 IMPACTED CERUMEN OF BOTH EARS: Primary | ICD-10-CM

## 2021-12-17 PROCEDURE — 1123F ACP DISCUSS/DSCN MKR DOCD: CPT | Performed by: PHYSICIAN ASSISTANT

## 2021-12-17 PROCEDURE — G8427 DOCREV CUR MEDS BY ELIG CLIN: HCPCS | Performed by: PHYSICIAN ASSISTANT

## 2021-12-17 PROCEDURE — 99213 OFFICE O/P EST LOW 20 MIN: CPT | Performed by: PHYSICIAN ASSISTANT

## 2021-12-17 PROCEDURE — 69210 REMOVE IMPACTED EAR WAX UNI: CPT | Performed by: PHYSICIAN ASSISTANT

## 2021-12-17 PROCEDURE — G8417 CALC BMI ABV UP PARAM F/U: HCPCS | Performed by: PHYSICIAN ASSISTANT

## 2021-12-17 PROCEDURE — 1036F TOBACCO NON-USER: CPT | Performed by: PHYSICIAN ASSISTANT

## 2021-12-17 PROCEDURE — 1090F PRES/ABSN URINE INCON ASSESS: CPT | Performed by: PHYSICIAN ASSISTANT

## 2021-12-17 PROCEDURE — G8482 FLU IMMUNIZE ORDER/ADMIN: HCPCS | Performed by: PHYSICIAN ASSISTANT

## 2021-12-17 PROCEDURE — 4130F TOPICAL PREP RX AOE: CPT | Performed by: PHYSICIAN ASSISTANT

## 2021-12-17 PROCEDURE — G8399 PT W/DXA RESULTS DOCUMENT: HCPCS | Performed by: PHYSICIAN ASSISTANT

## 2021-12-17 PROCEDURE — 4040F PNEUMOC VAC/ADMIN/RCVD: CPT | Performed by: PHYSICIAN ASSISTANT

## 2021-12-17 RX ORDER — NEOMYCIN SULFATE, POLYMYXIN B SULFATE AND HYDROCORTISONE 10; 3.5; 1 MG/ML; MG/ML; [USP'U]/ML
4 SUSPENSION/ DROPS AURICULAR (OTIC) 4 TIMES DAILY
Qty: 10 ML | Refills: 0 | Status: SHIPPED | OUTPATIENT
Start: 2021-12-17 | End: 2021-12-22

## 2021-12-17 ASSESSMENT — ENCOUNTER SYMPTOMS
DIARRHEA: 0
SHORTNESS OF BREATH: 0
CHEST TIGHTNESS: 0
VOMITING: 0
SORE THROAT: 0
COUGH: 0
NAUSEA: 0
ABDOMINAL PAIN: 0
BACK PAIN: 0
SINUS PAIN: 0
RHINORRHEA: 0
SINUS PRESSURE: 0

## 2021-12-17 ASSESSMENT — VISUAL ACUITY: OU: 1

## 2021-12-17 NOTE — PROGRESS NOTES
[address] Encounter  CHIEF COMPLAINT       Chief Complaint   Patient presents with    Ear Fullness     possible wax in both ears, pt is having a hard time hearing        HISTORY OF PRESENT ILLNESS   Wojciech Cottrell is a 78 y.o. female who presents with:  Ear Fullness   There is pain in both ears. This is a recurrent problem. The current episode started in the past 7 days. The problem occurs constantly. The problem has been unchanged. There has been no fever. The patient is experiencing no pain. Associated symptoms include hearing loss. Pertinent negatives include no abdominal pain, coughing, diarrhea, ear discharge, headaches, neck pain, rash, rhinorrhea, sore throat or vomiting. She has tried nothing for the symptoms. Her past medical history is significant for hearing loss. There is no history of a chronic ear infection or a tympanostomy tube. REVIEW OF SYSTEMS     Review of Systems   Constitutional: Negative for activity change, appetite change, chills and fever. HENT: Positive for hearing loss. Negative for congestion, drooling, ear discharge, ear pain, rhinorrhea, sinus pressure, sinus pain and sore throat. Eyes: Negative for visual disturbance. Respiratory: Negative for cough, chest tightness and shortness of breath. Cardiovascular: Negative for chest pain. Gastrointestinal: Negative for abdominal pain, diarrhea, nausea and vomiting. Endocrine: Negative for cold intolerance. Genitourinary: Negative for dysuria, flank pain, frequency and hematuria. Musculoskeletal: Negative for arthralgias, back pain and neck pain. Skin: Negative for rash. Allergic/Immunologic: Negative for food allergies. Neurological: Negative for weakness, light-headedness, numbness and headaches. Hematological: Does not bruise/bleed easily.      PAST MEDICAL HISTORY         Diagnosis Date    Asthma     Asthma in remission 1990    Breast cancer ([facility] [address] 2014    right breast    Elevated TSH     History of artificial lens replacement 3/24/2015    History of breast cancer 02/2014    Invasive ductal cancer right breast, Dr Arminda Moser, Dr Dee Edwards History of therapeutic radiation     HTN (hypertension) 1980    was with [facility]    Hx of ischemic left MCA stroke 2013    no residual, Dr Zach Paula Hx of ischemic right MCA stroke 12/2014    no residual, frontal lobe    Hyperlipidemia     Osteoarthritis     Secondary and unspecified malignant neoplasm of axilla and upper limb lymph nodes ([address] 11/8/2018    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 CURRENT MEDICATIONS       Previous Medications    AMLODIPINE-BENAZEPRIL (LOTREL) 5-20 MG PER CAPSULE    Take 1 capsule by mouth daily TAKE 1 CAPSULE DAILY    ASPIRIN-DIPYRIDAMOLE (AGGRENOX)  MG PER EXTENDED RELEASE CAPSULE    TAKE 1 CAPSULE TWICE DAILY    CEPHALEXIN 250 MG TABS    Take 1 tablet by mouth daily    CINNAMON PO    Take 1 capsule by mouth daily     CYANOCOBALAMIN (VITAMIN B 12 PO)    Take by mouth    EZETIMIBE (ZETIA) 10 MG TABLET    TAKE 1 TABLET DAILY    FISH OIL-OMEGA-3 FATTY ACIDS 1000 MG CAPSULE    Take 1 g by mouth 3 times daily. MAGNESIUM 500 MG CAPS    Take 1 capsule by mouth daily. MECLIZINE (ANTIVERT) 12.5 MG TABLET    Take 1 tablet by mouth daily    NIACIN (NIASPAN) 500 MG EXTENDED RELEASE TABLET    TAKE 1 TABLET 3 TIMES A DAY    POLYVINYL ALCOHOL-POVIDONE (REFRESH OP)    Apply 1 drop to eye 4 times daily as needed. TRIAMTERENE-HYDROCHLOROTHIAZIDE (MAXZIDE-25) 37.5-25 MG PER TABLET    TAKE 1 TABLET DAILY    VITAMIN D (CHOLECALCIFEROL) 1000 UNITS CAPS CAPSULE    Take 1,000 Units by mouth daily.        ALLERGIES     Patient is is allergic to sulfa antibiotics, iodinated diagnostic agents, and statins. FAMILY HISTORY     Patient'sfamily history includes Breast Cancer in her paternal cousin; Cancer in her brother; Diabetes in her father; Heart Attack in her paternal grandmother; Heart Failure in her father; Hypertension in her mother; Kidney Disease in her mother; Migraines in her mother; Stroke in her paternal grandfather. SOCIAL HISTORY     Patient  reports that she has quit smoking. Her smoking use included cigarettes. She has a 3.50 pack-year smoking history. She has never used smokeless tobacco. She reports that she does not drink alcohol and does not use drugs. PHYSICAL EXAM     VITALS  BP: 130/80, Temp: 96.9 °F (36.1 °C), Pulse: 69, Resp: 16, SpO2: 99 %  Physical Exam  Vitals and nursing note reviewed. Constitutional:       General: She is awake. She is not in acute distress. Appearance: Normal appearance. She is well-developed. She is not ill-appearing, toxic-appearing or diaphoretic. HENT:      Head: Normocephalic and atraumatic. Right Ear: Hearing, tympanic membrane and external ear normal. Drainage and swelling present. Tympanic membrane is not bulging. Left Ear: Hearing, tympanic membrane and external ear normal. No drainage, swelling or tenderness. Tympanic membrane is not bulging. Nose: Nose normal.   Eyes:      General: Lids are normal. Vision grossly intact. Gaze aligned appropriately. Conjunctiva/sclera: Conjunctivae normal.   Cardiovascular:      Rate and Rhythm: Normal rate and regular rhythm. Pulses: Normal pulses. Heart sounds: Normal heart sounds, S1 normal and S2 normal.   Pulmonary:      Effort: Pulmonary effort is normal.      Breath sounds: Normal breath sounds and air entry. Musculoskeletal:      Cervical back: Normal range of motion. Skin:     General: Skin is warm. Capillary Refill: Capillary refill takes less than 2 seconds.    Neurological:      Mental Status: She

## 2021-12-24 DIAGNOSIS — I10 PRIMARY HYPERTENSION: ICD-10-CM

## 2021-12-28 NOTE — TELEPHONE ENCOUNTER
pharmacy requesting medication refill.  Please approve or deny this request.    Rx requested:  Requested Prescriptions     Pending Prescriptions Disp Refills    triamterene-hydroCHLOROthiazide (NNOYNUH-76) 37.5-25 MG per tablet [Pharmacy Med Name: TRIAMT/HCTZ  TAB 37.5-25] 90 tablet 3     Sig: TAKE 1 TABLET DAILY         Last Office Visit:   10/22/2021      Next Visit Date:  Future Appointments   Date Time Provider Jose Manzo   2/9/2022 10:00 AM Clark Watt MD [facility]   2/9/2022 10:15 AM Clark Watt MD [facility]   6/7/2022  1:45 PM Neel Mayo MD [address] [place] [address]

## 2021-12-29 RX ORDER — TRIAMTERENE AND HYDROCHLOROTHIAZIDE 37.5; 25 MG/1; MG/1
TABLET ORAL
Qty: 90 TABLET | Refills: 3 | Status: SHIPPED | OUTPATIENT
Start: 2021-12-29 | End: 2022-07-11 | Stop reason: ALTCHOICE

## 2021-12-30 ENCOUNTER — CLINICAL DOCUMENTATION (OUTPATIENT)
Dept: PHYSICAL THERAPY | Age: 81
End: 2021-12-30

## 2021-12-30 DIAGNOSIS — I10 PRIMARY HYPERTENSION: ICD-10-CM

## 2021-12-30 RX ORDER — TRIAMTERENE AND HYDROCHLOROTHIAZIDE 37.5; 25 MG/1; MG/1
TABLET ORAL
Qty: 90 TABLET | Refills: 3 | OUTPATIENT
Start: 2021-12-30

## 2021-12-30 NOTE — TELEPHONE ENCOUNTER
Pharmacy requesting medication refill.  Please approve or deny this request.    Rx requested:  Requested Prescriptions     Pending Prescriptions Disp Refills    triamterene-hydroCHLOROthiazide (MAXZIDE-25) 37.5-25 MG per tablet 90 tablet 3         Last Office Visit:   10/22/2021      Next Visit Date:  Future Appointments   Date Time Provider Jose Manzo   2/9/2022 10:00 AM Alvin Mantilla MD [facility]   2/9/2022 10:15 AM Alvin Mantilla MD [facility]   6/7/2022  1:45 PM Barbara Perales MD [address] [place] [address]

## 2021-12-30 NOTE — PROGRESS NOTES
[facility]    []  [address]  [x]  Lucia Meyer [address]     [address]  Ph: [phone]     Ph: [phone]  Fax: [phone]     Fax: [phone]      Date: 2021  Patient Name: Kory Barnett  : 1940  MRN: [id number]    Pt not scheduled with any further appointments. Attempted to contact pt:  [x]  Left message for pt to call back. []  Called patient, but unable to leave message. Will hold chart open for 30 days from last appointment. Will D/C if no response.      Electronically signed by Bijan Storey PTA on 2021 at 2:01 PM

## 2022-01-24 ENCOUNTER — CLINICAL DOCUMENTATION (OUTPATIENT)
Dept: PHYSICAL THERAPY | Age: 82
End: 2022-01-24

## 2022-01-24 NOTE — DISCHARGE SUMMARY
[address] [facility]   Radha Meyer. [address] [place], [place]  Phone:  [phone]   Fax:  [phone]    [] Certification  [] Recertification []  Plan of Care  [] Progress Note   [x] Discharge        To:  Shayna TONG  From:  Ander Mckenna, PT  Patient: Claudia Rodas     : 1940  Diagnosis: Closed head injury, right hip pain, fall     Date: 2022  Treatment Diagnosis: impaired balance, impaired LE strength, impaired gait    Plan of Care/Certification Expiration Date: 22  Progress Report Period from: 21  to 2022    Total # of Visits to Date: 4   No Show: 0    Canceled Appointment: 1     OBJECTIVE:   Short Term Goals=Long term goals    Long Term Goals - Time Frame for Long term goals : 4 weeks  Goals Current/ Discharge status Met   Long term goal 1: Patient will increase strength in bilateral hips to 4+/5 for improved functional tolerance. NT due to unexpected D/C [] yes  [] no   Long term goal 2: Patient will ambulate 150ft independently with improved symmetry and decrease sway. NT due to unexpected D/C [] yes  [] no   Long term goal 3: Hollins balance >/= 52/56 to demonstrate improved balance. NT due to unexpected D/C [] yes  [] no   Long term goal 4: DGI >/= 20/24 to demonstrate improved dynamic balance. NT due to unexpected D/C [] yes  [] no   Long term goal 5: Patient will be independent with HEP. NT due to unexpected D/C [] yes  [] no     Assessment: Patient has not been seen since 21 and has not responded to attempts to contact. At this time, patient will be discharged from PT due to lapse in care. New Education Provided:  continue with HEP    PLAN:  D/C from PT  Precautions:             Patient Status:[] Continue/ Initate plan of Care    [x] Discharge PT. Recommend pt continue with HEP.      [] Additional visits requested, Please re-certify for additional visits:        Signature: Electronically signed by Ander Mckenna PT on 22 at 8:20 AM EST      If you have any questions or concerns, please don't hesitate to call. Thank you for your referral.    I have reviewed this plan of care and certify a need for medically necessary rehabilitation services.     Physician Signature:__________________________________________________________  Date:  Please sign and return

## 2022-02-09 ENCOUNTER — OFFICE VISIT (OUTPATIENT)
Dept: FAMILY MEDICINE CLINIC | Age: 82
End: 2022-02-09
Payer: MEDICARE

## 2022-02-09 VITALS
WEIGHT: 158 LBS | HEART RATE: 88 BPM | BODY MASS INDEX: 25.39 KG/M2 | HEIGHT: 66 IN | DIASTOLIC BLOOD PRESSURE: 80 MMHG | OXYGEN SATURATION: 99 % | SYSTOLIC BLOOD PRESSURE: 132 MMHG | TEMPERATURE: 96.2 F | RESPIRATION RATE: 17 BRPM

## 2022-02-09 DIAGNOSIS — R73.09 ABNORMAL GLUCOSE: ICD-10-CM

## 2022-02-09 DIAGNOSIS — F43.21 GRIEF REACTION: ICD-10-CM

## 2022-02-09 DIAGNOSIS — S06.0X0D CLOSED HEAD INJURY WITH CONCUSSION, WITHOUT LOSS OF CONSCIOUSNESS, SUBSEQUENT ENCOUNTER: ICD-10-CM

## 2022-02-09 DIAGNOSIS — E78.5 HYPERLIPIDEMIA, UNSPECIFIED HYPERLIPIDEMIA TYPE: ICD-10-CM

## 2022-02-09 DIAGNOSIS — I67.9 CEREBROVASCULAR DISEASE: ICD-10-CM

## 2022-02-09 DIAGNOSIS — I10 PRIMARY HYPERTENSION: Primary | ICD-10-CM

## 2022-02-09 PROCEDURE — 1090F PRES/ABSN URINE INCON ASSESS: CPT | Performed by: FAMILY MEDICINE

## 2022-02-09 PROCEDURE — 1123F ACP DISCUSS/DSCN MKR DOCD: CPT | Performed by: FAMILY MEDICINE

## 2022-02-09 PROCEDURE — G8427 DOCREV CUR MEDS BY ELIG CLIN: HCPCS | Performed by: FAMILY MEDICINE

## 2022-02-09 PROCEDURE — 4040F PNEUMOC VAC/ADMIN/RCVD: CPT | Performed by: FAMILY MEDICINE

## 2022-02-09 PROCEDURE — G8482 FLU IMMUNIZE ORDER/ADMIN: HCPCS | Performed by: FAMILY MEDICINE

## 2022-02-09 PROCEDURE — G8417 CALC BMI ABV UP PARAM F/U: HCPCS | Performed by: FAMILY MEDICINE

## 2022-02-09 PROCEDURE — G8399 PT W/DXA RESULTS DOCUMENT: HCPCS | Performed by: FAMILY MEDICINE

## 2022-02-09 PROCEDURE — 1036F TOBACCO NON-USER: CPT | Performed by: FAMILY MEDICINE

## 2022-02-09 PROCEDURE — 99214 OFFICE O/P EST MOD 30 MIN: CPT | Performed by: FAMILY MEDICINE

## 2022-02-09 SDOH — ECONOMIC STABILITY: TRANSPORTATION INSECURITY
IN THE PAST 12 MONTHS, HAS LACK OF TRANSPORTATION KEPT YOU FROM MEETINGS, WORK, OR FROM GETTING THINGS NEEDED FOR DAILY LIVING?: NO

## 2022-02-09 SDOH — ECONOMIC STABILITY: FOOD INSECURITY: WITHIN THE PAST 12 MONTHS, YOU WORRIED THAT YOUR FOOD WOULD RUN OUT BEFORE YOU GOT MONEY TO BUY MORE.: NEVER TRUE

## 2022-02-09 SDOH — ECONOMIC STABILITY: FOOD INSECURITY: WITHIN THE PAST 12 MONTHS, THE FOOD YOU BOUGHT JUST DIDN'T LAST AND YOU DIDN'T HAVE MONEY TO GET MORE.: NEVER TRUE

## 2022-02-09 ASSESSMENT — PATIENT HEALTH QUESTIONNAIRE - PHQ9
1. LITTLE INTEREST OR PLEASURE IN DOING THINGS: 0
SUM OF ALL RESPONSES TO PHQ QUESTIONS 1-9: 0
SUM OF ALL RESPONSES TO PHQ9 QUESTIONS 1 & 2: 0
2. FEELING DOWN, DEPRESSED OR HOPELESS: 0

## 2022-02-09 ASSESSMENT — SOCIAL DETERMINANTS OF HEALTH (SDOH): HOW HARD IS IT FOR YOU TO PAY FOR THE VERY BASICS LIKE FOOD, HOUSING, MEDICAL CARE, AND HEATING?: NOT HARD AT ALL

## 2022-02-09 NOTE — PROGRESS NOTES
Subjective  González Browning, 78 y.o. female presents today with:  Chief Complaint   Patient presents with    Follow-up    Hypertension           HPI    2021: Unwitnessed FFS. Amnestic to event. Imaging significant for large right hip hematoma. Admitted to [facility] under Trauma Surgery to observe hematoma and for PT/OT. 10/2021: H/H with slight downtrend over 18hrs. Do not suspect active bleeding. Right lateral thigh hematoma is soft/compressible. Hematoma stable. Performed well with PT/OT. Medically and functionally cleared for home without HHC.     At time of discharge, Fern was tolerating a regular diet, having bowel movements, and had adequate analgesia on oral pain medications.  Pt's activity level was OOBAT. The patient had no signs or symptoms of complications. Patient was determined stable for discharge to: Home     Now, has no headache, vision change, confusion, hypersomnolence, chest pain, SOB, palpitations, nausea, vomiting, diarrhea, appetite change. Has not checked BP lately but is taking meds as prescribed.       2022: Grief: Her daughter  of COVID last week. She had just retired. Yvon Rivera did get COVID but it was mild. Her son lives with her. Daughter lives in [place]. Has to cancel P[employer]. Lost  2020. Hu[place]iaoer is very supportive. Hypertension. Triamterene with HCTZ, amlodipine, benazepril. No chest pain, shortness of breath, palpitations, edema. CVD/closed head injury. .  Aggrenox. No new deficits. No facial droop. No speech impairment. No unilateral weakness. Hyperlipidemia. Niacin, Zetia. Abnormal glucose. Following TOPS diet. Hemoglobin A1c on 2021 5.7. No other questions and or concerns for today's visit    Review of Systems see above.       Past Medical History:   Diagnosis Date    Asthma     Asthma in remission     Breast cancer ([facility] [address] 2014    right breast    Elevated TSH     History of artificial lens replacement 3/24/2015    History of breast cancer 02/2014    Invasive ductal cancer right breast, Dr Tamra Siegel, Dr Lia López History of therapeutic radiation     HTN (hypertension) 1980    was with [facility]    Hx of ischemic left MCA stroke 2013    no residual, Dr Jorge Harris Hx of ischemic right MCA stroke 12/2014    no residual, frontal lobe    Hyperlipidemia     Osteoarthritis     Secondary and unspecified malignant neoplasm of axilla and upper limb lymph nodes ([facility] [address] 11/8/2018    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    Dr Fransisco Haines (ortho)    Vitamin D deficiency      Past Surgical History:   Procedure Laterality Date    BLADDER SUSPENSION      BREAST BIOPSY Right 2/14/14    BREAST LUMPECTOMY Right 3/4/2014    Lumpectomy with SLNB, Dr Luis Eduardo [address] in [address] [place] [address] History     Socioeconomic History    Marital status:       Spouse name: Not on file    Number of children: 3    Years of education: Not on file    Highest education level: Not on file   Occupational History    Occupation: , retired   Tobacco Use    Smoking status: Former Smoker     Packs/day: 0.25     Years: 14.00     Pack years: 3.50     Types: Cigarettes    Smokeless tobacco: Never Used    Tobacco comment: started at age 11yo and quit at age 31yo   Substance and Sexual Activity    Alcohol use: No    Drug use: No    Sexual activity: Not on file   Other Topics Concern    Not on file   Social History Narrative    Born in [place], 2 younger brothers, one dec 2014, one in [place]     July 2020    , retired     Lives in a house in [facility]     of first spouse    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, owned a convenient store    [employer], travel    4 children, 2 daughters in [place] ([place]), 2 sons in [address] [place] [place] [address] great grandchildren >50        Son lives with her temporarily (01/08/2021) Social Determinants of Health     Financial Resource Strain: Low Risk     Difficulty of Paying Living Expenses: Not hard at all   Food Insecurity: No Food Insecurity    Worried About Running Out of Food in the Last Year: Never true    Eryn of Food in the Last Year: Never true   Transportation Needs: No Transportation Needs    Lack of Transportation (Medical): No    Lack of Transportation (Non-Medical):  No   Physical Activity:     Days of Exercise per Week: Not on file    Minutes of Exercise per Session: Not on file   Stress:     Feeling of Stress : Not on file   Social Connections:     Frequency of Communication with Friends and Family: Not on file    Frequency of Social Gatherings with Friends and Family: Not on file    Attends Jehovah's witness Services: Not on file    Active Member of Clubs or Organizations: Not on file    Attends Club or Organization Meetings: Not on file    Marital Status: Not on file   Intimate Partner Violence:     Fear of Current or Ex-Partner: Not on file    Emotionally Abused: Not on file    Physically Abused: Not on file    Sexually Abused: Not on file   Housing Stability:     Unable to Pay for Housing in the Last Year: Not on file    Number of [place] in the Last Year: Not on file    Unstable Housing in the Last Year: Not on file     Family History   Problem Relation Age of Onset    Kidney Disease Mother         dec age 79    Hypertension Mother     Migraines Mother     Diabetes Father     Heart Failure Father         dec age 76    Cancer Brother         dec age 76    Heart Attack Paternal Grandmother     Stroke Paternal Grandfather     Breast Cancer Paternal Cousin      Allergies   Allergen Reactions    Sulfa Antibiotics      Unsure of reaction     Iodinated Diagnostic Agents      Oral & IV dye group    Statins Other (See Comments)     Current Outpatient Medications   Medication Sig Dispense Refill    triamterene-hydroCHLOROthiazide (MAXZIDE-25) 37.5-25 MG per tablet TAKE 1 TABLET DAILY 90 tablet 3    meclizine (ANTIVERT) 12.5 MG tablet Take 1 tablet by mouth daily 90 tablet 4    niacin (NIASPAN) 500 MG extended release tablet TAKE 1 TABLET 3 TIMES A  tablet 4    ezetimibe (ZETIA) 10 MG tablet TAKE 1 TABLET DAILY 90 tablet 4    Cephalexin 250 MG TABS Take 1 tablet by mouth daily 90 tablet 5    aspirin-dipyridamole (AGGRENOX)  MG per extended release capsule TAKE 1 CAPSULE TWICE DAILY 180 capsule 3    amLODIPine-benazepril (LOTREL) 5-20 MG per capsule Take 1 capsule by mouth daily TAKE 1 CAPSULE DAILY 90 capsule 4    Cyanocobalamin (VITAMIN B 12 PO) Take by mouth      CINNAMON PO Take 1 capsule by mouth daily       Polyvinyl Alcohol-Povidone (REFRESH OP) Apply 1 drop to eye 4 times daily as needed.  Vitamin D (CHOLECALCIFEROL) 1000 UNITS CAPS capsule Take 1,000 Units by mouth daily.  Magnesium 500 MG CAPS Take 1 capsule by mouth daily.  fish oil-omega-3 fatty acids 1000 MG capsule Take 1 g by mouth 3 times daily. No current facility-administered medications for this visit. PMH, Surgical Hx, Family Hx, and Social Hxreviewed and updated. Health Maintenance reviewed. Objective    Vitals:    02/09/22 1014   BP: 132/80   Pulse: 88   Resp: 17   Temp: 96.2 °F (35.7 °C)   TempSrc: Temporal   SpO2: 99%   Weight: 158 lb (71.7 kg)   Height: 5' 5.5\" (1.664 m)        Physical Exam      Lab Results   Component Value Date    LABA1C 5.7 11/23/2021     Lab Results   Component Value Date    LABMICR <1.20 06/10/2019    CREATININE 0.65 10/01/2021     Lab Results   Component Value Date    ALT 17 09/30/2021    AST 22 09/30/2021     Lab Results   Component Value Date    CHOL 199 06/12/2018    TRIG 106 06/12/2018    HDL 61 (H) 12/09/2019    LDLCALC 118 12/09/2019        Assessment & Plan   Visit Diagnoses and Associated Orders     Primary hypertension    -  Primary    Stable and well-controlled on current meds.          Closed head injury with concussion, without loss of consciousness, subsequent encounter        No recent falls. No apparent sequelae. Cerebrovascular disease        Stable and well-controlled with Aggrenox , niacin, Zetia         Abnormal glucose        Normal A1c. Reviewed healthy diet with which patient is very familiar. Hyperlipidemia, unspecified hyperlipidemia type        Adequately controlled with current meds. Grief reaction        Related to recent death of daughter. She has access to grief counseling resources through [facility]. Reviewed with the patient: all disease processes, current clinical status, medications, activities and diet.      Side effects, adverse effects of the medication prescribed today, as well as treatment plan/ rationale and result expectations have been discussed with the patient who expresses understanding and desires to proceed.     Close follow up to evaluate treatment results and for coordination of care. I have reviewed the patient's medical history in detail and updated the computerized patient record. More than 50% of the appointment was spent in face-to-face counseling, education and care coordination. Please note this report has been partially produced using speech recognition software and may contain mistakes related to that system including errors in grammar, punctuation and spelling as well as words and phrases that may seem inappropriate. If there are questions or concerns, please feel free to contact me to clarify. No orders of the defined types were placed in this encounter. No orders of the defined types were placed in this encounter. There are no discontinued medications. Return in about 3 months (around 5/9/2022) for chronic care and in 2 weeks, phone call for grief reaction in 2 weeks.         Controlled Substance Monitoring:    Acute and Chronic Pain Monitoring:   No flowsheet data found.        Lidia Chou MD

## 2022-02-21 RX ORDER — AMLODIPINE BESYLATE AND BENAZEPRIL HYDROCHLORIDE 5; 20 MG/1; MG/1
1 CAPSULE ORAL DAILY
Qty: 90 CAPSULE | Refills: 4 | Status: SHIPPED | OUTPATIENT
Start: 2022-02-21

## 2022-02-23 ENCOUNTER — TELEMEDICINE (OUTPATIENT)
Dept: FAMILY MEDICINE CLINIC | Age: 82
End: 2022-02-23
Payer: MEDICARE

## 2022-02-23 DIAGNOSIS — F43.21 GRIEF REACTION: Primary | ICD-10-CM

## 2022-02-23 PROCEDURE — G8399 PT W/DXA RESULTS DOCUMENT: HCPCS | Performed by: FAMILY MEDICINE

## 2022-02-23 PROCEDURE — G8427 DOCREV CUR MEDS BY ELIG CLIN: HCPCS | Performed by: FAMILY MEDICINE

## 2022-02-23 PROCEDURE — 4040F PNEUMOC VAC/ADMIN/RCVD: CPT | Performed by: FAMILY MEDICINE

## 2022-02-23 PROCEDURE — 1090F PRES/ABSN URINE INCON ASSESS: CPT | Performed by: FAMILY MEDICINE

## 2022-02-23 PROCEDURE — 99213 OFFICE O/P EST LOW 20 MIN: CPT | Performed by: FAMILY MEDICINE

## 2022-02-23 PROCEDURE — 1123F ACP DISCUSS/DSCN MKR DOCD: CPT | Performed by: FAMILY MEDICINE

## 2022-02-23 NOTE — PROGRESS NOTES
Adron Ormond is a 78 y.o. female evaluated via telephone on 2022. Consent:  She and/or health care decision maker is aware that that she may receive a bill for this telephone service, depending on her insurance coverage, and has provided verbal consent to proceed: Yes      I affirm this is a Patient Initiated Episode with an Established Patient who has not had a related appointment within my department in the past 7 days or scheduled within the next 24 hours. Total Time: minutes: 5-10 minutes    Note: not billable if this call serves to triage the patient into an appointment for the relevant concern      Brain MD Dany     2022    TELEHEALTH EVALUATION -- Audio (During YGZKP-64 public health emergency)    Chief Complaint   Patient presents with    Follow-up       HPI     Adron Ormond (:  1940) has requested an audio evaluation for the following concern(s):    Follow-up    HEre for f/u. \"All is quiet. Things are settling down. \" Son-in-law getting better. Having difficulty over deaths of daughter (sud[facility] from [facility])  and  (expected). Is considering joining h    Review of Systems    Prior to Visit Medications    Medication Sig Taking?  Authorizing Provider   amLODIPine-benazepril (LOTREL) 5-20 MG per capsule Take 1 capsule by mouth daily TAKE 1 CAPSULE DAILY Yes Marily Wan MD   triamterene-hydroCHLOROthiazide (MAXZIDE-25) 37.5-25 MG per tablet TAKE 1 TABLET DAILY Yes Marily Wan MD   meclizine (ANTIVERT) 12.5 MG tablet Take 1 tablet by mouth daily Yes Marily Wan MD   niacin (NIASPAN) 500 MG extended release tablet TAKE 1 TABLET 3 TIMES A DAY Yes Marily Wan MD   ezetimibe (ZETIA) 10 MG tablet TAKE 1 TABLET DAILY Yes Marily Wan MD   Cephalexin 250 MG TABS Take 1 tablet by mouth daily Yes Marily Wan MD   aspirin-dipyridamole (AGGRENOX)  MG per extended release capsule TAKE 1 CAPSULE TWICE DAILY Yes Miky Kemp MD   Cyanocobalamin (VITAMIN B 12 PO) Take by mouth Yes Historical Provider, MD   CINNAMON PO Take 1 capsule by mouth daily  Yes Historical Provider, MD   Polyvinyl Alcohol-Povidone (REFRESH OP) Apply 1 drop to eye 4 times daily as needed. Yes Historical Provider, MD   Vitamin D (CHOLECALCIFEROL) 1000 UNITS CAPS capsule Take 1,000 Units by mouth daily. Yes Historical Provider, MD   Magnesium 500 MG CAPS Take 1 capsule by mouth daily. Yes Historical Provider, MD   fish oil-omega-3 fatty acids 1000 MG capsule Take 1 g by mouth 3 times daily. Yes Historical Provider, MD           PHYSICAL EXAMINATION:    Patient-Reported Vitals 2/23/2022   Patient-Reported Weight 158 lb   Patient-Reported Height 5'5.5\"          Patient appears to be alert and oriented to person, place, time, situation and is in no acute distress. Respiratory effort appears normal. Mood appears mildly tearful and speech and thought are grossly normal.    Pertinent Labs:A  CBC: No results for input(s): WBC, RBC, HGB, HCT, MCV, MCH, MCHC, RDW, PLT, MPV in the last 72 hours. BMP: No results for input(s): NA, K, CL, CO2, BUN, CREATININE, GLUCOSE, CALCIUM in the last 72 hours. BNP:  No results found for: BNP  TSH:   Lab Results   Component Value Date    TSH 2.920 06/12/2018     Lipid Profile: No components found for: CHLPL  Lab Results   Component Value Date    TRIG 106 06/12/2018    TRIG 195 10/09/2017    TRIG 186 12/23/2015     Lab Results   Component Value Date    HDL 61 (H) 12/09/2019    HDL 49 06/12/2018    HDL 41 10/09/2017     Lab Results   Component Value Date    LDLCALC 118 12/09/2019    LDLCALC 129 06/12/2018    LDLCALC 133 (H) 10/09/2017     No results found for: LABVLDL  Hemoglobin A1C:   Lab Results   Component Value Date    LABA1C 5.7 11/23/2021           ASSESSMENT/PLAN:  Arvin Messina was seen today for follow-up.     Diagnoses and all orders for this visit:    Grief reaction  Comments:  Urged to call for hospice grief counseling by end of week. She will call if she needs additional support. Return if symptoms worsen or fail to improve. Robinson Shoemaker is a 78 y.o. female being evaluated by a Virtual Visit (telephonic visit) encounter to address concerns as mentioned above. A caregiver was present when appropriate. Due to this being a TeleHealth encounter (During HNOVA-11 public health emergency), evaluation of the following organ systems was limited: Vitals/Constitutional/EENT/Resp/CV/GI//MS/Neuro/Skin/Heme-Lymph-Imm. Pursuant to the emergency declaration under the [address] [place] [address], [address] [place] [address] authority and the [employer] Act, this Virtual Visit was conducted with patient's (and/or legal guardian's) consent, to reduce the patient's risk of exposure to COVID-19 and provide necessary medical care. The patient (and/or legal guardian) has also been advised to contact this office for worsening conditions or problems, and seek emergency medical treatment and/or call 911 if deemed necessary. Services were provided through a telephonic synchronous discussion virtually to substitute for in-person clinic visit. Patient and provider were located at their individual homes. --Cali Echavarria MD on 2/23/2022 at 8:23 AM    An electronic signature was used to authenticate this note.

## 2022-05-10 ENCOUNTER — OFFICE VISIT (OUTPATIENT)
Dept: FAMILY MEDICINE CLINIC | Age: 82
End: 2022-05-10
Payer: MEDICARE

## 2022-05-10 VITALS
WEIGHT: 159 LBS | HEART RATE: 78 BPM | SYSTOLIC BLOOD PRESSURE: 114 MMHG | HEIGHT: 66 IN | TEMPERATURE: 96.1 F | BODY MASS INDEX: 25.55 KG/M2 | DIASTOLIC BLOOD PRESSURE: 68 MMHG

## 2022-05-10 DIAGNOSIS — I10 PRIMARY HYPERTENSION: ICD-10-CM

## 2022-05-10 DIAGNOSIS — Z00.00 MEDICARE ANNUAL WELLNESS VISIT, SUBSEQUENT: Primary | ICD-10-CM

## 2022-05-10 DIAGNOSIS — I67.9 CEREBROVASCULAR DISEASE: Chronic | ICD-10-CM

## 2022-05-10 DIAGNOSIS — R41.3 MEMORY LOSS: ICD-10-CM

## 2022-05-10 DIAGNOSIS — S06.0X0D CLOSED HEAD INJURY WITH CONCUSSION, WITHOUT LOSS OF CONSCIOUSNESS, SUBSEQUENT ENCOUNTER: ICD-10-CM

## 2022-05-10 DIAGNOSIS — G45.9 TIA (TRANSIENT ISCHEMIC ATTACK): Primary | ICD-10-CM

## 2022-05-10 DIAGNOSIS — E55.9 VITAMIN D DEFICIENCY: ICD-10-CM

## 2022-05-10 DIAGNOSIS — Z12.31 ENCOUNTER FOR SCREENING MAMMOGRAM FOR BREAST CANCER: ICD-10-CM

## 2022-05-10 DIAGNOSIS — F43.21 GRIEF REACTION: ICD-10-CM

## 2022-05-10 DIAGNOSIS — E78.5 HYPERLIPIDEMIA, UNSPECIFIED HYPERLIPIDEMIA TYPE: ICD-10-CM

## 2022-05-10 PROCEDURE — G8399 PT W/DXA RESULTS DOCUMENT: HCPCS | Performed by: FAMILY MEDICINE

## 2022-05-10 PROCEDURE — 1036F TOBACCO NON-USER: CPT | Performed by: FAMILY MEDICINE

## 2022-05-10 PROCEDURE — G8427 DOCREV CUR MEDS BY ELIG CLIN: HCPCS | Performed by: FAMILY MEDICINE

## 2022-05-10 PROCEDURE — G8417 CALC BMI ABV UP PARAM F/U: HCPCS | Performed by: FAMILY MEDICINE

## 2022-05-10 PROCEDURE — 1123F ACP DISCUSS/DSCN MKR DOCD: CPT | Performed by: FAMILY MEDICINE

## 2022-05-10 PROCEDURE — 99215 OFFICE O/P EST HI 40 MIN: CPT | Performed by: FAMILY MEDICINE

## 2022-05-10 PROCEDURE — 1090F PRES/ABSN URINE INCON ASSESS: CPT | Performed by: FAMILY MEDICINE

## 2022-05-10 PROCEDURE — 4040F PNEUMOC VAC/ADMIN/RCVD: CPT | Performed by: FAMILY MEDICINE

## 2022-05-10 PROCEDURE — G0439 PPPS, SUBSEQ VISIT: HCPCS | Performed by: FAMILY MEDICINE

## 2022-05-10 RX ORDER — NIACIN 500 MG/1
TABLET, EXTENDED RELEASE ORAL
Qty: 270 TABLET | Refills: 4 | Status: SHIPPED | OUTPATIENT
Start: 2022-05-10

## 2022-05-10 ASSESSMENT — PATIENT HEALTH QUESTIONNAIRE - PHQ9
1. LITTLE INTEREST OR PLEASURE IN DOING THINGS: 1
2. FEELING DOWN, DEPRESSED OR HOPELESS: 1
SUM OF ALL RESPONSES TO PHQ QUESTIONS 1-9: 2
SUM OF ALL RESPONSES TO PHQ QUESTIONS 1-9: 2
SUM OF ALL RESPONSES TO PHQ9 QUESTIONS 1 & 2: 2
SUM OF ALL RESPONSES TO PHQ QUESTIONS 1-9: 2
SUM OF ALL RESPONSES TO PHQ QUESTIONS 1-9: 2

## 2022-05-10 ASSESSMENT — LIFESTYLE VARIABLES: HOW OFTEN DO YOU HAVE A DRINK CONTAINING ALCOHOL: NEVER

## 2022-05-10 NOTE — PATIENT INSTRUCTIONS
Personalized Preventive Plan for Khadar Dunn - 5/10/2022  Medicare offers a range of preventive health benefits. Some of the tests and screenings are paid in full while other may be subject to a deductible, co-insurance, and/or copay. Some of these benefits include a comprehensive review of your medical history including lifestyle, illnesses that may run in your family, and various assessments and screenings as appropriate. After reviewing your medical record and screening and assessments performed today your provider may have ordered immunizations, labs, imaging, and/or referrals for you. A list of these orders (if applicable) as well as your Preventive Care list are included within your After Visit Summary for your review. Other Preventive Recommendations:    · A preventive eye exam performed by an eye specialist is recommended every 1-2 years to screen for glaucoma; cataracts, macular degeneration, and other eye disorders. · A preventive dental visit is recommended every 6 months. · Try to get at least 150 minutes of exercise per week or 10,000 steps per day on a pedometer . · Order or download the FREE \"Exercise & Physical Activity: Your Everyday Guide\" from [employer]. Call 5-955.802.9885 or search [employer] online. · You need 7473-6685 mg of calcium and 0759-8724 IU of vitamin D per day. It is possible to meet your calcium requirement with diet alone, but a vitamin D supplement is usually necessary to meet this goal.  · When exposed to the sun, use a sunscreen that protects against both UVA and UVB radiation with an SPF of 30 or greater. Reapply every 2 to 3 hours or after sweating, drying off with a towel, or swimming. · Always wear a seat belt when traveling in a car. Always wear a helmet when riding a bicycle or motorcycle. Heart-Healthy Diet   Sodium, Fat, and Cholesterol Controlled Diet       What Is a Heart Healthy Diet?    A heart-healthy diet is one that limits sodium , certain types of fat , and cholesterol . This type of diet is recommended for:   People with any form of cardiovascular disease (eg, coronary heart disease , peripheral vascular disease , previous heart attack , previous stroke )   People with risk factors for cardiovascular disease, such as high blood pressure , high cholesterol , or diabetes   Anyone who wants to lower their risk of developing cardiovascular disease   Sodium    Sodium is a mineral found in many foods. In general, most people consume much more sodium than they need. Diets high in sodium can increase blood pressure and lead to edema (water retention). On a heart-healthy diet, you should consume no more than 2,300 mg (milligrams) of sodium per dayabout the amount in one teaspoon of table salt. The foods highest in sodium include table salt (about 50% sodium), processed foods, convenience foods, and preserved foods. Cholesterol    Cholesterol is a fat-like, waxy substance in your blood. Our bodies make some cholesterol. It is also found in animal products, with the highest amounts in fatty meat, egg yolks, whole milk, cheese, shellfish, and organ meats. On a heart-healthy diet, you should limit your cholesterol intake to less than 200 mg per day. It is normal and important to have some cholesterol in your bloodstream. But too much cholesterol can cause plaque to build up within your arteries, which can eventually lead to a heart attack or stroke. The two types of cholesterol that are most commonly referred to are:   Low-density lipoprotein (LDL) cholesterol  Also known as bad cholesterol, this is the cholesterol that tends to build up along your arteries. Bad cholesterol levels are increased by eating fats that are saturated or hydrogenated. Optimal level of this cholesterol is less than 100. Over 130 starts to get risky for heart disease.    High-density lipoprotein (HDL) cholesterol  Also known as good cholesterol, this type of cholesterol actually carries cholesterol away from your arteries and may, therefore, help lower your risk of having a heart attack. You want this level to be high (ideally greater than 60). It is a risk to have a level less than 40. You can raise this good cholesterol by eating olive oil, canola oil, avocados, or nuts. Exercise raises this level, too. Fat    Fat is calorie dense and packs a lot of calories into a small amount of food. Even though fats should be limited due to their high calorie content, not all fats are bad. In fact, some fats are quite healthful. Fat can be broken down into four main types. The good-for-you fats are:   Monounsaturated fat  found in oils such as olive and canola, avocados, and nuts and natural nut butters; can decrease cholesterol levels, while keeping levels of HDL cholesterol high   Polyunsaturated fat  found in oils such as safflower, sunflower, soybean, corn, and sesame; can decrease total cholesterol and LDL cholesterol   Omega-3 fatty acids  particularly those found in fatty fish (such as salmon, trout, tuna, mackerel, herring, and sardines); can decrease risk of arrhythmias, decrease triglyceride levels, and slightly lower blood pressure   The fats that you want to limit are:   Saturated fat  found in animal products, many fast foods, and a few vegetables; increases total blood cholesterol, including LDL levels   Animal fats that are saturated include: butter, lard, whole-milk dairy products, meat fat, and poultry skin   Vegetable fats that are saturated include: hydrogenated shortening, palm oil, coconut oil, cocoa butter   Hydrogenated or trans fat  found in margarine and vegetable shortening, most shelf stable snack foods, and fried foods; increases LDL and decreases HDL     It is generally recommended that you limit your total fat for the day to less than 30% of your total calories.  If you follow an 1800-calorie heart healthy diet, for example, this would mean 60 grams of fat or less per day. Saturated fat and trans fat in your diet raises your blood cholesterol the most, much more than dietary cholesterol does. For this reason, on a heart-healthy diet, less than 7% of your calories should come from saturated fat and ideally 0% from trans fat. On an 1800-calorie diet, this translates into less than 14 grams of saturated fat per day, leaving 46 grams of fat to come from mono- and polyunsaturated fats.    Food Choices on a Heart Healthy Diet   Food Category   Foods Recommended   Foods to Avoid   Grains   Breads and rolls without salted tops Most dry and cooked cereals Unsalted crackers and breadsticks Low-sodium or homemade breadcrumbs or stuffing All rice and pastas   Breads, rolls, and crackers with salted tops High-fat baked goods (eg, muffins, donuts, pastries) Quick breads, self-rising flour, and biscuit mixes Regular bread crumbs Instant hot cereals Commercially prepared rice, pasta, or stuffing mixes   Vegetables   Most fresh, frozen, and low-sodium canned vegetables Low-sodium and salt-free vegetable juices Canned vegetables if unsalted or rinsed   Regular canned vegetables and juices, including sauerkraut and pickled vegetables Frozen vegetables with sauces Commercially prepared potato and vegetable mixes   Fruits   Most fresh, frozen, and canned fruits All fruit juices   Fruits processed with salt or sodium   Milk   Nonfat or low-fat (1%) milk Nonfat or low-fat yogurt Cottage cheese, low-fat ricotta, cheeses labeled as low-fat and low-sodium   Whole milk Reduced-fat (2%) milk Malted and chocolate milk Full fat yogurt Most cheeses (unless low-fat and low salt) Buttermilk (no more than 1 cup per week)   Meats and Beans   Lean cuts of fresh or frozen beef, veal, lamb, or pork (look for the word loin) Fresh or frozen poultry without the skin Fresh or frozen fish and some shellfish Egg whites and egg substitutes (Limit whole eggs to three per week) Tofu Nuts or seeds (unsalted, dry-roasted), low-sodium peanut butter Dried peas, beans, and lentils   Any smoked, cured, salted, or canned meat, fish, or poultry (including persaud, chipped beef, cold cuts, hot dogs, sausages, sardines, and anchovies) Poultry skins Breaded and/or fried fish or meats Canned peas, beans, and lentils Salted nuts   Fats and Oils   Olive oil and canola oil Low-sodium, low-fat salad dressings and mayonnaise   Butter, margarine, coconut and palm oils, persaud fat   Snacks, Sweets, and Condiments   Low-sodium or unsalted versions of broths, soups, soy sauce, and condiments Pepper, herbs, and spices; vinegar, lemon, or lime juice Low-fat frozen desserts (yogurt, sherbet, fruit bars) Sugar, cocoa powder, honey, syrup, jam, and preserves Low-fat, trans-fat free cookies, cakes, and pies Tavo and animal crackers, fig bars, halima snaps   High-fat desserts Broth, soups, gravies, and sauces, made from instant mixes or other high-sodium ingredients Salted snack foods Canned olives Meat tenderizers, seasoning salt, and most flavored vinegars   Beverages   Low-sodium carbonated beverages Tea and coffee in moderation Soy milk   Commercially softened water   Suggestions   Make whole grains, fruits, and vegetables the base of your diet. Choose heart-healthy fats such as canola, olive, and flaxseed oil, and foods high in heart-healthy fats, such as nuts, seeds, soybeans, tofu, and fish. Eat fish at least twice per week; the fish highest in omega-3 fatty acids and lowest in mercury include salmon, herring, mackerel, sardines, and canned chunk light tuna. If you eat fish less than twice per week or have high triglycerides, talk to your doctor about taking fish oil supplements. Read food labels.    For products low in fat and cholesterol, look for fat free, low-fat, cholesterol free, saturated fat free, and trans fat freeAlso scan the Nutrition Facts Label, which lists saturated fat, trans fat, and cholesterol amounts. For products low in sodium, look for sodium free, very low sodium, low sodium, no added salt, and unsalted   Skip the salt when cooking or at the table; if food needs more flavor, get creative and try out different herbs and spices. Garlic and onion also add substantial flavor to foods. Trim any visible fat off meat and poultry before cooking, and drain the fat off after echavarria. Use cooking methods that require little or no added fat, such as grilling, boiling, baking, poaching, broiling, roasting, steaming, stir-frying, and sauting. Avoid fast food and convenience food. They tend to be high in saturated and trans fat and have a lot of added salt. Talk to a registered dietitian for individualized diet advice. Last Reviewed: March 2011 Mary Kate Madden MS, MPH, RD   Updated: 3/29/2011   ·     High-Fiber Diet     What Is Fiber? Dietary fiber is a form of carbohydrate found in plants that cannot be digested by humans. All plants contain fiber, including fruits, vegetables, grains, and legumes. Fiber is often classified into two categories: soluble and insoluble. Soluble fiber draws water into the bowel and can help slow digestion. Examples of foods that are high in soluble fiber include oatmeal, oat bran, barley, legumes (eg, beans and peas), apples, and strawberries. Insoluble fiber speeds digestion and can add bulk to the stool. Examples of foods that are high in insoluble fiber include whole-wheat products, wheat bran, cauliflower, green beans, and potatoes. Why Follow a High-Fiber Diet? A high-fiber diet is often recommended to prevent and treat constipation , hemorrhoids , diverticulitis , and irritable bowel syndrome . Eating a high-fiber diet can also help improve your cholesterol levels, lower your risk of coronary heart disease , reduce your risk of type 2 diabetes , and lower your weight.  For people with type 1 or 2 diabetes, a high-fiber diet can also help stabilize blood sugar levels. How Much Fiber Should I Eat? A high-fiber diet should contain  20-35 grams  of fiber a day. This is actually the amount recommended for the general adult population; however, most Americans eat only 15 grams of fiber per day. Digestion of Fiber   Eating a higher fiber diet than usual can take some getting used to by your body's digestive system. To avoid the side effects of sudden increases in dietary fiber (eg, gas, cramping, bloating, and diarrhea), increase fiber gradually and be sure to drink plenty of fluids every day. Tips for Increasing Fiber Intake   Whenever possible, choose whole grains over refined grains (eg, brown rice instead of white rice, whole-wheat bread instead of white bread). Include a variety of grains in your diet, such as wheat, rye, barley, oats, quinoa, and bulgur. Eat more vegetarian-based meals. Here are some ideas: black bean burgers, eggplant lasagna, and veggie tofu stir-españa. Choose high-fiber snacks, such as fruits, popcorn, whole-grain crackers, and nuts. Make whole-grain cereal or whole-grain toast part of your daily breakfast regime. When eating out, whether ordering a sandwich or dinner, ask for extra vegetables. When baking, replace part of the white flour with whole-wheat flour. Whole-wheat flour is particularly easy to incorporate into a recipe. High-Fiber Diet Eating Guide   Food Category   Foods Recommended   Notes   Grains   Whole-grain breads, muffins, bagels, or marlena bread Rye bread Whole-wheat crackers or crisp breads Whole-grain or bran cereals Oatmeal, oat bran, or grits Wheat germ Whole-wheat pasta and brown rice   Read the ingredients list on food labels. Look for products that list \"whole\" as the first ingredient (eg, whole-wheat, whole oats). Choose cereals with at least 2 grams of fiber per serving.    Vegetables   All vegetables, especially asparagus, bean sprouts, broccoli, [place] sprouts, cabbage, carrots, cauliflower, celery, corn, greens, green beans, green pepper, onions, peas, potatoes (with skin), snow peas, spinach, squash, sweet potatoes, tomatoes, zucchini   For maximum fiber intake, eat the peels of fruits and vegetablesjust be sure to wash them well first.   Fruits   All fruits, especially apples, berries, grapefruits, mangoes, nectarines, oranges, peaches, pears, dried fruits (figs, dates, prunes, raisins)   Choose raw fruits and vegetables over juice, cooked, or cannedraw fruit has more fiber. Dried fruit is also a good source of fiber. Milk   With the exception of yogurt containing inulin (a type of fiber), dairy foods provide little fiber. Add more fiber by topping your yogurt or cottage cheese with fresh fruit, whole grain or bran cereals, nuts, or seeds. Meats and Beans   All beans and peas, especially Garbanzo beans, kidney beans, lentils, lima beans, split peas, and grijalva beans All nuts and seeds, especially almonds, peanuts, [place] nuts, cashews, peanut butter, walnuts, sesame and sunflower seeds All meat, poultry, fish, and eggs   Increase fiber in meat dishes by adding grijalva beans, kidney beans, black-eyed peas, bran, or oatmeal. If you are following a low-fat diet, use nuts and seeds only in moderation. Fats and Oils   All in moderation   Fats and oils do not provide fiber   Snacks, Sweets, and Condiments   Fruit Nuts Popcorn, whole-wheat pretzels, or trail mix made with dried fruits, nuts, and seeds Cakes, breads, and cookies made with oatmeal or whole-wheat flour   Most snack foods do not provide much fiber. Choose snacks with at least 2 grams of fiber per serving. Last Reviewed: March 2011 Toy Guardado MS, MPH, RD   Updated: 3/29/2011   ·     Keep Your Memory [place]       Many factors can affect your ability to remembera hectic lifestyle, aging, stress, chronic disease, and certain medicines.  But, there are steps you can take to sharpen your mind and help preserve your memory. Challenge Your Brain   Regularly challenging your mind may help keeps it in top shape. Good mental exercises include:   Crossword puzzlesUse a dictionary if you need it; you will learn more that way. Brainteasers Try some! Crafts, such as wood working and sewing   Hobbies, such as gardening and building model airplanes   SocializingVisit old friends or join groups to meet new ones. Reading   Learning a new language   Taking a class, whether it be art history or tariq chi   TravelingExperience the food, history, and culture of your destination   Learning to use a computer   Going to museums, the theater, or thought-provoking movies   Changing things in your daily life, such as reversing your pattern in the grocery store or brushing your teeth using your nondominant hand   Use Memory Aids   There is no need to remember every detail on your own. These memory aids can help:   Calendars and day planners   Electronic organizers to store all sorts of helpful informationThese devices can \"beep\" to remind you of appointments. A book of days to record birthdays, anniversaries, and other occasions that occur on the same date every year   Detailed \"to-do\" lists and strategically placed sticky notes   Quick \"study\" sessionsBefore a gathering, review who will be there so their names will be fresh in your mind. Establish routinesFor example, keep your keys, wallet, and umbrella in the same place all the time or take medicine with your 8:00 AM glass of juice   Live a Healthy Life   Many actions that will keep your body strong will do the same for your mind. For example:   Talk to Your Doctor About Herbs and Supplements    Malnutrition and vitamin deficiencies can impair your mental function. For example, vitamin B12 deficiency can cause a range of symptoms, including confusion. But, what if your nutritional needs are being met? Can herbs and supplements still offer a benefit?  Researchers have investigated a range of natural remedies, such as ginkgo , ginseng , and the supplement phosphatidylserine (PS). So far, though, the evidence is inconsistent as to whether these products can improve memory or thinking. If you are interested in taking herbs and supplements, talk to your doctor first because they may interact with other medicines that you are taking. Exercise Regularly    Among the many benefits of regular exercise are increased blood flow to the brain and decreased risk of certain diseases that can interfere with memory function. One study found that even moderate exercise has a beneficial effect. Examples of \"moderate\" exercise include:   Playing 18 holes of golf once a week, without a cart   Playing tennis twice a week   Walking one mile per day   Manage Stress    It can be tough to remember what is important when your mind is cluttered. Make time for relaxation. Choose activities that calm you down, and make it routine. Manage Chronic Conditions    Side effects of high blood pressure , diabetes, and heart disease can interfere with mental function. Many of the lifestyle steps discussed here can help manage these conditions. Strive to eat a healthy diet, exercise regularly, get stress under control, and follow your doctor's advice for your condition. Minimize Medications    Talk to your doctor about the medicines that you take. Some may be unnecessary. Also, healthy lifestyle habits may lower the need for certain drugs. Last Reviewed: April 2010 Piotr Waller MD   Updated: 4/13/2010   ·     [address]       As we get older, changes in balance, gait, strength, vision, hearing, and cognition make even the most youthful senior more prone to accidents. Falls are one of the leading health risks for older people.  This increased risk of falling is related to:   Aging process (eg, decreased muscle strength, slowed reflexes)   Higher incidence of chronic health problems (eg, arthritis, diabetes) that may limit mobility, agility or sensory awareness   Side effects of medicine (eg, dizziness, blurred vision)especially medicines like prescription pain medicines and drugs used to treat mental health conditions   Depending on the brittleness of your bones, the consequences of a fall can be serious and long lasting. Home Life   Research by the Association of [employer] [facility]) shows that some home accidents among older adults can be prevented by making simple lifestyle changes and basic modifications and repairs to the home environment. Here are some lifestyle changes that experts recommend:   Have your hearing and vision checked regularly. Be sure to wear prescription glasses that are right for you. Speak to your doctor or pharmacist about the possible side effects of your medicines. A number of medicines can cause dizziness. If you have problems with sleep, talk to your doctor. Limit your intake of alcohol. If necessary, use a cane or walker to help maintain your balance. Wear supportive, rubber-soled shoes, even at home. If you live in a region that gets wintry weather, you may want to put special cleats on your shoes to prevent you from slipping on the snow and ice. Exercise regularly to help maintain muscle tone, agility, and balance. Always hold the banister when going up or down stairs. Also, use  bars when getting in or out of the bath or shower, or using the toilet. To avoid dizziness, get up slowly from a lying down position. Sit up first, dangling your legs for a minute or two before rising to a standing position. Overall Home Safety Check   According to the Consumer Product Safety Commision's \"Older Consumer Home Safety Checklist,\" it is important to check for potential hazards in each room. And remember, proper lighting is an essential factor in home safety. If you cannot see clearly, you are more likely to fall.    Important questions to ask yourself include:   Are lamp, electric, extension, and telephone cords placed out of the flow of traffic and maintained in good condition? Have frayed cords been replaced? Are all small rugs and runners slip resistant? If not, you can secure them to the floor with a special double-sided carpet tape. Are smoke detectors properly locatedone on every floor of your home and one outside of every sleeping area? Are they in good working order? Are batteries replaced at least once a year? Do you have a well-maintained carbon monoxide detector outside every sleeping are in your home? Does your furniture layout leave plenty of space to maneuver between and around chairs, tables, beds, and sofas? Are hallways, stairs and passages between rooms well lit? Can you reach a lamp without getting out of bed? Are floor surfaces well maintained? Shag rugs, high-pile carpeting, tile floors, and polished wood floors can be particularly slippery. Stairs should always have handrails and be carpeted or fitted with a non-skid tread. Is your telephone easily reachable. Is the cord safely tucked away? Room by Room   According to the Association [employer]ging, bathrooms and bob are the two most potentially hazardous rooms in your home. In the Kitchen    Be sure your stove is in proper working order and always make sure burners and the oven are off before you go out or go to sleep. Keep pots on the back burners, turn handles away from the front of the stove, and keep stove clean and free of grease build-up. Kitchen ventilation systems and range exhausts should be working properly. Keep flammable objects such as towels and pot holders away from the cooking area except when in use. Make sure kitchen curtains are tied back. Move cords and appliances away from the sink and hot surfaces. If extension cords are needed, install wiring guides so they do not hang over the sink, range, or working areas.     Look for coffee pots, kettles and toaster ovens with automatic shut-offs. Keep a mop handy in the kitchen so you can wipe up spills instantly. You should also have a small fire extinguisher. Arrange your kitchen with frequently used items on lower shelves to avoid the need to stand on a stepstool to reach them. Make sure countertops are well-lit to avoid injuries while cutting and preparing food. In the Bathroom    Use a non-slip mat or decals in the tub and shower, since wet, soapy tile or porcelain surfaces are extremely slippery. Make sure bathroom rugs are non-skid or tape them firmly to the floor. Bathtubs should have at least one, preferably two, grab bars, firmly attached to structural supports in the wall. (Do not use built-in soap holders or glass shower doors as grab bars.)    Tub seats fitted with non-slip material on the legs allow you to wash sitting down. For people with limited mobility, bathtub transfer benches allow you to slide safely into the tub. Raised toilet seats and toilet safety rails are helpful for those with knee or hip problems. In the [facility]    Make sure you use a nightlight and that the area around your bed is clear of potential obstacles. Be careful with electric blankets and never go to sleep with a heating pad, which can cause serious burns even if on a low setting. Use fire-resistant mattress covers and pillows, and NEVER smoke in bed. Keep a phone next to the bed that is programmed to dial 911 at the push of a button. If you have a chronic condition, you may want to sign on with an automatic call-in service. Typically the system includes a small pendant that connects directly to an emergency medical voice-response system. You should also make arrangements to stay in contact with someonefriend, neighbor, family memberon a regular schedule.    Fire Prevention   According to the [employer]. (Smoke Alarms for Every) [address] [place] [address], senior citizens are one of the two highest risk groups for death and serious injuries due to residential fires. When cooking, wear short-sleeved items, never a bulky long-sleeved robe. The [facility]'s Safety Checklist for Older Consumers emphasizes the importance of checking basements, garages, workshops and storage areas for fire hazards, such as volatile liquids, piles of old rags or clothing and overloaded circuits. Never smoke in bed or when lying down on a couch or recliner chair. Small portable electric or kerosene heaters are responsible for many home fires and should be used cautiously if at all. If you do use one, be sure to keep them away from flammable materials. In case of fire, make sure you have a pre-established emergency exit plan. Have a professional check your fireplace and other fuel-burning appliances yearly. Helping [employer]   Baby boomers entering the ackerman years will continue to see the development of new products to help older adults live safely and independently in spite of age-related changes. Making Life More Livable  , by Jazmin Bernardo, lists over 1,000 products for \"living well in the mature years,\" such as bathing and mobility aids, household security devices, ergonomically designed knives and peelers, and faucet valves and knobs for temperature control. Medical supply stores and organizations are good sources of information about products that improve your quality of life and insure your safety.      Last Reviewed: November 2009 Kevin Bradshaw MD   Updated: 3/7/2011     · Depth Of Biopsy: dermis

## 2022-05-10 NOTE — PROGRESS NOTES
Subjective  Martha Dugan, 78 y.o. female presents today with:  Chief Complaint   Patient presents with    3 Month Follow-Up     no concerns at this time            HPI  Patient is here for f/u HTN. Is compliant with meds and has no side effects from them. Avoids added salt. Tries to eat healthy. Exercises occasionally. Has no chest pain, shortness of breath, palpitations or edema. Patient is here for hyperlipidemia. Is compliant with medications and has no apparent side effects from them. CVA. No new deficits. No facial droop. No speech impairment. No unilateral weakness. However, she occasional abnormal sensation running through LUE and LLE and lasting seconds occurring randomly. Does not occur more than once a week. No weakness. No headaches. No dysarthria or acute confusion. On Zetia, Niaspan and Aggrenox. Memory loss - ST memory issues since concussion from fall in 2021 and since death of daughter. Grief reaction - Daughter recently  from [facility].  Has joined grief counseling group through Hosp[facility]. Starts on Thursday. \"All I want to do is eat and sleep. \" Is tearful almost daily. Lacks volition. No SI. HAs anxiety r/t household business/finances.      No other questions and or concerns for today's visit      Review of Systems      Past Medical History:   Diagnosis Date    Asthma     Asthma in remission     Breast cancer ([facility] [address]     right breast    Elevated TSH     History of artificial lens replacement 3/24/2015    History of breast cancer 2014    Invasive ductal cancer right breast, Dr 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    Dr Carissa Alcazar (ortho)    Vitamin D deficiency      Past Surgical History:   Procedure Laterality Date    BLADDER SUSPENSION      BREAST BIOPSY Right 2/14/14    BREAST LUMPECTOMY Right 3/4/2014    Lumpectomy with SLNB, Dr Tigist [address] in [address] History     Socioeconomic History    Marital status:      Spouse name: Not on file    Number of children: 3    Years of education: Not on file    Highest education level: Not on file   Occupational History    Occupation: , retired   Tobacco Use    Smoking status: Former Smoker     Packs/day: 0.25     Years: 14.00     Pack years: 3.50     Types: Cigarettes    Smokeless tobacco: Never Used    Tobacco comment: started at age 11yo and quit at age 31yo   Substance and Sexual Activity    Alcohol use: No    Drug use: No    Sexual activity: Not on file   Other Topics Concern    Not on file   Social History Narrative    Born in [place], 2 younger brothers, one dec 2014, one in [place]     July 2020    , retired     Lives in a house in [facility]     of first spouse    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, owned a convenient store    [address], travel    4 children, 2 daughters in [place] ([place]), 2 sons in [address] [place] [address] great grandchildren >50        Son lives with her temporarily (01/08/2021)     Social Determinants of Health     Financial Resource Strain: Low Risk     Difficulty of Paying Living Expenses: Not hard at all   Food Insecurity: No Food Insecurity    Worried About [address] in the Last Year: Never true    [facility] in the Last Year: Never true   Transportation Needs: No Transportation Needs    Lack of Transportation (Medical): No    Lack of Transportation (Non-Medical):  No   Physical Activity: Inactive    Days of Exercise per Week: 0 days    Minutes of Exercise per Session: 0 min   Stress:     Feeling of Stress : Not on file   Social Connections:     Frequency of Communication with Friends and Family: Not on file    Frequency of Social Gatherings with Friends and Family: Not on file    Attends Yazdanism Services: Not on file    Active Member of Clubs or Organizations: Not on file    Attends Club or Organization Meetings: Not on file    Marital Status: Not on file   Intimate Partner Violence:     Fear of Current or Ex-Partner: Not on file    Emotionally Abused: Not on file    Physically Abused: Not on file    Sexually Abused: Not on file   Housing Stability:     Unable to Pay for Housing in the Last Year: Not on file    Number of [place] in the Last Year: Not on file    Unstable Housing in the Last Year: Not on file     Family History   Problem Relation Age of Onset    Kidney Disease Mother         dec age 79    Hypertension Mother    [de-identified] Migraines Mother     Diabetes Father     Heart Failure Father         dec age 76    Cancer Brother         dec age 76    Heart Attack Paternal Grandmother     Stroke Paternal Grandfather     Breast Cancer Paternal Cousin      Allergies   Allergen Reactions    Sulfa Antibiotics      Unsure of reaction     Iodinated Diagnostic Agents      Oral & IV dye group    Statins Other (See Comments)     Current Outpatient Medications   Medication Sig Dispense Refill    niacin (NIASPAN) 500 MG extended release tablet TAKE 1 TABLET 3 TIMES A  tablet 4    amLODIPine-benazepril (LOTREL) 5-20 MG per capsule Take 1 capsule by mouth daily TAKE 1 CAPSULE DAILY 90 capsule 4    triamterene-hydroCHLOROthiazide (MAXZIDE-25) 37.5-25 MG per tablet TAKE 1 TABLET DAILY 90 tablet 3    meclizine (ANTIVERT) 12.5 MG tablet Take 1 tablet by mouth daily 90 tablet 4    ezetimibe (ZETIA) 10 MG tablet TAKE 1 TABLET DAILY 90 tablet 4    Cephalexin 250 MG TABS Take 1 tablet by mouth daily 90 tablet 5    aspirin-dipyridamole (AGGRENOX)  MG per extended release capsule TAKE 1 CAPSULE TWICE DAILY 180 capsule 3    Cyanocobalamin (VITAMIN B 12 PO) Take by mouth      CINNAMON PO Take 1 capsule by mouth daily       Polyvinyl Alcohol-Povidone (REFRESH OP) Apply 1 drop to eye 4 times daily as needed.  Vitamin D (CHOLECALCIFEROL) 1000 UNITS CAPS capsule Take 1,000 Units by mouth daily.  Magnesium 500 MG CAPS Take 1 capsule by mouth daily.  fish oil-omega-3 fatty acids 1000 MG capsule Take 1 g by mouth 3 times daily. No current facility-administered medications for this visit. PMH, Surgical Hx, Family Hx, and Social Hxreviewed and updated. Health Maintenance reviewed. Objective    Vitals:    05/10/22 0943   BP: 114/68   Pulse: 78   Temp: 96.1 °F (35.6 °C)   TempSrc: Temporal   Weight: 159 lb (72.1 kg)   Height: 5' 5.5\" (1.664 m)        Physical Exam  Constitutional:       General: She is not in acute distress. Appearance: She is well-developed. HENT:      Head: Normocephalic and atraumatic. Eyes:      General: No scleral icterus. Conjunctiva/sclera: Conjunctivae normal.   Cardiovascular:      Rate and Rhythm: Normal rate and regular rhythm. Heart sounds: Normal heart sounds. Pulmonary:      Effort: No respiratory distress. Breath sounds: No wheezing or rales. Skin:     General: Skin is warm and dry. Neurological:      General: No focal deficit present. Mental Status: She is alert and oriented to person, place, and time. Cranial Nerves: Cranial nerves are intact. Motor: Motor function is intact. Coordination: Coordination is intact. Gait: Gait is intact. Psychiatric:         Attention and Perception: Attention and perception normal.         Mood and Affect: Mood is depressed. Mood is not anxious. Speech: Speech normal.         Behavior: Behavior normal. Behavior is cooperative.          Thought Content: Thought content normal.         Cognition and Memory: Cognition and memory normal.         Judgment: Judgment normal.           Lab Results   Component Value Date    LABA1C 5.7 11/23/2021     Lab Results   Component Value Date    LABMICR <1.20 06/10/2019    CREATININE 0.65 10/01/2021     Lab Results   Component Value Date    ALT 17 09/30/2021    AST 22 09/30/2021     Lab Results   Component Value Date    CHOL 199 06/12/2018    TRIG 106 06/12/2018    HDL 61 (H) 12/09/2019    LDLCALC 118 12/09/2019        Assessment & Plan   Visit Diagnoses and Associated Orders     TIA (transient ischemic attack)    -  Primary    MRA HEAD W WO CONTRAST [[id number] Custom]   - Future Order    MRI BRAIN W WO CONTRAST [[id number] Custom]   - Future Order    CBC with Auto Differential [[id number] Custom]   - Future Order         Cerebrovascular disease        Lipid, Fasting [[id number] Custom]      MRA HEAD W WO CONTRAST [[id number] Custom]   - Future Order    MRI BRAIN W WO CONTRAST [[id number] Custom]   - Future Order         Memory loss        MRA HEAD W WO CONTRAST [[id number] Custom]   - Future Order    MRI BRAIN W WO CONTRAST [[id number] Custom]   - Future Order    TSH [[id number] Custom]   - Future Order    Vitamin B12 [[id number] Custom]   - Future Order    CBC with Auto Differential [[id number] Custom]   - Future Order         Hyperlipidemia, unspecified hyperlipidemia type        stable and well-controlled on current meds.     niacin (NIASPAN) 500 MG extended release tablet [[id number]]      Lipid, Fasting [[id number] Custom]           Closed head injury with concussion, without loss of consciousness, subsequent encounter             Grief reaction             Primary hypertension        Basic Metabolic Panel [[id number] Custom]           Vitamin D deficiency        Vitamin D 25 Hydroxy [[id number] Custom]   - Future Order         Encounter for screening mammogram for breast cancer        HUBER DIGITAL SCREEN W OR WO CAD BILATERAL [[id number] Custom]   - Future Order             Time spent on appointment included reviewing past laboratory and radiographic results, previous notes, consultations, past procedures, medications, obtaining history and performing physical, formulating mutually agreed upon plan of care with patient - 45 minutes. Given closed head injury 8 months ago, CVD with right MCA CVA and stuttering sx as well as memory changes, concerned for progression of CVD. Reassess radiographically. Continue current meds. Reviewed red flag sx and when to call 911. Memory changes may be related to grief reaction/depression. Encouraged group support for grief and will reassess for depression/reactive depression. Reviewed with the patient: all disease processes, current clinical status, medications, activities and diet.      Side effects, adverse effects of the medication prescribed today, as well as treatment plan/ rationale and result expectations have been discussed with the patient who expresses understanding and desires to proceed.     Close follow up to evaluate treatment results and for coordination of care. I have reviewed the patient's medical history in detail and updated the computerized patient record. More than 50% of the appointment was spent in face-to-face counseling, education and care coordination. Orders Placed This Encounter   Procedures    HUBER DIGITAL SCREEN W OR WO CAD BILATERAL     Standing Status:   Future     Standing Expiration Date:   7/10/2023     Order Specific Question:   Reason for exam:     Answer:   routine screening for breast cancer    MRA HEAD W WO CONTRAST     Standing Status:   Future     Standing Expiration Date:   5/10/2023     Order Specific Question:   Reason for exam:     Answer:   h/o CVA; having LUE and LLE dysesthesia coming in waves; memory deficits (new)     Order Specific Question:   What is the sedation requirement?      Answer:   None    MRI BRAIN W WO CONTRAST     Standing Status:   Future     Standing Expiration Date:   5/10/2023     Order Specific Question:   STAT Creatinine as needed:     Answer:   No     Order Specific Question:   Reason for exam:     Answer:   h/o CVA; episodes of waves of dysesthesia LUE and LLE; new memory deficits     Order Specific Question:   What is the sedation requirement? Answer:   None    Basic Metabolic Panel    Lipid, Fasting    TSH     Standing Status:   Future     Standing Expiration Date:   5/10/2023    Vitamin B12     Standing Status:   Future     Standing Expiration Date:   5/10/2023    Vitamin D 25 Hydroxy     Standing Status:   Future     Standing Expiration Date:   5/10/2023    CBC with Auto Differential     Standing Status:   Future     Standing Expiration Date:   5/10/2023     Orders Placed This Encounter   Medications    niacin (NIASPAN) 500 MG extended release tablet     Sig: TAKE 1 TABLET 3 TIMES A DAY     Dispense:  270 tablet     Refill:  4     Medications Discontinued During This Encounter   Medication Reason    niacin (NIASPAN) 500 MG extended release tablet REORDER     Return in about 2 months (around 7/10/2022) for memory, grief, CVD - OV. Controlled Substance Monitoring:    Acute and Chronic Pain Monitoring:   No flowsheet data found.         Debi Mendez MD

## 2022-05-10 NOTE — PROGRESS NOTES
Medicare Annual Wellness Visit    Essence Pantoja is here for Medicare AWV    Assessment & Plan    Recommendations for Preventive Services Due: see orders and patient instructions/AVS.  Recommended screening schedule for the next 5-10 years is provided to the patient in written form: see Patient Instructions/AVS.     No follow-ups on file. Subjective       Patient's complete Health Risk Assessment and screening values have been reviewed and are found in Flowsheets. The following problems were reviewed today and where indicated follow up appointments were made and/or referrals ordered. Positive Risk Factor Screenings with Interventions:    Fall Risk:  Do you feel unsteady or are you worried about falling? : (!) yes  2 or more falls in past year?: no  Fall with injury in past year?: no     Fall Risk Interventions:    · Home safety tips provided  · on vitamin D    Cognitive:   Words recalled: 2 Words Recalled  Total Score Interpretation: Abnormal Mini-Cog    Cognitive Impairment Interventions:  · Patient declines any further evaluation/treatment for cognitive impairment  · suspect r/t grief/depression +/- CVD           General Health and ACP:  General  In general, how would you say your health is?: Very Good  In the past 7 days, have you experienced any of the following: New or Increased Pain, New or Increased Fatigue, Loneliness, Social Isolation, Stress or Anger?: (!) Yes  Select all that apply: (!) New or Increased Pain  Do you get the social and emotional support that you need?: Yes  Do you have a Living Will?: Yes    Advance Directives     Power of  Living Will ACP-Advance Directive ACP-Power of Martha Sánchez on 04/22/21 Filed on 04/22/21 Filed [address] [facility] Risk Interventions:  · Pain issues: possible TIAs; MRI/MRA ordered; has neuro f/u  · Stress: patient's comments regarding reasons for stress and/or anger: deaths of daughter and     Health Habits/Nutrition:     Physical Activity: Inactive    Days of Exercise per Week: 0 days    Minutes of Exercise per Session: 0 min     Have you lost any weight without trying in the past 3 months?: No     Have you seen the dentist within the past year?: Appointment is scheduled    Health Habits/Nutrition Interventions:  · NA    Hearing/Vision:  Do you or your family notice any trouble with your hearing that hasn't been managed with hearing aids?: No  Do you have difficulty driving, watching TV, or doing any of your daily activities because of your eyesight?: No  Have you had an eye exam within the past year?: (!) No  No exam data present    Hearing/Vision Interventions:  · Vision concerns:  patient encouraged to make appointment with his/her eye specialist            Objective   There were no vitals filed for this visit. There is no height or weight on file to calculate BMI. Allergies   Allergen Reactions    Sulfa Antibiotics      Unsure of reaction     Iodinated Diagnostic Agents      Oral & IV dye group    Statins Other (See Comments)     Prior to Visit Medications    Medication Sig Taking?  Authorizing Provider   amLODIPine-benazepril (LOTREL) 5-20 MG per capsule Take 1 capsule by mouth daily TAKE 1 CAPSULE DAILY  Estuardo Mir MD   triamterene-hydroCHLOROthiazide (MAXZIDE-25) 37.5-25 MG per tablet TAKE 1 TABLET DAILY  Estuardo Mir MD   meclizine (ANTIVERT) 12.5 MG tablet Take 1 tablet by mouth daily  Estuardo Mir MD   niacin (NIASPAN) 500 MG extended release tablet TAKE 1 TABLET 3 TIMES A DAY  Estuardo Mir MD   ezetimibe (ZETIA) 10 MG tablet TAKE 1 TABLET DAILY  Estuardo Mir MD   Cephalexin 250 MG TABS Take 1 tablet by mouth daily  Estuardo Mir MD   aspirin-dipyridamole (AGGRENOX)  MG per extended release capsule TAKE 1 CAPSULE TWICE DAILY  Arnaldo Neri MD   Cyanocobalamin (VITAMIN B 12 PO) Take by mouth  Historical Provider, MD CINNAMON PO Take 1 capsule by mouth daily   Historical Provider, MD   Polyvinyl Alcohol-Povidone (REFRESH OP) Apply 1 drop to eye 4 times daily as needed. Historical Provider, MD   Vitamin D (CHOLECALCIFEROL) 1000 UNITS CAPS capsule Take 1,000 Units by mouth daily. Historical Provider, MD   Magnesium 500 MG CAPS Take 1 capsule by mouth daily. Historical Provider, MD   fish oil-omega-3 fatty acids 1000 MG capsule Take 1 g by mouth 3 times daily.   Historical Provider, MD       CareTeam (Including outside providers/suppliers regularly involved in providing care):   Patient Care Team:  Priyanka Dumont MD as PCP - General (Family Medicine)  Priyanka Dumont MD as PCP - [facility] Empaneled Provider  Albertina Beard MD (General Surgery)  Jensen Mallory MD (Oncology)  Nae Schaeffer MD (Hematology and Oncology)    Reviewed and updated this visit:  Tobacco  Allergies  Meds  Problems  Med Hx  Surg Hx  Soc Hx  Fam Hx

## 2022-05-11 DIAGNOSIS — E55.9 VITAMIN D DEFICIENCY: ICD-10-CM

## 2022-05-11 DIAGNOSIS — G45.9 TIA (TRANSIENT ISCHEMIC ATTACK): ICD-10-CM

## 2022-05-11 DIAGNOSIS — R41.3 MEMORY LOSS: ICD-10-CM

## 2022-05-11 LAB
ANION GAP SERPL CALCULATED.3IONS-SCNC: 13 MEQ/L (ref 9–15)
BASOPHILS ABSOLUTE: 0 K/UL (ref 0–0.2)
BASOPHILS RELATIVE PERCENT: 0.7 %
BUN BLDV-MCNC: 15 MG/DL (ref 8–23)
CALCIUM SERPL-MCNC: 10.4 MG/DL (ref 8.5–9.9)
CHLORIDE BLD-SCNC: 104 MEQ/L (ref 95–107)
CHOLESTEROL, TOTAL: 199 MG/DL (ref 0–199)
CO2: 26 MEQ/L (ref 20–31)
CREAT SERPL-MCNC: 0.76 MG/DL (ref 0.5–0.9)
EOSINOPHILS ABSOLUTE: 0.1 K/UL (ref 0–0.7)
EOSINOPHILS RELATIVE PERCENT: 1.5 %
GFR AFRICAN AMERICAN: >60
GFR NON-AFRICAN AMERICAN: >60
GLUCOSE BLD-MCNC: 99 MG/DL (ref 70–99)
HCT VFR BLD CALC: 42.3 % (ref 37–47)
HDLC SERPL-MCNC: 60 MG/DL (ref 40–59)
HEMOGLOBIN: 14.2 G/DL (ref 12–16)
LDL CHOLESTEROL CALCULATED: 120 MG/DL (ref 0–129)
LYMPHOCYTES ABSOLUTE: 1.5 K/UL (ref 1–4.8)
LYMPHOCYTES RELATIVE PERCENT: 27.2 %
MCH RBC QN AUTO: 31.6 PG (ref 27–31.3)
MCHC RBC AUTO-ENTMCNC: 33.7 % (ref 33–37)
MCV RBC AUTO: 94 FL (ref 82–100)
MONOCYTES ABSOLUTE: 0.4 K/UL (ref 0.2–0.8)
MONOCYTES RELATIVE PERCENT: 8 %
NEUTROPHILS ABSOLUTE: 3.5 K/UL (ref 1.4–6.5)
NEUTROPHILS RELATIVE PERCENT: 62.6 %
PDW BLD-RTO: 13.2 % (ref 11.5–14.5)
PLATELET # BLD: 240 K/UL (ref 130–400)
POTASSIUM SERPL-SCNC: 4.4 MEQ/L (ref 3.4–4.9)
RBC # BLD: 4.5 M/UL (ref 4.2–5.4)
SODIUM BLD-SCNC: 143 MEQ/L (ref 135–144)
TRIGL SERPL-MCNC: 94 MG/DL (ref 0–150)
TSH SERPL DL<=0.05 MIU/L-ACNC: 3.73 UIU/ML (ref 0.44–3.86)
VITAMIN B-12: 683 PG/ML (ref 232–1245)
VITAMIN D 25-HYDROXY: 34.9 NG/ML
WBC # BLD: 5.5 K/UL (ref 4.8–10.8)

## 2022-05-16 DIAGNOSIS — E78.5 HYPERLIPIDEMIA, UNSPECIFIED HYPERLIPIDEMIA TYPE: ICD-10-CM

## 2022-05-16 RX ORDER — EZETIMIBE 10 MG/1
TABLET ORAL
Qty: 90 TABLET | Refills: 3 | Status: SHIPPED | OUTPATIENT
Start: 2022-05-16

## 2022-05-16 NOTE — TELEPHONE ENCOUNTER
pharmacy requesting medication refill.  Please approve or deny this request.    Rx requested:  Requested Prescriptions     Pending Prescriptions Disp Refills    ezetimibe (Huey Lota) 10 MG tablet [Pharmacy Med Name: EZETIMIBE TAB 10MG] 90 tablet 3     Sig: TAKE 1 TABLET DAILY         Last Office Visit:   5/10/2022      Next Visit Date:  Future Appointments   Date Time Provider Jose Manzo   5/20/2022  9:00 AM [place] [facility] ROOM 2 [facility]   6/7/2022  1:45 PM Arnaldo Cisneros  [address]   7/11/2022  3:30 PM Shira Collins MD [address] [place] [address]

## 2022-05-20 ENCOUNTER — HOSPITAL ENCOUNTER (OUTPATIENT)
Dept: MRI IMAGING | Age: 82
Discharge: HOME | End: 2022-05-22
Payer: MEDICARE

## 2022-05-20 DIAGNOSIS — I67.9 CEREBROVASCULAR DISEASE: Chronic | ICD-10-CM

## 2022-05-20 DIAGNOSIS — R41.3 MEMORY LOSS: ICD-10-CM

## 2022-05-20 DIAGNOSIS — G45.9 TIA (TRANSIENT ISCHEMIC ATTACK): ICD-10-CM

## 2022-05-20 PROCEDURE — 70551 MRI BRAIN STEM W/O DYE: CPT

## 2022-05-20 PROCEDURE — 70544 MR ANGIOGRAPHY HEAD W/O DYE: CPT

## 2022-05-23 RX ORDER — ASPIRIN AND DIPYRIDAMOLE 25; 200 MG/1; MG/1
CAPSULE, EXTENDED RELEASE ORAL
Qty: 180 CAPSULE | Refills: 3 | Status: SHIPPED | OUTPATIENT
Start: 2022-05-23 | End: 2022-06-07 | Stop reason: SDUPTHER

## 2022-05-24 NOTE — RESULT ENCOUNTER NOTE
Your MRI shows predictable/expected age-related loss of brain mass/volume. THere are no other changes that indicate circulation or tumors are causing any problems. This is excellent news.

## 2022-06-01 PROBLEM — G25.0 ESSENTIAL TREMOR: Status: ACTIVE | Noted: 2022-06-01

## 2022-06-07 ENCOUNTER — OFFICE VISIT (OUTPATIENT)
Dept: NEUROLOGY | Age: 82
End: 2022-06-07
Payer: MEDICARE

## 2022-06-07 VITALS
HEIGHT: 66 IN | HEART RATE: 67 BPM | DIASTOLIC BLOOD PRESSURE: 70 MMHG | BODY MASS INDEX: 25.71 KG/M2 | WEIGHT: 160 LBS | SYSTOLIC BLOOD PRESSURE: 120 MMHG

## 2022-06-07 DIAGNOSIS — Z86.73 HX OF ISCHEMIC RIGHT MCA STROKE: Primary | ICD-10-CM

## 2022-06-07 DIAGNOSIS — G25.0 ESSENTIAL PALATAL TREMOR: ICD-10-CM

## 2022-06-07 DIAGNOSIS — Z86.73 HISTORY OF RECURRENT TIAS: ICD-10-CM

## 2022-06-07 DIAGNOSIS — G25.0 ESSENTIAL TREMOR: ICD-10-CM

## 2022-06-07 DIAGNOSIS — G43.709 CHRONIC MIGRAINE WITHOUT AURA WITHOUT STATUS MIGRAINOSUS, NOT INTRACTABLE: ICD-10-CM

## 2022-06-07 DIAGNOSIS — R42 DIZZINESS: ICD-10-CM

## 2022-06-07 PROCEDURE — G8417 CALC BMI ABV UP PARAM F/U: HCPCS | Performed by: PSYCHIATRY & NEUROLOGY

## 2022-06-07 PROCEDURE — G8399 PT W/DXA RESULTS DOCUMENT: HCPCS | Performed by: PSYCHIATRY & NEUROLOGY

## 2022-06-07 PROCEDURE — 1123F ACP DISCUSS/DSCN MKR DOCD: CPT | Performed by: PSYCHIATRY & NEUROLOGY

## 2022-06-07 PROCEDURE — 99214 OFFICE O/P EST MOD 30 MIN: CPT | Performed by: PSYCHIATRY & NEUROLOGY

## 2022-06-07 PROCEDURE — 1036F TOBACCO NON-USER: CPT | Performed by: PSYCHIATRY & NEUROLOGY

## 2022-06-07 PROCEDURE — 1090F PRES/ABSN URINE INCON ASSESS: CPT | Performed by: PSYCHIATRY & NEUROLOGY

## 2022-06-07 PROCEDURE — G8427 DOCREV CUR MEDS BY ELIG CLIN: HCPCS | Performed by: PSYCHIATRY & NEUROLOGY

## 2022-06-07 RX ORDER — ASPIRIN AND DIPYRIDAMOLE 25; 200 MG/1; MG/1
CAPSULE, EXTENDED RELEASE ORAL
Qty: 180 CAPSULE | Refills: 3 | Status: SHIPPED | OUTPATIENT
Start: 2022-06-07 | End: 2022-07-25 | Stop reason: SDUPTHER

## 2022-06-07 ASSESSMENT — ENCOUNTER SYMPTOMS
BACK PAIN: 0
SHORTNESS OF BREATH: 0
VOMITING: 0
TROUBLE SWALLOWING: 0
NAUSEA: 0
CHOKING: 0
COLOR CHANGE: 0
PHOTOPHOBIA: 0

## 2022-06-07 NOTE — PROGRESS NOTES
Subjective:      Patient ID: Mery Hinds is a 78 y.o. female who presents today for:  Chief Complaint   Patient presents with    1 Year Follow Up     History of Stroke, no symptoms of stroke since 2021, patient fell last fall and gave herself a concussion has not had any dizziness since her fall. HPI 78 right-handed female with a history of transient ischemic event and a left MCA stroke unresponsive to Plavix and therefore patient actually doing well on Aggrenox without any recurrent symptoms. She also has mild bilateral tremor which we have been watching for a while and she has not developed any dysphagia or any parkinsonian features. She does have a subtle palatal tremor but no dysphonia or dystonias develop. She has some dizziness which is not reoccurring. She is functioning well and is not complain of any memory issues since last seen. She is not any major hospitalizations either. Since last seen patient had an MRI of the brain done in May she was having some numbness in the left arm which was very transient and resolved. MRI of the brain and MRA does not show any abnormal findings.     Past Medical History:   Diagnosis Date    Asthma     Asthma in remission 1990    Breast cancer ([facility] [address] 2014    right breast    Elevated TSH     History of artificial lens replacement 3/24/2015    History of breast cancer 02/2014    Invasive ductal cancer right breast, Dr Alin Houston, Dr Javier Dwyer History of therapeutic radiation     HTN (hypertension) 1980    was with [facility]    Hx of ischemic left MCA stroke 2013    no residual, Dr Selby Saliva Hx of ischemic right MCA stroke 12/2014    no residual, frontal lobe    Hyperlipidemia     Osteoarthritis     Secondary and unspecified malignant neoplasm of axilla and upper limb lymph nodes ([address] 11/8/2018    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    Dr Teodora Sauceda (ortho)    Vitamin D deficiency      Past Surgical History:   Procedure Laterality Date    BLADDER SUSPENSION      BREAST BIOPSY Right 2/14/14    BREAST LUMPECTOMY Right 3/4/2014    Lumpectomy with SLNB, Dr Hina [address] in [address] History     Socioeconomic History    Marital status:      Spouse name: Not on file    Number of children: 3    Years of education: Not on file    Highest education level: Not on file   Occupational History    Occupation: , retired   Tobacco Use    Smoking status: Former Smoker     Packs/day: 0.25     Years: 14.00     Pack years: 3.50     Types: Cigarettes    Smokeless tobacco: Never Used    Tobacco comment: started at age 9yo and quit at age 31yo   Substance and Sexual Activity    Alcohol use: No    Drug use: No    Sexual activity: Not on file   Other Topics Concern    Not on file   Social History Narrative    Born in [place], 2 younger brothers, one dec 2014, one in [place]     July 2020    , retired     Lives in a house in [facility]     of first spouse    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, owned a convenient store    [employer], travel    4 children, 2 daughters in [place] ([place] and [place]), 2 sons in [address] [place] [address] great grandchildren >50        Son lives with her temporarily (01/08/2021)     Social Determinants of Health     Financial Resource Strain: Low Risk     Difficulty of Paying Living Expenses: Not hard at all   Food Insecurity: No Food Insecurity    Worried About [address] in the Last Year: Never true    [address] [facility] [address] in the Last Year: Never true   Transportation Needs: No Transportation Needs    Lack of Transportation (Medical): No    Lack of Transportation (Non-Medical):  No   Physical Activity: Inactive    Days of Exercise per Week: 0 days    Minutes of Exercise per Session: 0 min   Stress:     Feeling of Stress : Not on file   Social Connections:     Frequency of Communication with Friends and Family: Not on file    Frequency of Social Gatherings with Friends and Family: Not on file    Attends Druze Services: Not on file    Active Member of Clubs or Organizations: Not on file    Attends Club or Organization Meetings: Not on file    Marital Status: Not on file   Intimate Partner Violence:     Fear of Current or Ex-Partner: Not on file    Emotionally Abused: Not on file    Physically Abused: Not on file    Sexually Abused: Not on file   Housing Stability:     Unable to Pay for Housing in the Last Year: Not on file    Number of [place] in the Last Year: Not on file    Unstable Housing in the Last Year: Not on file     Family History   Problem Relation Age of Onset    Kidney Disease Mother         dec age 79    Hypertension Mother    [place] Mother     Diabetes Father     Heart Failure Father         dec age 76    Cancer Brother         dec age 76    Heart Attack Paternal Grandmother     Stroke Paternal Grandfather     Breast Cancer Paternal Cousin      Allergies   Allergen Reactions    Sulfa Antibiotics      Unsure of reaction     Iodinated Diagnostic Agents      Oral & IV dye group    Statins Other (See Comments)       Current Outpatient Medications   Medication Sig Dispense Refill    aspirin-dipyridamole (AGGRENOX)  MG per extended release capsule TAKE 1 CAPSULE TWICE DAILY 180 capsule 3    ezetimibe (ZETIA) 10 MG tablet TAKE 1 TABLET DAILY 90 tablet 3    niacin (NIASPAN) 500 MG extended release tablet TAKE 1 TABLET 3 TIMES A  tablet 4    amLODIPine-benazepril (LOTREL) 5-20 MG per capsule Take 1 capsule by mouth daily TAKE 1 CAPSULE DAILY 90 capsule 4    triamterene-hydroCHLOROthiazide (MAXZIDE-25) 37.5-25 MG per tablet TAKE 1 TABLET DAILY 90 tablet 3    meclizine (ANTIVERT) 12.5 MG tablet Take 1 tablet by mouth daily 90 tablet 4    Cephalexin 250 MG TABS Take 1 tablet by mouth daily 90 tablet 5    Cyanocobalamin (VITAMIN B 12 PO) Take by mouth      CINNAMON PO Take 1 capsule by mouth daily       Polyvinyl Alcohol-Povidone (REFRESH OP) Apply 1 drop to eye 4 times daily as needed.  Vitamin D (CHOLECALCIFEROL) 1000 UNITS CAPS capsule Take 1,000 Units by mouth daily.  Magnesium 500 MG CAPS Take 1 capsule by mouth daily.  fish oil-omega-3 fatty acids 1000 MG capsule Take 1 g by mouth 3 times daily. No current facility-administered medications for this visit. Review of Systems   Constitutional: Negative for fever. HENT: Negative for ear pain, tinnitus and trouble swallowing. Eyes: Negative for photophobia and visual disturbance. Respiratory: Negative for choking and shortness of breath. Cardiovascular: Negative for chest pain and palpitations. Gastrointestinal: Negative for nausea and vomiting. Musculoskeletal: Negative for back pain, gait problem, joint swelling, myalgias, neck pain and neck stiffness. Skin: Negative for color change. Allergic/Immunologic: Negative for food allergies. Neurological: Negative for dizziness, tremors, seizures, syncope, facial asymmetry, speech difficulty, weakness, light-headedness, numbness and headaches. Psychiatric/Behavioral: Negative for behavioral problems, confusion, hallucinations and sleep disturbance. Objective:   /70 (Site: Left Upper Arm, Position: Sitting, Cuff Size: Medium Adult)   Pulse 67   Ht 5' 5.5\" (1.664 m)   Wt 160 lb (72.6 kg)   BMI 26.22 kg/m²     Physical Exam  Vitals reviewed. Eyes:      Pupils: Pupils are equal, round, and reactive to light. Cardiovascular:      Rate and Rhythm: Normal rate and regular rhythm. Heart sounds: No murmur heard. Pulmonary:      Effort: Pulmonary effort is normal.      Breath sounds: Normal breath sounds. Abdominal:      General: Bowel sounds are normal.   Musculoskeletal:         General: Normal range of motion. Cervical back: Normal range of motion.    Skin: General: Skin is warm. Neurological:      Mental Status: She is alert and oriented to person, place, and time. Cranial Nerves: No cranial nerve deficit. Sensory: No sensory deficit. Motor: No abnormal muscle tone. Coordination: Coordination normal.      Deep Tendon Reflexes: Reflexes are normal and symmetric. Babinski sign absent on the right side. Babinski sign absent on the left side. Psychiatric:         Mood and Affect: Mood normal.     She has a mild tremor and a very prominent palatal tremor. MRA HEAD WO CONTRAST    Result Date: 5/20/2022  EXAMINATION:  MRI BRAIN. MRA INTRACRANIAL VESSELS. CLINICAL HISTORY:  MEMORY LOSS. TIA. COMPARISONS:  12/22/2014. TECHNIQUE:  T1 and T2 and diffusion scans. No contrast. MIP images. FINDINGS:  There is slight generalized atrophy. There is mild to moderate ischemic white matter change. There is no evidence of mass or infarct. There is no hemorrhage or hematoma. There is no midline shift or evidence of increased intracranial pressure. The internal carotid arteries and basilar artery are patent and normal in caliber. Superior cerebral arteries are patent. Anterior and middle cerebral arteries are patent. No abnormal vascularity is demonstrated. There is no evidence of an aneurysm. 1. AN UNENHANCED MRI SCAN OF THE BRAIN APPEARS FAIRLY UNREMARKABLE FOR A PATIENT OF THIS AGE. 2. INTRACRANIAL VESSELS APPEAR NORMAL. [place] MRI BRAIN WO CONTRAST    Result Date: 5/20/2022  EXAMINATION:  MRI BRAIN. MRA INTRACRANIAL VESSELS. CLINICAL HISTORY:  MEMORY LOSS. TIA. COMPARISONS:  12/22/2014. TECHNIQUE:  T1 and T2 and diffusion scans. No contrast. MIP images. FINDINGS:  There is slight generalized atrophy. There is mild to moderate ischemic white matter change. There is no evidence of mass or infarct. There is no hemorrhage or hematoma. There is no midline shift or evidence of increased intracranial pressure.  The internal carotid arteries and basilar artery are patent and normal in caliber. Superior cerebral arteries are patent. Anterior and middle cerebral arteries are patent. No abnormal vascularity is demonstrated. There is no evidence of an aneurysm. 1. AN UNENHANCED MRI SCAN OF THE BRAIN APPEARS FAIRLY UNREMARKABLE FOR A PATIENT OF THIS AGE. 2. INTRACRANIAL VESSELS APPEAR NORMAL. .      Lab Results   Component Value Date    WBC 5.5 05/11/2022    RBC 4.50 05/11/2022    RBC 4.66 05/16/2012    HGB 14.2 05/11/2022    HCT 42.3 05/11/2022    MCV 94.0 05/11/2022    MCH 31.6 05/11/2022    MCHC 33.7 05/11/2022    RDW 13.2 05/11/2022     05/11/2022    MPV 8.1 05/11/2015     Lab Results   Component Value Date     05/11/2022    K 4.4 05/11/2022    K 3.8 10/01/2021     05/11/2022    CO2 26 05/11/2022    BUN 15 05/11/2022    CREATININE 0.76 05/11/2022    GFRAA >60.0 05/11/2022    LABGLOM >60.0 05/11/2022    GLUCOSE 99 05/11/2022    GLUCOSE 91 05/16/2012    PROT 6.4 09/30/2021    LABALBU 4.0 09/30/2021    LABALBU 4.7 05/16/2012    CALCIUM 10.4 05/11/2022    BILITOT 0.3 09/30/2021    ALKPHOS 121 09/30/2021    AST 22 09/30/2021    ALT 17 09/30/2021     Lab Results   Component Value Date    PROTIME 12.3 09/30/2021    INR 0.9 09/30/2021     Lab Results   Component Value Date    TSH 3.730 05/11/2022    [id number] 683 05/11/2022     Lab Results   Component Value Date    TRIG 94 05/11/2022    HDL 60 05/11/2022    LDLCALC 120 05/11/2022     No results found for: Lazaro Ku, LABBENZ, CANNAB, COCAINESCRN, LABMETH, OPIATESCREENURINE, PHENCYCLIDINESCREENURINE, PPXUR, ETOH  No results found for: LITHIUM, DILFRTOT, VALPROATE    Assessment:       Diagnosis Orders   1. Hx of ischemic right MCA stroke     2. Essential tremor     3. Essential palatal tremor     4. Chronic migraine without aura without status migrainosus, not intractable     History of right cerebral stroke with very subtle findings.   The patient actually had a recurrent TIA as well in the left MCA distribution. She was aspirin Plavix failure and therefore she continues on Aggrenox. Since being on Aggrenox she has not any recurrent symptoms. Patient though was seen by primary care for numbness in the left arm and an MRI of the brain and MRA was arranged and this did not show any findings. This was done recently in May. The findings are very transient. We have not recommended any changes for now we will watch this. Patient has had previous dizziness and continues on Antivert daily without any side effects. Patient has a very prominent bilateral tremor with a part of an essential tremor has not worsened and she has not developed any parkinsonian features. MRI and MRA were reviewed personally. Plan:      No orders of the defined types were placed in this encounter. No orders of the defined types were placed in this encounter. No follow-ups on file.       Carlito Poritllo MD

## 2022-07-11 ENCOUNTER — OFFICE VISIT (OUTPATIENT)
Dept: FAMILY MEDICINE CLINIC | Age: 82
End: 2022-07-11
Payer: MEDICARE

## 2022-07-11 VITALS
DIASTOLIC BLOOD PRESSURE: 78 MMHG | TEMPERATURE: 97.3 F | BODY MASS INDEX: 25.55 KG/M2 | OXYGEN SATURATION: 97 % | WEIGHT: 159 LBS | SYSTOLIC BLOOD PRESSURE: 128 MMHG | HEIGHT: 66 IN | HEART RATE: 67 BPM

## 2022-07-11 DIAGNOSIS — I10 PRIMARY HYPERTENSION: ICD-10-CM

## 2022-07-11 DIAGNOSIS — E83.52 HYPERCALCEMIA: Chronic | ICD-10-CM

## 2022-07-11 DIAGNOSIS — E78.5 HYPERLIPIDEMIA, UNSPECIFIED HYPERLIPIDEMIA TYPE: ICD-10-CM

## 2022-07-11 DIAGNOSIS — F43.21 GRIEF REACTION: ICD-10-CM

## 2022-07-11 DIAGNOSIS — I67.9 CEREBROVASCULAR DISEASE: Primary | Chronic | ICD-10-CM

## 2022-07-11 PROBLEM — G89.11 ACUTE PAIN DUE TO TRAUMA: Status: RESOLVED | Noted: 2021-09-30 | Resolved: 2022-07-11

## 2022-07-11 PROBLEM — S70.11XA HEMATOMA OF RIGHT THIGH: Status: RESOLVED | Noted: 2021-09-30 | Resolved: 2022-07-11

## 2022-07-11 PROCEDURE — 1123F ACP DISCUSS/DSCN MKR DOCD: CPT | Performed by: FAMILY MEDICINE

## 2022-07-11 PROCEDURE — 99214 OFFICE O/P EST MOD 30 MIN: CPT | Performed by: FAMILY MEDICINE

## 2022-07-11 PROCEDURE — 1090F PRES/ABSN URINE INCON ASSESS: CPT | Performed by: FAMILY MEDICINE

## 2022-07-11 PROCEDURE — 1036F TOBACCO NON-USER: CPT | Performed by: FAMILY MEDICINE

## 2022-07-11 PROCEDURE — G8427 DOCREV CUR MEDS BY ELIG CLIN: HCPCS | Performed by: FAMILY MEDICINE

## 2022-07-11 PROCEDURE — G8399 PT W/DXA RESULTS DOCUMENT: HCPCS | Performed by: FAMILY MEDICINE

## 2022-07-11 PROCEDURE — G8417 CALC BMI ABV UP PARAM F/U: HCPCS | Performed by: FAMILY MEDICINE

## 2022-07-11 ASSESSMENT — PATIENT HEALTH QUESTIONNAIRE - PHQ9
SUM OF ALL RESPONSES TO PHQ QUESTIONS 1-9: 0
1. LITTLE INTEREST OR PLEASURE IN DOING THINGS: 0
SUM OF ALL RESPONSES TO PHQ QUESTIONS 1-9: 0
SUM OF ALL RESPONSES TO PHQ9 QUESTIONS 1 & 2: 0
2. FEELING DOWN, DEPRESSED OR HOPELESS: 0
SUM OF ALL RESPONSES TO PHQ QUESTIONS 1-9: 0
SUM OF ALL RESPONSES TO PHQ QUESTIONS 1-9: 0

## 2022-07-11 NOTE — PROGRESS NOTES
Subjective  Fernando Tuttle, 78 y.o. female presents today with:  Chief Complaint   Patient presents with    Follow-up     2 month           HPI    05/10/2022: Patient is here for f/u HTN. Is compliant with meds and has no side effects from them. Avoids added salt. Tries to eat healthy. Exercises occasionally. Has no chest pain, shortness of breath, palpitations or edema.     Patient is here for hyperlipidemia. Is compliant with medications and has no apparent side effects from them.     CVA. No new deficits. No facial droop. No speech impairment. No unilateral weakness. However, she occasional abnormal sensation running through LUE and LLE and lasting seconds occurring randomly. Does not occur more than once a week. No weakness. No headaches. No dysarthria or acute confusion. On Zetia, Niaspan and Aggrenox.     Memory loss - ST memory issues since concussion from fall in 2021 and since death of daughter.     Grief reaction - Daughter recently  from [facility].  Has joined grief counseling group through Hosp[facility]. Starts on Thursday. \"All I want to do is eat and sleep. \" Is tearful almost daily. Lacks volition. No SI. HAs anxiety r/t household business/finances. Given closed head injury 8 months ago, CVD with right MCA CVA and stuttering sx as well as memory changes, concerned for progression of CVD. Reassess radiographically. Continue current meds. Reviewed red flag sx and when to call 911. Memory changes may be related to grief reaction/depression. Encouraged group support for grief and will reassess for depression/reactive depression.     2022: MRI BRAIN WO CONTRAST  Narrative: EXAMINATION:  MRI BRAIN. MRA INTRACRANIAL VESSELS. CLINICAL HISTORY:  MEMORY LOSS. TIA. COMPARISONS:  2014. TECHNIQUE:  T1 and T2 and diffusion scans. No contrast. MIP images. FINDINGS:  There is slight generalized atrophy. There is mild to moderate ischemic white matter change.  There is no evidence of mass or infarct. There is no hemorrhage or hematoma. There is no midline shift or evidence of increased intracranial pressure. The internal carotid arteries and basilar artery are patent and normal in caliber. Superior cerebral arteries are patent. Anterior and middle cerebral arteries are patent. No abnormal vascularity is demonstrated. There is no evidence of an aneurysm. Impression: 1. AN UNENHANCED MRI SCAN OF THE BRAIN APPEARS FAIRLY UNREMARKABLE FOR A PATIENT OF THIS AGE. 2. INTRACRANIAL VESSELS APPEAR NORMAL. Nichelle Castro MRA HEAD WO CONTRAST  Narrative: EXAMINATION:  MRI BRAIN. MRA INTRACRANIAL VESSELS. CLINICAL HISTORY:  MEMORY LOSS. TIA. COMPARISONS:  12/22/2014. TECHNIQUE:  T1 and T2 and diffusion scans. No contrast. MIP images. FINDINGS:  There is slight generalized atrophy. There is mild to moderate ischemic white matter change. There is no evidence of mass or infarct. There is no hemorrhage or hematoma. There is no midline shift or evidence of increased intracranial pressure. The internal carotid arteries and basilar artery are patent and normal in caliber. Superior cerebral arteries are patent. Anterior and middle cerebral arteries are patent. No abnormal vascularity is demonstrated. There is no evidence of an aneurysm. Impression: 1. AN UNENHANCED MRI SCAN OF THE BRAIN APPEARS FAIRLY UNREMARKABLE FOR A PATIENT OF THIS AGE. 2. INTRACRANIAL VESSELS APPEAR NORMAL. Nichelle Castro Possible CVA/TIA - MRA and MRI negative in spite of sx c/w left MCA CVA. Is on Aggrenox, niacin, Zetia, Maxzide, Lotrel. No untoward bleeding events. Grief reaction: Did participate in Grief group with Hospice and benefitted.      Review of Systems  See above    Past Medical History:   Diagnosis Date    Asthma     Asthma in remission 1990    Breast cancer ([facility] [address] 2014    right breast    Elevated TSH     Hematoma of right thigh 9/30/2021    History of artificial lens replacement 3/24/2015    History of breast cancer 02/2014    Invasive ductal cancer right breast, Dr Fields [place], Dr Fady Manning History of therapeutic radiation     HTN (hypertension) 1980    was with 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    Dr Scharlene Saint (ortho)    Vitamin D deficiency      Past Surgical History:   Procedure Laterality Date    BLADDER SUSPENSION      BREAST BIOPSY Right 2/14/14    BREAST LUMPECTOMY Right 3/4/2014    Lumpectomy with [facility], Dr Jha Re       in [address] [place] [address] History     Socioeconomic History    Marital status:       Spouse name: Not on file    Number of children: 3    Years of education: Not on file    Highest education level: Not on file   Occupational History    Occupation: , retired   Tobacco Use    Smoking status: Former Smoker     Packs/day: 0.25     Years: 14.00     Pack years: 3.50     Types: Cigarettes    Smokeless tobacco: Never Used    Tobacco comment: started at age 11yo and quit at age 29yo   Substance and Sexual Activity    Alcohol use: No    Drug use: No    Sexual activity: Not on file   Other Topics Concern    Not on file   Social History Narrative    Born in [place], 2 younger brothers, one dec 2014, one in [place]     July 2020    , retired     [facility]     of first spouse    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, owned a convenient store    [employer], travel    4 children, 2 daughters in [place] ([place] and [place]), 2 sons in [address] [place] [address] great grandchildren >50        Son lives with her temporarily (01/08/2021)     Social Determinants of Health     Financial Resource Strain: Low Risk     Difficulty of Paying Living Expenses: Not hard at all   Food Insecurity: No Food Insecurity    Worried About Running Out of Food in the Last Year: Never true    Eryn of Food in the Last Year: Never true   Transportation Needs: No Transportation Needs    Lack of Transportation (Medical): No    Lack of Transportation (Non-Medical):  No   Physical Activity: Inactive    Days of Exercise per Week: 0 days    Minutes of Exercise per Session: 0 min   Stress:     Feeling of Stress : Not on file   Social Connections:     Frequency of Communication with Friends and Family: Not on file    Frequency of Social Gatherings with Friends and Family: Not on file    Attends Islam Services: Not on file    Active Member of Clubs or Organizations: Not on file    Attends Club or Organization Meetings: Not on file    Marital Status: Not on file   Intimate Partner Violence:     Fear of Current or Ex-Partner: Not on file    Emotionally Abused: Not on file    Physically Abused: Not on file    Sexually Abused: Not on file   Housing Stability:     Unable to Pay for Housing in the Last Year: Not on file    Number of [place] in the Last Year: Not on file    Unstable Housing in the Last Year: Not on file     Family History   Problem Relation Age of Onset    Kidney Disease Mother         dec age 79    Hypertension Mother    [facility] Migraines Mother     Diabetes Father     Heart Failure Father         dec age 76    [de-identified] Brother         dec age 76    Heart Attack Paternal Grandmother     Stroke Paternal Grandfather     Breast Cancer Paternal Cousin      Allergies   Allergen Reactions    Sulfa Antibiotics      Unsure of reaction     Iodinated Diagnostic Agents      Oral & IV dye group    Statins Other (See Comments)     Current Outpatient Medications   Medication Sig Dispense Refill    aspirin-dipyridamole (AGGRENOX)  MG per extended release capsule TAKE 1 CAPSULE TWICE DAILY 180 capsule 3    ezetimibe (ZETIA) 10 MG tablet TAKE 1 TABLET DAILY 90 tablet 3    niacin (NIASPAN) 500 MG extended release tablet TAKE 1 TABLET 3 TIMES A  tablet 4    amLODIPine-benazepril (LOTREL) 5-20 MG per capsule Take 1 capsule by mouth daily TAKE 1 CAPSULE DAILY 90 capsule 4    meclizine (ANTIVERT) 12.5 MG tablet Take 1 tablet by mouth daily 90 tablet 4    Cephalexin 250 MG TABS Take 1 tablet by mouth daily 90 tablet 5    Cyanocobalamin (VITAMIN B 12 PO) Take by mouth      CINNAMON PO Take 1 capsule by mouth daily       Polyvinyl Alcohol-Povidone (REFRESH OP) Apply 1 drop to eye 4 times daily as needed.  Vitamin D (CHOLECALCIFEROL) 1000 UNITS CAPS capsule Take 1,000 Units by mouth daily.  Magnesium 500 MG CAPS Take 1 capsule by mouth daily.  fish oil-omega-3 fatty acids 1000 MG capsule Take 1 g by mouth 3 times daily. No current facility-administered medications for this visit. PMH, Surgical Hx, Family Hx, and Social Hxreviewed and updated. Health Maintenance reviewed. Objective    Vitals:    07/11/22 1532   BP: 128/78   Pulse: 67   Temp: 97.3 °F (36.3 °C)   TempSrc: Temporal   SpO2: 97%   Weight: 159 lb (72.1 kg)   Height: 5' 5.5\" (1.664 m)        Physical Exam  Constitutional:       Appearance: She is well-developed. HENT:      Head: Normocephalic. Eyes:      Conjunctiva/sclera: Conjunctivae normal.   Pulmonary:      Effort: Pulmonary effort is normal.   Neurological:      Mental Status: She is alert and oriented to person, place, and time. Comments: Palatal tremor, essential tremor of head and neck   Psychiatric:         Behavior: Behavior normal.         Thought Content:  Thought content normal.         Judgment: Judgment normal.           Lab Results   Component Value Date    LABA1C 5.7 11/23/2021     Lab Results   Component Value Date    LABMICR <1.20 06/10/2019    CREATININE 0.76 05/11/2022     Lab Results   Component Value Date    ALT 17 09/30/2021    AST 22 09/30/2021     Lab Results   Component Value Date    CHOL 199 05/11/2022    TRIG 94 05/11/2022    HDL 60 (H) 05/11/2022    LDLCALC 120 05/11/2022        Assessment & Plan   Visit Diagnoses and Associated Orders     Cerebrovascular disease    -  Primary    Possible. No residual on normal MRA/MRI. Stable on Aggrenox. Attended by Dr. Chino Clark. Hypercalcemia        Suspect r/t Maxzide which we will stop and then repeat Ca. If elevation persists, then SPEP. Basic Metabolic Panel [[id number] Custom]   - Future Order         Grief reaction        improving, appears to have god support, has coping tools and understands what to expect in her healing process. Hyperlipidemia, unspecified hyperlipidemia type        No cerebral arterial disese with current therapy; statin intolerant. Primary hypertension        On no meds today. Prior BP was excellent/low. Will stop Maxzide d/t elevated calcium and follow Bp and labs. Basic Metabolic Panel [[id number] Custom]   - Future Order                 Reviewed with the patient: all disease processes, current clinical status, medications, activities and diet.      Side effects, adverse effects of the medication prescribed today, as well as treatment plan/ rationale and result expectations have been discussed with the patient who expresses understanding and desires to proceed.     Close follow up to evaluate treatment results and for coordination of care. I have reviewed the patient's medical history in detail and updated the computerized patient record. More than 50% of the appointment was spent in face-to-face counseling, education and care coordination.     Please note this report has been partially produced using speech recognition software and may contain mistakes related to that system including errors in grammar, punctuation and spelling as well as words and phrases that may seem inappropriate. If there are questions or concerns, please feel free to contact me to clarify. Orders Placed This Encounter   Procedures    Basic Metabolic Panel     Standing Status:   Future     Standing Expiration Date:   7/11/2023     No orders of the defined types were placed in this encounter. Medications Discontinued During This Encounter   Medication Reason    triamterene-hydroCHLOROthiazide (MAXZIDE-25) 37.5-25 MG per tablet Therapy completed     Return in about 6 weeks (around 8/22/2022) for HTN, hypercalcemia - OV. Controlled Substance Monitoring:    Acute and Chronic Pain Monitoring:   No flowsheet data found.         Willa Sheikh MD

## 2022-07-25 DIAGNOSIS — N39.0 RECURRENT UTI: ICD-10-CM

## 2022-07-25 RX ORDER — CEPHALEXIN 250 MG/1
1 TABLET ORAL DAILY
Qty: 90 TABLET | Refills: 5 | Status: SHIPPED | OUTPATIENT
Start: 2022-07-25

## 2022-07-25 RX ORDER — ASPIRIN AND DIPYRIDAMOLE 25; 200 MG/1; MG/1
CAPSULE, EXTENDED RELEASE ORAL
Qty: 180 CAPSULE | Refills: 3 | Status: SHIPPED | OUTPATIENT
Start: 2022-07-25

## 2022-09-06 ENCOUNTER — HOSPITAL ENCOUNTER (OUTPATIENT)
Dept: LAB | Age: 82
Discharge: HOME | End: 2022-09-06
Payer: MEDICARE

## 2022-09-06 DIAGNOSIS — I10 PRIMARY HYPERTENSION: ICD-10-CM

## 2022-09-06 DIAGNOSIS — E83.52 HYPERCALCEMIA: Chronic | ICD-10-CM

## 2022-09-06 LAB
ANION GAP SERPL CALCULATED.3IONS-SCNC: 11 MEQ/L (ref 9–15)
BUN BLDV-MCNC: 12 MG/DL (ref 8–23)
CALCIUM SERPL-MCNC: 10.1 MG/DL (ref 8.5–9.9)
CHLORIDE BLD-SCNC: 104 MEQ/L (ref 95–107)
CO2: 27 MEQ/L (ref 20–31)
CREAT SERPL-MCNC: 0.7 MG/DL (ref 0.5–0.9)
GFR AFRICAN AMERICAN: >60
GFR NON-AFRICAN AMERICAN: >60
GLUCOSE BLD-MCNC: 99 MG/DL (ref 70–99)
POTASSIUM SERPL-SCNC: 4.1 MEQ/L (ref 3.4–4.9)
SODIUM BLD-SCNC: 142 MEQ/L (ref 135–144)

## 2022-09-06 PROCEDURE — 36415 COLL VENOUS BLD VENIPUNCTURE: CPT

## 2022-09-06 PROCEDURE — 80048 BASIC METABOLIC PNL TOTAL CA: CPT

## 2022-09-09 ENCOUNTER — OFFICE VISIT (OUTPATIENT)
Dept: FAMILY MEDICINE CLINIC | Age: 82
End: 2022-09-09
Payer: MEDICARE

## 2022-09-09 VITALS
HEART RATE: 72 BPM | OXYGEN SATURATION: 97 % | DIASTOLIC BLOOD PRESSURE: 78 MMHG | HEIGHT: 66 IN | TEMPERATURE: 96.7 F | SYSTOLIC BLOOD PRESSURE: 124 MMHG | WEIGHT: 160 LBS | BODY MASS INDEX: 25.71 KG/M2

## 2022-09-09 DIAGNOSIS — I67.9 CEREBROVASCULAR DISEASE: Chronic | ICD-10-CM

## 2022-09-09 DIAGNOSIS — F32.9 REACTIVE DEPRESSION: ICD-10-CM

## 2022-09-09 DIAGNOSIS — R53.83 FATIGUE, UNSPECIFIED TYPE: ICD-10-CM

## 2022-09-09 DIAGNOSIS — G47.33 OSA (OBSTRUCTIVE SLEEP APNEA): ICD-10-CM

## 2022-09-09 DIAGNOSIS — E83.52 HYPERCALCEMIA: Chronic | ICD-10-CM

## 2022-09-09 DIAGNOSIS — F43.21 GRIEF REACTION: ICD-10-CM

## 2022-09-09 DIAGNOSIS — I10 ESSENTIAL HYPERTENSION: Primary | ICD-10-CM

## 2022-09-09 DIAGNOSIS — E78.5 HYPERLIPIDEMIA, UNSPECIFIED HYPERLIPIDEMIA TYPE: ICD-10-CM

## 2022-09-09 DIAGNOSIS — I10 PRIMARY HYPERTENSION: ICD-10-CM

## 2022-09-09 PROCEDURE — 1090F PRES/ABSN URINE INCON ASSESS: CPT | Performed by: FAMILY MEDICINE

## 2022-09-09 PROCEDURE — 1123F ACP DISCUSS/DSCN MKR DOCD: CPT | Performed by: FAMILY MEDICINE

## 2022-09-09 PROCEDURE — 99214 OFFICE O/P EST MOD 30 MIN: CPT | Performed by: FAMILY MEDICINE

## 2022-09-09 PROCEDURE — G8427 DOCREV CUR MEDS BY ELIG CLIN: HCPCS | Performed by: FAMILY MEDICINE

## 2022-09-09 PROCEDURE — G8417 CALC BMI ABV UP PARAM F/U: HCPCS | Performed by: FAMILY MEDICINE

## 2022-09-09 PROCEDURE — 1036F TOBACCO NON-USER: CPT | Performed by: FAMILY MEDICINE

## 2022-09-09 PROCEDURE — G8399 PT W/DXA RESULTS DOCUMENT: HCPCS | Performed by: FAMILY MEDICINE

## 2022-09-09 RX ORDER — FLUOXETINE HYDROCHLORIDE 20 MG/1
20 CAPSULE ORAL DAILY
Qty: 90 CAPSULE | Refills: 1 | Status: SHIPPED | OUTPATIENT
Start: 2022-09-09

## 2022-09-09 ASSESSMENT — PATIENT HEALTH QUESTIONNAIRE - PHQ9
2. FEELING DOWN, DEPRESSED OR HOPELESS: 1
4. FEELING TIRED OR HAVING LITTLE ENERGY: 0
8. MOVING OR SPEAKING SO SLOWLY THAT OTHER PEOPLE COULD HAVE NOTICED. OR THE OPPOSITE, BEING SO FIGETY OR RESTLESS THAT YOU HAVE BEEN MOVING AROUND A LOT MORE THAN USUAL: 0
1. LITTLE INTEREST OR PLEASURE IN DOING THINGS: 1
SUM OF ALL RESPONSES TO PHQ QUESTIONS 1-9: 2
SUM OF ALL RESPONSES TO PHQ QUESTIONS 1-9: 2
3. TROUBLE FALLING OR STAYING ASLEEP: 0
SUM OF ALL RESPONSES TO PHQ QUESTIONS 1-9: 2
5. POOR APPETITE OR OVEREATING: 0
10. IF YOU CHECKED OFF ANY PROBLEMS, HOW DIFFICULT HAVE THESE PROBLEMS MADE IT FOR YOU TO DO YOUR WORK, TAKE CARE OF THINGS AT HOME, OR GET ALONG WITH OTHER PEOPLE: 0
9. THOUGHTS THAT YOU WOULD BE BETTER OFF DEAD, OR OF HURTING YOURSELF: 0
7. TROUBLE CONCENTRATING ON THINGS, SUCH AS READING THE NEWSPAPER OR WATCHING TELEVISION: 0
SUM OF ALL RESPONSES TO PHQ QUESTIONS 1-9: 2
SUM OF ALL RESPONSES TO PHQ9 QUESTIONS 1 & 2: 2
6. FEELING BAD ABOUT YOURSELF - OR THAT YOU ARE A FAILURE OR HAVE LET YOURSELF OR YOUR FAMILY DOWN: 0

## 2022-09-09 NOTE — PROGRESS NOTES
Subjective  Ev Alejandra, 78 y.o. female presents today with:  Chief Complaint   Patient presents with    Hypertension     Follow up     Fatigue     Is able to fall asleep easily and is feeling tired            HPI    05/10/2022: Patient is here for f/u HTN. Is compliant with meds and has no side effects from them. Avoids added salt. Tries to eat healthy. Exercises occasionally. Has no chest pain, shortness of breath, palpitations or edema. Patient is here for hyperlipidemia. Is compliant with medications and has no apparent side effects from them. CVA. No new deficits. No facial droop. No speech impairment. No unilateral weakness. However, she occasional abnormal sensation running through LUE and LLE and lasting seconds occurring randomly. Does not occur more than once a week. No weakness. No headaches. No dysarthria or acute confusion. On Zetia, Niaspan and Aggrenox. Memory loss - ST memory issues since concussion from fall in 2021 and since death of daughter. Grief reaction - Daughter recently  from [facility].  Has joined grief counseling group through Hospice. Starts on Thursday. \"All I want to do is eat and sleep. \" Is tearful almost daily. Lacks volition. No SI. HAs anxiety r/t household business/finances. Given closed head injury 8 months ago, CVD with right MCA CVA and stuttering sx as well as memory changes, concerned for progression of CVD. Reassess radiographically. Continue current meds. Reviewed red flag sx and when to call 911. Memory changes may be related to grief reaction/depression. Encouraged group support for grief and will reassess for depression/reactive depression. 2022: MRI BRAIN WO CONTRAST  Narrative: EXAMINATION:  MRI BRAIN. MRA INTRACRANIAL VESSELS. CLINICAL HISTORY:  MEMORY LOSS. TIA. COMPARISONS:  2014. TECHNIQUE:  T1 and T2 and diffusion scans. No contrast. MIP images.      FINDINGS:  There is slight generalized atrophy. There is mild to moderate ischemic white matter change. There is no evidence of mass or infarct. There is no hemorrhage or hematoma. There is no midline shift or evidence of increased intracranial pressure. The internal carotid arteries and basilar artery are patent and normal in caliber. Superior cerebral arteries are patent. Anterior and middle cerebral arteries are patent. No abnormal vascularity is demonstrated. There is no evidence of an aneurysm. I   Possible CVA/TIA - MRA and MRI negative in spite of sx c/w left MCA CVA. Is on Aggrenox, niacin, Zetia, Maxzide, Lotrel. No untoward bleeding events. Grief reaction: Did participate in Grief group with Hospice and benefitted. 2022: HLD: Intolerant of statins. On Zetia and Niacin. CVD: Possible dx. MRA Without significant abnormalities. On Aggrenox. Hypercalcemia: Ca still elevated at 10.1. Has 10.4 prior to stopping TZD. Fatigued: Sleeps 7 hours a night. Nocturia: every 2-4 hours. (Has h/o UTIs.) Does get refreshing sleep. Has headaches upon awakening lasting <30 minutes. Dozes off easily under virtually any circumstance. No dozing while driving or during conversation. Has been told she snores. No gasping awake. One time she went for a sleep study and she could not fall asleep because nice and light prevent her from sleeping. Grief reaction/mood: low volition; \"I used to love getting up in the morning and go somewhere and now I don't. \" Not self-isolating. Not more tearful than she would expect. (Lost daughter and  recently.) Does not get much attention from remaining children and she saw  daughter almost every day. Son lives with her but he is not much company and he seems depressed.      No other questions and or concerns for today's visit          Past Medical History:   Diagnosis Date    Asthma     Asthma in remission     Breast cancer ([facility] [address] 2014    right breast    Elevated TSH Hematoma of right thigh 9/30/2021    History of artificial lens replacement 3/24/2015    History of breast cancer 02/2014    Invasive ductal cancer right breast, Dr Liane Wahl, Dr Marcial Cavazos    History of therapeutic radiation     HTN (hypertension) 1980    was with [facility]    Hx of ischemic left MCA stroke 2013    no residual, Dr Cristian Bee    Hx of ischemic right MCA stroke 12/2014    no residual, frontal lobe    Hypercalcemia 7/11/2022    Hyperlipidemia     Osteoarthritis     Secondary and unspecified malignant neoplasm of axilla and upper limb lymph nodes ([address] 11/8/2018    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    Dr Elana Mallory (ortho)    Vitamin D deficiency      Past Surgical History:   Procedure Laterality Date    BLADDER SUSPENSION      BREAST BIOPSY Right 2/14/14    BREAST LUMPECTOMY Right 3/4/2014    Lumpectomy with SLNB, Dr Woodard Angelucci Dr in [facility] [address] History     Socioeconomic History    Marital status:       Spouse name: Not on file    Number of children: 4    Years of education: Not on file    Highest education level: Not on file   Occupational History    Occupation: , retired   Tobacco Use    Smoking status: Former     Packs/day: 0.25     Years: 14.00     Pack years: 3.50     Types: Cigarettes    Smokeless tobacco: Never    Tobacco comments:     started at age 11yo and quit at age 29yo   Substance and Sexual Activity    Alcohol use: No    Drug use: No    Sexual activity: Not on file   Other Topics Concern    Not on file   Social History Narrative    Born in [place], 2 younger brothers, one dec 2014, one in [place]     July 2020    , retired     Lives in a house in [place] [facility] [place] [facility] [place]     of first spouse    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, owned a convenient store    [employer], travel    4 children, 2 daughters in [place] ([place] and [place]), 2 sons in [place] grandchildren >47        Son lives with her temporarily (01/08/2021)     Social Determinants of Health     Financial Resource Strain: Low Risk     Difficulty of Paying Living Expenses: Not hard at all   Food Insecurity: No Food Insecurity    Worried About [employer] in the Last Year: Never true    Ran Out of Food in the Last Year: Never true   Transportation Needs: No Transportation Needs    Lack of Transportation (Medical): No    Lack of Transportation (Non-Medical):  No   Physical Activity: Inactive    Days of Exercise per Week: 0 days    Minutes of Exercise per Session: 0 min   Stress: Not on file   Social Connections: Not on file   Intimate Partner Violence: Not on file   Housing Stability: Not on file     Family History   Problem Relation Age of Onset    Kidney Disease Mother         dec age 79    Hypertension Mother     Migraines Mother     Diabetes Father     Heart Failure Father         dec age 76    Cancer Brother         dec age 76    Heart Attack Paternal Grandmother     Stroke Paternal Grandfather     Breast Cancer Paternal Cousin      Allergies   Allergen Reactions    Sulfa Antibiotics      Unsure of reaction     Iodinated Diagnostic Agents      Oral & IV dye group    Statins Other (See Comments)     Current Outpatient Medications   Medication Sig Dispense Refill    FLUoxetine (PROZAC) 20 MG capsule Take 1 capsule by mouth daily 90 capsule 1    aspirin-dipyridamole (AGGRENOX)  MG per extended release capsule TAKE 1 CAPSULE TWICE DAILY 180 capsule 3    Cephalexin 250 MG TABS Take 1 tablet by mouth daily 90 tablet 5    ezetimibe (ZETIA) 10 MG tablet TAKE 1 TABLET DAILY 90 tablet 3    niacin (NIASPAN) 500 MG extended release tablet TAKE 1 TABLET 3 TIMES A  tablet 4    amLODIPine-benazepril (LOTREL) 5-20 MG per capsule Take 1 capsule by mouth daily TAKE 1 CAPSULE DAILY 90 capsule 4    meclizine (ANTIVERT) 12.5 MG tablet Take 1 tablet by mouth daily 90 tablet 4    Cyanocobalamin (VITAMIN B 12 PO) Take by mouth      CINNAMON PO Take 1 capsule by mouth daily       Polyvinyl Alcohol-Povidone (REFRESH OP) Apply 1 drop to eye 4 times daily as needed. Vitamin D (CHOLECALCIFEROL) 1000 UNITS CAPS capsule Take 1,000 Units by mouth daily. Magnesium 500 MG CAPS Take 1 capsule by mouth daily. fish oil-omega-3 fatty acids 1000 MG capsule Take 1 g by mouth 3 times daily. No current facility-administered medications for this visit. PMH, Surgical Hx, Family Hx, and Social Hxreviewed and updated. Health Maintenance reviewed. Objective    Vitals:    09/09/22 1519   BP: 124/78   Pulse: 72   Temp: (!) 96.7 °F (35.9 °C)   TempSrc: Temporal   SpO2: 97%   Weight: 160 lb (72.6 kg)   Height: 5' 5.5\" (1.664 m)        Physical Exam  Constitutional:       Appearance: She is well-developed. HENT:      Head: Normocephalic. Eyes:      Conjunctiva/sclera: Conjunctivae normal.   Pulmonary:      Effort: Pulmonary effort is normal.   Neurological:      Mental Status: She is alert and oriented to person, place, and time. Psychiatric:         Behavior: Behavior normal.         Thought Content:  Thought content normal.         Judgment: Judgment normal.         Lab Results   Component Value Date    LABA1C 5.7 11/23/2021     Lab Results   Component Value Date    LABMICR <1.20 06/10/2019    CREATININE 0.70 09/06/2022     Lab Results   Component Value Date    ALT 17 09/30/2021    AST 22 09/30/2021     Lab Results   Component Value Date    CHOL 199 05/11/2022    TRIG 94 05/11/2022    HDL 60 (H) 05/11/2022    LDLCALC 120 05/11/2022        Component Ref Range & Units 9/6/22 1149 5/11/22 0844 10/1/21 0559 9/30/21 1215 4/22/21 0901 12/9/19 1022 6/18/19 1805   Sodium 135 - 144 mEq/L 142  143  137  135  135  139  137    Potassium 3.4 - 4.9 mEq/L 4.1  4.4  3.8  3.4  4.0  3.6  3.6    Chloride 95 - 107 mEq/L 104  104  102  100  99  101  97    CO2 20 - 31 mEq/L 27  26  26  24  26  26  24    Anion Gap 9 - 15 mEq/L 11  13  9  11  10  12  16 High     Glucose 70 - 99 mg/dL 99  99  124 High   158 High   97  100 High   110 High     BUN 8 - 23 mg/dL 12  15  10  13  15  11  11    Creatinine 0.50 - 0.90 mg/dL 0.70  0.76  0.65  0.63  0.71  0.74  0.70    GFR Non- >60 >60.0  >60.0 CM  >60.0 CM  >60.0 CM  >60.0 CM  >60.0 CM  >60.0 CM    Comment: >60 mL/min/1.73m2 EGFR, calc. for ages 25 and older using the   MDRD formula (not corrected for weight), is valid for stable   renal function. GFR  >60 >60.0  >60.0 CM  >60.0 CM  >60.0 CM  >60.0 CM  >60.0 CM  >60.0 CM    Comment: >60 mL/min/1.73m2 EGFR, calc. for ages 25 and older using the   MDRD formula (not corrected for weight), is valid for stable   renal function.     Calcium 8.5 - 9.9 mg/dL 10.1 High   10.4 High   9.6  10.0 High   10.6 High   10.0 High   10.1 High       Assessment & Plan   Visit Diagnoses and Associated Orders       Essential hypertension    -  Primary    stable and well-controlled on current meds         Cerebrovascular disease        stable and well-controlled on current meds         Hypercalcemia        evaluating additional labs    Electrophoresis Protein, Serum [[id number] Custom]   - Future Order    Kappa/Lambda Quantitative Free Light Chains, Serum [[id number] Custom]   - Future Order         Hyperlipidemia, unspecified hyperlipidemia type        continue statin         Fatigue, unspecified type        suspect may be related to depression; labs other than calcium elevation appear to be related         GHAZAL (obstructive sleep apnea)        moderate risk on screen; PSG ordered    [facility] Sleep Study with PAP Titration [[id number] Custom]           Reactive depression        Start fluoxetine and f/u in 2-3 weeks    FLUoxetine (PROZAC) 20 MG capsule [[id number]]           Primary hypertension        stable and well-controlled         Grief reaction        urged to continue hospice group therapy wor; start fluxetine                   Reviewed with the patient: all disease processes, current clinical status, medications, activities and diet. Side effects, adverse effects of the medication prescribed today, as well as treatment plan/ rationale and result expectations have been discussed with the patient who expresses understanding and desires to proceed. Close follow up to evaluate treatment results and for coordination of care. I have reviewed the patient's medical history in detail and updated the computerized patient record. More than 50% of the appointment was spent in face-to-face counseling, education and care coordination. Time spent on appointment included reviewing past laboratory and radiographic results, previous notes, consultations, past procedures, medications, obtaining history and performing physical, formulating mutually agreed upon plan of care with patient - 40 minutes. .     Orders Placed This Encounter   Procedures    Electrophoresis Protein, Serum     Standing Status:   Future     Number of Occurrences:   1     Standing Expiration Date:   9/9/2023    Kappa/Lambda Quantitative Free Light Chains, Serum     Standing Status:   Future     Number of Occurrences:   1     Standing Expiration Date:   9/9/2023    [facility] Sleep Study with PAP Titration     Patients with abnormal results will be assessed and referred to the sleep  as needed.      Scheduling Instructions:      Sleep Study with PAP Titration - [place]       Scheduling and Pre-Certification: [phone]      The following must be completed and FAXED to [phone]      1) Sleep Evaluation Orders Form      2) Office note justifying study      3) Demographic info / insurance card     Order Specific Question:   Sleep Study Titration Type     Answer:   Split Night Study (Baseline followed by PAP Titration)     Order Specific Question:   Location For Sleep Study     Answer:   [place]     Order Specific Question:   Select Sleep Lab Location     Answer: [facility]     Comments:   [place]     Orders Placed This Encounter   Medications    FLUoxetine (PROZAC) 20 MG capsule     Sig: Take 1 capsule by mouth daily     Dispense:  90 capsule     Refill:  1     There are no discontinued medications. Return in about 2 months (around 11/9/2022) for Mood Disorder, grief - Debi Hensley. Controlled Substance Monitoring:    Acute and Chronic Pain Monitoring:   No flowsheet data found.         Keith Dominguez MD

## 2022-09-12 LAB
COMMENT: NORMAL
KAPPA FREE LIGHT CHAINS QNT: 22.35 MG/L (ref 3.3–19.4)
KAPPA/LAMBDA FREE LIGHT CHAIN RATIO: 1.27 (ref 0.26–1.65)
LAMBDA FREE LIGHT CHAINS QNT: 17.58 MG/L (ref 5.71–26.3)
MISCELLANEOUS LAB TEST RESULT: NORMAL

## 2022-09-14 LAB
ALBUMIN SERPL-MCNC: 4.34 G/DL (ref 3.75–5.01)
ALPHA-1-GLOBULIN: 0.22 G/DL (ref 0.19–0.46)
ALPHA-2-GLOBULIN: 0.97 G/DL (ref 0.48–1.05)
BETA GLOBULIN: 0.86 G/DL (ref 0.48–1.1)
GAMMA GLOBULIN: 0.91 G/DL (ref 0.62–1.51)
PROTEIN ELECTROPHORESIS, SERUM: NORMAL
SPE/IFE INTERPRETATION: NORMAL
TOTAL PROTEIN: 7.3 G/DL (ref 6.3–8.2)

## 2022-09-20 ENCOUNTER — HOSPITAL ENCOUNTER (OUTPATIENT)
Dept: SLEEP CENTER | Age: 82
Discharge: HOME | End: 2022-09-22
Payer: MEDICARE

## 2022-09-20 PROCEDURE — 95810 POLYSOM 6/> YRS 4/> PARAM: CPT

## 2022-09-26 PROCEDURE — 95810 POLYSOM 6/> YRS 4/> PARAM: CPT | Performed by: INTERNAL MEDICINE

## 2022-09-27 DIAGNOSIS — G47.9 SLEEP DISORDER: ICD-10-CM

## 2022-09-27 DIAGNOSIS — G47.33 OSA (OBSTRUCTIVE SLEEP APNEA): Primary | ICD-10-CM

## 2022-09-27 NOTE — RESULT ENCOUNTER NOTE
Please call patient. Her sleep study does show mild sleep apnea which should be treated with CPAP machine given her significant symptoms. I have referred her for the second CPAP titration sleep study and to see the sleep specialist, Dr. Michelle Daily.

## 2022-09-28 ENCOUNTER — OFFICE VISIT (OUTPATIENT)
Dept: FAMILY MEDICINE CLINIC | Age: 82
End: 2022-09-28
Payer: MEDICARE

## 2022-09-28 VITALS
WEIGHT: 155 LBS | SYSTOLIC BLOOD PRESSURE: 138 MMHG | BODY MASS INDEX: 24.91 KG/M2 | OXYGEN SATURATION: 99 % | HEART RATE: 68 BPM | HEIGHT: 66 IN | DIASTOLIC BLOOD PRESSURE: 78 MMHG | TEMPERATURE: 97 F

## 2022-09-28 DIAGNOSIS — B35.4 RINGWORM OF BODY: Primary | ICD-10-CM

## 2022-09-28 PROCEDURE — 99213 OFFICE O/P EST LOW 20 MIN: CPT

## 2022-09-28 PROCEDURE — 1123F ACP DISCUSS/DSCN MKR DOCD: CPT

## 2022-09-28 RX ORDER — KETOCONAZOLE 20 MG/G
CREAM TOPICAL 2 TIMES DAILY
Qty: 30 G | Refills: 1 | Status: SHIPPED | OUTPATIENT
Start: 2022-09-28 | End: 2022-10-28

## 2022-09-28 ASSESSMENT — ENCOUNTER SYMPTOMS
SHORTNESS OF BREATH: 0
CHEST TIGHTNESS: 0
WHEEZING: 0
COUGH: 0
VOMITING: 0
FACIAL SWELLING: 0
SORE THROAT: 0

## 2022-09-28 NOTE — PROGRESS NOTES
No respiratory distress. Breath sounds: Normal air entry. Musculoskeletal:      Cervical back: Normal range of motion. Skin:     General: Skin is warm and dry. Findings: Erythema and lesion present. No abscess or rash. Rash is not crusting, pustular or scaling. Comments: Raised erythematous pruritic annular lesion to anterior aspect of left breast 2cm x 2 cm. Raised macular papular erythematous pruritic annular lesion to left lower ABD skin fold. 2cm x 3cm. Neurological:      General: No focal deficit present. Mental Status: She is alert and oriented to person, place, and time. Mental status is at baseline. Psychiatric:         Mood and Affect: Mood normal.     Assessment & Plan   1. Ringworm of body  -     ketoconazole (NIZORAL) 2 % cream; Apply topically 2 times daily Apply to rash., Topical, 2 TIMES DAILY Starting Wed 9/28/2022, Until Fri 10/28/2022, For 30 days, Disp-30 g, R-1, Normal   -Wash and dry the areas daily, remove flaky skin and dry thoroughly  -Use antifungal cream to each area, spreading just beyond the edge. Continue using up to four weeks to each area once the lesions are cleared up to prevent reoccurrence.  -Wash hands thoroughly after touching rash to avoid spreading  -Wash towels, bedding, and clothing frequently to avoid re-occurrence. Do not share towels or clothing to avoid spread  -Follow up with PCP for any signs of infection or if rash is worsening or not improving with treatment  -Discussed red flags for when to seek care in ER      No orders of the defined types were placed in this encounter. Orders Placed This Encounter   Medications    ketoconazole (NIZORAL) 2 % cream     Sig: Apply topically 2 times daily Apply to rash. Dispense:  30 g     Refill:  1     Return if symptoms worsen or fail to improve, for follow up with PCP. Reviewed with the patient: current clinical status,medications, activities and diet.      Side effects, adverse effects of themedication prescribed today, as well as treatment plan/ rationale and result expectations have been discussed with the patient who expresses understanding and desires to proceed. Close follow up to evaluate treatment results and for coordination of care. I have reviewed the patient's medical history in detail and updated the computerized patient record.     CORONA Hopson - NP

## 2022-09-29 PROBLEM — G47.33 OSA (OBSTRUCTIVE SLEEP APNEA): Status: ACTIVE | Noted: 2022-09-29

## 2022-10-03 ENCOUNTER — OFFICE VISIT (OUTPATIENT)
Dept: PULMONOLOGY | Age: 82
End: 2022-10-03
Payer: MEDICARE

## 2022-10-03 VITALS
TEMPERATURE: 97.6 F | BODY MASS INDEX: 25.07 KG/M2 | HEIGHT: 66 IN | HEART RATE: 58 BPM | WEIGHT: 156 LBS | SYSTOLIC BLOOD PRESSURE: 122 MMHG | OXYGEN SATURATION: 98 % | DIASTOLIC BLOOD PRESSURE: 68 MMHG

## 2022-10-03 DIAGNOSIS — G47.10 HYPERSOMNIA: ICD-10-CM

## 2022-10-03 DIAGNOSIS — I10 HYPERTENSION, UNSPECIFIED TYPE: ICD-10-CM

## 2022-10-03 DIAGNOSIS — G47.33 OSA (OBSTRUCTIVE SLEEP APNEA): Primary | ICD-10-CM

## 2022-10-03 PROCEDURE — 99203 OFFICE O/P NEW LOW 30 MIN: CPT | Performed by: INTERNAL MEDICINE

## 2022-10-03 PROCEDURE — G8427 DOCREV CUR MEDS BY ELIG CLIN: HCPCS | Performed by: INTERNAL MEDICINE

## 2022-10-03 PROCEDURE — G8399 PT W/DXA RESULTS DOCUMENT: HCPCS | Performed by: INTERNAL MEDICINE

## 2022-10-03 PROCEDURE — G8417 CALC BMI ABV UP PARAM F/U: HCPCS | Performed by: INTERNAL MEDICINE

## 2022-10-03 PROCEDURE — 1036F TOBACCO NON-USER: CPT | Performed by: INTERNAL MEDICINE

## 2022-10-03 PROCEDURE — 1090F PRES/ABSN URINE INCON ASSESS: CPT | Performed by: INTERNAL MEDICINE

## 2022-10-03 PROCEDURE — 1123F ACP DISCUSS/DSCN MKR DOCD: CPT | Performed by: INTERNAL MEDICINE

## 2022-10-03 PROCEDURE — G8484 FLU IMMUNIZE NO ADMIN: HCPCS | Performed by: INTERNAL MEDICINE

## 2022-10-03 NOTE — PROGRESS NOTES
NEW PATIENT VISIT-PULMONARY/SLEEP    10/3/2022     REFERRING PHYSICIAN:  Carlos Osborn MD     REASON FOR REFERRAL:  GHAZAL    HPI:     Katerina Suazo is a 78 y.o. female who was referred to sleep and pulmonary clinic for evaluation. She underwent a recent sleep study which showed mild case of obstructive sleep apnea. Overall AHI was 5. REM AHI was 13. Sleep efficiency was 75%. There was no major desaturations. She is tired and sleepy during the day and she dozes off easily. [place] Sleepiness Scale is 14. She has a history of stroke in the past.  She has a history of hypertension as well. She denies dozing off behind the wheels.   No accident or near misses in the past.     Past Medical History   Past Medical History:   Diagnosis Date    Asthma     Asthma in remission 1990    Breast cancer ([address] 2014    right breast    Elevated TSH     Hematoma of right thigh 9/30/2021    History of artificial lens replacement 3/24/2015    History of breast cancer 02/2014    Invasive ductal cancer right breast, Dr Stiven Reed, Dr Kassandra Shaw    History of therapeutic radiation     HTN (hypertension) 1980    was with [facility]    Hx of ischemic left MCA stroke 2013    no residual, Dr Za Peña    Hx of ischemic right MCA stroke 12/2014    no residual, frontal lobe    Hypercalcemia 7/11/2022    Hyperlipidemia     Osteoarthritis     Secondary and unspecified malignant neoplasm of axilla and upper limb lymph nodes ([address] 11/8/2018    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    Dr Torrey Stone (ortho)    Vitamin D deficiency        Past Surgical History  Past Surgical History:   Procedure Laterality Date    BLADDER SUSPENSION      BREAST BIOPSY Right 2/14/14    BREAST LUMPECTOMY Right 3/4/2014    Lumpectomy with SLNB, Dr Mayda Marino Dr in [address] [facility] [address]         Allergies  Allergies   Allergen Reactions    Sulfa Antibiotics      Unsure of reaction     Iodinated Diagnostic Paternal Grandfather     Breast Cancer Paternal Cousin        Review of Systems  All review of systems has been obtained and negative other than what was mentionedin HPI. Physical Exam        Vitals:    10/03/22 1007   BP: 122/68   Pulse: 58   Temp: 97.6 °F (36.4 °C)   TempSrc: Tympanic   SpO2: 98%   Weight: 156 lb (70.8 kg)   Height: 5' 5.5\" (1.664 m)          General appearance: Well appearing. No acute distress. AAOX3  Head: Normocephalic, without obvious abnormality, atraumatic   Eyes:Pupils bilateral equal and reactive, EOM intact. Normal sclera and conjunctiva. Blackness under eft eye  Nose: Mucosa pink  Throat: Clear,  [place] 2  Neck: Supple, No JVD. Nothyromegaly. Neck is thin  Lungs: Clear bilaterally. No wheezing. No crackles. No use of accessory muscles. Heart: RRR, S1, S2 normal, no murmur, click, rub or gallop   Abdomen: soft, non-tender, nondistended. Bowel sounds normal. No hernia. No organomegaly. Extremities: extremities normal, atraumatic, no cyanosis, no edema  Skin: Skin color, texture, turgor normal. No rashes or lesions   Neurological: No focal deficits,cranial nerves grossly intact. No weakness. Sensation normal   Psych: Normal Mood  Musculoskeletal: No joint abnormalities. Impression:   Diagnosis Orders   1. GHAZAL (obstructive sleep apnea)  Sleep Study with PAP Titration      2. Hypersomnia        3. Hypertension, unspecified type             Orders Placed This Encounter   Procedures    Sleep Study with PAP Titration     Order Specific Question:   Sleep Study Titration Type     Answer:   CPAP           Recommendations:     - I went over results of sleep study with patient in details and answered all her questions  - Although she has a mild case of GHAZAL, she has significant hypersomnia. She has hypertension and history of stroke. It is recommended to treat her mild case of sleep apnea.   We will proceed with CPAP titration study.  - She was advised that she needs to use the machine every night at a minimum of 4 hours. - Continue blood pressure medications.  - No driving if sleepy or drowsy or otherwise impaired. Return in about 4 months (around 2/3/2023).        Electronically signed by Ariel Wheatley MD on 10/3/2022 at 10:39 AM

## 2022-10-10 ENCOUNTER — HOSPITAL ENCOUNTER (OUTPATIENT)
Dept: SLEEP CENTER | Age: 82
Discharge: HOME | End: 2022-10-12
Admitting: INTERNAL MEDICINE
Payer: MEDICARE

## 2022-10-10 PROCEDURE — 95811 POLYSOM 6/>YRS CPAP 4/> PARM: CPT

## 2022-11-21 ENCOUNTER — TELEPHONE (OUTPATIENT)
Dept: FAMILY MEDICINE CLINIC | Age: 82
End: 2022-11-21

## 2022-11-21 ENCOUNTER — HOSPITAL ENCOUNTER (OUTPATIENT)
Dept: WOMENS IMAGING | Age: 82
Discharge: HOME | End: 2022-11-23
Payer: MEDICARE

## 2022-11-21 VITALS — BODY MASS INDEX: 25.56 KG/M2 | HEIGHT: 66 IN

## 2022-11-21 DIAGNOSIS — N63.10 MASS OF RIGHT BREAST, UNSPECIFIED QUADRANT: Primary | ICD-10-CM

## 2022-11-21 DIAGNOSIS — R92.2 INCONCLUSIVE MAMMOGRAM: ICD-10-CM

## 2022-11-21 DIAGNOSIS — Z12.31 ENCOUNTER FOR SCREENING MAMMOGRAM FOR BREAST CANCER: ICD-10-CM

## 2022-11-21 PROCEDURE — 77067 SCR MAMMO BI INCL CAD: CPT

## 2022-11-21 NOTE — TELEPHONE ENCOUNTER
Patient has a new patient appt with Debi on 1/9/2023. Patient is being scheduled for a mammogram and the order needs to be a right diagnostic mammogram with ultrasound.  Category 0, mass in breast.     Please advise    [facility] [phone] ext [phone]

## 2022-11-29 ENCOUNTER — HOSPITAL ENCOUNTER (OUTPATIENT)
Dept: ULTRASOUND IMAGING | Age: 82
Discharge: HOME | End: 2022-12-01
Payer: MEDICARE

## 2022-11-29 ENCOUNTER — HOSPITAL ENCOUNTER (OUTPATIENT)
Dept: WOMENS IMAGING | Age: 82
Discharge: HOME | End: 2022-12-01
Payer: MEDICARE

## 2022-11-29 DIAGNOSIS — R92.8 ABNORMAL MAMMOGRAM OF RIGHT BREAST: Primary | ICD-10-CM

## 2022-11-29 DIAGNOSIS — N63.10 MASS OF RIGHT BREAST, UNSPECIFIED QUADRANT: ICD-10-CM

## 2022-11-29 DIAGNOSIS — R92.2 INCONCLUSIVE MAMMOGRAM: ICD-10-CM

## 2022-11-29 PROCEDURE — 76641 ULTRASOUND BREAST COMPLETE: CPT

## 2022-11-29 PROCEDURE — 77065 DX MAMMO INCL CAD UNI: CPT

## 2022-11-30 ENCOUNTER — TELEPHONE (OUTPATIENT)
Dept: WOMENS IMAGING | Age: 82
End: 2022-11-30

## 2022-11-30 NOTE — TELEPHONE ENCOUNTER
11-  Two attempts to call patient regarding her mammogram results on Nov 29th at [facility]. Both times line busy. Will continue to call.

## 2022-12-02 ENCOUNTER — TELEPHONE (OUTPATIENT)
Dept: FAMILY MEDICINE CLINIC | Age: 82
End: 2022-12-02

## 2022-12-02 NOTE — TELEPHONE ENCOUNTER
Patient is aware of her test results. She is questioning why Dr. Conor Fontenot is the doctor she is being referred to. She states that it is ok to leave a message.

## 2022-12-05 NOTE — TELEPHONE ENCOUNTER
12/05/2022  Spoke with patient and appointment made with Dr. Caty Loza. Navneet for Dec. 12 at 1:45 at the [facility] regarding her Bir[facility] 4 mammogram on Nov 29. Patient would like to cx appointment with PCP until after she sees Dr. Fly Blackwell. PCP office will be notified.

## 2022-12-09 RX ORDER — LIDOCAINE HYDROCHLORIDE AND EPINEPHRINE 15; 5 MG/ML; UG/ML
8 INJECTION, SOLUTION EPIDURAL ONCE
Status: CANCELLED | OUTPATIENT
Start: 2022-12-12

## 2022-12-12 ENCOUNTER — OFFICE VISIT (OUTPATIENT)
Dept: SURGERY | Age: 82
End: 2022-12-12
Payer: MEDICARE

## 2022-12-12 VITALS
BODY MASS INDEX: 24.99 KG/M2 | DIASTOLIC BLOOD PRESSURE: 88 MMHG | TEMPERATURE: 97.4 F | HEIGHT: 65 IN | OXYGEN SATURATION: 98 % | RESPIRATION RATE: 16 BRPM | SYSTOLIC BLOOD PRESSURE: 168 MMHG | HEART RATE: 60 BPM | WEIGHT: 150 LBS

## 2022-12-12 DIAGNOSIS — R92.8 ABNORMAL MAMMOGRAM OF RIGHT BREAST: Primary | ICD-10-CM

## 2022-12-12 DIAGNOSIS — R92.8 OTH ABN AND INCONCLUSIVE FINDINGS ON DX IMAGING OF BREAST: ICD-10-CM

## 2022-12-12 PROCEDURE — 3078F DIAST BP <80 MM HG: CPT | Performed by: SURGERY

## 2022-12-12 PROCEDURE — 99203 OFFICE O/P NEW LOW 30 MIN: CPT | Performed by: SURGERY

## 2022-12-12 PROCEDURE — 3074F SYST BP LT 130 MM HG: CPT | Performed by: SURGERY

## 2022-12-12 PROCEDURE — G8427 DOCREV CUR MEDS BY ELIG CLIN: HCPCS | Performed by: SURGERY

## 2022-12-12 PROCEDURE — 1036F TOBACCO NON-USER: CPT | Performed by: SURGERY

## 2022-12-12 PROCEDURE — G8420 CALC BMI NORM PARAMETERS: HCPCS | Performed by: SURGERY

## 2022-12-12 PROCEDURE — G8484 FLU IMMUNIZE NO ADMIN: HCPCS | Performed by: SURGERY

## 2022-12-12 PROCEDURE — G8399 PT W/DXA RESULTS DOCUMENT: HCPCS | Performed by: SURGERY

## 2022-12-12 PROCEDURE — 1090F PRES/ABSN URINE INCON ASSESS: CPT | Performed by: SURGERY

## 2022-12-12 PROCEDURE — 1123F ACP DISCUSS/DSCN MKR DOCD: CPT | Performed by: SURGERY

## 2022-12-12 RX ORDER — VITAMIN B COMPLEX
1 CAPSULE ORAL DAILY
COMMUNITY

## 2022-12-12 RX ORDER — DICYCLOMINE HYDROCHLORIDE 10 MG/1
10 CAPSULE ORAL
COMMUNITY

## 2022-12-12 ASSESSMENT — ENCOUNTER SYMPTOMS
COLOR CHANGE: 0
SHORTNESS OF BREATH: 0
VOMITING: 0
CHEST TIGHTNESS: 0
ABDOMINAL PAIN: 0
COUGH: 0
NAUSEA: 0
SORE THROAT: 0

## 2022-12-12 NOTE — PROGRESS NOTES
NEW BREAST PATIENT         SERVICE DATE: 12/12/22  SERVICE TIME:  1:36 PM EST    REFERRED BY:  ALYCIA Quezada  REASON FOR TODAY'S VISIT:    Chief Complaint   Patient presents with    New Patient     BIRADS 4 right breast, HX of right breast invasive ductal carcinoma ER/CT +, HER 2-,Can feel a mass in the right breast for a couple months that she felt after a fall on the right side, the area feels like it is getting softer, right breast is tender at the site when touched or pressed on by bra. CHAPERONE WAS OFFERED, PATIENT RESPONDED: no    HISTORY AND CHIEF COMPLAINT:  Kurt Zamora is a 78 y.o.  female who is here for a New Patient (BIRADS 4 right breast, HX of right breast invasive ductal carcinoma ER/CT +, HER 2-,Can feel a mass in the right breast for a couple months that she felt after a fall on the right side, the area feels like it is getting softer, right breast is tender at the site when touched or pressed on by bra.)  She has some tenderness but improved since her fall on the right breast  No fever or chills      BREAST HISTORY  Her past breast history (prior to this encounter) is as follows: Abnormal mammogram:   Yes, Right Breast BIRADS 4  Abnormal Breast US:  Yes, Right Breast BIRADS 4  Breast biopsy:    Yes, 2014 Right Breast Invasive Ductal carcinoma  Breast cysts:    No  Breast surgery:    Yes, Right Breast Lumpectomy and Axillary Dissection, followed by radiation and Anastrazole until 2017 when she started with osteopenia, could not take Tamoxifen because of HX stroke  Breast cancer              Yes, 2014 Right Breast Invasive Ductal carcinoma ER/CT +, HER2 -  History of Breastfeeding: No   Currently Breastfeeding: No     RISK FACTORS FOR BREAST CANCER:  Family History of Breast Cancer: Yes, significant for Paternal Cpusin .   History of ovarian cancer: no  Ashkenazi Ancestry: no  Age at the birth of first child: 23  Age at the onset of menses: 13  Age at menopause: 48   Hormonal therapy: no  Postmenopausal obesity: no BMI 24.96    BRA SIZE: 38B    Past Medical History:   Diagnosis Date    Asthma     Asthma in remission 1990    Breast cancer ([facility] [address] 2014    right breast    Elevated TSH     Hematoma of right thigh 9/30/2021    History of artificial lens replacement 3/24/2015    History of breast cancer 02/2014    Invasive ductal cancer right breast, Dr Brenda Fleming, Dr Ev Berger    History of therapeutic radiation     HTN (hypertension) 1980    was with [facility]    Hx of ischemic left MCA stroke 2013    no residual, Dr Wilfredo Lovelace    Hx of ischemic right MCA stroke 12/2014    no residual, frontal lobe    Hypercalcemia 7/11/2022    Hyperlipidemia     Osteoarthritis     Secondary and unspecified malignant neoplasm of axilla and upper limb lymph nodes ([facility] [address] 11/8/2018    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    Dr Les Barnett (ortho)    Vitamin D deficiency      Past Surgical History:   Procedure Laterality Date    BLADDER SUSPENSION      BREAST BIOPSY Right 02/14/2014    BREAST LUMPECTOMY Right 03/04/2014    Lumpectomy with SLNB, Dr Kylie Serr[address] Dr in [address]       Family History   Problem Relation Age of Onset    Kidney Disease Mother         dec age 79    Hypertension Mother     Migraines Mother     Diabetes Father     Heart Failure Father         dec age 76    Cancer Brother         dec age 76    Heart Attack Paternal Grandmother     Stroke Paternal Grandfather     Breast Cancer Paternal Cousin      Social History     Socioeconomic History    Marital status:       Spouse name: Not on file    Number of children: 4    Years of education: Not on file    Highest education level: Not on file   Occupational History    Occupation: , retired   Tobacco Use    Smoking status: Former     Packs/day: 0.25     Years: 14.00     Pack years: 3.50     Types: Cigarettes    Smokeless tobacco: Never    Tobacco comments:     started at age 9yo and quit at age 29yo   Vaping Use    Vaping Use: Never used   Substance and Sexual Activity    Alcohol use: No    Drug use: No    Sexual activity: Not on file   Other Topics Concern    Not on file   Social History Narrative    Born in [facility] [place], 2 younger brothers, one dec 2014, one in [place]     July 2020    , retired     Lives in a house in [facility]     of first spouse    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, owned a convenient store    [address], travel    4 children, 2 daughters in [place] ([place]), 2 sons in [address] [place] [address] great grandchildren >50        Son lives with her temporarily (01/08/2021)     Social Determinants of Health     Financial Resource Strain: Low Risk     Difficulty of Paying Living Expenses: Not hard at all   Food Insecurity: No Food Insecurity    Worried About [address] in the Last Year: Never true    [address] in the Last Year: Never true   Transportation Needs: No Transportation Needs    Lack of Transportation (Medical): No    Lack of Transportation (Non-Medical): No   Physical Activity: Inactive    Days of Exercise per Week: 0 days    Minutes of Exercise per Session: 0 min   Stress: Not on file   Social Connections: Not on file   Intimate Partner Violence: Not on file   Housing Stability: Not on file     Under care of PCP for all positive symptoms  Review of Systems   Constitutional:  Negative for activity change, appetite change, chills, diaphoresis, fatigue, fever and unexpected weight change. HENT:  Negative for congestion, ear pain, hearing loss, mouth sores, nosebleeds and sore throat. Respiratory:  Negative for cough, chest tightness and shortness of breath. Cardiovascular:  Negative for chest pain, palpitations and leg swelling. Gastrointestinal:  Negative for abdominal pain, nausea and vomiting.    Endocrine: Negative for cold intolerance, heat intolerance, polydipsia, polyphagia and polyuria. Genitourinary:  Negative for difficulty urinating, menstrual problem and vaginal bleeding. Musculoskeletal:  Positive for arthralgias. Negative for neck pain and neck stiffness. When she first wakes up, gets better with movement   Skin:  Negative for color change, pallor, rash and wound. Allergic/Immunologic: Negative for environmental allergies and immunocompromised state. Neurological:  Negative for weakness. Hematological:  Does not bruise/bleed easily. Psychiatric/Behavioral:  Negative for agitation, confusion, sleep disturbance and suicidal ideas. The patient is not nervous/anxious. Have you ever tested positive for AIDS? no  Have you ever tested positive for Hepatitis? no    ANTICOAGULANT MEDICATIONS:  Aggrenox    SOCIAL HISTORY   Marital status:   Occupation:  Retired   Tobacco use: Jorge Millan  reports that she has quit smoking. Her smoking use included cigarettes. She has a 3.50 pack-year smoking history. She has never used smokeless tobacco.  Alcohol use: Jorge Millan  reports no history of alcohol use. Drug use: Jorge Millan  reports no history of drug use. Caffeine intake: 4 cups of  only one scoop caffeinated with decaff coffee per day(s)  Exercise:  not active    Vitals:    12/12/22 1333 12/12/22 1351   BP: (!) 168/88 (!) 168/88   Site: Left Upper Arm Left Upper Arm   Pulse: 60    Resp: 16    Temp: 97.4 °F (36.3 °C)    SpO2: 98%    Weight: 150 lb (68 kg)    Height: 5' 5\" (1.651 m)         Physical Exam  Vitals reviewed. Constitutional:       Appearance: Normal appearance. She is well-developed. HENT:      Head: Normocephalic and atraumatic. Nose: Nose normal.   Eyes:      Conjunctiva/sclera: Conjunctivae normal.      Right eye: No hemorrhage. Left eye: No hemorrhage. Cardiovascular:      Rate and Rhythm: Normal rate. Pulmonary:      Effort: Pulmonary effort is normal. No respiratory distress.    Chest:   Breasts:     Breasts are asymmetrical (s/p right lumpectomy). Right: Inverted nipple, mass (thickness near right breast scar, unclear normal for patient or acute) and skin change (mild edematous skin, no color changes, unclear whether acute or chronic) present. No nipple discharge or tenderness. Left: No inverted nipple, mass, nipple discharge, skin change or tenderness. Musculoskeletal:         General: Normal range of motion. Cervical back: Normal range of motion and neck supple. Comments: Normal Range of motion in upper and lower extremities. Lymphadenopathy:      Cervical: No cervical adenopathy. Right cervical: No superficial, deep or posterior cervical adenopathy. Left cervical: No superficial, deep or posterior cervical adenopathy. Upper Body:      Right upper body: No supraclavicular, axillary or pectoral adenopathy. Left upper body: No supraclavicular, axillary or pectoral adenopathy. Skin:     General: Skin is warm and dry. Findings: No abrasion, bruising, erythema or lesion. Neurological:      Mental Status: She is alert and oriented to person, place, and time. She is not disoriented. Psychiatric:         Speech: Speech normal.         Behavior: Behavior normal. Behavior is cooperative. Thought Content: Thought content normal.         Judgment: Judgment normal.       RADIOGRAPHIC FINDINGS:    US BREAST COMPLETE RIGHT    Result Date: 11/29/2022  EXAMINATION: DIAGNOSTIC DIGITAL RIGHT BREAST MAMMOGRAM WITH TOMOSYNTHESIS; TARGETED ULTRASOUND OF THE RIGHT BREAST 11/29/2022 8:19 am; 11/29/2022 8:47 am TECHNIQUE: Diagnostic mammography of the right breast was performed with tomosynthesis. 2D standard and 3D tomosynthesis combination imaging performed through the right breast.  Computer aided detection was utilized in the interpretation of this exam.; Targeted ultrasound of the right breast was performed.  VIEWS: HISTORY: ORDERING SYSTEM PROVIDED HISTORY: Mass of right breast, unspecified follow-up. [facility] CECILIA DIGITAL SCREEN BILATERAL    Result Date: 11/21/2022  EXAMINATION: SCREENING DIGITAL BILATERAL MAMMOGRAM WITH TOMOSYNTHESIS, 11/21/2022 9:54 am TECHNIQUE: Screening mammography of the bilateral breasts was performed with tomosynthesis. 2D standard and 3D tomosynthesis combination imaging performed through both breasts in the MLO and CC projection. Computer aided detection was utilized in the interpretation of this exam. COMPARISON: Multiple, most recent 2021 HISTORY: Screening. FINDINGS: The breasts are heterogeneously dense, which may obscure small masses. There is a question of increased skin thickening of the right breast. Additional evaluation with right 2-D and 3-D diagnostic mammograms including 3-D spot compression views in the CC and MLO projections with possible sonography is suggested. There are postoperative changes of the right breast as seen on prior exam. There are no suspicious findings noted within the left breast.     There is a right breast mammographic abnormality. Follow-up is recommended with a right breast diagnostic mammogram with possible ultrasound. BIRADS: BIRADS - CATEGORY 0 Incomplete: Needs Additional Imaging Evaluation OVERALL ASSESSMENT - INCOMPLETE:NEED ADDITIONAL IMAGING EVALUATION. [facility] CECILIA DIGITAL DIAGNOSTIC UNILATERAL RIGHT    Result Date: 11/29/2022  EXAMINATION: DIAGNOSTIC DIGITAL RIGHT BREAST MAMMOGRAM WITH TOMOSYNTHESIS; TARGETED ULTRASOUND OF THE RIGHT BREAST 11/29/2022 8:19 am; 11/29/2022 8:47 am TECHNIQUE: Diagnostic mammography of the right breast was performed with tomosynthesis. 2D standard and 3D tomosynthesis combination imaging performed through the right breast.  Computer aided detection was utilized in the interpretation of this exam.; Targeted ultrasound of the right breast was performed.  VIEWS: HISTORY: ORDERING SYSTEM PROVIDED HISTORY: Mass of right breast, unspecified quadrant, Inconclusive mammogram TECHNOLOGIST PROVIDED HISTORY: Reason for exam:->mass on right breast, u/s if needed.; ORDERING SYSTEM PROVIDED HISTORY: Mass of right breast, unspecified quadrant TECHNOLOGIST PROVIDED HISTORY: FINDINGS: Mammogram: Density: The breast tissue is heterogeneously dense, which may obscure small masses. There remains dermal thickening throughout the right breast, most prominent within the right lower breast.  This was further evaluated with right breast ultrasound. Ultrasound: There is an oval circumscribed hypoechoic mass with posterior acoustic enhancement within the surgical bed in the right breast 9 o'clock 2 cm from the nipple that measures up to 1 cm. This is favored represent a postoperative seroma. Dermal thickening is again seen throughout the breast, most prominent in the right lower breast around 6 o'clock. Additionally, there is some edema identified. 1.  There is asymmetric dermal thickening throughout the right breast.  Given the asymmetry and the fact this is new since prior examinations this is characterized as suspicious. Follow-up is recommended with an MRI of bilateral breasts with and without contrast.  Additionally, follow-up is recommended with a surgical consultation. Consideration could be given to punch biopsy of the thickened dermis. 2.  There is a hypoechoic mass in the area of the surgical bed. This is favored to represent the postoperative seroma. However, this could be further evaluated with the already recommended MRI of the breast. BIRADS: BIRADS - CATEGORY 4 Suspicious Abnormality. Biopsy should be considered at this time. OVERALL ASSESSMENT - SUSPICIOUS A letter of notification will be sent to the patient regarding the results. My findings and recommendations were discussed with the patient at the time of service. A representative from the radiology department will be contacting your office and assisting the patient in getting appropriate follow-up. ASSESSMENT         IMPRESSION :      ICD-10-CM    1. Abnormal mammogram of right breast  R92.8 MRI BREAST BILATERAL W WO CONTRAST      2. Oth abn and inconclusive findings on dx imaging of breast  R92.8 MRI BREAST BILATERAL W WO CONTRAST           PLAN:    Reviewed imaging however clinical findings are not suggestive of inflammatory cancer however this is the first I am examining her. Recommend bilateral breast MRI before any biopsy. Will see her shortly afterwards. Explained this to the patient and she stated understanding. Will schedule this MRI. Will order bmp I recommend a bilateral breast MRI. I reviewed there is an IV and dye is needed in order to properly evaluate the breast. We discussed the sensitivity and specificity of each and the false positive rate for the MRI. F/u with PCP regarding elevated BP  Orders Placed This Encounter   Procedures    MRI BREAST BILATERAL W WO CONTRAST     Standing Status:   Future     Standing Expiration Date:   12/12/2023     Order Specific Question:   STAT Creatinine as needed:     Answer:   No     Order Specific Question:   What is the sedation requirement? Answer:   None      No orders of the defined types were placed in this encounter. Return in about 4 weeks (around 1/9/2023) for MRI / SKIN CHECK UP. I spent a total of 40 minutes on the date of the service which included preparing to see the patient, face-to-face patient care, completing clinical documentation, obtaining and/or reviewing separately obtained history, performing a medically appropriate examination, counseling and educating the patient/family/caregiver, ordering medications, tests, or procedures, communicating with other HCPs (not separately reported), independently interpreting results (not separately reported), communicating results to the patient/family/caregiver and care coordination (not separately reported).  Discussion regarding natural history and potential progression of breast changes, timing of surveillance visits, timing and type of surveillance imaging, life-style modification, exercise, and limiting alcohol intake were performed today. I personally and independently saw and examined patient and reviewed all pertinent laboratory data and imaging studies. I have reviewed and agree with the history and review of systems as documented in the above note. This document is generated, in part, by voice recognition software and thus syntax and grammatical errors are possible. Carole Maradiaga MD    CC: ALYCIA Hess    The chief complaint, extensive medical and family history, and review of systems were asked and reviewed with the patient by me. I also reviewed the note in detail. This note was partially generated using Dragon voice recognition system, and there may be some incorrect words, spellings, punctuation that were not noticed in checking the note before saving.

## 2022-12-19 DIAGNOSIS — R92.8 ABNORMAL MAMMOGRAM: Primary | ICD-10-CM

## 2022-12-20 ENCOUNTER — TELEPHONE (OUTPATIENT)
Dept: FAMILY MEDICINE CLINIC | Age: 82
End: 2022-12-20

## 2022-12-20 NOTE — TELEPHONE ENCOUNTER
MRI scheduled 1/10/22 @ 9:30am & patient would like to know if the ultrasound needs repeated from 11/29/22.     Please advise and thank you,

## 2022-12-20 NOTE — TELEPHONE ENCOUNTER
Please cancel Mammogram and US of breast. Dr. Bhavani Nelson has ordered MRI of bilateral breast which IS the correct imaging needed. Again, cancel 1/5/23 for Mammogram and US and continue with MRI 1/10/22.

## 2023-01-04 ENCOUNTER — OFFICE VISIT (OUTPATIENT)
Dept: PULMONOLOGY | Age: 83
End: 2023-01-04
Payer: MEDICARE

## 2023-01-04 VITALS
BODY MASS INDEX: 24.66 KG/M2 | HEART RATE: 88 BPM | TEMPERATURE: 97.6 F | DIASTOLIC BLOOD PRESSURE: 80 MMHG | HEIGHT: 65 IN | OXYGEN SATURATION: 98 % | WEIGHT: 148 LBS | SYSTOLIC BLOOD PRESSURE: 138 MMHG

## 2023-01-04 DIAGNOSIS — G47.10 HYPERSOMNIA: ICD-10-CM

## 2023-01-04 DIAGNOSIS — G47.33 OSA (OBSTRUCTIVE SLEEP APNEA): Primary | ICD-10-CM

## 2023-01-04 DIAGNOSIS — I10 HYPERTENSION, UNSPECIFIED TYPE: ICD-10-CM

## 2023-01-04 PROCEDURE — G8484 FLU IMMUNIZE NO ADMIN: HCPCS | Performed by: INTERNAL MEDICINE

## 2023-01-04 PROCEDURE — 1036F TOBACCO NON-USER: CPT | Performed by: INTERNAL MEDICINE

## 2023-01-04 PROCEDURE — G8427 DOCREV CUR MEDS BY ELIG CLIN: HCPCS | Performed by: INTERNAL MEDICINE

## 2023-01-04 PROCEDURE — 99213 OFFICE O/P EST LOW 20 MIN: CPT | Performed by: INTERNAL MEDICINE

## 2023-01-04 PROCEDURE — 3079F DIAST BP 80-89 MM HG: CPT | Performed by: INTERNAL MEDICINE

## 2023-01-04 PROCEDURE — G8420 CALC BMI NORM PARAMETERS: HCPCS | Performed by: INTERNAL MEDICINE

## 2023-01-04 PROCEDURE — 1123F ACP DISCUSS/DSCN MKR DOCD: CPT | Performed by: INTERNAL MEDICINE

## 2023-01-04 PROCEDURE — 3075F SYST BP GE 130 - 139MM HG: CPT | Performed by: INTERNAL MEDICINE

## 2023-01-04 PROCEDURE — G8399 PT W/DXA RESULTS DOCUMENT: HCPCS | Performed by: INTERNAL MEDICINE

## 2023-01-04 PROCEDURE — 1090F PRES/ABSN URINE INCON ASSESS: CPT | Performed by: INTERNAL MEDICINE

## 2023-01-04 NOTE — PROGRESS NOTES
PATIENT VISIT-PULMONARY/SLEEP    1/4/2023     REFERRING PHYSICIAN:  Raynard Goltz, PA     REASON FOR REFERRAL:  GHAZAL    HPI:     Kenna Enriquez is a 78 y.o. female who was referred to sleep and pulmonary clinic for evaluation. She underwent a recent sleep study which showed mild case of obstructive sleep apnea. Overall AHI was 5. REM AHI was 13. Sleep efficiency was 75%. There was no major desaturations. She is tired and sleepy during the day and she dozes off easily. [place] Sleepiness Scale is 14. She has a history of stroke in the past.  She has a history of hypertension as well. She denies dozing off behind the wheels. No accident or near misses in the past.     1/4/23:    Comes back for follow up. She underwent CPAP titration study which found pressure 6 cm of H2O was adequate for her. She is here to discuss results. She has been feeling about the same with excessive daytime sleepiness. She felt refreshed after the CPAP titration study.     Past Medical History   Past Medical History:   Diagnosis Date    Asthma     Asthma in remission 1990    Breast cancer ([address] 2014    right breast    Elevated TSH     Hematoma of right thigh 9/30/2021    History of artificial lens replacement 3/24/2015    History of breast cancer 02/2014    Invasive ductal cancer right breast, Dr Erick Martinez, Dr Bertha Carrillo    History of therapeutic radiation     HTN (hypertension) 1980    was with [facility]    Hx of ischemic left MCA stroke 2013    no residual, Dr Stephanie Sunshine    Hx of ischemic right MCA stroke 12/2014    no residual, frontal lobe    Hypercalcemia 7/11/2022    Hyperlipidemia     Osteoarthritis     Secondary and unspecified malignant neoplasm of axilla and upper limb lymph nodes ([address] 11/8/2018    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    Dr Janet Muniz (ortho)    Vitamin D deficiency        Past Surgical History  Past Surgical History:   Procedure Laterality Date    BLADDER SUSPENSION      BREAST BIOPSY Right 02/14/2014    BREAST LUMPECTOMY Right 03/04/2014    Lumpectomy with SLNB, Dr Shadi Akers Dr in [address]         Allergies  Allergies   Allergen Reactions    Sulfa Antibiotics      Unsure of reaction     Iodinated Contrast Media      Oral & IV dye group    Statins Other (See Comments)       Medications  Current Outpatient Medications   Medication Sig Dispense Refill    b complex vitamins capsule Take 1 capsule by mouth daily      Glucosamine-Chondroitin (GLUCOSAMINE CHONDR COMPLEX PO) Take 1 tablet by mouth daily      Multiple Vitamins-Minerals (ABC COMPLETE SENIOR WOMENS 50+ PO) Take 1 tablet by mouth daily      dicyclomine (BENTYL) 10 MG capsule Take 10 mg by mouth 4 times daily (before meals and nightly)      linaclotide (LINZESS) 145 MCG capsule Take 145 mcg by mouth daily as needed      FLUoxetine (PROZAC) 20 MG capsule Take 1 capsule by mouth daily 90 capsule 1    aspirin-dipyridamole (AGGRENOX)  MG per extended release capsule TAKE 1 CAPSULE TWICE DAILY 180 capsule 3    Cephalexin 250 MG TABS Take 1 tablet by mouth daily 90 tablet 5    ezetimibe (ZETIA) 10 MG tablet TAKE 1 TABLET DAILY 90 tablet 3    niacin (NIASPAN) 500 MG extended release tablet TAKE 1 TABLET 3 TIMES A  tablet 4    amLODIPine-benazepril (LOTREL) 5-20 MG per capsule Take 1 capsule by mouth daily TAKE 1 CAPSULE DAILY 90 capsule 4    Polyvinyl Alcohol-Povidone (REFRESH OP) Apply 1 drop to eye 4 times daily as needed. Vitamin D (CHOLECALCIFEROL) 1000 UNITS CAPS capsule Take 1,000 Units by mouth daily. fish oil-omega-3 fatty acids 1000 MG capsule Take 1 g by mouth 3 times daily. No current facility-administered medications for this visit.        Social History  Social History     Tobacco Use    Smoking status: Former     Packs/day: 0.25     Years: 14.00     Pack years: 3.50     Types: Cigarettes    Smokeless tobacco: Never    Tobacco comments:     started at age 9yo and quit at age 31yo   Substance Use Topics    Alcohol use: No       Family History  Family History   Problem Relation Age of Onset    Kidney Disease Mother         dec age 79    Hypertension Mother     Migraines Mother     Diabetes Father     Heart Failure Father         dec age 76    Cancer Brother         dec age 76    Heart Attack Paternal Grandmother     Stroke Paternal Grandfather     Breast Cancer Paternal Cousin        Review of Systems  All review of systems has been obtained and negative other than what was mentionedin HPI. Physical Exam        Vitals:    01/04/23 1012   BP: 138/80   Pulse: 88   Temp: 97.6 °F (36.4 °C)   TempSrc: Tympanic   SpO2: 98%   Weight: 148 lb (67.1 kg)   Height: 5' 5\" (1.651 m)          General appearance: Well appearing. No acute distress. AAOX3  Head: Normocephalic, without obvious abnormality, atraumatic   Eyes:Pupils bilateral equal and reactive, EOM intact. Normal sclera and conjunctiva. Blackness under eft eye  Nose: Mucosa pink  Throat: Clear,  [place] 2  Neck: Supple, No JVD. Nothyromegaly. Neck is thin  Lungs: Clear bilaterally. No wheezing. No crackles. No use of accessory muscles. Heart: RRR, S1, S2 normal, no murmur, click, rub or gallop   Abdomen: soft, non-tender, nondistended. Bowel sounds normal. No hernia. No organomegaly. Extremities: extremities normal, atraumatic, no cyanosis, no edema  Skin: Skin color, texture, turgor normal. No rashes or lesions   Neurological: No focal deficits,cranial nerves grossly intact. No weakness. Sensation normal   Psych: Normal Mood  Musculoskeletal: No joint abnormalities. Impression:   Diagnosis Orders   1. GHAZAL (obstructive sleep apnea)        2. Hypersomnia        3. Hypertension, unspecified type             No orders of the defined types were placed in this encounter.           Recommendations:     - I went over results of sleep study with patient in details and answered all her questions  -We will be prescribing CPAP at a pressure of 6 cm H2O.  - She was advised that she needs to use the machine every night at a minimum of 4 hours. - Continue blood pressure medications.  - No driving if sleepy or drowsy or otherwise impaired.  -Follow-up 4 weeks after being on CPAP. Return in about 2 months (around 3/4/2023). Phone visit.        Electronically signed by Rosaura Woods MD on 1/4/2023 at 10:51 AM

## 2023-01-10 ENCOUNTER — HOSPITAL ENCOUNTER (OUTPATIENT)
Dept: MRI IMAGING | Age: 83
Discharge: HOME | End: 2023-01-12
Payer: MEDICARE

## 2023-01-10 DIAGNOSIS — R92.8 OTH ABN AND INCONCLUSIVE FINDINGS ON DX IMAGING OF BREAST: ICD-10-CM

## 2023-01-10 DIAGNOSIS — R92.8 ABNORMAL MAMMOGRAM OF RIGHT BREAST: ICD-10-CM

## 2023-01-10 PROCEDURE — A9577 INJ MULTIHANCE: HCPCS | Performed by: SURGERY

## 2023-01-10 PROCEDURE — 6360000004 HC RX CONTRAST MEDICATION: Performed by: SURGERY

## 2023-01-10 PROCEDURE — G1010 CDSM STANSON: HCPCS

## 2023-01-10 RX ADMIN — GADOBENATE DIMEGLUMINE 20 ML: 529 INJECTION, SOLUTION INTRAVENOUS at 10:38

## 2023-01-13 DIAGNOSIS — R92.8 ABNORMAL MAGNETIC RESONANCE IMAGING OF RIGHT BREAST: Primary | ICD-10-CM

## 2023-01-16 ENCOUNTER — TELEPHONE (OUTPATIENT)
Dept: OBGYN CLINIC | Age: 83
End: 2023-01-16

## 2023-01-16 ENCOUNTER — TELEPHONE (OUTPATIENT)
Dept: WOMENS IMAGING | Age: 83
End: 2023-01-16

## 2023-01-16 NOTE — TELEPHONE ENCOUNTER
I informed the patient that Sheree Kwok would be calling her to schedule the breast biopsy and then the patient will follow up in the office with Dr. Amanda Wilkins as scheduled on 1/30/2023. The patient verbalized understanding and has no further questions at this time.

## 2023-01-16 NOTE — TELEPHONE ENCOUNTER
Received call from patient would like to know if she had to schedule a biopsy appointment. Patient confirmed her pending appointment with Dr. Fidelia Elder.

## 2023-01-16 NOTE — TELEPHONE ENCOUNTER
I was calling to inform the patient that [facility] ELIAS, the breast nurse navigator, will be calling her to schedule the biopsy appt. The phone rings busy.

## 2023-01-26 ENCOUNTER — HOSPITAL ENCOUNTER (OUTPATIENT)
Dept: WOMENS IMAGING | Age: 83
Discharge: HOME | End: 2023-01-28
Payer: MEDICARE

## 2023-01-26 ENCOUNTER — HOSPITAL ENCOUNTER (OUTPATIENT)
Dept: ULTRASOUND IMAGING | Age: 83
Discharge: HOME | End: 2023-01-28
Payer: MEDICARE

## 2023-01-26 VITALS — SYSTOLIC BLOOD PRESSURE: 171 MMHG | RESPIRATION RATE: 20 BRPM | HEART RATE: 113 BPM | DIASTOLIC BLOOD PRESSURE: 66 MMHG

## 2023-01-26 DIAGNOSIS — R92.8 ABNORMAL MAGNETIC RESONANCE IMAGING OF RIGHT BREAST: ICD-10-CM

## 2023-01-26 PROCEDURE — 2500000003 HC RX 250 WO HCPCS: Performed by: RADIOLOGY

## 2023-01-26 PROCEDURE — 38505 NEEDLE BIOPSY LYMPH NODES: CPT

## 2023-01-26 RX ORDER — LIDOCAINE HYDROCHLORIDE 20 MG/ML
20 INJECTION, SOLUTION INFILTRATION; PERINEURAL ONCE
Status: COMPLETED | OUTPATIENT
Start: 2023-01-26 | End: 2023-01-26

## 2023-01-26 RX ORDER — LIDOCAINE HYDROCHLORIDE AND EPINEPHRINE 10; 10 MG/ML; UG/ML
20 INJECTION, SOLUTION INFILTRATION; PERINEURAL ONCE
Status: COMPLETED | OUTPATIENT
Start: 2023-01-26 | End: 2023-01-26

## 2023-01-26 RX ADMIN — LIDOCAINE HYDROCHLORIDE AND EPINEPHRINE 2 ML: 10; 10 INJECTION, SOLUTION INFILTRATION; PERINEURAL at 11:21

## 2023-01-26 RX ADMIN — LIDOCAINE HYDROCHLORIDE 5 ML: 20 INJECTION, SOLUTION INFILTRATION; PERINEURAL at 11:19

## 2023-01-26 ASSESSMENT — PAIN SCALES - GENERAL: PAINLEVEL_OUTOF10: 0

## 2023-01-26 NOTE — SEDATION DOCUMENTATION
1039 Patient arrived to [facility] with steady gait. 1054 Reviewed history, medications, and allergies. Reviewed procedure with patient and patient verbalizes understanding. VS taken. Consent signed. 1100 Patient assisted into ultrasound room and onto ultrasound cart. Tech scanning. 12 Dr. Arnold Liu arrived to [facility] and into ultrasound room. Talking with patient and looking at scans. [facility] Dr. Arnold Liu scanning. Area of concern located and marked. Area cleaned with chloraprep, towels draped around area, and sterile probe cover over probe. He then injected lido 2 % into site to numb and then injected lido 1% with epi deeper into site. He then made small incision. Using [employer] 14g x 10cm lot [id number] exp 7-28-25 he then took samples of tissue. Two samples of tissue Marked A in 3ml NS for flow cytometry< Leukemia/lymphocytes phenotyping out at 1127. He then took 3 more samples into formalin marked B at 1129. Then he inserted  He then inserted [employer] X shape marker lot [id number] exp 7-28-26. Compression held to site. Steri strips, gauze, and tegaderm applied. No post mamm per Dr. Arnold Liu. Patient tolerated well. Patient assisted off ultrasound cart and into consultation room. Steady gait. 1153 VS taken. Dressing clean, dry, and intact. Chest area wrapped in ace wrap and ice to bx site. Another ice pack given to patient. 1210 Discharge instructions reviewed with patient and patient verbalizes understanding. Encouraged to call with any questions or concerns. Patient left [facility] with steady gait.

## 2023-01-26 NOTE — DISCHARGE INSTRUCTIONS
Post Procedure Instructions for Breast Biopsies    You have [facility] a breast [facility] the [facility] which was ordered by Dr. Rashaad De Leon. Toth    Activity  You may return to work or other activities the day of your biopsy, providing these activites do not require any heavy lifting or strenuous activity. We do recommend that you take the rest of the day following your biopsy just to rest.    Diet  You may resume your normal diet    Medications  1. Continue taking your normal medications. 2. You may take a mild pain reliever, as needed, such as Tylenol (Acetaminophen), Advil (Ibuprofen) or Motrin    Dressing  1. Wear your bra day and night for the remainder of today and tomorrow  2. Keep the ice pack on for 15 minutes at least 2 times today  3. You may shower and pat the dressing dry tomorrow. On Saturday you may remove the dressing. The biopsy site should have started to heal at this point. At your discretion you can put a band-aid on it. Other Instructions  1. You may have some bruising following the biopsy, that is normal because of the compression and it will heal and go away  2. For severyal days or even a couple of weeks, you may feel mild tenderness, \"twinges\", or even a small bump at the biopsy site. This is normal. Applying moist heat to the area may bring relief. This will disappear with time    If you develop any of the following symptoms, please contact your referring physician. 1. Active bleeding-hold compression to the site. If it does not stop call the radiology department  2. If you develop redness or heat at the biopsy site or a fever 5-7 days following your biopsy this could be a sign of an early infection    Physician performing exam: Dr. Antionette Mendez    Please call Dr.M. Toth office on Tuesday for results and to make a follow-up appointment   [phone]    Your [address] RN, [place]   Phone: [phone]   Cell  [phone]        Radiology Phone Number: [phone]  [facility]: [phone]

## 2023-01-29 LAB
INTERPRETATION: NORMAL
NUMBER OF MARKERS: 22 MARKERS
PROF LEUK/LYMPH PHENO 16 OR MORE MARKERS: NORMAL
SOURCE: NORMAL
TECHNICAL LEUK/LYMPH PHENO, 22 MARKERS: NORMAL

## 2023-01-31 ENCOUNTER — OFFICE VISIT (OUTPATIENT)
Dept: FAMILY MEDICINE CLINIC | Age: 83
End: 2023-01-31

## 2023-01-31 VITALS
RESPIRATION RATE: 16 BRPM | SYSTOLIC BLOOD PRESSURE: 154 MMHG | HEART RATE: 60 BPM | WEIGHT: 153 LBS | HEIGHT: 65 IN | BODY MASS INDEX: 25.49 KG/M2 | DIASTOLIC BLOOD PRESSURE: 80 MMHG | OXYGEN SATURATION: 99 %

## 2023-01-31 DIAGNOSIS — Z85.3 PERSONAL HISTORY OF MALIGNANT NEOPLASM OF BREAST: ICD-10-CM

## 2023-01-31 DIAGNOSIS — E78.5 HYPERLIPIDEMIA, UNSPECIFIED HYPERLIPIDEMIA TYPE: ICD-10-CM

## 2023-01-31 DIAGNOSIS — E55.9 VITAMIN D DEFICIENCY: ICD-10-CM

## 2023-01-31 DIAGNOSIS — I10 ESSENTIAL HYPERTENSION: ICD-10-CM

## 2023-01-31 DIAGNOSIS — I67.9 CEREBROVASCULAR DISEASE: Chronic | ICD-10-CM

## 2023-01-31 DIAGNOSIS — F43.21 GRIEF REACTION: ICD-10-CM

## 2023-01-31 DIAGNOSIS — C50.911 ADENOCARCINOMA, BREAST, RIGHT (HCC): Primary | ICD-10-CM

## 2023-01-31 DIAGNOSIS — G47.33 OSA (OBSTRUCTIVE SLEEP APNEA): ICD-10-CM

## 2023-01-31 RX ORDER — MECLIZINE HCL 12.5 MG/1
12.5 TABLET ORAL 2 TIMES DAILY
COMMUNITY

## 2023-01-31 ASSESSMENT — PATIENT HEALTH QUESTIONNAIRE - PHQ9
SUM OF ALL RESPONSES TO PHQ QUESTIONS 1-9: 2
SUM OF ALL RESPONSES TO PHQ9 QUESTIONS 1 & 2: 2
9. THOUGHTS THAT YOU WOULD BE BETTER OFF DEAD, OR OF HURTING YOURSELF: 0
SUM OF ALL RESPONSES TO PHQ QUESTIONS 1-9: 2
1. LITTLE INTEREST OR PLEASURE IN DOING THINGS: 0
4. FEELING TIRED OR HAVING LITTLE ENERGY: 0
3. TROUBLE FALLING OR STAYING ASLEEP: 0
10. IF YOU CHECKED OFF ANY PROBLEMS, HOW DIFFICULT HAVE THESE PROBLEMS MADE IT FOR YOU TO DO YOUR WORK, TAKE CARE OF THINGS AT HOME, OR GET ALONG WITH OTHER PEOPLE: 0
SUM OF ALL RESPONSES TO PHQ QUESTIONS 1-9: 2
8. MOVING OR SPEAKING SO SLOWLY THAT OTHER PEOPLE COULD HAVE NOTICED. OR THE OPPOSITE, BEING SO FIGETY OR RESTLESS THAT YOU HAVE BEEN MOVING AROUND A LOT MORE THAN USUAL: 0
SUM OF ALL RESPONSES TO PHQ QUESTIONS 1-9: 2
6. FEELING BAD ABOUT YOURSELF - OR THAT YOU ARE A FAILURE OR HAVE LET YOURSELF OR YOUR FAMILY DOWN: 0
5. POOR APPETITE OR OVEREATING: 0
2. FEELING DOWN, DEPRESSED OR HOPELESS: 2
7. TROUBLE CONCENTRATING ON THINGS, SUCH AS READING THE NEWSPAPER OR WATCHING TELEVISION: 0

## 2023-01-31 NOTE — PROGRESS NOTES
Subjective  Jonelle Senior, 78 y.o. female presents today with:  Chief Complaint   Patient presents with    Follow-up     Change care from Dr. Fanny Cardoso no current concerns        HPI  In the office today to establish care. Previous PCP: dr.de Flaco Soto. Overall, feeling well. Due for labs. History of abnormal mammogram of R breast.    Had initial visit with Dr. Richy Conroy on 12/12/22. She has history of R breast, invasive ductal carcinoma. She then had MRI of breast on 1/10/23 then lymph node biopsy of R axilla. Has follow up on 2/6/23 to discuss results. GHAZAL. Mild sleep apnea. Had f/u with Dr. Mercedez Gambino on 1/4/23. To be started on CPAP machine nightly. Does have excessive daytime sleepiness. Will have a f/u visit via phone in about 4 weeks after starting CPAP. History of CVA. Patient is compliant with her medications. Denies gait abnormality, worsening HA. No falls/injury/trauma. She is compliant with her HTN medication. Did not take medication yet today. No change from baseline. Followed by Dr. Richy Conroy yearly. Acute grief reaction--lost oldest daughter to Meme a year ago on 2/2/22. Feeling OK at this time. Denies worsening grief. Will be going to lunch with her LEXI, has three living children that she is close with. Review of Systems   Constitutional:  Positive for fatigue. Negative for activity change, appetite change, chills, fever and unexpected weight change. HENT:  Negative for congestion, hearing loss and nosebleeds. Eyes:  Negative for photophobia and visual disturbance. Respiratory:  Negative for chest tightness and shortness of breath. Cardiovascular:  Negative for chest pain and leg swelling. Gastrointestinal:  Negative for abdominal pain, blood in stool, diarrhea, nausea and vomiting. Endocrine: Negative for cold intolerance and heat intolerance. Genitourinary:  Negative for dysuria, hematuria, menstrual problem and urgency. Musculoskeletal:  Negative for arthralgias, joint swelling and myalgias. Skin:  Negative for rash. Neurological:  Negative for dizziness and headaches. Psychiatric/Behavioral:  Positive for sleep disturbance. Negative for dysphoric mood. The patient is not nervous/anxious. Past Medical History:   Diagnosis Date    Asthma     Asthma in remission 1990    Breast cancer ([facility] [address] 2014    right breast    Elevated TSH     Hematoma of right thigh 9/30/2021    History of artificial lens replacement 3/24/2015    History of breast cancer 02/2014    Invasive ductal cancer right breast, Dr Dasha Duffy, Dr Martínez Crane    History of therapeutic radiation     HTN (hypertension) 1980    was with [facility]    Hx of ischemic left MCA stroke 2013    no residual, Dr Charli Curiel    Hx of ischemic right MCA stroke 12/2014    no residual, frontal lobe    Hypercalcemia 7/11/2022    Hyperlipidemia     Osteoarthritis     Secondary and unspecified malignant neoplasm of axilla and upper limb lymph nodes ([facility] [address] 11/8/2018    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    Dr Yahir Davison (ortho)    Vitamin D deficiency      Past Surgical History:   Procedure Laterality Date    BLADDER SUSPENSION      BREAST BIOPSY Right 02/14/2014    BREAST LUMPECTOMY Right 03/04/2014    Lumpectomy with SLNB, Dr Alejandra Vergara Dr in [facility]  1/26/2023     LYMPH NODE BIOPSY [address]     Social History     Socioeconomic History    Marital status:       Spouse name: Not on file    Number of children: 4    Years of education: Not on file    Highest education level: Not on file   Occupational History    Occupation: , retired   Tobacco Use    Smoking status: Former     Packs/day: 0.25     Years: 14.00     Pack years: 3.50     Types: Cigarettes    Smokeless tobacco: Never    Tobacco comments:     started at age 11yo and quit at age 31yo   Vaping Use    Vaping Use: Never used Substance and Sexual Activity    Alcohol use: No    Drug use: No    Sexual activity: Not on file   Other Topics Concern    Not on file   Social History Narrative    Born in [place], 2 younger brothers, one dec 2014, one in [place]     July 2020    , retired     Lives in a house in [place]     of first spouse    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, owned a convenient store    [address] [employer], travel    4 children, 2 daughters in [place] ([facility] [place] and [place]), 2 sons in [address] [place] [address] great grandchildren >50        Son lives with her temporarily (01/08/2021)     Social Determinants of Health     Financial Resource Strain: Low Risk     Difficulty of Paying Living Expenses: Not hard at [facility] Insecurity: No Food Insecurity    Worried About [address] in the Last Year: Never true    [address] in the Last Year: Never true   Transportation Needs: No Transportation Needs    Lack of Transportation (Medical): No    Lack of Transportation (Non-Medical):  No   Physical Activity: Inactive    Days of Exercise per Week: 0 days    Minutes of Exercise per Session: 0 min   Stress: Not on file   Social Connections: Not on file   Intimate Partner Violence: Not on file   Housing Stability: Not on file     Family History   Problem Relation Age of Onset    Kidney Disease Mother         dec age 79    Hypertension Mother     Migraines Mother     Diabetes Father     Heart Failure Father         dec age 76    Cancer Brother         dec age 76    Heart Attack Paternal Grandmother     Stroke Paternal Grandfather     Breast Cancer Paternal Cousin      Allergies   Allergen Reactions    Sulfa Antibiotics      Unsure of reaction     Iodinated Contrast Media      Oral & IV dye group    Statins Other (See Comments)     Current Outpatient Medications   Medication Sig Dispense Refill    meclizine (ANTIVERT) 12.5 MG tablet Take 12.5 mg by mouth in the morning and at bedtime Half a tablet      b complex vitamins capsule Take 1 capsule by mouth daily      Multiple Vitamins-Minerals (ABC COMPLETE SENIOR WOMENS 50+ PO) Take 1 tablet by mouth daily      aspirin-dipyridamole (AGGRENOX)  MG per extended release capsule TAKE 1 CAPSULE TWICE DAILY 180 capsule 3    ezetimibe (ZETIA) 10 MG tablet TAKE 1 TABLET DAILY 90 tablet 3    niacin (NIASPAN) 500 MG extended release tablet TAKE 1 TABLET 3 TIMES A  tablet 4    amLODIPine-benazepril (LOTREL) 5-20 MG per capsule Take 1 capsule by mouth daily TAKE 1 CAPSULE DAILY 90 capsule 4    Polyvinyl Alcohol-Povidone (REFRESH OP) Apply 1 drop to eye 4 times daily as needed. Vitamin D (CHOLECALCIFEROL) 1000 UNITS CAPS capsule Take 1,000 Units by mouth daily. fish oil-omega-3 fatty acids 1000 MG capsule Take 1 g by mouth 3 times daily. No current facility-administered medications for this visit. PMH, Surgical Hx, Family Hx, and Social Hx reviewed and updated. Health Maintenance reviewed. Objective  Vitals:    01/31/23 1048 01/31/23 1057   BP: (!) 160/80 (!) 154/80   Site: Right Upper Arm    Position: Sitting    Cuff Size: Medium Adult    Pulse: 60    Resp: 16    SpO2: 99%    Weight: 153 lb (69.4 kg)    Height: 5' 5\" (1.651 m)      BP Readings from Last 3 Encounters:   01/31/23 (!) 154/80   01/26/23 (!) 171/66   01/04/23 138/80     Wt Readings from Last 3 Encounters:   01/31/23 153 lb (69.4 kg)   01/04/23 148 lb (67.1 kg)   12/12/22 150 lb (68 kg)     Physical Exam  Constitutional:       General: She is not in acute distress. Appearance: She is well-developed. She is not diaphoretic. HENT:      Head: Normocephalic and atraumatic. Right Ear: External ear normal.      Left Ear: External ear normal.   Eyes:      Conjunctiva/sclera: Conjunctivae normal.   Cardiovascular:      Rate and Rhythm: Normal rate and regular rhythm. Heart sounds: Normal heart sounds. No murmur heard.   Pulmonary:      Effort: Pulmonary effort is normal. No respiratory distress. Breath sounds: Normal breath sounds. No wheezing or rales. Musculoskeletal:         General: Normal range of motion. Cervical back: Normal range of motion. Skin:     General: Skin is warm and dry. Findings: No erythema or rash. Neurological:      Mental Status: She is alert and oriented to person, place, and time. Psychiatric:         Mood and Affect: Affect is flat. Comments: Slightly flat affect, denies worsening depression. Losing daughter was very hard. Doing OK at this time. Has f/u visit with Dr. Charli Curiel to discuss biopsy results. Spends much of her time reading. Retired, worked at [employer] Assessment & Plan   Marco Horn was seen today for follow-up. Diagnoses and all orders for this visit:    Adenocarcinoma, breast, right ([address]    Hyperlipidemia, unspecified hyperlipidemia type  -     Lipid, Fasting; Future    Vitamin D deficiency  -     Vitamin D 25 Hydroxy; Future    Essential hypertension  -     CBC; Future  -     Comprehensive Metabolic Panel; Future    Cerebrovascular disease    GHAZAL (obstructive sleep apnea)    Personal history of malignant neoplasm of breast    Has 2/6 f/u with Dr Navjot Curiel to discuss results. Labs today. Starting CPAP machine. 3-4 month follow up with me. Orders Placed This Encounter   Procedures    CBC     Standing Status:   Future     Standing Expiration Date:   1/31/2024    Comprehensive Metabolic Panel     Standing Status:   Future     Standing Expiration Date:   1/31/2024    Lipid, Fasting     Standing Status:   Future     Standing Expiration Date:   1/31/2024    Vitamin D 25 Hydroxy     Standing Status:   Future     Standing Expiration Date:   1/31/2024     No orders of the defined types were placed in this encounter.     Medications Discontinued During This Encounter   Medication Reason    Glucosamine-Chondroitin (GLUCOSAMINE CHONDR COMPLEX PO) LIST CLEANUP    FLUoxetine (PROZAC) 20 MG capsule LIST CLEANUP    dicyclomine (BENTYL) 10 MG capsule Therapy completed    linaclotide (LINZESS) 145 MCG capsule Therapy completed    Cephalexin 250 MG TABS Therapy completed     No follow-ups on file. Reviewed with the patient: current clinical status, medications, activities and diet. Side effects, adverse effects of the medication prescribed today, as well as treatment plan/ rationale and result expectations have been discussed with the patient who expresses understanding and desires to proceed. Close follow up to evaluate treatment results and for coordination of care. I have reviewed the patient's medical history in detail and updated the computerized patient record.     Debi Malone PA-C

## 2023-02-01 ENCOUNTER — TELEPHONE (OUTPATIENT)
Dept: SURGERY | Age: 83
End: 2023-02-01

## 2023-02-01 PROBLEM — C50.911 ADENOCARCINOMA, BREAST, RIGHT (HCC): Status: ACTIVE | Noted: 2023-02-01

## 2023-02-01 PROBLEM — F43.21 GRIEF REACTION: Status: ACTIVE | Noted: 2023-02-01

## 2023-02-01 PROBLEM — F43.20 GRIEF REACTION: Status: ACTIVE | Noted: 2023-02-01

## 2023-02-01 PROBLEM — R42 DIZZINESS: Status: RESOLVED | Noted: 2020-02-04 | Resolved: 2023-02-01

## 2023-02-01 ASSESSMENT — ENCOUNTER SYMPTOMS
DIARRHEA: 0
BLOOD IN STOOL: 0
VOMITING: 0
PHOTOPHOBIA: 0
NAUSEA: 0
ABDOMINAL PAIN: 0
SHORTNESS OF BREATH: 0
CHEST TIGHTNESS: 0

## 2023-02-01 NOTE — TELEPHONE ENCOUNTER
----- Message from Felisha Turner MD sent at 2/1/2023 12:14 PM EST -----  Regarding: can u call her with dx, she needs to see med onc too    ----- Message -----  From: Rolly White Incoming Lab Results From Soft  Sent: 1/31/2023   2:38 PM EST  To: Felisha Turner MD

## 2023-02-01 NOTE — TELEPHONE ENCOUNTER
I informed the patient of her Left Axilla LN Biopsy results. The patient verbalized understanding of her Pathology results. I verified the patient's follow up appointment date/time/location with Radha Crowe. I informed the patient that Dr. Denise Quinones would like her to follow up with Dr. Kassandra Carbajal. The patient is scheduled for a Hematology Oncology follow up in July. I explained that Dr. Denise Quinones would like her to follow up as soon as possible. I informed the patient that I would notify [address] [place] [address] tomorrow, because their office is currently closed ,that she needs a sooner follow up appointment. The patient is aware that someone from [address] [place] [address] should be calling her in the next two business days with a new appointment date/time. The patient verbalized understanding of her pathology results, her follow up appointment and the need to see Hematology Oncology as soon as possible. The patient has no questions at this time.

## 2023-02-06 ENCOUNTER — OFFICE VISIT (OUTPATIENT)
Dept: SURGERY | Age: 83
End: 2023-02-06
Payer: MEDICARE

## 2023-02-06 ENCOUNTER — PREP FOR PROCEDURE (OUTPATIENT)
Dept: SURGERY | Age: 83
End: 2023-02-06

## 2023-02-06 VITALS
BODY MASS INDEX: 25.66 KG/M2 | TEMPERATURE: 97 F | WEIGHT: 154 LBS | HEART RATE: 62 BPM | SYSTOLIC BLOOD PRESSURE: 138 MMHG | HEIGHT: 65 IN | DIASTOLIC BLOOD PRESSURE: 72 MMHG | OXYGEN SATURATION: 98 % | RESPIRATION RATE: 16 BRPM

## 2023-02-06 DIAGNOSIS — C77.3 BREAST CANCER METASTASIZED TO AXILLARY LYMPH NODE, RIGHT (HCC): Primary | ICD-10-CM

## 2023-02-06 DIAGNOSIS — Z85.3 PERSONAL HISTORY OF BREAST CANCER: ICD-10-CM

## 2023-02-06 DIAGNOSIS — C50.911 BREAST CANCER METASTASIZED TO AXILLARY LYMPH NODE, RIGHT (HCC): Primary | ICD-10-CM

## 2023-02-06 PROCEDURE — G8417 CALC BMI ABV UP PARAM F/U: HCPCS | Performed by: SURGERY

## 2023-02-06 PROCEDURE — 1036F TOBACCO NON-USER: CPT | Performed by: SURGERY

## 2023-02-06 PROCEDURE — 3074F SYST BP LT 130 MM HG: CPT | Performed by: SURGERY

## 2023-02-06 PROCEDURE — G8427 DOCREV CUR MEDS BY ELIG CLIN: HCPCS | Performed by: SURGERY

## 2023-02-06 PROCEDURE — 99215 OFFICE O/P EST HI 40 MIN: CPT | Performed by: SURGERY

## 2023-02-06 PROCEDURE — G2212 PROLONG OUTPT/OFFICE VIS: HCPCS | Performed by: SURGERY

## 2023-02-06 PROCEDURE — 1123F ACP DISCUSS/DSCN MKR DOCD: CPT | Performed by: SURGERY

## 2023-02-06 PROCEDURE — G8399 PT W/DXA RESULTS DOCUMENT: HCPCS | Performed by: SURGERY

## 2023-02-06 PROCEDURE — G8484 FLU IMMUNIZE NO ADMIN: HCPCS | Performed by: SURGERY

## 2023-02-06 PROCEDURE — 1090F PRES/ABSN URINE INCON ASSESS: CPT | Performed by: SURGERY

## 2023-02-06 PROCEDURE — 3078F DIAST BP <80 MM HG: CPT | Performed by: SURGERY

## 2023-02-06 RX ORDER — SODIUM CHLORIDE 0.9 % (FLUSH) 0.9 %
5-40 SYRINGE (ML) INJECTION EVERY 12 HOURS SCHEDULED
OUTPATIENT
Start: 2023-02-06

## 2023-02-06 RX ORDER — SODIUM CHLORIDE 9 MG/ML
INJECTION, SOLUTION INTRAVENOUS PRN
OUTPATIENT
Start: 2023-02-06

## 2023-02-06 RX ORDER — SODIUM CHLORIDE 0.9 % (FLUSH) 0.9 %
5-40 SYRINGE (ML) INJECTION PRN
OUTPATIENT
Start: 2023-02-06

## 2023-02-06 NOTE — PROGRESS NOTES
CANCER TALK    PATIENT:  Papito Davies    DATE:     2/6/23    SURGICAL DISCUSSION:    Vitals:    02/06/23 1325   BP: 138/72   Site: Left Lower Arm   Pulse: 62   Resp: 16   Temp: 97 °F (36.1 °C)   SpO2: 98%   Weight: 154 lb (69.9 kg)   Height: 5' 5\" (1.651 m)        Bertha Rushing is a 78y.o. year old  female who presents for a discussion right breast cancer. We underwent a description of the pathology of her tumor, the clinical stage, her treatment options neoadjuvant chemotherapy vs RMRM. The indications for chemotherapy and radiation therapy were also discussed. The patient has undergone explanation of the complications of the procedures which are including but not limited to bleeding, infection, nerve injury and lymphedema. Lymphedema rates discussed. Multiple questions were answered and the patient wanted to schedule a right MRM at the next available time. She needs PET scan. She needs to see med onc for possible NAC. All treatment recommendations are per NCCN guidelines. We discussed cultural and personal values in her medical treatment options. Barriers to care were discussed. We discussed pain after surgical treatment and options for treatment including OTC medication, ice therapy, and rarely prescription narcotics. Referrals to physical therapy for prehab, NP for presurgical consideration / exercise conversations and  are given to all presurgery. GENETIC COUNSELING RISK ASSESSMENT, DISCUSSION, AND SUGGESTED FOLLOW UP:    We reviewed the natural history and genetic etiology of sporadic, familial and hereditary cancer syndromes. The patient's personal and family history is potentially suggestive of: Hereditary Breast and Ovarian Cancer Syndrome. The patient meets NCCN HBOC testing criteria based on her personal and family history. Her personal history of breast cancer x 2 and family history of paternal cousin with breast cancer.      We discussed that identification of a hereditary cancer syndrome may help her care providers tailor the patients medical management. If a mutation indicating Hereditary Breast and Ovarian Cancer Syndrome is detected in this case, the [address] [place] [address] recommendations would include increased cancer surveillance and prophylactic surgery options. If a mutation is detected, the patient will be referred back to the referring provider and to any additional appropriate care providers to discuss the relevant options. Inheritance of hereditary cancer syndromes was discussed with the patient. If a mutation is not found in the patient, this will decrease the likelihood of Hereditary Breast and Ovarian Cancer Syndrome as the explanation for her personal history. Cancer surveillance options would be discussed for the patient according to the appropriate standard [facility] guidelines, with consideration of their personal and family history risk factors. In this case, the patient will be referred back to their care providers for discussions of management. The patient was offered [facility] Hereditary Cancer test, BRCA 1 and BRCA 2 with 38 genes for Multi-Cancer. After considering the risks, benefits, and limitations, the patient chose to pursue and provided informed consent for the following testing:  [facility] Hereditary Cancer test, BRCA 1 and BRCA 2 with 38 genes for Multi-Cancer. 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 Personal history of breast cancer             Consultations to    Plastic Surgery Yes, But Patient declined  Radiation/Oncology Yes   Medical/Oncology  Yes  Genetic Counseling Yes  Fertility issues  Not Applicable    Yes  Psychology Yes, But Patient declined    Genetics  PET scan stat  She needs to see med onc asap (she sees deborah)  RIGHT Modified radical mastectomy 2/23   She needs medical clearance and neuro clearance (she needs to be off the aggrenox for surgery)    Nutrition counseling given:  Recommend an active lifestyle, healthy diet, limited alcohol intake, achieve and maintain a healthy BMI to optimize breast cancer outcomes    Dictated by:  Bacilio Jack MD 2/6/23    Total face to face time was 70 minutes today with greater than 50% spent on counseling the patient or coordinating her care, reviewing records prior to visit, history, physical exam, reviewing imaging. Discussion regarding natural history and potential progression of breast changes, timing of surveillance visits, timing and type of surveillance imaging, life-style modification, exercise, and limiting alcohol intake were performed today. Cc: Yuridia Irwin PA-C     This note was partially generated using Dragon voice recognition system, and there may be some incorrect words, spellings, punctuation that were not noticed in checking the note before saving.

## 2023-02-07 ENCOUNTER — OFFICE VISIT (OUTPATIENT)
Dept: NEUROLOGY | Age: 83
End: 2023-02-07
Payer: MEDICARE

## 2023-02-07 ENCOUNTER — TELEPHONE (OUTPATIENT)
Dept: SURGERY | Age: 83
End: 2023-02-07

## 2023-02-07 VITALS
DIASTOLIC BLOOD PRESSURE: 74 MMHG | SYSTOLIC BLOOD PRESSURE: 130 MMHG | HEART RATE: 64 BPM | BODY MASS INDEX: 25.73 KG/M2 | WEIGHT: 154.6 LBS

## 2023-02-07 DIAGNOSIS — S06.0X0D CLOSED HEAD INJURY WITH CONCUSSION, WITHOUT LOSS OF CONSCIOUSNESS, SUBSEQUENT ENCOUNTER: ICD-10-CM

## 2023-02-07 DIAGNOSIS — H81.13 BENIGN PAROXYSMAL VERTIGO OF BOTH EARS: ICD-10-CM

## 2023-02-07 DIAGNOSIS — G25.0 ESSENTIAL TREMOR: Primary | ICD-10-CM

## 2023-02-07 DIAGNOSIS — C77.3 BREAST CANCER METASTASIZED TO AXILLARY LYMPH NODE, RIGHT (HCC): Primary | ICD-10-CM

## 2023-02-07 DIAGNOSIS — G25.0 ESSENTIAL PALATAL TREMOR: ICD-10-CM

## 2023-02-07 DIAGNOSIS — C50.911 BREAST CANCER METASTASIZED TO AXILLARY LYMPH NODE, RIGHT (HCC): Primary | ICD-10-CM

## 2023-02-07 DIAGNOSIS — I67.9 CEREBROVASCULAR DISEASE: Chronic | ICD-10-CM

## 2023-02-07 PROCEDURE — G8417 CALC BMI ABV UP PARAM F/U: HCPCS | Performed by: PSYCHIATRY & NEUROLOGY

## 2023-02-07 PROCEDURE — 3074F SYST BP LT 130 MM HG: CPT | Performed by: PSYCHIATRY & NEUROLOGY

## 2023-02-07 PROCEDURE — 1090F PRES/ABSN URINE INCON ASSESS: CPT | Performed by: PSYCHIATRY & NEUROLOGY

## 2023-02-07 PROCEDURE — 1036F TOBACCO NON-USER: CPT | Performed by: PSYCHIATRY & NEUROLOGY

## 2023-02-07 PROCEDURE — G8399 PT W/DXA RESULTS DOCUMENT: HCPCS | Performed by: PSYCHIATRY & NEUROLOGY

## 2023-02-07 PROCEDURE — G8427 DOCREV CUR MEDS BY ELIG CLIN: HCPCS | Performed by: PSYCHIATRY & NEUROLOGY

## 2023-02-07 PROCEDURE — 1123F ACP DISCUSS/DSCN MKR DOCD: CPT | Performed by: PSYCHIATRY & NEUROLOGY

## 2023-02-07 PROCEDURE — 99214 OFFICE O/P EST MOD 30 MIN: CPT | Performed by: PSYCHIATRY & NEUROLOGY

## 2023-02-07 PROCEDURE — 3078F DIAST BP <80 MM HG: CPT | Performed by: PSYCHIATRY & NEUROLOGY

## 2023-02-07 PROCEDURE — G8484 FLU IMMUNIZE NO ADMIN: HCPCS | Performed by: PSYCHIATRY & NEUROLOGY

## 2023-02-07 ASSESSMENT — ENCOUNTER SYMPTOMS
TROUBLE SWALLOWING: 0
SHORTNESS OF BREATH: 0
BACK PAIN: 0
CHOKING: 0
COLOR CHANGE: 0
PHOTOPHOBIA: 0
NAUSEA: 0
VOMITING: 0

## 2023-02-07 NOTE — TELEPHONE ENCOUNTER
Patient scheduled for STAT PET CT tomorrow, 02/08/23 @ 1pm. No Auth needed, per Chica Perez. Patient aware to arrive at [facility] at 12:30pm and given phone number [phone], to call asap to pre register and get instructions.

## 2023-02-07 NOTE — PROGRESS NOTES
Subjective:      Patient ID: Wojciech Cottrell is a 78 y.o. female who presents today for:  Chief Complaint   Patient presents with    Follow-up     Pt is here for surgery clearance to stop the aggrenox until after surgery. Pt states she feels as things are going well. Pt states besides the little bit of stress she is under for surgery she feels good and has no concerns. HPI 78 right-handed female with a known history of stroke with essential tremors and bilateral tremors with occasional migraines and sleep apnea patient. Transischemic event with a left MCA stroke responsive to Plavix and therefore we had changed her to Aggrenox actually she is being quite well. We are seeing her for surgical clearance urgently for breast surgery at patient stroke was years ago. Patient does have a M2 stenosis on the right. She has not had recurrent symptoms of cerebral ischemia. Is here for the tremors and patient has not developed parkinsonian features. She does follow occasionally as she reports that she is clumsy. She has not noticed any tremor. She has not any cognitive decline.       Past Medical History:   Diagnosis Date    Asthma     Asthma in remission 1990    Breast cancer ([address] 2014    right breast    Elevated TSH     Hematoma of right thigh 9/30/2021    History of artificial lens replacement 3/24/2015    History of breast cancer 02/2014    Invasive ductal cancer right breast, Dr Malachi Page, Dr Cha [place]    History of therapeutic radiation     HTN (hypertension) 1980    was with [facility]    Hx of ischemic left MCA stroke 2013    no residual, Dr Gavin Gustafson    Hx of ischemic right MCA stroke 12/2014    no residual, frontal lobe    Hypercalcemia 7/11/2022    Hyperlipidemia     Osteoarthritis     Secondary and unspecified malignant neoplasm of axilla and upper limb lymph nodes ([address] 11/8/2018    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    Dr Rachel Kingston (ortho)    Vitamin D deficiency      Past Surgical History: Procedure Laterality Date    BLADDER SUSPENSION      BREAST BIOPSY Right 02/14/2014    BREAST LUMPECTOMY Right 03/04/2014    Lumpectomy with SLNB, Dr Lj Cancino Dr in [address] [place] [address] Drive BIOPSY  1/26/2023     LYMPH NODE BIOPSY MLOZ ULTRASOUND     Social History     Socioeconomic History    Marital status:      Spouse name: Not on file    Number of children: 4    Years of education: Not on file    Highest education level: Not on file   Occupational History    Occupation: , retired   Tobacco Use    Smoking status: Former     Packs/day: 0.25     Years: 14.00     Pack years: 3.50     Types: Cigarettes    Smokeless tobacco: Never    Tobacco comments:     started at age 11yo and quit at age 31yo   Vaping Use    Vaping Use: Never used   Substance and Sexual Activity    Alcohol use: No    Drug use: No    Sexual activity: Not on file   Other Topics Concern    Not on file   Social History Narrative    Born in [place], 2 younger brothers, one dec 2014, one in [place]     July 2020    , retired     Lives in a house in [facility]     of first spouse    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, owned a convenient store    [employer], travel    4 children, 2 daughters in [place] ([facility] [place] and [place]), 2 sons in [address] [place] [address] great grandchildren >50        Son lives with her temporarily (01/08/2021)     Social Determinants of Health     Financial Resource Strain: Low Risk     Difficulty of Paying Living Expenses: Not hard at all   Food Insecurity: No Food Insecurity    Worried About [address] in the Last Year: Never true    [address] [facility] [address] in the Last Year: Never true   Transportation Needs: No Transportation Needs    Lack of Transportation (Medical): No    Lack of Transportation (Non-Medical):  No   Physical Activity: Inactive    Days of Exercise per Week: 0 days    Minutes of Exercise per Session: 0 min   Stress: Not on file   Social Connections: Not on file   Intimate Partner Violence: Not on file   Housing Stability: Not on file     Family History   Problem Relation Age of Onset    Kidney Disease Mother         dec age 79    Hypertension Mother     Migraines Mother     Diabetes Father     Heart Failure Father         dec age 76    Cancer Brother         dec age 76    Heart Attack Paternal Grandmother     Stroke Paternal Grandfather     Breast Cancer Paternal Cousin      Allergies   Allergen Reactions    Sulfa Antibiotics      Unsure of reaction     Iodinated Contrast Media      Oral & IV dye group    Statins Other (See Comments)       Current Outpatient Medications   Medication Sig Dispense Refill    meclizine (ANTIVERT) 12.5 MG tablet Take 12.5 mg by mouth in the morning and at bedtime Half a tablet      b complex vitamins capsule Take 1 capsule by mouth daily      Multiple Vitamins-Minerals (ABC COMPLETE SENIOR WOMENS 50+ PO) Take 1 tablet by mouth daily      aspirin-dipyridamole (AGGRENOX)  MG per extended release capsule TAKE 1 CAPSULE TWICE DAILY 180 capsule 3    ezetimibe (ZETIA) 10 MG tablet TAKE 1 TABLET DAILY 90 tablet 3    niacin (NIASPAN) 500 MG extended release tablet TAKE 1 TABLET 3 TIMES A  tablet 4    amLODIPine-benazepril (LOTREL) 5-20 MG per capsule Take 1 capsule by mouth daily TAKE 1 CAPSULE DAILY 90 capsule 4    Polyvinyl Alcohol-Povidone (REFRESH OP) Apply 1 drop to eye 4 times daily as needed. Vitamin D (CHOLECALCIFEROL) 1000 UNITS CAPS capsule Take 1,000 Units by mouth daily. fish oil-omega-3 fatty acids 1000 MG capsule Take 1 g by mouth 3 times daily. No current facility-administered medications for this visit. Review of Systems   Constitutional:  Negative for fever. HENT:  Negative for ear pain, tinnitus and trouble swallowing. Eyes:  Negative for photophobia and visual disturbance.    Respiratory:  Negative for choking and shortness of breath. Cardiovascular:  Negative for chest pain and palpitations. Gastrointestinal:  Negative for nausea and vomiting. Musculoskeletal:  Negative for back pain, gait problem, joint swelling, myalgias, neck pain and neck stiffness. Skin:  Negative for color change. Allergic/Immunologic: Negative for food allergies. Neurological:  Negative for dizziness, tremors, seizures, syncope, facial asymmetry, speech difficulty, weakness, light-headedness, numbness and headaches. Psychiatric/Behavioral:  Negative for behavioral problems, confusion, hallucinations and sleep disturbance. Objective:   /74 (Site: Right Upper Arm, Position: Sitting, Cuff Size: Medium Adult)   Pulse 64   Wt 154 lb 9.6 oz (70.1 kg)   BMI 25.73 kg/m²     Physical Exam  Vitals reviewed. Eyes:      Pupils: Pupils are equal, round, and reactive to light. Cardiovascular:      Rate and Rhythm: Normal rate and regular rhythm. Heart sounds: No murmur heard. Pulmonary:      Effort: Pulmonary effort is normal.      Breath sounds: Normal breath sounds. Abdominal:      General: Bowel sounds are normal.   Musculoskeletal:         General: Normal range of motion. Cervical back: Normal range of motion. Skin:     General: Skin is warm. Neurological:      Mental Status: She is alert and oriented to person, place, and time. Cranial Nerves: No cranial nerve deficit. Sensory: No sensory deficit. Motor: No abnormal muscle tone. Coordination: Coordination normal.      Deep Tendon Reflexes: Reflexes are normal and symmetric. Babinski sign absent on the right side. Babinski sign absent on the left side. Psychiatric:         Mood and Affect: Mood normal.     Patient has a mild stooped posture and no tremors or parkinsonian features are noted.   US BIOPSY LYMPH NODE Right    Result Date: 1/26/2023  EXAMINATION: LYMPH NODE BIOPSY LEFT: 1/26/2023 PROCEDURE: ULTRASOUND-GUIDED RIGHT AXILLARY LYMPH NODE CORE BIOPSY TECHNIQUE: Informed consent was obtained after a detailed discussion about the procedure including the risk, benefits, and alternatives. Universal protocol was followed. Sterile gowns, masks, hats and gloves utilized for maximal sterile barrier. Limited ultrasound images were obtained of the right axilla and a suitable skin site was prepped and draped in sterile fashion. Local anesthesia was achieved with lidocaine. Core biopsy was performed with ultrasound guidance. Five 14 gauge core biopsy specimens were obtained using a YuanV needle device and coaxial technique. A Hologic X marker was placed in the usual fashion. The patient tolerated the procedure well. COMPARISON: Numerous prior studies; most recently breast MRI 01/10/2013. HISTORY: ORDERING SYSTEM PROVIDED HISTORY: Abnormal magnetic resonance imaging of right breast TECHNOLOGIST PROVIDED HISTORY: Right axilla Lymph node biopsy Laterality->Right     Successful ultrasound-guided right axillary lymph node core biopsyand ultrasound-guided Hologic X marker placement.  RECOMMENDATIONS: Unavailable       Lab Results   Component Value Date/Time    WBC 5.5 05/11/2022 08:44 AM    RBC 4.50 05/11/2022 08:44 AM    RBC 4.66 05/16/2012 08:45 AM    HGB 14.2 05/11/2022 08:44 AM    HCT 42.3 05/11/2022 08:44 AM    MCV 94.0 05/11/2022 08:44 AM    MCH 31.6 05/11/2022 08:44 AM    MCHC 33.7 05/11/2022 08:44 AM    RDW 13.2 05/11/2022 08:44 AM     05/11/2022 08:44 AM    MPV 8.1 05/11/2015 10:47 PM     Lab Results   Component Value Date/Time     09/06/2022 11:49 AM    K 4.1 09/06/2022 11:49 AM    K 3.8 10/01/2021 05:59 AM     09/06/2022 11:49 AM    CO2 27 09/06/2022 11:49 AM    BUN 12 09/06/2022 11:49 AM    CREATININE 0.70 09/06/2022 11:49 AM    GFRAA >60.0 09/06/2022 11:49 AM    LABGLOM >60.0 09/06/2022 11:49 AM    GLUCOSE 99 09/06/2022 11:49 AM    GLUCOSE 91 05/16/2012 08:45 AM    PROT 7.3 09/09/2022 04:25 PM    LABALBU 4.34 09/09/2022 04:25 PM    LABALBU 4.7 05/16/2012 08:45 AM    CALCIUM 10.1 09/06/2022 11:49 AM    BILITOT 0.3 09/30/2021 12:15 PM    ALKPHOS 121 09/30/2021 12:15 PM    AST 22 09/30/2021 12:15 PM    ALT 17 09/30/2021 12:15 PM     Lab Results   Component Value Date/Time    PROTIME 12.3 09/30/2021 12:15 PM    INR 0.9 09/30/2021 12:15 PM     Lab Results   Component Value Date/Time    TSH 3.730 05/11/2022 08:44 AM    DDQLCEMY75 683 05/11/2022 08:44 AM     Lab Results   Component Value Date/Time    TRIG 94 05/11/2022 08:44 AM    HDL 60 05/11/2022 08:44 AM    LDLCALC 120 05/11/2022 08:44 AM     No results found for: Cathyann Kindler, CANNAB, COCAINESCRN, LABMETH, OPIATESCREENURINE, PHENCYCLIDINESCREENURINE, PPXUR, ETOH  No results found for: LITHIUM, DILFRTOT, VALPROATE    Assessment:       Diagnosis Orders   1. Essential tremor        2. Closed head injury with concussion, without loss of consciousness, subsequent encounter        3. Essential palatal tremor        4. Benign paroxysmal vertigo of both ears        5. Cerebrovascular disease        Essential tremor with a bilateral tremor which has not worsened patient has not developed any section of parkinsonian features. She has had a few falls due to clumsiness but not due to parkinsonian features. She has occasional dizziness in response to Antivert. Patient actually is here for surgical clearance for breast surgery. Patient several vascular evaluation is done and her last MRA was done in the last year less than 12 months appeared to be normal.  Her ultrasounds in the past have not shown any significant stenosis. She does have a minor right MCA stenosis though not symptomatic. Patient may discontinue Aggrenox 7 days prior to surgery as she is a bleeding risk otherwise. Patient is cleared for surgery from her and and she will let us know if there are any other issues in between and later    Arnaldo Bear MD, [address], American Board of Psychiatry & Neurology  Board Certified in Vascular Neurology  Board Certified in Neuromuscular Medicine  Certified in [facility]:      No orders of the defined types were placed in this encounter. No orders of the defined types were placed in this encounter. No follow-ups on file.       Opal Keller MD

## 2023-02-07 NOTE — TELEPHONE ENCOUNTER
Patient called in and wanted to update Dr. Sang Mcfadden that she is schedule for a C-PAP fitting and is working with Dr. Ganesh Zarate on this issue.

## 2023-02-08 ENCOUNTER — HOSPITAL ENCOUNTER (OUTPATIENT)
Dept: CT IMAGING | Age: 83
Discharge: HOME | End: 2023-02-10
Payer: MEDICARE

## 2023-02-08 DIAGNOSIS — C50.911 BREAST CANCER METASTASIZED TO AXILLARY LYMPH NODE, RIGHT (HCC): ICD-10-CM

## 2023-02-08 DIAGNOSIS — C77.3 BREAST CANCER METASTASIZED TO AXILLARY LYMPH NODE, RIGHT (HCC): ICD-10-CM

## 2023-02-08 PROCEDURE — 3430000000 HC RX DIAGNOSTIC RADIOPHARMACEUTICAL: Performed by: PHYSICIAN ASSISTANT

## 2023-02-08 PROCEDURE — A9552 F18 FDG: HCPCS | Performed by: PHYSICIAN ASSISTANT

## 2023-02-08 PROCEDURE — 78815 PET IMAGE W/CT SKULL-THIGH: CPT

## 2023-02-08 RX ORDER — FLUDEOXYGLUCOSE F 18 200 MCI/ML
15 INJECTION, SOLUTION INTRAVENOUS
Status: COMPLETED | OUTPATIENT
Start: 2023-02-08 | End: 2023-02-08

## 2023-02-08 RX ADMIN — FLUDEOXYGLUCOSE F 18 15 MILLICURIE: 200 INJECTION, SOLUTION INTRAVENOUS at 15:07

## 2023-02-09 ENCOUNTER — TELEPHONE (OUTPATIENT)
Dept: SURGERY | Age: 83
End: 2023-02-09

## 2023-02-09 NOTE — TELEPHONE ENCOUNTER
PATIENT:  Papito Davies    DATE:     2/9/2023    TELEPHONE CONVERSATION:    Papito Davies was called regarding question regarding unilateral mastectomy vs bilateral. Since she has a history of stroke and stopping aggrenox, I recommend to do the necessary surgery and she is at slightly higher risk for complications with her baseline medical problems. Reviewed PET results.  She stated understanding will proceed with surgery as planned    Dictated by:  Joie Osborn MD  2/9/2023

## 2023-02-10 ENCOUNTER — TELEPHONE (OUTPATIENT)
Dept: SURGERY | Age: 83
End: 2023-02-10

## 2023-02-10 NOTE — TELEPHONE ENCOUNTER
I informed the patient of her negative HER 2 pathology result. The patient verbalized understanding of her negative HER 2 result. The patient verbalized that she is aware to hold her Aggrenox per her neurologists orders for 7 days prior to surgery. The patient has no questions at this time.

## 2023-02-10 NOTE — TELEPHONE ENCOUNTER
----- Message from Nuzhat Jalloh MD sent at 2/9/2023  1:27 PM EST -----  Regarding: call with neg her 2    ----- Message -----  From: Irene Crouch Incoming Lab Results From Soft  Sent: 1/31/2023   2:38 PM EST  To: Nuzhat Jalloh MD

## 2023-02-13 ENCOUNTER — OFFICE VISIT (OUTPATIENT)
Dept: FAMILY MEDICINE CLINIC | Age: 83
End: 2023-02-13

## 2023-02-13 VITALS
BODY MASS INDEX: 25.66 KG/M2 | HEIGHT: 65 IN | HEART RATE: 62 BPM | OXYGEN SATURATION: 97 % | WEIGHT: 154 LBS | DIASTOLIC BLOOD PRESSURE: 78 MMHG | RESPIRATION RATE: 18 BRPM | SYSTOLIC BLOOD PRESSURE: 118 MMHG

## 2023-02-13 DIAGNOSIS — Z01.818 PRE-OP EXAM: Primary | ICD-10-CM

## 2023-02-13 SDOH — ECONOMIC STABILITY: HOUSING INSECURITY
IN THE LAST 12 MONTHS, WAS THERE A TIME WHEN YOU DID NOT HAVE A STEADY PLACE TO SLEEP OR SLEPT IN A SHELTER (INCLUDING NOW)?: NO

## 2023-02-13 SDOH — ECONOMIC STABILITY: FOOD INSECURITY: WITHIN THE PAST 12 MONTHS, YOU WORRIED THAT YOUR FOOD WOULD RUN OUT BEFORE YOU GOT MONEY TO BUY MORE.: NEVER TRUE

## 2023-02-13 SDOH — ECONOMIC STABILITY: FOOD INSECURITY: WITHIN THE PAST 12 MONTHS, THE FOOD YOU BOUGHT JUST DIDN'T LAST AND YOU DIDN'T HAVE MONEY TO GET MORE.: NEVER TRUE

## 2023-02-13 SDOH — ECONOMIC STABILITY: INCOME INSECURITY: HOW HARD IS IT FOR YOU TO PAY FOR THE VERY BASICS LIKE FOOD, HOUSING, MEDICAL CARE, AND HEATING?: NOT HARD AT ALL

## 2023-02-13 NOTE — PROGRESS NOTES
Preoperative Consultation      Ferny Lantigua  YOB: 1940    Date of Service:  2/13/2023    Vitals:    02/13/23 1134 02/13/23 1157   BP: (!) 150/80 118/78   Site: Left Upper Arm Left Upper Arm   Position: Sitting Sitting   Cuff Size: Medium Adult Medium Adult   Pulse: 62    Resp: 18    SpO2: 97%    Weight: 154 lb (69.9 kg)    Height: 5' 5\" (1.651 m)       Wt Readings from Last 2 Encounters:   02/13/23 154 lb (69.9 kg)   02/07/23 154 lb 9.6 oz (70.1 kg)     BP Readings from Last 3 Encounters:   02/13/23 118/78   02/07/23 130/74   02/06/23 138/72      Chief Complaint   Patient presents with    Pre-op Exam     Medical clearance surgery 2/23/23. Masectomy right breast. Done with Dr. May Riedel patel     Allergies   Allergen Reactions    Sulfa Antibiotics      Unsure of reaction     Iodinated Contrast Media      Oral & IV dye group    Statins Other (See Comments)     Outpatient Medications Marked as Taking for the 2/13/23 encounter (Office Visit) with Raghav Watson PA-C   Medication Sig Dispense Refill    meclizine (ANTIVERT) 12.5 MG tablet Take 12.5 mg by mouth in the morning and at bedtime Half a tablet      b complex vitamins capsule Take 1 capsule by mouth daily      Multiple Vitamins-Minerals (ABC COMPLETE SENIOR WOMENS 50+ PO) Take 1 tablet by mouth daily      aspirin-dipyridamole (AGGRENOX)  MG per extended release capsule TAKE 1 CAPSULE TWICE DAILY 180 capsule 3    ezetimibe (ZETIA) 10 MG tablet TAKE 1 TABLET DAILY 90 tablet 3    niacin (NIASPAN) 500 MG extended release tablet TAKE 1 TABLET 3 TIMES A  tablet 4    amLODIPine-benazepril (LOTREL) 5-20 MG per capsule Take 1 capsule by mouth daily TAKE 1 CAPSULE DAILY 90 capsule 4    Polyvinyl Alcohol-Povidone (REFRESH OP) Apply 1 drop to eye 4 times daily as needed. Vitamin D (CHOLECALCIFEROL) 1000 UNITS CAPS capsule Take 1,000 Units by mouth daily. fish oil-omega-3 fatty acids 1000 MG capsule Take 1 g by mouth 3 times daily. This patient presents to the office today for a preoperative consultation at the request of surgeon, Dr. Toney Castillo, who plans on performing R mastectomy, modified radical on February 23, 2023 at [facility] of R breast from recent biopsy on 1/26/23. HX of right breast invasive ductal carcinoma ER/MD +, HER 2-    Was cleared by neurology on 2/7/23 with Dr. Rebecca Carrillo.       Planned anesthesia: General   Known anesthesia problems: None   Bleeding risk: Anticoagulant therapy-aggrenox  Personal or FH of DVT/PE: No    Patient objection to receiving blood products: No    Patient Active Problem List   Diagnosis    Essential hypertension    Hyperlipidemia    Osteoarthritis    Vitamin D deficiency    History of recurrent TIAs    Hypertensive retinopathy    Low back pain    Retinal pigment epithelial atrophy    Senile cataract    Tear film insufficiency    Vitreous degeneration    Personal history of malignant neoplasm of breast    Estrogen receptor positive status (ER+)    Cerebrovascular disease    Migraines    Essential palatal tremor    Benign paroxysmal vertigo of both ears    Recurrent UTI    Closed head injury with concussion    Essential tremor    Hypercalcemia    GHAZAL (obstructive sleep apnea)    Adenocarcinoma, breast, right (HCC)    Grief reaction    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, right ([address]       Past Medical History:   Diagnosis Date    Asthma     Asthma in remission 1990    Breast cancer ([facility] 2014    right breast    Elevated TSH     Hematoma of right thigh 9/30/2021    History of artificial lens replacement 3/24/2015    History of breast cancer 02/2014    Invasive ductal cancer right breast, Dr Artie Botello, Dr Mckenzie Cotton    History of therapeutic radiation     HTN (hypertension) 1980    was with [address]    Hx of ischemic left MCA stroke 2013    no residual, Dr Rebecca Carrillo    Hx of ischemic right MCA stroke 12/2014    no residual, frontal lobe    Hypercalcemia 7/11/2022 Hyperlipidemia     Osteoarthritis     Secondary and unspecified malignant neoplasm of axilla and upper limb lymph nodes ([facility] 11/8/2018    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    Dr Josesito Pack (ortho)    Vitamin D deficiency      Past Surgical History:   Procedure Laterality Date    BLADDER SUSPENSION      BREAST BIOPSY Right 02/14/2014    BREAST LUMPECTOMY Right 03/04/2014    Lumpectomy with SLNB, Dr Octavio Dooley Dr in [address] BIOPSY  1/26/2023     LYMPH NODE BIOPSY MLOZ ULTRASOUND     Family History   Problem Relation Age of Onset    Kidney Disease Mother         dec age 79    Hypertension Mother     Migraines Mother     Diabetes Father     Heart Failure Father         dec age 76    Cancer Brother         dec age 76    Heart Attack Paternal Grandmother     Stroke Paternal Grandfather     Breast Cancer Paternal Cousin      Social History     Socioeconomic History    Marital status:       Spouse name: Not on file    Number of children: 4    Years of education: Not on file    Highest education level: Not on file   Occupational History    Occupation: , retired   Tobacco Use    Smoking status: Former     Packs/day: 0.25     Years: 14.00     Pack years: 3.50     Types: Cigarettes    Smokeless tobacco: Never    Tobacco comments:     started at age 9yo and quit at age 29yo   Vaping Use    Vaping Use: Never used   Substance and Sexual Activity    Alcohol use: No    Drug use: No    Sexual activity: Not on file   Other Topics Concern    Not on file   Social History Narrative    Born in [place], 2 younger brothers, one dec 2014, one in [place]     July 2020    , retired     Lives in a house in [facility]     of first spouse    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, owned a convenient store    [employer]), travel    4 children, 2 daughters in [place] ([place]), 2 sons in [address] [place] [address] great grandchildren >50        Son lives with her temporarily (01/08/2021)     Social Determinants of Health     Financial Resource Strain: Low Risk     Difficulty of Paying Living Expenses: Not hard at all   Food Insecurity: No Food Insecurity    Worried About [address] in the Last Year: Never true    Ran Out of Food in the Last Year: Never true   Transportation Needs: Unknown    Lack of Transportation (Medical): Not on file    Lack of Transportation (Non-Medical): No   Physical Activity: Inactive    Days of Exercise per Week: 0 days    Minutes of Exercise per Session: 0 min   Stress: Not on file   Social Connections: Not on file   Intimate Partner Violence: Not on file   Housing Stability: Unknown    Unable to Pay for Housing in the Last Year: Not on file    Number of Places Lived in the Last Year: Not on file    Unstable Housing in the Last Year: No       Review of Systems  A comprehensive review of systems was negative except for what was noted in the HPI. Physical Exam   Constitutional: She is oriented to person, place, and time. She appears well-developed and well-nourished. No distress. HENT:   Head: Normocephalic and atraumatic. Mouth/Throat: Uvula is midline, oropharynx is clear and moist and mucous membranes are normal.   Eyes: Conjunctivae and EOM are normal. Pupils are equal, round, and reactive to light. Neck: Trachea normal and normal range of motion. Neck supple. No JVD present. Carotid bruit is not present. No mass and no thyromegaly present. Cardiovascular: Normal rate, regular rhythm, normal heart sounds and intact distal pulses. Exam reveals no gallop and no friction rub. No murmur heard. Pulmonary/Chest: Effort normal and breath sounds normal. No respiratory distress. She has no wheezes. She has no rales. Abdominal: Soft. Normal aorta and bowel sounds are normal. She exhibits no distension and no mass. There is no hepatosplenomegaly.  No tenderness.   Musculoskeletal: She exhibits no edema and no tenderness.   Neurological: She is alert and oriented to person, place, and time. She has normal strength. No cranial nerve deficit or sensory deficit. Coordination and gait normal.   Skin: Skin is warm and dry. No rash noted. No erythema.   Psychiatric: She has a normal mood and affect. Her behavior is normal.     EKG Interpretation:  sinus olivia with rate of 58 BPM; L atrial enlargement.     Lab Review   Orders Only on 02/09/2023   Component Date Value    WBC 02/09/2023 6.5     RBC 02/09/2023 4.26     Hemoglobin 02/09/2023 13.6     Hematocrit 02/09/2023 39.8     MCV 02/09/2023 93.5     MCH 02/09/2023 31.9 (A)     MCHC 02/09/2023 34.1     RDW 02/09/2023 13.2     Platelets 02/09/2023 249     Sodium 02/09/2023 141     Potassium 02/09/2023 3.7     Chloride 02/09/2023 104     CO2 02/09/2023 26     Anion Gap 02/09/2023 11     Glucose 02/09/2023 96     BUN 02/09/2023 14     Creatinine 02/09/2023 0.71     Est, Glom Filt Rate 02/09/2023 >60.0     Calcium 02/09/2023 10.2 (A)     Total Protein 02/09/2023 7.0     Albumin 02/09/2023 4.1     Total Bilirubin 02/09/2023 0.5     Alkaline Phosphatase 02/09/2023 130     ALT 02/09/2023 16     AST 02/09/2023 20     Globulin 02/09/2023 2.9     Cholesterol, Fasting 02/09/2023 174     Triglyceride, Fasting 02/09/2023 67     HDL 02/09/2023 65 (A)     LDL Calculated 02/09/2023 96     Vit D, 25-Hydroxy 02/09/2023 34.6    Orders Only on 01/26/2023   Component Date Value    Source 01/26/2023 Other     Number of markers 01/26/2023 22     Interpretation 01/26/2023 See Note     Prof Leuk/Lymph Pheno 16* 01/26/2023 Billed     Technical Leuk/Lymph Phe* 01/26/2023 Billed            Assessment:       82 y.o. patient with planned surgery as above.    Known risk factors for perioperative complications: Hypertension, Obstructive sleep apnea  Current medications which may produce withdrawal symptoms if withheld perioperatively: none      Plan:  1. Preoperative workup as follows: ECG, hemoglobin, hematocrit, electrolytes, creatinine, glucose, coagulation studies  2. Change in medication regimen before surgery: Hold aggrenox therapy starting on 2/16/23. 3. Deep vein thrombosis prophylaxis: regimen to be chosen by surgical team  4. No contraindications to planned surgery    Debi Malone PA-C

## 2023-02-14 ENCOUNTER — TELEPHONE (OUTPATIENT)
Dept: SURGERY | Age: 83
End: 2023-02-14

## 2023-02-14 DIAGNOSIS — C77.3 BREAST CANCER METASTASIZED TO AXILLARY LYMPH NODE, RIGHT (HCC): Primary | ICD-10-CM

## 2023-02-14 DIAGNOSIS — Z85.3 PERSONAL HISTORY OF BREAST CANCER: ICD-10-CM

## 2023-02-14 DIAGNOSIS — C50.911 BREAST CANCER METASTASIZED TO AXILLARY LYMPH NODE, RIGHT (HCC): Primary | ICD-10-CM

## 2023-02-14 NOTE — TELEPHONE ENCOUNTER
I was calling the patient to inform her of the new order for a referral to radiation oncology, to be seen prior to the scheduled surgery on 2/23/2023. LMOM for the patient to return a call to the office.

## 2023-02-15 NOTE — TELEPHONE ENCOUNTER
Patient aware of referral to radiation oncology and that they will be calling her to schedule prior to her surgery.  Patient also aware of appointment scheduled with Dr. Anna Chen @ [facility] 02/20/23 @ 1pm.

## 2023-02-16 ENCOUNTER — HOSPITAL ENCOUNTER (OUTPATIENT)
Dept: PREADMISSION TESTING | Age: 83
Discharge: HOME | End: 2023-02-20
Payer: MEDICARE

## 2023-02-16 VITALS
SYSTOLIC BLOOD PRESSURE: 134 MMHG | HEIGHT: 65 IN | OXYGEN SATURATION: 98 % | TEMPERATURE: 97.9 F | HEART RATE: 64 BPM | WEIGHT: 153.4 LBS | RESPIRATION RATE: 16 BRPM | DIASTOLIC BLOOD PRESSURE: 68 MMHG | BODY MASS INDEX: 25.56 KG/M2

## 2023-02-16 LAB
ABO/RH: NORMAL
ANTIBODY SCREEN: NORMAL

## 2023-02-16 PROCEDURE — 86900 BLOOD TYPING SEROLOGIC ABO: CPT

## 2023-02-16 PROCEDURE — 86850 RBC ANTIBODY SCREEN: CPT

## 2023-02-16 PROCEDURE — 86901 BLOOD TYPING SEROLOGIC RH(D): CPT

## 2023-02-16 NOTE — PROGRESS NOTES
H/P done by James Leigh PA-C on 2/13/23 in [facility]. EKG done during that visit 2/13/23 in [facility]. CBC 2/9/23 in epic  CMP 2/9/23 in epic    I ordered T&S today. Neurology clearance done by Dr. Bud Espinal on 2/7/23 in epic. Pt takes aggrenox for stroke hx-told to hold 7 days prior to surgery-see note. Pt is aware.

## 2023-02-17 ENCOUNTER — PATIENT MESSAGE (OUTPATIENT)
Dept: FAMILY MEDICINE CLINIC | Age: 83
End: 2023-02-17

## 2023-02-17 NOTE — TELEPHONE ENCOUNTER
From: Fern Recio  To: Debi Malone  Sent: 2/17/2023 8:05 AM EST  Subject: new appointments    I have been scheduled for two new appointments on 2/20/23, at the [facility]. They are not on \"My Chart.\" Am I expecting this too early? You are receiving this message bacause I do not know who else to contact. bes

## 2023-02-20 ENCOUNTER — HOSPITAL ENCOUNTER (OUTPATIENT)
Dept: RADIATION ONCOLOGY | Age: 83
Discharge: HOME | End: 2023-02-20
Payer: MEDICARE

## 2023-02-20 VITALS
WEIGHT: 156 LBS | HEART RATE: 54 BPM | DIASTOLIC BLOOD PRESSURE: 77 MMHG | BODY MASS INDEX: 25.99 KG/M2 | RESPIRATION RATE: 18 BRPM | SYSTOLIC BLOOD PRESSURE: 167 MMHG | TEMPERATURE: 97.3 F | OXYGEN SATURATION: 98 % | HEIGHT: 65 IN

## 2023-02-20 DIAGNOSIS — C50.911 BREAST CANCER METASTASIZED TO AXILLARY LYMPH NODE, RIGHT (HCC): Primary | ICD-10-CM

## 2023-02-20 DIAGNOSIS — C77.3 BREAST CANCER METASTASIZED TO AXILLARY LYMPH NODE, RIGHT (HCC): Primary | ICD-10-CM

## 2023-02-20 DIAGNOSIS — C50.911 ADENOCARCINOMA, BREAST, RIGHT (HCC): ICD-10-CM

## 2023-02-20 PROCEDURE — 99205 OFFICE O/P NEW HI 60 MIN: CPT | Performed by: RADIOLOGY

## 2023-02-20 PROCEDURE — 99212 OFFICE O/P EST SF 10 MIN: CPT | Performed by: RADIOLOGY

## 2023-02-20 PROCEDURE — 99497 ADVNCD CARE PLAN 30 MIN: CPT | Performed by: RADIOLOGY

## 2023-02-20 NOTE — PROGRESS NOTES
SW visit with Pt (accompanied by dtr-in-law, Funmi Briceno). Pt reports a minimal distress level of 3/10. Pt has Advance Directive documents in place. Pt's primary support person is her dtr, Yunior Vargas, who most often accompanies her to medical ike'ts. Pt's son resides with her. Pt is a retired  with no specific SW needs, issues or concerns at this time. Business card contact provided should future support needs arise.

## 2023-02-20 NOTE — CONSULTS
NURSING ASSESSMENT     Date: 2023        Patient Name: Immanuel Alcala     YOB: 1940      Age:  78 y.o. MRN: [id number]       Chaperone [x] Yes   [] No  LEBRON Carbajal    Advance Directives:   Do you currently have completed advance directives (living will)? [x] Yes   [] No         *If yes, please bring us a copy for your records. *If no, would you like info or assistance in completing advance directives (living will)? [] Yes   [x] No    Pain Score:   Pain Score (1-10): Denies   Pain Location: NA   Pain Duration: NA   Pain Management/Control: NA      Is pain affecting your ability to take care of yourself or move throughout your home? [] Yes   [x] No    General: No Problems  Patient has gained weight [] Yes   [x] No  Patient has lost weight [] Yes   [x] No  How much weight in pounds and over what length of time:     Eyes (Ophthalmic): Wears reading glasses. H/o cataract removal bilaterally with implant to right eye. Skin (Dermatological): No Problems     ENT: Hearing Loss-use hearing aids when watching TV. Runny nose intermittently     Respiratory: LEY-has GHAZAL, wears cpap at night, started using for about 1 month     Cardiovascular: Patient  denies any heart issues      Device   [] Yes   [x] No   Copy of Card Obtained [] Yes   [x] No    Gastrointestinal: No Problems    Genito-Urinary:    Incontinence at night, wears pad to bed     Breast: right axilla  LN biopsy on 23     Musculoskeletal: Joint Pain in the morning    Neurological: H/O stroke, takes aggrenox-stopped since 23 for upcoming surgery. Short term memory issues for the last couple months. Follows with Dr. Valrie Cushing with neurology.       Hematological and Lymphatic: No Problems     Endocrine: No Problems     Gyn History:   /Para: 4/4  Age of Menarche: 13  Age of Menopause 48  Vaginal Bleeding: N0   First Pregnancy: 16  Breast Feeding:  no  Last Menstrual period: 46  Oral Contraceptives: no     Number of years:   Hormone Replacement therapy No     Number of Years:   Last Pap Smear: unknown  Last Mammogram: 11/21/23-bilat and 11/29/23 right side only    A 10-point review of systems  has been conducted and pertinent positives have been   recorded. All other review of systems are negative    Was the patient admitted during the course of treatment OR within 30 days of treatment? No        Additional Comments: Scheduled Right Modified Radical Mastectomy on 2/23/23 with Dr. Trell Guy.     PALLIATIVE CARE SCREENING TOOL    Patient Scenarios               (Score 1 Point Each)  The Patient has  A chronic illness with uncontrolled symptoms (e.g. pain, nausea, vomiting, shortness of breath, anorexia)   []  Unresolved psychosocial or spiritual issues                                                                                                     []  Frequent visits to the Emergency Department  (>1 x per month or >2 in last 3 months)                                 []  More than one hospital admission in past 90 days                  []   Complex care requirements (e.g. functional dependency, complex home support for ventilator/antibiotics/feedings, patient in SNF/LTAC/nursing home) []  No advance directives in place                                                                                                                         []  TOTAL FOR SECTION #1- 0      Basic Disease Processes             (Score 3 Points Overall)  Locally advanced or metastatic cancer           []  Non-cancer life-limiting illness (COPD, CHF, advanced dementia, stroke)      []  Total score for section # 2-   0      Concomitant Disease Processes            (Score 1 Point Overall)  COPD               []   Renal Disease              []  CHF               []  Liver Disease              []  Other significant comorbidities (e.g. failure to thrive, feeding intolerance, functional decline)    []  Total score for section # 3- 0      Physician to complete #4, #5, & #6      Symptom Assessment             (Score 1 Point Each)  Severe/Uncontrolled Pain (e.g. patients who are opiate naïve and/or only taking OTC medications OR patients prescribed opiate by non-palliative care provider and pain not adequately controlled OR patient on long-term opiates for chronic pain and high risk for tolerance/abuse) []  Anorexia/failure to thrive            []  Unintentional weight loss of greater than 10 lbs in 1 month       []  Shortness of breath/increasing O2 needs via supplemental source      []  Cognitive impairment            []  Total score for section # 4- 0      Functional Status of Patient  ECOG 2              [] (Score 2 Point)  ECOG 3             [] (Score 3 Points)  Total score for section # 5- 0      Goals of Care              (Score 6 Points)  If patient wishes to pursue hospice and/or wishes to have modifications in their goals of care that involve stopping active treatment.  []    Total score for section # 6- 0    TOTAL SCORE > 6 Consult Palliative Care (requires provider orders in Epic  Total score- 0

## 2023-02-21 ENCOUNTER — SOCIAL WORK (OUTPATIENT)
Dept: RADIATION ONCOLOGY | Age: 83
End: 2023-02-21

## 2023-02-21 ENCOUNTER — HOSPITAL ENCOUNTER (OUTPATIENT)
Dept: OCCUPATIONAL THERAPY | Age: 83
Setting detail: THERAPIES SERIES
Discharge: HOME | End: 2023-02-21
Payer: MEDICARE

## 2023-02-21 ENCOUNTER — NURSE ONLY (OUTPATIENT)
Dept: RADIATION ONCOLOGY | Age: 83
End: 2023-02-21

## 2023-02-21 PROCEDURE — 97166 OT EVAL MOD COMPLEX 45 MIN: CPT

## 2023-02-21 NOTE — PROGRESS NOTES
Brief SW visit as Pt scheduled for her Pre-hab, pre-breast surgery consults with NP and OT and SW. As this SW met with Pt for her Radiation Oncology consult yesterday, Pt has been made aware of supportive services available to her at the cancer center. No further SW needs today.

## 2023-02-21 NOTE — PROGRESS NOTES
Occupational Therapy Lymphedema Prehab Screen    Date: 2023  Patient Name: Patrica Arango        MRN: [id number]  Account: [de-identified]   : 1940  (78 y.o.)    [x]   Patient's date of birth was confirmed    Chart Review:  Diagnosis: There were no encounter diagnoses. R mastectomy  Past Medical History:   Diagnosis Date    Asthma     Asthma in remission     Breast cancer ([facility] [address]     right breast    Cerebral artery occlusion with cerebral infarction ([facility] [address]         Elevated TSH     Hematoma of right thigh 2021    History of artificial lens replacement 2015    History of blood transfusion     -after delivery of first child    History of breast cancer 2014    Invasive ductal cancer right breast, Dr Evgeny Abraham, Dr Kim Cai    History of therapeutic radiation     HTN (hypertension)     was with [facility]    Hx of blood clots     Hx of ischemic left MCA stroke     no residual, Dr Stalin Romero    Hx of ischemic right MCA stroke 2014    no residual, frontal lobe    Hypercalcemia 2022    Hyperlipidemia     Osteoarthritis     Secondary and unspecified malignant neoplasm of axilla and upper limb lymph nodes ([facility] [address] 2018    Tendinopathy of right gluteal region 2018    Dr Irish Gomez (ortho)    Vitamin D deficiency      Past Surgical History:   Procedure Laterality Date    BLADDER SUSPENSION      BREAST BIOPSY Right 2014    BREAST LUMPECTOMY Right 2014    Lumpectomy with SLNB, Dr Stephen Alba [address] in [address]      bilateral cataract surgery    FRACTURE SURGERY      \"as child right leg\"    TONSILLECTOMY      TUBAL LIGATION      US LYMPH NODE BIOPSY  2023    US LYMPH NODE BIOPSY MLOZ ULTRASOUND       Strength: WNL    ROM:    WNL    Observation:       Patient is L hand dominant.     Circumferential Measurements    Location Rt UE (cm)   Date: 2023 Lt UE (cm)   Date: 2023   3rd DIP/ PIP 5.5/6.5 5.6/6.5   10 cm from distal 3rd finger 21.5 23   15 cm 23 22   20 cm 17.5 16.5   30 cm 23 24   40 cm 27 28   50 cm 31 31   60 cm NT NT                Brief Fatigue Inventory   Throughout our lives, most of us have times when we feel very tired or fatigued. Have you felt unusually tired or fatigued in the last week? No   Scale: 0 (No Fatigue)  <<<<>>>>  10 (As Bad as You Can Imagine)   1. How would you rate your fatigue (weariness, tiredness) right NOW? 0   2. How would you rate your USUAL level of fatigue during the past 24 hours? 6   3. How would you rate your WORST level of fatigue during the past 24 hours? 0   Scale: 0 (Does Not Interfere) <<<<>>>> 10 (Completely Interferes)   4. How would you rate how your fatigue has interfered with your GENERAL ACTIVITY in the past 24 hours? 0   5. How would you rate how your fatigue has interfered with your MOOD in the past 24 hours? 0   6. How would you rate how your fatigue has interfered with your WALKING ABILITY in the past 24 hours? 0   7. How would you rate how your fatigue has interfered with your NORMAL WORK (both outside and inside the home) in the past 24 hours? 0   8. How would you rate how your fatigue has interfered with your RELATIONS WITH OTHER PEOPLE in the past 24 hours? 0   9. How would you rate how your fatigue has interfered with your ENJOYMENT OF LIFE in the past 24 hours? 0   Total Score (sum of points for all 9 items/9): 0.7   Levels of Fatigue:  0 = None  1 -3 = Mild  4 - 6 = Moderate  7 - 10 = Severe None   *Adapted from MD Morgan pt. in lymphedema signs and symptoms and provided written hand out. Yes    Instructed pt. in ROM HEP for prevention and reduction of lymphedema symptoms and provided written handout. Yes    Pt. demo good understanding of information provided. YES__X___  NO_____ Comments:  Daughter present     Plan:  Follow up with physician per physician orders.     Therapy Time:   OT Individual Minutes  Time In: 9920  Time Out: 4001  Minutes: 36    Electronically signed by:     EDENILSON Miguel/L,  2/21/2023, 2:49 PM Occupational Therapy Lymphedema Prehab Screen

## 2023-02-21 NOTE — PROGRESS NOTES
Patient teaching completed for their scheduled right breast mastectomy, modified radical.I reviewed Surgical instructions with the Patient, including the following information, you will receive a phone call from the surgery schedulers the day prior to the surgery, usually between 3-5 pm, to let you know what time to report to surgery. A map of [place] was given to the Patient with instructions on where to park and go the day of surgery. The Patient was instructed to Not Eat or drink anything after midnight, before their surgery. Approved medications, which you have discussed with your Doctor, can be taken the morning of surgery, with sips of water. All blood thinners, oral anticoagulants, Aspirin, NSAIDS, should be stopped at least ten days prior to surgery. Please follow your Doctors instructions. Do not chew gum, take cough drops or eat hard candy the morning of your surgery. Do not wear any jewelry. Remove all body piercings prior to arriving for surgery. Leave all valuable items at home. Avoid hairspray, make-up, deodorant, lotions, nail polish or acrylic nails. Bring all personal care items, toiletries, and a loose fitting shirt that buttons or zips in the front to wear when discharged. Dont forget to bring your cell phone . Bring your cases for dentures and glasses, do not wear contact lenses. You will need an adult to drive you home after your surgery or when you are discharged from the hospital.No driving for one week after Lumpectomy. The Surgeon will call you with your pathology results in about 7-10 days. Do not lift greater than 10 pounds or use the affected arm to reach upwards after surgery, until the Doctor clears you post operatively. You may be asked to wear a surgical bra. If it is recommended that you wear one,it will be given to you after your surgery. Wear the surgical bra  or ace wrap for the first 48 hours after surgery.  After surgery, you will need to take care of your incision as it heals. Either stitches, staples,tape strips ( steri-strips),or surgical glue were used to close your incision. You will need to keep the area clean, change the dressing according to the wound care instructions that were given to you and observe for s/s of infection at each dressing change. You may notice soreness, tenderness, tingling, numbness and itching around your incision. There may also be mild oozing and bruising, and a small lump may form. This is normal and no cause for concern. Placing an ice bag on the site,it may help with any swelling or discomfort. After 48 hours you may, remove the outer gauze bandage. You can take a shower. Allow the soap and water to just run over the incision, do not scrub the incision, gently pat the area dry. If steri strips or surgical glue are covering the incision,do not remove, the surgeon will address the steri strips or glue at your post operative appointment. Redress the wound with gauze/Band-Aid as needed. Take Tylenol or Motrin for pain. If you notice any of the following signs of an infection, such as, a yellow or green discharge that is increasing, a change in the odor of the discharge/drainage, a change in the size of the incision,redness or hardening of the area surrounding the incision or if it is  hot to the touch, a fever, or excessive bleeding that has soaked through the dressing, notify the surgeon as soon as possible. I educated the Patient on JOHNIE drains . I explained that they may have an external drainage device after surgery. I reviewed how to empty and record the drainage output on the log sheet they were given. The Patient is aware to call Dr. Richy Conroy, if they develop a fever over 101? F, increased drainage (more than 240 cc 8 ounces per drain over one 24-hour period), or increased pain not controlled by the pain medication. I explained that the fluid output should gradually decrease.  The color, although not vital, might change from red to red-orange, to straw color. If it is thick, pus -like, and / or changes to red,to please call Dr. Starr Dust office. The ideal time to remove the drains is when the output is less than 30 cc per 24-hours for 2 days consecutively. It can take up to two weeks for that to happen. Dr. Deepthi Boyle will typically remove the drains if it has been more than two weeks. Patient is aware to bring the drainage log to the post operative appointment. If the drainage record indicates that there is still too much fluid draining to have the drain removed, Dr. Deepthi Boyle will determine when you should return to the office. Patient is aware to empty, measure and to record the drainage at least three times daily, using the cc side of the measuring cup. They were given a lanyard and it was explained that the drains should be pinned to the Bra, clothing or the lanyard so the drain will not be pulled out of the body. I reviewed the procedure for emptying the drain as follows, get all the supplies and work in a clean area: measuring cup(s), drain sheet, pen . You and/or your assistant must wash and dry your hands. Unpin the drain(s). Open the stopper out of the bulb and empty the fluid into the measuring cup. Educated the Patient on the importance of milking/ stripping the tubing three times a day and it was practiced on a JOHNIE drain. Patient was then taught to squeeze the drainage bulb while firmly pushing the stopper back into place. Then to, re-pin the drain to their bra/clothing/lanyard. Then make sure to record the amount of drainage on the log. Empty the cup into the toilet or sink, then rinse and store it for future use. Begin recording the drainage the day you are discharged from the hospital.Pre-Operative Lymphedema arm measurements completed by OT. I educated the Patient on Lymphedema and how to protect the affected limb to avoid injuries. I reviewed post operative exercises. The Patient is aware to not begin exercising until the surgeon has approved exercising post operatively. Your postop appointment is on 03/08/2023. The Patient verbalized understanding of all of the surgical instructions and has no further questions at this time.

## 2023-02-22 ENCOUNTER — TELEPHONE (OUTPATIENT)
Dept: FAMILY MEDICINE CLINIC | Age: 83
End: 2023-02-22

## 2023-02-22 NOTE — TELEPHONE ENCOUNTER
I looked through her paperwork and did not see it. Dr. Reyes Dalton office did call back and she will move forward with the office notes that she has.

## 2023-02-22 NOTE — TELEPHONE ENCOUNTER
Patient is scheduled to have surgery tomorrow and still has not received clearance form.     Please advise    Dong Allen [phone]

## 2023-02-22 NOTE — TELEPHONE ENCOUNTER
It was in the signed folder I gave [facility] yesterday. Note and that form needs to be faxed.

## 2023-02-23 ENCOUNTER — HOSPITAL ENCOUNTER (OUTPATIENT)
Age: 83
Setting detail: OBSERVATION
Discharge: HOME HEALTH | End: 2023-02-24
Attending: SURGERY | Admitting: SURGERY
Payer: MEDICARE

## 2023-02-23 ENCOUNTER — ANESTHESIA (OUTPATIENT)
Dept: OPERATING ROOM | Age: 83
End: 2023-02-23
Payer: MEDICARE

## 2023-02-23 ENCOUNTER — ANESTHESIA EVENT (OUTPATIENT)
Dept: OPERATING ROOM | Age: 83
End: 2023-02-23
Payer: MEDICARE

## 2023-02-23 DIAGNOSIS — G89.18 ACUTE POSTOPERATIVE PAIN: Primary | ICD-10-CM

## 2023-02-23 DIAGNOSIS — C50.911 MALIGNANT NEOPLASM OF RIGHT BREAST (HCC): ICD-10-CM

## 2023-02-23 PROBLEM — Z90.11 STATUS POST MASTECTOMY, RIGHT: Status: ACTIVE | Noted: 2023-02-23

## 2023-02-23 PROCEDURE — 94664 DEMO&/EVAL PT USE INHALER: CPT

## 2023-02-23 PROCEDURE — 3700000001 HC ADD 15 MINUTES (ANESTHESIA): Performed by: SURGERY

## 2023-02-23 PROCEDURE — 2580000003 HC RX 258: Performed by: NURSE PRACTITIONER

## 2023-02-23 PROCEDURE — 3600000004 HC SURGERY LEVEL 4 BASE: Performed by: SURGERY

## 2023-02-23 PROCEDURE — 3700000000 HC ANESTHESIA ATTENDED CARE: Performed by: SURGERY

## 2023-02-23 PROCEDURE — G0378 HOSPITAL OBSERVATION PER HR: HCPCS

## 2023-02-23 PROCEDURE — 7100000000 HC PACU RECOVERY - FIRST 15 MIN: Performed by: SURGERY

## 2023-02-23 PROCEDURE — 6370000000 HC RX 637 (ALT 250 FOR IP): Performed by: NURSE PRACTITIONER

## 2023-02-23 PROCEDURE — 19307 MAST MOD RAD: CPT | Performed by: SURGERY

## 2023-02-23 PROCEDURE — 6360000002 HC RX W HCPCS: Performed by: STUDENT IN AN ORGANIZED HEALTH CARE EDUCATION/TRAINING PROGRAM

## 2023-02-23 PROCEDURE — 2500000003 HC RX 250 WO HCPCS: Performed by: NURSE ANESTHETIST, CERTIFIED REGISTERED

## 2023-02-23 PROCEDURE — 2580000003 HC RX 258: Performed by: SURGERY

## 2023-02-23 PROCEDURE — 3600000014 HC SURGERY LEVEL 4 ADDTL 15MIN: Performed by: SURGERY

## 2023-02-23 PROCEDURE — 2720000010 HC SURG SUPPLY STERILE: Performed by: SURGERY

## 2023-02-23 PROCEDURE — 76942 ECHO GUIDE FOR BIOPSY: CPT | Performed by: STUDENT IN AN ORGANIZED HEALTH CARE EDUCATION/TRAINING PROGRAM

## 2023-02-23 PROCEDURE — 2500000003 HC RX 250 WO HCPCS: Performed by: REGISTERED NURSE

## 2023-02-23 PROCEDURE — 88342 IMHCHEM/IMCYTCHM 1ST ANTB: CPT

## 2023-02-23 PROCEDURE — 2709999900 HC NON-CHARGEABLE SUPPLY: Performed by: SURGERY

## 2023-02-23 PROCEDURE — 88309 TISSUE EXAM BY PATHOLOGIST: CPT

## 2023-02-23 PROCEDURE — 6360000002 HC RX W HCPCS: Performed by: SURGERY

## 2023-02-23 PROCEDURE — 7100000001 HC PACU RECOVERY - ADDTL 15 MIN: Performed by: SURGERY

## 2023-02-23 PROCEDURE — 88341 IMHCHEM/IMCYTCHM EA ADD ANTB: CPT

## 2023-02-23 PROCEDURE — 6360000002 HC RX W HCPCS: Performed by: NURSE ANESTHETIST, CERTIFIED REGISTERED

## 2023-02-23 PROCEDURE — 6370000000 HC RX 637 (ALT 250 FOR IP): Performed by: INTERNAL MEDICINE

## 2023-02-23 RX ORDER — ROPIVACAINE HYDROCHLORIDE 5 MG/ML
INJECTION, SOLUTION EPIDURAL; INFILTRATION; PERINEURAL
Status: COMPLETED | OUTPATIENT
Start: 2023-02-23 | End: 2023-02-23

## 2023-02-23 RX ORDER — HYDRALAZINE HYDROCHLORIDE 20 MG/ML
10 INJECTION INTRAMUSCULAR; INTRAVENOUS ONCE
Status: COMPLETED | OUTPATIENT
Start: 2023-02-23 | End: 2023-02-23

## 2023-02-23 RX ORDER — ESMOLOL HYDROCHLORIDE 10 MG/ML
INJECTION INTRAVENOUS PRN
Status: DISCONTINUED | OUTPATIENT
Start: 2023-02-23 | End: 2023-02-23 | Stop reason: SDUPTHER

## 2023-02-23 RX ORDER — DEXAMETHASONE SODIUM PHOSPHATE 4 MG/ML
INJECTION, SOLUTION INTRA-ARTICULAR; INTRALESIONAL; INTRAMUSCULAR; INTRAVENOUS; SOFT TISSUE PRN
Status: DISCONTINUED | OUTPATIENT
Start: 2023-02-23 | End: 2023-02-23 | Stop reason: SDUPTHER

## 2023-02-23 RX ORDER — SODIUM CHLORIDE 0.9 % (FLUSH) 0.9 %
5-40 SYRINGE (ML) INJECTION PRN
Status: DISCONTINUED | OUTPATIENT
Start: 2023-02-23 | End: 2023-02-23 | Stop reason: HOSPADM

## 2023-02-23 RX ORDER — SODIUM CHLORIDE 9 MG/ML
25 INJECTION, SOLUTION INTRAVENOUS PRN
Status: DISCONTINUED | OUTPATIENT
Start: 2023-02-23 | End: 2023-02-23 | Stop reason: HOSPADM

## 2023-02-23 RX ORDER — NIACIN 500 MG/1
500 TABLET, EXTENDED RELEASE ORAL NIGHTLY
Status: DISCONTINUED | OUTPATIENT
Start: 2023-02-23 | End: 2023-02-24 | Stop reason: HOSPADM

## 2023-02-23 RX ORDER — SODIUM CHLORIDE 0.9 % (FLUSH) 0.9 %
5-40 SYRINGE (ML) INJECTION EVERY 12 HOURS SCHEDULED
Status: DISCONTINUED | OUTPATIENT
Start: 2023-02-23 | End: 2023-02-23 | Stop reason: HOSPADM

## 2023-02-23 RX ORDER — ALBUTEROL SULFATE 90 UG/1
2 AEROSOL, METERED RESPIRATORY (INHALATION) EVERY 6 HOURS PRN
Status: DISCONTINUED | OUTPATIENT
Start: 2023-02-23 | End: 2023-02-24 | Stop reason: HOSPADM

## 2023-02-23 RX ORDER — MAGNESIUM HYDROXIDE 1200 MG/15ML
LIQUID ORAL PRN
Status: DISCONTINUED | OUTPATIENT
Start: 2023-02-23 | End: 2023-02-23 | Stop reason: HOSPADM

## 2023-02-23 RX ORDER — POLYETHYLENE GLYCOL 3350 17 G/17G
17 POWDER, FOR SOLUTION ORAL DAILY PRN
Status: DISCONTINUED | OUTPATIENT
Start: 2023-02-23 | End: 2023-02-24 | Stop reason: HOSPADM

## 2023-02-23 RX ORDER — HYDRALAZINE HYDROCHLORIDE 20 MG/ML
10 INJECTION INTRAMUSCULAR; INTRAVENOUS EVERY 6 HOURS PRN
Status: DISCONTINUED | OUTPATIENT
Start: 2023-02-23 | End: 2023-02-24 | Stop reason: HOSPADM

## 2023-02-23 RX ORDER — ONDANSETRON 4 MG/1
4 TABLET, ORALLY DISINTEGRATING ORAL EVERY 8 HOURS PRN
Status: DISCONTINUED | OUTPATIENT
Start: 2023-02-23 | End: 2023-02-24 | Stop reason: HOSPADM

## 2023-02-23 RX ORDER — SODIUM CHLORIDE 9 MG/ML
INJECTION, SOLUTION INTRAVENOUS PRN
Status: DISCONTINUED | OUTPATIENT
Start: 2023-02-23 | End: 2023-02-23 | Stop reason: HOSPADM

## 2023-02-23 RX ORDER — FENTANYL CITRATE 50 UG/ML
INJECTION, SOLUTION INTRAMUSCULAR; INTRAVENOUS PRN
Status: DISCONTINUED | OUTPATIENT
Start: 2023-02-23 | End: 2023-02-23 | Stop reason: SDUPTHER

## 2023-02-23 RX ORDER — METOCLOPRAMIDE HYDROCHLORIDE 5 MG/ML
10 INJECTION INTRAMUSCULAR; INTRAVENOUS
Status: DISCONTINUED | OUTPATIENT
Start: 2023-02-23 | End: 2023-02-23 | Stop reason: HOSPADM

## 2023-02-23 RX ORDER — FENTANYL CITRATE 0.05 MG/ML
50 INJECTION, SOLUTION INTRAMUSCULAR; INTRAVENOUS EVERY 10 MIN PRN
Status: DISCONTINUED | OUTPATIENT
Start: 2023-02-23 | End: 2023-02-23 | Stop reason: HOSPADM

## 2023-02-23 RX ORDER — SENNA AND DOCUSATE SODIUM 50; 8.6 MG/1; MG/1
1 TABLET, FILM COATED ORAL 2 TIMES DAILY
Status: DISCONTINUED | OUTPATIENT
Start: 2023-02-23 | End: 2023-02-24 | Stop reason: HOSPADM

## 2023-02-23 RX ORDER — EPHEDRINE SULFATE/0.9% NACL/PF 50 MG/5 ML
SYRINGE (ML) INTRAVENOUS PRN
Status: DISCONTINUED | OUTPATIENT
Start: 2023-02-23 | End: 2023-02-23 | Stop reason: SDUPTHER

## 2023-02-23 RX ORDER — SODIUM CHLORIDE 0.9 % (FLUSH) 0.9 %
5-40 SYRINGE (ML) INJECTION PRN
Status: DISCONTINUED | OUTPATIENT
Start: 2023-02-23 | End: 2023-02-24 | Stop reason: HOSPADM

## 2023-02-23 RX ORDER — CEFAZOLIN SODIUM IN 0.9 % NACL 2 G/100 ML
2000 PLASTIC BAG, INJECTION (ML) INTRAVENOUS
Status: COMPLETED | OUTPATIENT
Start: 2023-02-23 | End: 2023-02-23

## 2023-02-23 RX ORDER — LIDOCAINE HYDROCHLORIDE 10 MG/ML
1 INJECTION, SOLUTION EPIDURAL; INFILTRATION; INTRACAUDAL; PERINEURAL
Status: DISCONTINUED | OUTPATIENT
Start: 2023-02-23 | End: 2023-02-23 | Stop reason: HOSPADM

## 2023-02-23 RX ORDER — MECLIZINE HYDROCHLORIDE 25 MG/1
12.5 TABLET ORAL 2 TIMES DAILY
Status: DISCONTINUED | OUTPATIENT
Start: 2023-02-23 | End: 2023-02-24 | Stop reason: HOSPADM

## 2023-02-23 RX ORDER — VITAMIN B COMPLEX
1000 TABLET ORAL DAILY
Status: DISCONTINUED | OUTPATIENT
Start: 2023-02-23 | End: 2023-02-24 | Stop reason: HOSPADM

## 2023-02-23 RX ORDER — DIPHENHYDRAMINE HYDROCHLORIDE 50 MG/ML
12.5 INJECTION INTRAMUSCULAR; INTRAVENOUS
Status: DISCONTINUED | OUTPATIENT
Start: 2023-02-23 | End: 2023-02-23 | Stop reason: HOSPADM

## 2023-02-23 RX ORDER — AMLODIPINE BESYLATE 5 MG/1
5 TABLET ORAL DAILY
Status: DISCONTINUED | OUTPATIENT
Start: 2023-02-23 | End: 2023-02-24 | Stop reason: HOSPADM

## 2023-02-23 RX ORDER — PROPOFOL 10 MG/ML
INJECTION, EMULSION INTRAVENOUS PRN
Status: DISCONTINUED | OUTPATIENT
Start: 2023-02-23 | End: 2023-02-23 | Stop reason: SDUPTHER

## 2023-02-23 RX ORDER — ONDANSETRON 2 MG/ML
4 INJECTION INTRAMUSCULAR; INTRAVENOUS
Status: DISCONTINUED | OUTPATIENT
Start: 2023-02-23 | End: 2023-02-23 | Stop reason: HOSPADM

## 2023-02-23 RX ORDER — ONDANSETRON 2 MG/ML
4 INJECTION INTRAMUSCULAR; INTRAVENOUS EVERY 6 HOURS PRN
Status: DISCONTINUED | OUTPATIENT
Start: 2023-02-23 | End: 2023-02-24 | Stop reason: HOSPADM

## 2023-02-23 RX ORDER — EZETIMIBE 10 MG/1
10 TABLET ORAL DAILY
Status: DISCONTINUED | OUTPATIENT
Start: 2023-02-23 | End: 2023-02-24 | Stop reason: HOSPADM

## 2023-02-23 RX ORDER — MIDAZOLAM HYDROCHLORIDE 1 MG/ML
INJECTION INTRAMUSCULAR; INTRAVENOUS PRN
Status: DISCONTINUED | OUTPATIENT
Start: 2023-02-23 | End: 2023-02-23 | Stop reason: SDUPTHER

## 2023-02-23 RX ORDER — OXYCODONE HYDROCHLORIDE 5 MG/1
10 TABLET ORAL EVERY 4 HOURS PRN
Status: DISCONTINUED | OUTPATIENT
Start: 2023-02-23 | End: 2023-02-24 | Stop reason: HOSPADM

## 2023-02-23 RX ORDER — SODIUM CHLORIDE 9 MG/ML
INJECTION, SOLUTION INTRAVENOUS PRN
Status: DISCONTINUED | OUTPATIENT
Start: 2023-02-23 | End: 2023-02-24 | Stop reason: HOSPADM

## 2023-02-23 RX ORDER — SODIUM CHLORIDE 0.9 % (FLUSH) 0.9 %
5-40 SYRINGE (ML) INJECTION EVERY 12 HOURS SCHEDULED
Status: DISCONTINUED | OUTPATIENT
Start: 2023-02-23 | End: 2023-02-24 | Stop reason: HOSPADM

## 2023-02-23 RX ORDER — ACETAMINOPHEN 325 MG/1
650 TABLET ORAL EVERY 6 HOURS
Status: DISCONTINUED | OUTPATIENT
Start: 2023-02-23 | End: 2023-02-24 | Stop reason: HOSPADM

## 2023-02-23 RX ORDER — ONDANSETRON 2 MG/ML
INJECTION INTRAMUSCULAR; INTRAVENOUS PRN
Status: DISCONTINUED | OUTPATIENT
Start: 2023-02-23 | End: 2023-02-23 | Stop reason: SDUPTHER

## 2023-02-23 RX ORDER — OXYCODONE HYDROCHLORIDE 5 MG/1
5 TABLET ORAL EVERY 4 HOURS PRN
Status: DISCONTINUED | OUTPATIENT
Start: 2023-02-23 | End: 2023-02-24 | Stop reason: HOSPADM

## 2023-02-23 RX ORDER — OXYCODONE HYDROCHLORIDE 5 MG/1
5 TABLET ORAL
Status: DISCONTINUED | OUTPATIENT
Start: 2023-02-23 | End: 2023-02-23 | Stop reason: HOSPADM

## 2023-02-23 RX ADMIN — SODIUM CHLORIDE: 9 INJECTION, SOLUTION INTRAVENOUS at 13:49

## 2023-02-23 RX ADMIN — FENTANYL CITRATE 50 MCG: 50 INJECTION, SOLUTION INTRAMUSCULAR; INTRAVENOUS at 14:29

## 2023-02-23 RX ADMIN — DOCUSATE SODIUM 50 MG AND SENNOSIDES 8.6 MG 1 TABLET: 8.6; 5 TABLET, FILM COATED ORAL at 21:43

## 2023-02-23 RX ADMIN — ACETAMINOPHEN 650 MG: 325 TABLET ORAL at 21:44

## 2023-02-23 RX ADMIN — PROPOFOL 150 MG: 10 INJECTION, EMULSION INTRAVENOUS at 14:00

## 2023-02-23 RX ADMIN — ONDANSETRON 4 MG: 2 INJECTION INTRAMUSCULAR; INTRAVENOUS at 14:03

## 2023-02-23 RX ADMIN — FENTANYL CITRATE 50 MCG: 50 INJECTION, SOLUTION INTRAMUSCULAR; INTRAVENOUS at 15:21

## 2023-02-23 RX ADMIN — Medication 20 MG: at 15:47

## 2023-02-23 RX ADMIN — Medication 10 MG: at 15:40

## 2023-02-23 RX ADMIN — Medication 2000 MG: at 13:56

## 2023-02-23 RX ADMIN — FENTANYL CITRATE 50 MCG: 50 INJECTION, SOLUTION INTRAMUSCULAR; INTRAVENOUS at 14:19

## 2023-02-23 RX ADMIN — Medication 20 MG: at 15:33

## 2023-02-23 RX ADMIN — SODIUM CHLORIDE 1000 ML: 9 INJECTION, SOLUTION INTRAVENOUS at 12:34

## 2023-02-23 RX ADMIN — MECLIZINE HYDROCHLORIDE 12.5 MG: 25 TABLET ORAL at 21:43

## 2023-02-23 RX ADMIN — OXYCODONE HYDROCHLORIDE 10 MG: 5 TABLET ORAL at 18:17

## 2023-02-23 RX ADMIN — Medication 1000 UNITS: at 21:51

## 2023-02-23 RX ADMIN — HYDRALAZINE HYDROCHLORIDE 10 MG: 20 INJECTION INTRAMUSCULAR; INTRAVENOUS at 16:34

## 2023-02-23 RX ADMIN — FENTANYL CITRATE 50 MCG: 50 INJECTION, SOLUTION INTRAMUSCULAR; INTRAVENOUS at 14:25

## 2023-02-23 RX ADMIN — ROPIVACAINE HYDROCHLORIDE 30 ML: 5 INJECTION, SOLUTION EPIDURAL; INFILTRATION; PERINEURAL at 13:31

## 2023-02-23 RX ADMIN — EZETIMIBE 10 MG: 10 TABLET ORAL at 21:43

## 2023-02-23 RX ADMIN — AMLODIPINE BESYLATE 5 MG: 5 TABLET ORAL at 21:43

## 2023-02-23 RX ADMIN — DEXAMETHASONE SODIUM PHOSPHATE 4 MG: 4 INJECTION, SOLUTION INTRAMUSCULAR; INTRAVENOUS at 14:03

## 2023-02-23 RX ADMIN — ESMOLOL HYDROCHLORIDE 40 MG: 100 INJECTION, SOLUTION INTRAVENOUS at 14:29

## 2023-02-23 RX ADMIN — MIDAZOLAM HYDROCHLORIDE 2 MG: 1 INJECTION, SOLUTION INTRAMUSCULAR; INTRAVENOUS at 13:31

## 2023-02-23 RX ADMIN — Medication 10 ML: at 21:45

## 2023-02-23 RX ADMIN — NIACIN 500 MG: 500 TABLET, EXTENDED RELEASE ORAL at 21:44

## 2023-02-23 RX ADMIN — FENTANYL CITRATE 50 MCG: 0.05 INJECTION, SOLUTION INTRAMUSCULAR; INTRAVENOUS at 16:58

## 2023-02-23 ASSESSMENT — PAIN DESCRIPTION - DESCRIPTORS
DESCRIPTORS: ACHING;SORE
DESCRIPTORS: THROBBING;TIGHTNESS
DESCRIPTORS: BURNING;ACHING

## 2023-02-23 ASSESSMENT — PAIN DESCRIPTION - LOCATION
LOCATION: BREAST

## 2023-02-23 ASSESSMENT — PAIN - FUNCTIONAL ASSESSMENT: PAIN_FUNCTIONAL_ASSESSMENT: PREVENTS OR INTERFERES SOME ACTIVE ACTIVITIES AND ADLS

## 2023-02-23 ASSESSMENT — PAIN SCALES - GENERAL
PAINLEVEL_OUTOF10: 6
PAINLEVEL_OUTOF10: 4
PAINLEVEL_OUTOF10: 0
PAINLEVEL_OUTOF10: 0
PAINLEVEL_OUTOF10: 8

## 2023-02-23 ASSESSMENT — PAIN DESCRIPTION - ORIENTATION
ORIENTATION: RIGHT

## 2023-02-23 NOTE — ANESTHESIA PROCEDURE NOTES
Peripheral Block    Patient location during procedure: pre-op  Reason for block: post-op pain management and at surgeon's request  Start time: 2/23/2023 1:31 PM  End time: 2/23/2023 1:41 PM  Staffing  Performed: anesthesiologist   Anesthesiologist: Ana Hussein DO  Preanesthetic Checklist  Completed: patient identified, IV checked, site marked, risks and benefits discussed, surgical/procedural consents, equipment checked, pre-op evaluation, timeout performed, anesthesia consent given, oxygen available and monitors applied/VS acknowledged  Peripheral Block   Patient position: supine  Prep: ChloraPrep  Provider prep: mask and sterile gloves (Sterile probe cover)  Patient monitoring: cardiac monitor, continuous pulse ox, frequent blood pressure checks and IV access  Block type: PECS I and PECS II  Laterality: right  Injection technique: single-shot  Guidance: ultrasound guided  Local infiltration: ropivacaine  Infiltration strength: 0.5 %  Local infiltration: ropivacaine  Dose: 30 mL    Needle   Needle type: combined needle/nerve stimulator   Needle gauge: 22 G  Needle localization: anatomical landmarks and ultrasound guidance  Needle length: 10 cm  Assessment   Injection assessment: negative aspiration for heme, no paresthesia on injection and local visualized surrounding nerve on ultrasound  Paresthesia pain: immediately resolved  Slow fractionated injection: yes  Hemodynamics: stable  Real-time US image taken/store: yes    Additional Notes  Ultrasound image printed and saved in patient chart.     Sterile probe cover used    Medications Administered  ropivacaine (NAROPIN) injection 0.5% - Perineural   30 mL - 2/23/2023 1:31:00 PM

## 2023-02-23 NOTE — ANESTHESIA POSTPROCEDURE EVALUATION
Department of Anesthesiology  Postprocedure Note    Patient: Neha Leblanc  MRN: [id number]  YOB: 1940  Date of evaluation: 2/23/2023      Procedure Summary     Date: 02/23/23 Room / Location: [address]    Anesthesia Start: 8399 Anesthesia Stop: 0510    Procedure: RIGHT BREAST MASTECTOMY MODIFIED RADICAL (Right: Breast) Diagnosis:       Malignant neoplasm of right breast ([address]      (Malignant neoplasm of right breast ([address] [C50.911])    Surgeons: Archana Miller MD Responsible Provider: Сергей Cuba DO    Anesthesia Type: general, regional ASA Status: 3          Anesthesia Type: No value filed.     Shelton Phase I: Shelton Score: 9    Shelton Phase II:        Anesthesia Post Evaluation    Patient location during evaluation: bedside  Patient participation: complete - patient participated  Level of consciousness: awake and awake and alert  Airway patency: patent  Nausea & Vomiting: no nausea and no vomiting  Complications: no  Cardiovascular status: blood pressure returned to baseline and hemodynamically stable  Respiratory status: acceptable  Hydration status: euvolemic

## 2023-02-23 NOTE — OP NOTE
OPERATIVE REPORT    LOG ID: [id number]  Surgery Date: 2/23/2023   Incision/Procedure Start Time: 2980  Incision Close/Procedure End Time: 4009    Procedure Performed: Procedure(s):  RIGHT BREAST MASTECTOMY MODIFIED RADICAL     Surgeon(s)/Proceduralist(s) and Assistant(s):  Surgeon(s) and Role:     * Remi Roberson MD - Primary  First Assistant: Bess Giraldo  Scrub Person First: Anuel Uriarte       Anesthesia: Anesthesiologist: Britton Khalil DO  CRNA: CORONA Perez - CRNA   General     Pre-Operative Diagnosis:  Malignant neoplasm of right breast [facility]) [C50.911]  Post-Operative Diagnosis:  SAME    COMPLICATIONS: There were no complications. ESTIMATED BLOOD LOSS: 10 CC     SPECIMEN: right breast and axillary contents     DRAINS: 2 JOHNIE drains were left. 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  female  who presented with a right axillary mass that was biopsied and positive for cancer. Due to her overall good health, I recommended a right  modified radical mastectomy. She did not want reconstruction. She understood the risks and benefits of the procedure and consented on the day of surgery. PROCEDURE: The patient was brought to the operating room in the supine position. With the ipsilateral arm abducted to 90 degrees, the right    breast, chest and axilla was prepped and draped in the usual surgical fashion. Time out was performed and breast images were reviewed preoperatively. An elliptical incision was made around the nipple areolar complex. A 10 scalpel used through skin and subcutaneous tissue. Electrocautery was used for further dissection.  A superior flap was created to the inferior aspect of the clavicle, medially to the sternal border, inferiorly to the superior aspect of the rectus, and laterally to the latissimus dorsi. The right  breast, along with the pectoralis fascia, was removed as one specimen marked short superior, long lateral. It includes some axillary nodes. Next, the axillary contents were removed, recognizing the axillary vein, and preserving the long thoracic nerve and thoracodorsal nerve. There were several nodes. All were removed as one specimen. The incision was irrigated and inspected for hemostasis. 3 grams of surgicel was sprayed into the wound bed and irrigated. Two 10 flat JOHNIE drains were placed and sutured in place with 3-0 nylon. The incision was then closed with interrupted 3-0 Vicryl and dermabond, covered by 4 x 4s, paper tape, and a  surgical bra The patient tolerated the procedure well and was transferred to recovery room in stable condition. I performed this procedure with a surgical assistant.      Operation performed with curative intent: Yes  Resection was performed within the boundaries of the axillary vein, chest wall (serratus anterior), and latissimus dorsi: Yes  Nerves identified and preserved during dissection (select all that apply): Long thoracic nerve, Thoracodorsal nerve, and Branches of the intercostobrachial nerves  Level III nodes were removed: No     SIGNATURE: Servando Lopes MD PATIENT NAME: Curt Ellis   DATE: 2/23/23 MRN: [id number]   TIME: 2:58 PM EST PHONE:  [phone]

## 2023-02-23 NOTE — CONSULTS
17-year-old female with past medical history of CVA on Aggrenox, hypertension, history of breast cancer status post right breast mastectomy by surgery. Surgery consult me for medical management of her asthma, CVA, hypertension. Patient currently denies any needs.   Spoke with nursing about the care      Past Medical History:   Diagnosis Date    Asthma     Asthma in remission 1990    Breast cancer ([facility] [address] 2014    right breast    Cerebral artery occlusion with cerebral infarction ([address]     2014    Elevated TSH     Hematoma of right thigh 09/30/2021    History of artificial lens replacement 03/24/2015    History of blood transfusion     1956-after delivery of first child    History of breast cancer 02/2014    Invasive ductal cancer right breast, Dr Stephen Rahman, Dr Buchanan Needle    History of therapeutic radiation     HTN (hypertension) 1980    was with [facility] [place]    Hx of blood clots     Hx of ischemic left MCA stroke 2013    no residual, Dr Smitha Crespo    Hx of ischemic right MCA stroke 12/2014    no residual, frontal lobe    Hypercalcemia 07/11/2022    Hyperlipidemia     Osteoarthritis     Secondary and unspecified malignant neoplasm of axilla and upper limb lymph nodes ([facility] [address] 11/08/2018    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    Dr Rebecca Medrano (ortho)    Vitamin D deficiency      Past Surgical History:   Procedure Laterality Date    BLADDER SUSPENSION      BREAST BIOPSY Right 02/14/2014    BREAST LUMPECTOMY Right 03/04/2014    Lumpectomy with SLNB, Dr Bonnie Finnegan Dr in [address]      bilateral cataract surgery    FRACTURE SURGERY      \"as child right leg\"    [address] LYMPH NODE BIOPSY  01/26/2023    US LYMPH NODE BIOPSY MLOZ ULTRASOUND     Social History       Tobacco History       Smoking Status  Former Smoking Frequency  0.25 packs/day for 14.00 years (3.50 pk-yrs) Smoking Tobacco Type  Cigarettes      Smokeless Tobacco Use  Never      Tobacco Comments  started at age 11yo and quit at age 29yo              Alcohol History       Alcohol Use Status  No              Drug Use       Drug Use Status  No              Sexual Activity       Sexually Active  Not Asked                  Family History   Problem Relation Age of Onset    Kidney Disease Mother         dec age 79    Hypertension Mother     Migraines Mother     Diabetes Father     Heart Failure Father         dec age 76    Diabetes Brother     Cancer Brother         dec age 76    Heart Attack Paternal Grandmother     Stroke Paternal Grandfather     Breast Cancer Paternal Cousin     Diabetes Daughter     Diabetes Son      No current facility-administered medications on file prior to encounter. Current Outpatient Medications on File Prior to Encounter   Medication Sig Dispense Refill    meclizine (ANTIVERT) 12.5 MG tablet Take 12.5 mg by mouth in the morning and at bedtime Half a tablet      b complex vitamins capsule Take 1 capsule by mouth daily      Multiple Vitamins-Minerals (ABC COMPLETE SENIOR WOMENS 50+ PO) Take 1 tablet by mouth daily      aspirin-dipyridamole (AGGRENOX)  MG per extended release capsule TAKE 1 CAPSULE TWICE DAILY 180 capsule 3    ezetimibe (ZETIA) 10 MG tablet TAKE 1 TABLET DAILY 90 tablet 3    niacin (NIASPAN) 500 MG extended release tablet TAKE 1 TABLET 3 TIMES A  tablet 4    amLODIPine-benazepril (LOTREL) 5-20 MG per capsule Take 1 capsule by mouth daily TAKE 1 CAPSULE DAILY 90 capsule 4    Polyvinyl Alcohol-Povidone (REFRESH OP) Apply 1 drop to eye 4 times daily as needed. Vitamin D (CHOLECALCIFEROL) 1000 UNITS CAPS capsule Take 1,000 Units by mouth daily. fish oil-omega-3 fatty acids 1000 MG capsule Take 1 g by mouth 3 times daily.        Lab Results   Component Value Date    WBC 6.5 02/09/2023    HGB 13.6 02/09/2023    HCT 39.8 02/09/2023    MCV 93.5 02/09/2023     02/09/2023     Lab Results   Component Value Date/Time     02/09/2023 10:26 AM    K 3.7 02/09/2023 10:26 AM    K 3.8 10/01/2021 05:59 AM     02/09/2023 10:26 AM    CO2 26 02/09/2023 10:26 AM    BUN 14 02/09/2023 10:26 AM    CREATININE 0.71 02/09/2023 10:26 AM    GLUCOSE 96 02/09/2023 10:26 AM    GLUCOSE 91 05/16/2012 08:45 AM    CALCIUM 10.2 02/09/2023 10:26 AM      ROS: 12 point review of systems negative except as in HPI  HEENT: AT/NC, PERRLA, no JVD  HEART: s1/s2 wnl w/o s3, no m.r.g  Neck: supple and midline  LUNG: clear  ABD: soft, NT  EXT: no edema  SKin : no rash  Neuro:no focal deficits, alert and orientated    1) encounter for post surgical care  2) HTN  3) h/o CVA  4) asthma  Nephrotoxic agents such as BenzePrO, hydralazine as needed follow-up of blood pressure, resume Aggrenox   When Okay with surgery. Spoke with daughter at bedside.   Pain controll, albuterol inhaler as needed for asthma

## 2023-02-23 NOTE — PROGRESS NOTES
Assessment documented. A + O x 3. Family at bedside. Dressing to right breast is clean, dry and intact, surgical bra also in place. JOHNIE drain x 2 compressed with bloody drainage  Vital signs stable. Rates pain 8/10 scale and medicated as per eMAR. Incontinent of bladder x 1. Call light within reach.      BP (!) 149/62   Pulse 76   Temp 97.3 °F (36.3 °C) (Oral)   Resp 16   Ht 5' 4.5\" (1.638 m)   Wt 153 lb (69.4 kg)   SpO2 98%   BMI 25.86 kg/m²

## 2023-02-23 NOTE — ANESTHESIA PRE PROCEDURE
Department of Anesthesiology  Preprocedure Note       Name:  Anayeli Desai   Age:  78 y.o.  :  1940                                          MRN:  [id number]         Date:  2023      Surgeon: Alessandro Crarasco):  Alexandra Lux MD    Procedure: Procedure(s):  RIGHT BREAST MASTECTOMY MODIFIED RADICAL    Medications prior to admission:   Prior to Admission medications    Medication Sig Start Date End Date Taking? Authorizing Provider   meclizine (ANTIVERT) 12.5 MG tablet Take 12.5 mg by mouth in the morning and at bedtime Half a tablet    Historical Provider, MD   b complex vitamins capsule Take 1 capsule by mouth daily    Historical Provider, MD   Multiple Vitamins-Minerals (ABC COMPLETE SENIOR WOMENS 50+ PO) Take 1 tablet by mouth daily    Historical Provider, MD   aspirin-dipyridamole (AGGRENOX)  MG per extended release capsule TAKE 1 CAPSULE TWICE DAILY 22   Florence Zepeda MD   ezetimibe (ZETIA) 10 MG tablet TAKE 1 TABLET DAILY 22   Sp Salas MD   niacin (NIASPAN) 500 MG extended release tablet TAKE 1 TABLET 3 TIMES A DAY 5/10/22   Sp Salas MD   amLODIPine-benazepril (LOTREL) 5-20 MG per capsule Take 1 capsule by mouth daily TAKE 1 CAPSULE DAILY 22   Sp Salas MD   Polyvinyl Alcohol-Povidone (REFRESH OP) Apply 1 drop to eye 4 times daily as needed. Historical Provider, MD   Vitamin D (CHOLECALCIFEROL) 1000 UNITS CAPS capsule Take 1,000 Units by mouth daily. Historical Provider, MD   fish oil-omega-3 fatty acids 1000 MG capsule Take 1 g by mouth 3 times daily.     Historical Provider, MD       Current medications:    Current Facility-Administered Medications   Medication Dose Route Frequency Provider Last Rate Last Admin    sodium chloride flush 0.9 % injection 5-40 mL  5-40 mL IntraVENous 2 times per day Harish Sethi APRN - CNP        sodium chloride flush 0.9 % injection 5-40 mL  5-40 mL IntraVENous PRN Maisha Rodrigues Born, APRN - CNP        0.9 % sodium chloride infusion   IntraVENous PRN CORONA Sloan CNP        ondansetron (ZOFRAN-ODT) disintegrating tablet 4 mg  4 mg Oral Q8H PRN CORONA Sloan CNP        Or    ondansetron [facility]) injection 4 mg  4 mg IntraVENous Q6H PRN OCRONA Sloan CNP        acetaminophen (TYLENOL) tablet 650 mg  650 mg Oral Q6H CORONA Sloan - CNP        oxyCODONE (ROXICODONE) immediate release tablet 5 mg  5 mg Oral Q4H PRN CORONA Sloan CNP        Or    oxyCODONE (ROXICODONE) immediate release tablet 10 mg  10 mg Oral Q4H PRN CORONA Sloan CNP        HYDROmorphone (DILAUDID) injection 0.25 mg  0.25 mg IntraVENous Q3H PRN CORONA Sloan CNP        Or    HYDROmorphone (DILAUDID) injection 0.5 mg  0.5 mg IntraVENous Q3H PRN CORONA Sloan CNP        sennosides-docusate sodium (SENOKOT-S) 8.6-50 MG tablet 1 tablet  1 tablet Oral BID CORONA Sloan CNP        polyethylene glycol (GLYCOLAX) packet 17 g  17 g Oral Daily PRN CORONA Sloan CNP        sodium chloride flush 0.9 % injection 5-40 mL  5-40 mL IntraVENous 2 times per day Latoya Samayoa MD        sodium chloride flush 0.9 % injection 5-40 mL  5-40 mL IntraVENous PRN Latoya Samayoa MD        0.9 % sodium chloride infusion   IntraVENous PRN Latoya Samayoa MD 50 mL/hr at 02/23/23 1234 1,000 mL at 02/23/23 1234    ceFAZolin (ANCEF) 2000 mg in 0.9% sodium chloride 100 mL IVPB  2,000 mg IntraVENous On Call to [address] [place] [address] Avenue, MD           Allergies:     Allergies   Allergen Reactions    Sulfa Antibiotics      Unsure of reaction     Iodinated Contrast Media      Oral & IV dye group    Statins Other (See Comments)       Problem List:    Patient Active Problem List   Diagnosis Code    Essential hypertension I10    Hyperlipidemia E78.5    Osteoarthritis M19.90    Vitamin D deficiency E55.9    History of recurrent TIAs Z86.73    Hypertensive retinopathy H35.039    Low back pain M54.50    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 Isela Speck Essential tremor G25.0    Hypercalcemia E83.52    GHAZAL (obstructive sleep apnea) G47.33    Adenocarcinoma, breast, right (HCC) C50.911    Grief reaction F43.21    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, right (HCC) C50.911, C77.3    Status post mastectomy, right Z90.11       Past Medical History:        Diagnosis Date    Asthma     Asthma in remission 1990    Breast cancer ([address] 2014    right breast    Cerebral artery occlusion with cerebral infarction ([address]     2014    Elevated TSH     Hematoma of right thigh 09/30/2021    History of artificial lens replacement 03/24/2015    History of blood transfusion     1956-after delivery of first child    History of breast cancer 02/2014    Invasive ductal cancer right breast, Dr Italia [place], Dr Chucho Wong History of therapeutic radiation     HTN (hypertension) 1980    was with [facility] [place]    Hx of blood clots     Hx of ischemic left MCA stroke 2013    no residual, Dr Tang Mejia Hx of ischemic right MCA stroke 12/2014    no residual, frontal lobe    Hypercalcemia 07/11/2022    Hyperlipidemia     Osteoarthritis     Secondary and unspecified malignant neoplasm of axilla and upper limb lymph nodes ([address] 11/08/2018    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    Dr Grace Rico (ortho)    Vitamin D deficiency        Past Surgical History:        Procedure Laterality Date    BLADDER SUSPENSION      BREAST BIOPSY Right 02/14/2014    BREAST LUMPECTOMY Right 03/04/2014    Lumpectomy with SLNB, Dr Ospina Bachelor       in [address] [address]      bilateral cataract surgery    FRACTURE SURGERY      \"as child right leg\"    TONSILLECTOMY      TUBAL LIGATION      US LYMPH NODE BIOPSY  01/26/2023    US LYMPH NODE BIOPSY MLOZ ULTRASOUND       Social History:    Social History     Tobacco Use    Smoking status: Former     Packs/day: 0.25     Years: 14.00     Pack years: 3.50     Types: Cigarettes    Smokeless tobacco: Never    Tobacco comments:     started at age 11yo and quit at age 29yo   Substance Use Topics    Alcohol use: No                                Counseling given: Not Answered  Tobacco comments: started at age 11yo and quit at age 29yo      Vital Signs (Current):   Vitals:    02/23/23 1230 02/23/23 1245 02/23/23 1250   BP: (!) 194/80 (!) 197/75 (!) 172/77   Pulse: 63     Resp: 16     Temp: 97.8 °F (36.6 °C)     TempSrc: Temporal     SpO2: 98%     Weight: 153 lb (69.4 kg)     Height: 5' 4.5\" (1.638 m)                                                BP Readings from Last 3 Encounters:   02/23/23 (!) 172/77   02/20/23 (!) 167/77   02/16/23 134/68       NPO Status: Time of last liquid consumption: 2100                        Time of last solid consumption: 2100                        Date of last liquid consumption: 02/22/23                        Date of last solid food consumption: 02/22/23    BMI:   Wt Readings from Last 3 Encounters:   02/23/23 153 lb (69.4 kg)   02/20/23 156 lb (70.8 kg)   02/16/23 153 lb 6.4 oz (69.6 kg)     Body mass index is 25.86 kg/m².     CBC:   Lab Results   Component Value Date/Time    WBC 6.5 02/09/2023 10:26 AM    RBC 4.26 02/09/2023 10:26 AM    RBC 4.66 05/16/2012 08:45 AM    HGB 13.6 02/09/2023 10:26 AM    HCT 39.8 02/09/2023 10:26 AM    MCV 93.5 02/09/2023 10:26 AM    RDW 13.2 02/09/2023 10:26 AM     02/09/2023 10:26 AM       CMP:   Lab Results   Component Value Date/Time     02/09/2023 10:26 AM    K 3.7 02/09/2023 10:26 AM    K 3.8 10/01/2021 05:59 AM     02/09/2023 10:26 AM    CO2 26 02/09/2023 10:26 AM    BUN 14 02/09/2023 10:26 AM    CREATININE 0.71 02/09/2023 10:26 AM    GFRAA >60.0 09/06/2022 11:49 AM    LABGLOM >60.0 02/09/2023 10:26 AM    GLUCOSE 96 02/09/2023 10:26 AM    GLUCOSE 91 05/16/2012 08:45 AM    PROT 7.0 02/09/2023 10:26 AM    CALCIUM 10.2 02/09/2023 10:26 AM    BILITOT 0.5 02/09/2023 10:26 AM    ALKPHOS 130 02/09/2023 10:26 AM    AST 20 02/09/2023 10:26 AM    ALT 16 02/09/2023 10:26 AM       POC Tests: No results for input(s): POCGLU, POCNA, POCK, POCCL, POCBUN, POCHEMO, POCHCT in the last 72 hours. Coags:   Lab Results   Component Value Date/Time    PROTIME 12.3 09/30/2021 12:15 PM    INR 0.9 09/30/2021 12:15 PM    APTT 25.7 09/30/2021 12:15 PM       HCG (If Applicable): No results found for: PREGTESTUR, PREGSERUM, HCG, HCGQUANT     ABGs: No results found for: PHART, PO2ART, PSX2QUT, OWC0UXT, BEART, T2XQUTUR     Type & Screen (If Applicable):  No results found for: LABABO, LABRH    Drug/Infectious Status (If Applicable):  No results found for: HIV, HEPCAB    COVID-19 Screening (If Applicable): No results found for: COVID19        Anesthesia Evaluation  Patient summary reviewed and Nursing notes reviewed no history of anesthetic complications:   Airway: Mallampati: II  TM distance: >3 FB   Neck ROM: full  Mouth opening: > = 3 FB   Dental: normal exam         Pulmonary:normal exam    (+) sleep apnea:  asthma:                            Cardiovascular:  Exercise tolerance: good (>4 METS),   (+) hypertension:, hyperlipidemia      ECG reviewed               Beta Blocker:  Not on Beta Blocker      ROS comment: Sinus  Bradycardia   -Left atrial enlargement.    -Anterior infarct -age undetermined.       Neuro/Psych:   (+) CVA:, headaches:,             GI/Hepatic/Renal: Neg GI/Hepatic/Renal ROS            Endo/Other:    (+) malignancy/cancer. Pt had PAT visit. Abdominal:             Vascular:   + DVT, . Other Findings:           Anesthesia Plan      general and regional     ASA 3     (LMA  PEC vs PVB)  Induction: intravenous. MIPS: Postoperative opioids intended and Prophylactic antiemetics administered. Anesthetic plan and risks discussed with patient. Plan discussed with CRNA.     Attending anesthesiologist reviewed and agrees with Pre Eval content      Post-op pain plan if not by surgeon: mj Beckwith,    2/23/2023

## 2023-02-23 NOTE — H&P
UPDATED HISTORY AND PHYSICAL EXAMINATION    SERVICE DATE:  2/23/2023   SERVICE TIME:  12:14 PM    PHYSICAL EXAM MUST BE COMPLETED ON ADMISSION    The History and Physical (completed in the past 30 days) has been reviewed and the patient has been examined. The contents accurately reflect the patient's condition with the following additions or revisions since the H&P was completed. Examination indicates no changes. This H&P can be found in the Epic. The patient was counseled at length about the risks of roxann Covid-19 during their perioperative period and any recovery window from their procedure. The patient was made aware that roxann Covid-19  may worsen their prognosis for recovering from their procedure  and lend to a higher morbidity and/or mortality risk. All material risks, benefits, and reasonable alternatives including postponing the procedure were discussed. The patient does wish to proceed with the procedure at this time.     SIGNATURE: Cheyenne Scheuermann, MD PATIENT NAME: Thais Second   DATE: 2/23/23 MRN: [id number]   TIME: 12:14 PM EST PHONE: [phone]

## 2023-02-24 VITALS
BODY MASS INDEX: 25.49 KG/M2 | SYSTOLIC BLOOD PRESSURE: 154 MMHG | RESPIRATION RATE: 18 BRPM | HEIGHT: 65 IN | WEIGHT: 153 LBS | DIASTOLIC BLOOD PRESSURE: 51 MMHG | HEART RATE: 51 BPM | OXYGEN SATURATION: 100 % | TEMPERATURE: 97.5 F

## 2023-02-24 PROCEDURE — 6370000000 HC RX 637 (ALT 250 FOR IP): Performed by: NURSE PRACTITIONER

## 2023-02-24 PROCEDURE — 2700000000 HC OXYGEN THERAPY PER DAY

## 2023-02-24 PROCEDURE — 2580000003 HC RX 258: Performed by: NURSE PRACTITIONER

## 2023-02-24 PROCEDURE — G0378 HOSPITAL OBSERVATION PER HR: HCPCS

## 2023-02-24 PROCEDURE — 6370000000 HC RX 637 (ALT 250 FOR IP): Performed by: INTERNAL MEDICINE

## 2023-02-24 RX ORDER — SENNA AND DOCUSATE SODIUM 50; 8.6 MG/1; MG/1
1 TABLET, FILM COATED ORAL 2 TIMES DAILY
Qty: 20 TABLET | Refills: 0 | Status: SHIPPED | OUTPATIENT
Start: 2023-02-24 | End: 2023-03-06

## 2023-02-24 RX ORDER — OXYCODONE HYDROCHLORIDE 5 MG/1
5 TABLET ORAL EVERY 6 HOURS PRN
Qty: 28 TABLET | Refills: 0 | Status: SHIPPED | OUTPATIENT
Start: 2023-02-24 | End: 2023-03-03

## 2023-02-24 RX ADMIN — ACETAMINOPHEN 650 MG: 325 TABLET ORAL at 04:41

## 2023-02-24 RX ADMIN — EZETIMIBE 10 MG: 10 TABLET ORAL at 08:53

## 2023-02-24 RX ADMIN — Medication 1000 UNITS: at 08:53

## 2023-02-24 RX ADMIN — AMLODIPINE BESYLATE 5 MG: 5 TABLET ORAL at 08:53

## 2023-02-24 RX ADMIN — Medication 10 ML: at 10:20

## 2023-02-24 RX ADMIN — ACETAMINOPHEN 650 MG: 325 TABLET ORAL at 08:53

## 2023-02-24 RX ADMIN — DOCUSATE SODIUM 50 MG AND SENNOSIDES 8.6 MG 1 TABLET: 8.6; 5 TABLET, FILM COATED ORAL at 08:53

## 2023-02-24 RX ADMIN — MECLIZINE HYDROCHLORIDE 12.5 MG: 25 TABLET ORAL at 08:53

## 2023-02-24 ASSESSMENT — ENCOUNTER SYMPTOMS
COUGH: 0
SHORTNESS OF BREATH: 0
DIARRHEA: 0

## 2023-02-24 ASSESSMENT — PAIN SCALES - GENERAL
PAINLEVEL_OUTOF10: 0
PAINLEVEL_OUTOF10: 4
PAINLEVEL_OUTOF10: 0

## 2023-02-24 ASSESSMENT — PAIN DESCRIPTION - LOCATION: LOCATION: BREAST

## 2023-02-24 ASSESSMENT — PAIN DESCRIPTION - ORIENTATION: ORIENTATION: RIGHT

## 2023-02-24 ASSESSMENT — PAIN DESCRIPTION - DESCRIPTORS: DESCRIPTORS: SORE

## 2023-02-24 NOTE — PLAN OF CARE
Problem: Chronic Conditions and Co-morbidities  Goal: Patient's chronic conditions and co-morbidity symptoms are monitored and maintained or improved  2/24/2023 1218 by Deloris Carroll RN  Outcome: Adequate for Discharge  2/24/2023 1214 by Deloris Carroll RN  Outcome: Progressing     Problem: Discharge Planning  Goal: Discharge to home or other facility with appropriate resources  2/24/2023 1218 by Deloris Carroll RN  Outcome: Adequate for Discharge  2/24/2023 1214 by Deloris Carroll RN  Outcome: Progressing     Problem: Pain  Goal: Verbalizes/displays adequate comfort level or baseline comfort level  2/24/2023 1218 by Deloris Carroll RN  Outcome: Adequate for Discharge  2/24/2023 1214 by Deloris Carroll RN  Outcome: Progressing     Problem: ABCDS Injury Assessment  Goal: Absence of physical injury  2/24/2023 1218 by Deloris Carroll RN  Outcome: Adequate for Discharge  2/24/2023 1214 by Deloris Carroll RN  Outcome: Progressing     Problem: Safety - Adult  Goal: Free from fall injury  2/24/2023 1218 by Deloris Carroll RN  Outcome: Adequate for Discharge  2/24/2023 1214 by Deloris Carroll RN  Outcome: Progressing

## 2023-02-24 NOTE — DISCHARGE SUMMARY
Discharge Summary     Date:2/24/2023        Patient Name:Fern Hutton     Date of Birth:[birth year]     Age:82 y.o. Admit Date:2/23/2023   Admission Condition:good   Discharged Condition:  Discharge Date: 02/24/23     Discharge Diagnoses   Principal Problem:    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, right [facility])  Active Problems:    Status post mastectomy, right  Resolved Problems:    * No resolved hospital problems. [facility] Stay   Narrative of Hospital Course:   Patient pod1 modified radical right breast mastectomy for right breast neoplasm   Patient doing well postoperatively   Patient is not having any pain at this time  The right chest wall is flat, there is no evidence of seroma or hematoma formation. The incision is well approximated with skin adhesive. Patient has two JOHNIE drains to right side that are functioning appropriately and has had a total of 138 cc of sanguinous output since placement. Patient has been instructed on drain and incision care and verbalized understanding. Consultants:   IP CONSULT TO HOSPITALIST  IP CONSULT TO CASE MANAGEMENT  IP CONSULT TO SOCIAL WORK  IP CONSULT TO HOME CARE NEEDS    Time Spent on Discharge:  40 minutes were spent in patient examination, evaluation, counseling as well as medication reconciliation, prescriptions for required medications, discharge plan and follow up.       Surgeries/Procedures Performed:  Procedure(s):  RIGHT BREAST MASTECTOMY MODIFIED RADICAL      Treatments:   IV hydration and analgesia: acetaminophen and roxicodone    Significant Diagnostic Studies:   Recent Labs:  CBC:   Lab Results   Component Value Date/Time    WBC 6.5 02/09/2023 10:26 AM    RBC 4.26 02/09/2023 10:26 AM    RBC 4.66 05/16/2012 08:45 AM    HGB 13.6 02/09/2023 10:26 AM    HCT 39.8 02/09/2023 10:26 AM    MCV 93.5 02/09/2023 10:26 AM    MCH 31.9 02/09/2023 10:26 AM    MCHC 34.1 02/09/2023 10:26 AM    RDW 13.2 02/09/2023 10:26 AM     02/09/2023 10:26 AM     BMP:    Lab Results   Component Value Date/Time    GLUCOSE 96 02/09/2023 10:26 AM    GLUCOSE 91 05/16/2012 08:45 AM     02/09/2023 10:26 AM    K 3.7 02/09/2023 10:26 AM    K 3.8 10/01/2021 05:59 AM     02/09/2023 10:26 AM    CO2 26 02/09/2023 10:26 AM    ANIONGAP 11 02/09/2023 10:26 AM    BUN 14 02/09/2023 10:26 AM    CREATININE 0.71 02/09/2023 10:26 AM    CALCIUM 10.2 02/09/2023 10:26 AM    LABGLOM >60.0 02/09/2023 10:26 AM    GFRAA >60.0 09/06/2022 11:49 AM     Surgical pathology pending at this time. Radiology Last 7 Days:  No results found. Discharge Plan   Disposition: Home    Provider Follow-Up:   No follow-up provider specified. Hospital/Incidental Findings Requiring Follow-Up:  There were no incidental findings requiring follow up outside of patient's postoperative follow up. Patient Instructions   Diet: regular diet    Activity: activity as tolerated, no heavy lifting, pushing, pulling, raising arms above the head or fully extending arms in front of body, and no driving while on analgesics    Other Instructions:   Strip and drain JOHNIE drains three times a day and PRN recording output on paper to provide to surgeon daily and at follow up    Discharge Medications         Medication List        START taking these medications      oxyCODONE 5 MG immediate release tablet  Commonly known as: ROXICODONE  Take 1 tablet by mouth every 6 hours as needed for Pain for up to 7 days.  Max Daily Amount: 20 mg     sennosides-docusate sodium 8.6-50 MG tablet  Commonly known as: SENOKOT-S  Take 1 tablet by mouth 2 times daily for 10 days            CONTINUE taking these medications      ABC COMPLETE SENIOR WOMENS 50+ PO     amLODIPine-benazepril 5-20 MG per capsule  Commonly known as: LOTREL  Take 1 capsule by mouth daily TAKE 1 CAPSULE DAILY     aspirin-dipyridamole  MG per extended release capsule  Commonly known as: AGGRENOX  TAKE 1 CAPSULE TWICE DAILY     b complex vitamins capsule     ezetimibe 10 MG tablet  Commonly known as: ZETIA  TAKE 1 TABLET DAILY     fish oil-omega-3 fatty acids 1000 MG capsule     meclizine 12.5 MG tablet  Commonly known as: ANTIVERT     niacin 500 MG extended release tablet  Commonly known as: NIASPAN  TAKE 1 TABLET 3 TIMES A DAY     REFRESH OP     Vitamin D 1000 units Caps capsule  Commonly known as: CHOLECALCIFEROL               Where to Get Your Medications        These medications were sent to [place] [address], [place] - [address] [address]      Phone: [phone]   oxyCODONE 5 MG immediate release tablet  sennosides-docusate sodium 8.6-50 MG tablet         Electronically signed by CORONA Wolfe CNP on 2/24/23 at 9:16 AM EST

## 2023-02-24 NOTE — PROGRESS NOTES
Shift assessment completed and medications given per MAR. Patient is 1 day post op and has no complaint of pain or discomfort at this time. VSS and alert and oriented. Patient's daughter will be coming to facility for discharge. Said nurse will educate daughter about JOHNIE drains and how to empty those. Call light is within reach and patient is up to chair eating lunch. Will continue to monitor.

## 2023-02-24 NOTE — PLAN OF CARE
Problem: Chronic Conditions and Co-morbidities  Goal: Patient's chronic conditions and co-morbidity symptoms are monitored and maintained or improved  2/24/2023 1219 by Dot De Leon RN  Outcome: Completed  2/24/2023 1218 by Dot De Leon RN  Outcome: Adequate for Discharge  2/24/2023 1214 by Dot De Leon RN  Outcome: Progressing     Problem: Discharge Planning  Goal: Discharge to home or other facility with appropriate resources  2/24/2023 1219 by Dot De Leon RN  Outcome: Completed  2/24/2023 1218 by Dot De Leon RN  Outcome: Adequate for Discharge  2/24/2023 1214 by Dot De Leon RN  Outcome: Progressing     Problem: Pain  Goal: Verbalizes/displays adequate comfort level or baseline comfort level  2/24/2023 1219 by Dot De Leon RN  Outcome: Completed  2/24/2023 1218 by Dot De Leon RN  Outcome: Adequate for Discharge  2/24/2023 1214 by Dot De Leon RN  Outcome: Progressing     Problem: ABCDS Injury Assessment  Goal: Absence of physical injury  2/24/2023 1219 by Dot De Leon RN  Outcome: Completed  2/24/2023 1218 by Dot De Leon RN  Outcome: Adequate for Discharge  2/24/2023 1214 by Dot De Leon RN  Outcome: Progressing     Problem: Safety - Adult  Goal: Free from fall injury  2/24/2023 1219 by Dot De Leon RN  Outcome: Completed  2/24/2023 1218 by Dot De Leon RN  Outcome: Adequate for Discharge  2/24/2023 1214 by Dot De Leno RN  Outcome: Progressing

## 2023-02-24 NOTE — PROGRESS NOTES
Progress Note  Date:2023       [facility]  Patient Bay Parcel     Date of Birth:[birth year]     Age:82 y.o. Subjective    Subjective:  Symptoms:  No shortness of breath, malaise, cough, chest pain, weakness, headache, chest pressure, anorexia, diarrhea or anxiety. Review of Systems   Constitutional:  Negative for fever. Respiratory:  Negative for cough and shortness of breath. Cardiovascular:  Negative for chest pain. Gastrointestinal:  Negative for anorexia and diarrhea. Neurological:  Negative for weakness. Objective         Vitals Last 24 Hours:  TEMPERATURE:  Temp  Av.3 °F (36.3 °C)  Min: 97 °F (36.1 °C)  Max: 97.5 °F (36.4 °C)  RESPIRATIONS RANGE: Resp  Av.5  Min: 11  Max: 18  PULSE OXIMETRY RANGE: SpO2  Av.2 %  Min: 96 %  Max: 100 %  PULSE RANGE: Pulse  Av.9  Min: 51  Max: 84  BLOOD PRESSURE RANGE: Systolic (94CSC), WRB:600 , Min:145 , GAQ:609   ; Diastolic (56DRD), TGQ:05, Min:51, Max:84    I/O (24Hr): Intake/Output Summary (Last 24 hours) at 2023 1443  Last data filed at 2023 0534  Gross per 24 hour   Intake 2475 ml   Output 838 ml   Net 1637 ml     Objective:  General Appearance:  Comfortable, well-appearing and in no acute distress. Vital signs: (most recent): Blood pressure (!) 154/51, pulse 51, temperature 97.5 °F (36.4 °C), temperature source Oral, resp. rate 18, height 5' 4.5\" (1.638 m), weight 153 lb (69.4 kg), SpO2 100 %. No fever. HEENT: Normal HEENT exam.    Lungs:  Normal effort. No decreased breath sounds. Heart: S1 normal.    Abdomen: Abdomen is soft. Bowel sounds are normal.     Neurological: Patient is alert. Pupils:  Pupils are equal, round, and reactive to light. Skin:  Warm. Labs/Imaging/Diagnostics    Labs:  CBC:No results for input(s): WBC, RBC, HGB, HCT, MCV, RDW, PLT in the last 72 hours.   CHEMISTRIES:No results for input(s): NA, K, CL, CO2, BUN, CREATININE, GLUCOSE, CA, PHOS, MG in the last 72 hours. PT/INR:No results for input(s): PROTIME, INR in the last 72 hours. APTT:No results for input(s): APTT in the last 72 hours. LIVER PROFILE:No results for input(s): AST, ALT, BILIDIR, BILITOT, ALKPHOS in the last 72 hours. Imaging Last 24 Hours:  No results found. Assessment//Plan           Hospital Problems             Last Modified POA    * (Principal) 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, right ([facility] [address] 2/16/2023 Unknown    Status post mastectomy, right 2/23/2023 Yes   1) encounter for post surgical care  2) HTN  3) h/o CVA  4) asthma  Assessment & Plan  2/24: c/w current care, fU with PCP as outpt, pain is controlled, denies any needs, taper and dc O2 as tolerated, spoke with nursing.   Electronically signed by Davy Adams MD on 2/24/23 at 2:43 PM EST

## 2023-02-24 NOTE — PROGRESS NOTES
[facility] Respiratory Therapy Evaluation   Current Order:  Albuterol MDI 2 puffs Q6 PRN      Home Regimen: None      Ordering Physician: Leobardo Louise  Re-evaluation Date:  ---     Diagnosis: Breast CA      Patient Status: Stable / Unstable + Physician notified    The following MDI Criteria must be met in order to convert aerosol to MDI with spacer. If unable to meet, MDI will be converted to aerosol:  []  Patient able to demonstrate the ability to use MDI effectively  []  Patient alert and cooperative  []  Patient able to take deep breath with 5-10 second hold  []  Medication(s) available in this delivery method   []  Peak flow greater than or equal to 200 ml/min            Current Order Substituted To  (same drug, same frequency)   Aerosol to MDI [] Albuterol Sulfate 0.083% unit dose by aerosol Albuterol Sulfate MDI 2 puffs by inhalation with spacer    [] Levalbuterol 1.25 mg unit dose by aerosol Levalbuterol MDI 2 puffs by inhalation with spacer    [] Levalbuterol 0.63 mg unit dose by aerosol Levalbuterol MDI 2 puffs by inhalation with spacer    [] Ipratropium Bromide 0.02% unit dose by aerosol Ipratropium Bromide MDI 2 puffs by inhalation with spacer    [] Duoneb (Ipratropium + Albuterol) unit dose by aerosol Ipratropium MDI + Albuterol MDI 2 puffs by inhalation w/spacer   MDI to Aerosol [] Albuterol Sulfate MDI Albuterol Sulfate 0.083% unit dose by aerosol    [] Levalbuterol MDI 2 puffs by inhalation Levalbuterol 1.25 mg unit dose by aerosol    [] Ipratropium Bromide MDI by inhalation Ipratropium Bromide 0.02% unit dose by aerosol    [] Combivent (Ipratropium + Albuterol) MDI by inhalation Duoneb (Ipratropium + Albuterol) unit dose by aerosol       Treatment Assessment [Frequency/Schedule]:  Change frequency to: ___________No Changes_______________________________________per Protocol, P&T, MEC      Points 0 1 2 3 4   Pulmonary Status  Non-Smoker  []   Smoking history   < 20 pack years  []   Smoking history  ?  21 pack years  []   Pulmonary Disorder  (acute or chronic)  [x]   Severe or Chronic w/ Exacerbation  []     Surgical Status No []   Surgeries     General [x]   Surgery Lower []   Abdominal Thoracic or []   Upper Abdominal Thoracic with  PulmonaryDisorder  []     Chest X-ray Clear/Not  Ordered     [x]  Chronic Changes  Results Pending  []  Infiltrates, atelectasis, pleural effusion, or edema  []  Infiltrates in more than one lobe []  Infiltrate + Atelectasis, &/or pleural effusion  []    Respiratory Pattern Regular,  RR = 12-20 [x]  Increased,  RR = 21-25 []  LEY, irregular,  or RR = 26-30 []  Decreased FEV1  or RR = 31-35 []  Severe SOB, use  of accessory muscles, or RR ? 35  []    Mental Status Alert, oriented,  Cooperative [x]  Confused but Follows commands []  Lethargic or unable to follow commands []  Obtunded  []  Comatose  []    Breath Sounds Clear to  auscultation  [x]  Decreased unilaterally or  in bases only []  Decreased  bilaterally  []  Crackles or intermittent wheezes []  Wheezes []    Cough Strong, Spontan., & nonproductive [x]  Strong,  spontaneous, &  productive []  Weak,  Nonproductive []  Weak, productive or  with wheezes []  No spontaneous  cough or may require suctioning []    Level of Activity Ambulatory []  Ambulatory w/ Assist  [x]  Non-ambulatory []  Paraplegic []  Quadriplegic []    Total    Score:___5____     Triage Score:____5____      Tri       Triage:     1. (>20) Freq: Q3    2. (16-20) Freq: Q4   3. (11-15) Freq: QID & Albuterol Q2 PRN    4. (6-10) Freq: TID & Albuterol Q2 PRN    5. (0-5) Freq Q4prn

## 2023-02-24 NOTE — CARE COORDINATION
Met with pt at bedside to discuss discharge planning. Pt lives at home with her son. Her daughter will be coming to stay with her and help with her care, she will pick her up today. [place] of choice offered and pt would like [phone]. Referral called to Dustin Lopez with [phone] who can accept.

## 2023-02-25 NOTE — CONSULTS
[facility]           Radiation Oncology      [address] [facility]        Christopher Bañuelos        Veronica Mock: [phone]        F: [phone]       [facility]                   Dr. Bertrand Jalloh MD PhD    CONSULT NOTE     Date of Service: 2023  Patient ID: Malick Louie   : 1940  MRN: [id number]   Acct Number: [de-identified]       Malick Louie  78 y.o.   1940    REFERRING PROVIDER: Bhavani Nelson    PCP:  Marco Malone PA-C    DIAGNOSIS:  1. 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, right ([facility] [address]    2. Adenocarcinoma, breast, right ([facility] [address]        STAGING: Cancer Staging  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, right (HCC)  Staging form: Breast, AJCC 8th Edition  - Clinical: Stage IIIB (cT4b, cN1, cM0, G2, ER+, IL+, HER2-) - Signed by Alma Griffin MD on 2023      HISTORY OF PRESENT ILLNESS: Ms. Malick Louie  is a 78y.o. year old female with history of osteoarthritis, CVA, hypertension, hyperlipidemia, TIAs, invasive ductal carcinoma of the right breast initially diagnosed in 2014 status post lumpectomy and sentinel lymph node biopsy followed by axillary lymph node dissection and adjuvant radiation therapy to 6440 cGy. She was in usual state of health when she was noted to have an abnormality in the right breast on mammogram and ultrasound dated 2022 that was read as BI-RADS 4. On 1/10/2023 she had an MRI of the bilateral breasts which revealed pathologic. Right axilla lymph node and dermal thickening within the right breast favored to be edema. This was again read as BI-RADS 4. Patient had a right lymph node biopsy on 2023 with pathology revealing metastatic adenocarcinoma consistent with ductal carcinoma of breast primary, ER+/IL+/HER2 nonamplified.     2023 the patient had a restaging PET/CT which revealed hypermetabolism within the right extra lymph node activity within 2 right inguinal lymph nodes that were favored to be reactive versus metastatic. Patient did meet medical oncology in 2/20/2023 to discuss systemic therapy options. She has also discussed her options with surgery and has a planned mastectomy with lymph node sampling on 2/23/2023. Referral was made to radiation oncology presurgically to evaluate the patient and discussed potential options for adjuvant radiation therapy pending path from her surgery scheduled in a few days. Symptomatically the patient does have some short-term memory loss thought to be attributed to her TIAs and advanced age. She did have a fall in September but otherwise has not had any issues. She has a history of smoking and quit at the age of 27. She otherwise notes some pain in the right breast intermittently and some mild skin thickening but denies any other complaints at this time.     PAST MEDICAL HISTORY:      Diagnosis Date    Asthma     Asthma in remission 1990    Breast cancer ([address] 2014    right breast    Cerebral artery occlusion with cerebral infarction ([address]     2014    Elevated TSH     Hematoma of right thigh 09/30/2021    History of artificial lens replacement 03/24/2015    History of blood transfusion     1956-after delivery of first child    History of breast cancer 02/2014    Invasive ductal cancer right breast, Dr Fer Zepeda, Dr Hailey Pelaez    History of therapeutic radiation     HTN (hypertension) 1980    was with [facility]    Hx of blood clots     Hx of ischemic left MCA stroke 2013    no residual, Dr Yenifer Batista    Hx of ischemic right MCA stroke 12/2014    no residual, frontal lobe    Hypercalcemia 07/11/2022    Hyperlipidemia     Osteoarthritis     Secondary and unspecified malignant neoplasm of axilla and upper limb lymph nodes ([address] 11/08/2018    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    Dr Aragon Marking (ortho)    Vitamin D deficiency        PAST SURGICAL HISTORY:      Procedure Laterality Date    BLADDER SUSPENSION      BREAST BIOPSY Right 02/14/2014    BREAST LUMPECTOMY Right 03/04/2014    Lumpectomy with SLNB, Dr Yessy [address] in [address]      bilateral cataract surgery    FRACTURE SURGERY      \"as child right leg\"    MASTECTOMY, MODIFIED RADICAL Right 2/23/2023    RIGHT BREAST MASTECTOMY MODIFIED RADICAL performed by Remedios Whitley MD at [facility]  01/26/2023    US LYMPH NODE BIOPSY MLOZ ULTRASOUND       Allergies   Allergen Reactions    Sulfa Antibiotics      Unsure of reaction     Iodinated Contrast Media      Oral & IV dye group    Statins Other (See Comments)       MEDICATIONS:  Medications reviewed and reconciled. Current Outpatient Medications   Medication Sig Dispense Refill    oxyCODONE (ROXICODONE) 5 MG immediate release tablet Take 1 tablet by mouth every 6 hours as needed for Pain for up to 7 days. Max Daily Amount: 20 mg 28 tablet 0    sennosides-docusate sodium (SENOKOT-S) 8.6-50 MG tablet Take 1 tablet by mouth 2 times daily for 10 days 20 tablet 0    meclizine (ANTIVERT) 12.5 MG tablet Take 12.5 mg by mouth in the morning and at bedtime Half a tablet      b complex vitamins capsule Take 1 capsule by mouth daily      Multiple Vitamins-Minerals (ABC COMPLETE SENIOR WOMENS 50+ PO) Take 1 tablet by mouth daily      aspirin-dipyridamole (AGGRENOX)  MG per extended release capsule TAKE 1 CAPSULE TWICE DAILY 180 capsule 3    ezetimibe (ZETIA) 10 MG tablet TAKE 1 TABLET DAILY 90 tablet 3    niacin (NIASPAN) 500 MG extended release tablet TAKE 1 TABLET 3 TIMES A  tablet 4    amLODIPine-benazepril (LOTREL) 5-20 MG per capsule Take 1 capsule by mouth daily TAKE 1 CAPSULE DAILY 90 capsule 4    Polyvinyl Alcohol-Povidone (REFRESH OP) Apply 1 drop to eye 4 times daily as needed. Vitamin D (CHOLECALCIFEROL) 1000 UNITS CAPS capsule Take 1,000 Units by mouth daily. fish oil-omega-3 fatty acids 1000 MG capsule Take 1 g by mouth 3 times daily.        No current facility-administered medications for this encounter. FAMILY HISTORY:  Family History   Problem Relation Age of Onset    Kidney Disease Mother         dec age 79    Hypertension Mother     Migraines Mother     Diabetes Father     Heart Failure Father         dec age 76    Diabetes Brother     Cancer Brother         dec age 76    Heart Attack Paternal Grandmother     Stroke Paternal Grandfather     Breast Cancer Paternal Cousin     Diabetes Daughter     Diabetes Son        SOCIAL HISTORY:       reports that she has quit smoking. Her smoking use included cigarettes. She has a 3.50 pack-year smoking history. She has never used smokeless tobacco..   reports no history of alcohol use. .   reports no history of drug use. REVIEW OF SYSTEMS:  Obtained from the patient, chart review and nursing assessment. Review of Systems   All other systems reviewed and are negative. PHYSICAL EXAMINATION:      Vitals:    02/20/23 1048   BP: (!) 167/77   Pulse: 54   Resp: 18   Temp: 97.3 °F (36.3 °C)   SpO2: 98%   Weight: 156 lb (70.8 kg)   Height: 5' 5\" (1.651 m)       Wt Readings from Last 3 Encounters:   02/23/23 153 lb (69.4 kg)   02/20/23 156 lb (70.8 kg)   02/16/23 153 lb 6.4 oz (69.6 kg)       ECOG PERFORMANCE STATUS:  1    Physical Exam  Vitals and nursing note reviewed. Constitutional:       Appearance: Normal appearance. Comments: Accompanied by her daughter-in-law. HENT:      Head: Normocephalic and atraumatic. Eyes:      General: No scleral icterus. Extraocular Movements: Extraocular movements intact. Conjunctiva/sclera: Conjunctivae normal.   Cardiovascular:      Rate and Rhythm: Normal rate. Pulmonary:      Effort: Pulmonary effort is normal.      Breath sounds: Normal breath sounds. Chest:      Comments: Some areas of fibrosis/mild skin thickening with associated skin dimpling of the skin lateral to the right nipple.   There are otherwise no palpable abnormalities in the right or left breast.  Abdominal:      General: Abdomen is flat. Bowel sounds are normal.      Palpations: Abdomen is soft. Musculoskeletal:         General: Normal range of motion. Cervical back: Normal range of motion and neck supple. Lymphadenopathy:      Cervical: No cervical adenopathy. Skin:     General: Skin is warm and dry. Neurological:      General: No focal deficit present. Mental Status: She is alert and oriented to person, place, and time. Psychiatric:         Mood and Affect: Mood normal.         Behavior: Behavior normal.         RADIATION SAFETY AND ONCOLOGIC TREATMENT SUPPORT:    - Previous radiation history: Per HPI  - History of autoimmune or connective tissue disease: None  - Nutritional support/ PEG: Not applicable  - Dental evaluation: Not applicable  -  requested:   met with the patient, please refer to the note from that encounter.  - Transportation for daily treatment: No issues    TUMOR MARKERS:   Lab Results   Component Value Date/Time    [id number] 20 11/19/2018 06:47 PM       IMAGING REVIEWED: Per HPI    PATHOLOGY REVIEWED: Per HPI    IMPRESSION: This is a 78y.o. year old female with history of osteoarthritis, CVA, hypertension, hyperlipidemia, TIAs, invasive ductal carcinoma of the right breast initially diagnosed in 2014 status post lumpectomy and sentinel lymph node biopsy followed by axillary lymph node dissection and adjuvant radiation therapy to 6440 cGy. She was found to have more recent abnormal imaging with biopsy-proven metastatic adenocarcinoma breast primary and an axillary lymph node. She presents to discuss potential role and rationale of radiation therapy in the management of disease in anticipation of a mastectomy with lymph node sampling planned in 2 days. DISCUSSION/PLAN:   I reviewed with the patient the risks, benefits, rationale of radiation therapy.   At the outset, additional radiation therapy will be predicated on final pathology of her mastectomy planned for 2/23. Her case was discussed at our multidisciplinary tumor board last week and the potential concern of an inflammatory breast cancer picture was raised for this particular patient. After examining the patient I think her skin changes are more consistent with postradiation changes/fibrosis and edema rather than true inflammatory breast cancer and I therefore do not think there is a role for neoadjuvant chemotherapy but will defer to medical oncology recommendations. She will be seeing medical oncology later this afternoon. I discussed with the patient that reirradiation therapy would largely be considered if there were positive margins or extensive disease within the lymph nodes sampled with other high risk features such as lymphovascular invasion, extracapsular extension, or other adverse risk features. Otherwise should she have negative margins, disease confined to the breast, and less than 3 lymph nodes harbored metastatic disease, I would likely refer her to the patient for adjuvant chemotherapy and withhold radiation therapy due to concern for additional toxicity/side effects of worsening chest wall fibrosis, radiation pneumonitis, cardiac toxicity, and rib fracture in the setting of reirradiation therapy. The patient and her daughter-in-law who was present had several questions regarding this all of which were answered to their apparent satisfaction. They do know that we remain available should they have any additional questions or concerns. I will coordinate with medical oncology and breast surgery after the patient's mastectomy and will see the patient back if need be to discuss the next steps    Thank you very much for the referral and for the opportunity to provide care for this patient.     A total of 16 minutes were spent discussing Advance Care Planning to understand personal values, life goals, and preferences regarding future medical care with respect to potential modifications/establishing of advanced directives and any modifications of current goals of care. A combined total of 65 minutes was spent in preparing this consultation as well as in meeting with the patient in person. Please excuse any typos in this consultation note as voice dictation was used. ORDERS: None    FOLLOW-UP: As clinically indicated moving forward.     Mickey Ward MD PHD  Radiation Oncologist, [address] [place] [address] Director    Department of [address] [place] [address]  Alcides Bañuelos

## 2023-02-27 ENCOUNTER — NURSE ONLY (OUTPATIENT)
Dept: SURGERY | Age: 83
End: 2023-02-27

## 2023-02-27 ENCOUNTER — TELEPHONE (OUTPATIENT)
Dept: SURGERY | Age: 83
End: 2023-02-27

## 2023-02-27 DIAGNOSIS — C50.911 BREAST CANCER METASTASIZED TO AXILLARY LYMPH NODE, RIGHT (HCC): Primary | ICD-10-CM

## 2023-02-27 DIAGNOSIS — C77.3 BREAST CANCER METASTASIZED TO AXILLARY LYMPH NODE, RIGHT (HCC): Primary | ICD-10-CM

## 2023-02-27 NOTE — PROGRESS NOTES
The patient's 2 surgical drains will not stay compressed. The sutures are intact and the drains are in place, no white drain tubing is observed. The patient's right chest wall and axillary incision are dry and intact. There is no swelling observed. I stripped the drains and then re-suctioned the bulbs. You can hear air leaking from the tubing,then the bulbs re-fill with air. I stripped the lines 3 more times and the lines appear clear. I repositioned the drain tubing, attempted an occlusive dressing over the drain sites and the bulbs continued to fill with air. Verbal order per Dr. Marita Arceo to remove the surgical drains and apply a clean dry dressing. I removed the surgical drains. The patient had no complaints of pain or discomfort. The patient was informed to cleanse the drain sites with soap and water, pat dry and apply a clean dry dressing  twice daily and as needed until the drain sites stop draining, then [address] [facility]. The patient verbalized understanding. The patient is aware to observe and notify the office of any redness extending more than a half an inch from the drain sites or the incision sites, swelling of the chest wall or axilla,pain, purulent or foul smelling drainage, warmth at the drain or incision sites, fever or chills. The patient verbalized understanding and has no questions at this time.

## 2023-02-27 NOTE — TELEPHONE ENCOUNTER
Home Care wanted to inform Dr. Perry Huerta that patient is having home care nursing services only. Patient declined PT, OT and social work. Nurse is wanted to clarify incision dressing order. Nurse can be called back at [phone]

## 2023-03-08 ENCOUNTER — OFFICE VISIT (OUTPATIENT)
Dept: SURGERY | Age: 83
End: 2023-03-08

## 2023-03-08 VITALS
TEMPERATURE: 97.1 F | HEART RATE: 64 BPM | HEIGHT: 65 IN | OXYGEN SATURATION: 98 % | WEIGHT: 153 LBS | DIASTOLIC BLOOD PRESSURE: 72 MMHG | SYSTOLIC BLOOD PRESSURE: 164 MMHG | BODY MASS INDEX: 25.49 KG/M2 | RESPIRATION RATE: 18 BRPM

## 2023-03-08 DIAGNOSIS — C77.3 BREAST CANCER METASTASIZED TO AXILLARY LYMPH NODE, RIGHT (HCC): Primary | ICD-10-CM

## 2023-03-08 DIAGNOSIS — C50.911 BREAST CANCER METASTASIZED TO AXILLARY LYMPH NODE, RIGHT (HCC): Primary | ICD-10-CM

## 2023-03-08 DIAGNOSIS — Z12.31 ENCOUNTER FOR SCREENING MAMMOGRAM FOR BREAST CANCER: ICD-10-CM

## 2023-03-08 PROCEDURE — 99024 POSTOP FOLLOW-UP VISIT: CPT | Performed by: SURGERY

## 2023-03-08 NOTE — PATIENT INSTRUCTIONS
Patient Education        Preventing Lymphedema After Treatment for Breast Cancer: Care Instructions  Your Care Instructions     Lymphedema is a buildup of fluid in the soft tissues of the body. It can happen in the arm after breast cancer surgery to remove lymph nodes. If there are few or no lymph nodes, fluid can build up in the arm. It can also happen if the lymph system in an arm has been damaged. Infection, tumors, and scar tissue from radiation therapy to the armpit area also can cause fluid to build up. You may be able to avoid lymphedema or keep it under control by following the tips below. Make sure that you take good care of the skin on your arm and hand. Your skin acts as a barrier to keep out bacteria and prevent infection. It is also important not to overuse the muscles in the arm. And don't expose your arm to very hot or cold temperatures. Lymphedema can happen soon after breast cancer treatment. Or it may happen many years later. It may affect only part of your arm or hand. In some cases, it affects all of the arm. Make sure to follow these precautions even after you finish treatment. Do not ignore tightness or swelling in or around your arm or hand. You are less likely to have long-term problems if you get these symptoms treated right away. Follow-up care is a key part of your treatment and safety. Be sure to make and go to all appointments, and call your doctor if you are having problems. It's also a good idea to know your test results and keep a list of the medicines you take. How can you care for yourself at home? Skin care    Keep your arm, hand, and armpit clean. Use a mild soap that does not dry out your skin. Moisturize your skin often. Take good care of the skin around your fingernails. Do not bite or cut your cuticles. Ask your doctor how to handle any cuts, scratches, insect bites, or other injuries you may get.      Use sunscreen and insect repellent outdoors to protect your skin from sunburn and insect bites. Use an electric razor if you shave your armpit. It's less likely to cut or irritate your skin. Activity    Don't wear clothing or jewelry that is tight on your arm or hand. Your doctor may advise you not to wear a watch or rings on the affected hand. Wear gloves when you do activities that could hurt the skin on your fingers or hand. Wear them when you garden, do yard work, wash dishes, and clean with chemicals. Use oven mitts when you handle hot food. Do not have blood drawn from the arm on the side of the lymph node surgery. Do not get injections (shots) or have an IV put in the affected arm. Do not allow a blood pressure cuff to be placed on that arm. If you are in the hospital, make sure you tell your nurse and other hospital staff about your condition. Do not expose your arm to very hot or very cold temperatures. For example, don't use hot tubs, saunas, or steam rooms. Don't use a heating pad or cold pack on that arm or shoulder. Rest your arm often when you do repeated movements, such as vacuum, scrub, or mop. Try to use your other arm to carry heavy things, such as grocery bags. If you carry a briefcase or a purse, avoid shoulder straps and carry it on your good arm. Ask your doctor about wearing a compression sleeve and glove (gauntlet). Your doctor may want you to wear these when you exercise or when you fly in an airplane. They can help keep fluid from pooling in your arm and hand. Exercise    Ask your doctor about exercises for your arm and hand. Your doctor may recommend that you see a physical therapist. This person can teach you how to do self-massage to move fluid out of your arm. Check with your doctor before you start exercises that use the arm. This includes tennis, rowing, or weight lifting. Your doctor can help you find an activity level that's right for you. When should you call for help?    Call your doctor now or seek immediate medical care if:    You have signs of infection, such as: Increased pain, swelling, warmth, or redness. Red streaks leading from the area. Pus draining from the area. A fever. Watch closely for changes in your health, and be sure to contact your doctor if:    You have new or worse symptoms from lymphedema. You do not get better as expected. Where can you learn more? Go to http://www.woods.com/ and enter G546 to learn more about \"Preventing Lymphedema After Treatment for Breast Cancer: Care Instructions. \"  Current as of: May 4, 2022               Content Version: 13.5  © 0741-2705 Healthwise, [employer]. Care instructions adapted under license by [facility] [address] If you have questions about a medical condition or this instruction, always ask your healthcare professional. [address] any warranty or liability for your use of this information.

## 2023-03-08 NOTE — PROGRESS NOTES
POSTOPERATIVE NOTE    PATIENT:  Stalin Lebron     DATE:     3/8/2023     TIME: 9:23 AM EST     HISTORY AND CHIEF COMPLAINT:    Stalin Lebron  is a 78y.o. year old  Female  status post right modified radical mastectomy    The pathology showed 2 positive lymph nodes. PHYSICAL EXAMINATION:        Vitals:    03/08/23 0903 03/08/23 0906   BP: (!) 162/70 (!) 164/72   Site: Left Upper Arm Left Upper Arm   Pulse: 64    Resp: 18    Temp: 97.1 °F (36.2 °C)    SpO2: 98%    Weight: 153 lb (69.4 kg)    Height: 5' 4.5\" (1.638 m)         Stalin Lebron  is a 78y.o. year old  Female in no acute distress, alert and oriented x 3. Incision: clean, dry, intact  She does have a small chest wall seroma that is not uncomfortable. IMPRESSION:    Status post right modified radical mastectomy    PLAN:    Patient is doing well. Recommend clinical breast exams in the breast surgical suite in 6 month(s)  Recommend to follow up with PT / OT  Monitor signs of lymphedema  Recommend an active lifestyle, healthy diet, limited alcohol intake, achieve and maintain a healthy BMI to optimize breast cancer outcomes / decrease risk of breast cancer. Referral to or Follow up with Medical Oncology  Referral to or Follow up with Radiation Oncology  Follow up with PCP regarding all medical problems      If seroma is giving her discomfort then she should come in to have it drained.   Dictated by:  Deyanira Carr MD 3/8/2023 9:23 AM EST

## 2023-03-16 RX ORDER — AMLODIPINE BESYLATE AND BENAZEPRIL HYDROCHLORIDE 5; 20 MG/1; MG/1
1 CAPSULE ORAL DAILY
Qty: 90 CAPSULE | Refills: 4 | Status: SHIPPED | OUTPATIENT
Start: 2023-03-16

## 2023-03-21 ENCOUNTER — OFFICE VISIT (OUTPATIENT)
Dept: PULMONOLOGY | Age: 83
End: 2023-03-21
Payer: MEDICARE

## 2023-03-21 VITALS
HEART RATE: 89 BPM | SYSTOLIC BLOOD PRESSURE: 138 MMHG | HEIGHT: 65 IN | WEIGHT: 152 LBS | BODY MASS INDEX: 25.33 KG/M2 | OXYGEN SATURATION: 99 % | TEMPERATURE: 97.6 F | DIASTOLIC BLOOD PRESSURE: 74 MMHG

## 2023-03-21 DIAGNOSIS — I10 HYPERTENSION, UNSPECIFIED TYPE: ICD-10-CM

## 2023-03-21 DIAGNOSIS — Z99.89 OSA ON CPAP: Primary | ICD-10-CM

## 2023-03-21 DIAGNOSIS — G47.10 HYPERSOMNIA: ICD-10-CM

## 2023-03-21 DIAGNOSIS — G47.33 OSA ON CPAP: Primary | ICD-10-CM

## 2023-03-21 PROCEDURE — 3075F SYST BP GE 130 - 139MM HG: CPT | Performed by: INTERNAL MEDICINE

## 2023-03-21 PROCEDURE — 1036F TOBACCO NON-USER: CPT | Performed by: INTERNAL MEDICINE

## 2023-03-21 PROCEDURE — 1090F PRES/ABSN URINE INCON ASSESS: CPT | Performed by: INTERNAL MEDICINE

## 2023-03-21 PROCEDURE — 99213 OFFICE O/P EST LOW 20 MIN: CPT | Performed by: INTERNAL MEDICINE

## 2023-03-21 PROCEDURE — G8399 PT W/DXA RESULTS DOCUMENT: HCPCS | Performed by: INTERNAL MEDICINE

## 2023-03-21 PROCEDURE — G8417 CALC BMI ABV UP PARAM F/U: HCPCS | Performed by: INTERNAL MEDICINE

## 2023-03-21 PROCEDURE — 1123F ACP DISCUSS/DSCN MKR DOCD: CPT | Performed by: INTERNAL MEDICINE

## 2023-03-21 PROCEDURE — G8428 CUR MEDS NOT DOCUMENT: HCPCS | Performed by: INTERNAL MEDICINE

## 2023-03-21 PROCEDURE — G8484 FLU IMMUNIZE NO ADMIN: HCPCS | Performed by: INTERNAL MEDICINE

## 2023-03-21 PROCEDURE — 3078F DIAST BP <80 MM HG: CPT | Performed by: INTERNAL MEDICINE

## 2023-03-21 NOTE — PROGRESS NOTES
PATIENT VISIT-PULMONARY/SLEEP    3/21/2023     REFERRING PHYSICIAN:  Barrington Malone PA-C     REASON FOR REFERRAL:  GHAZAL    HPI:     Eva Donovan is a 78 y.o. female who was referred to sleep and pulmonary clinic for evaluation. She underwent a recent sleep study which showed mild case of obstructive sleep apnea. Overall AHI was 5. REM AHI was 13. Sleep efficiency was 75%. There was no major desaturations. She is tired and sleepy during the day and she dozes off easily. [place] Sleepiness Scale is 14. She has a history of stroke in the past.  She has a history of hypertension as well. She denies dozing off behind the wheels. No accident or near misses in the past.     1/4/23:    Comes back for follow up. She underwent CPAP titration study which found pressure 6 cm of H2O was adequate for her. She is here to discuss results. She has been feeling about the same with excessive daytime sleepiness. She felt refreshed after the CPAP titration study. 3/21/23:    Received her CPAP machine has been using it for few weeks. Compliance is great. Averaging about 5 hours and 30 minutes. AHI below 5. There is no significant leak from the mask. She takes off the machine sometimes at end of her night. She is refreshed and does not doze off easily.        Past Medical History   Past Medical History:   Diagnosis Date    Asthma     Asthma in remission 1990    Breast cancer ([address] 2014    right breast    Cerebral artery occlusion with cerebral infarction ([address]     2014    Elevated TSH     Hematoma of right thigh 09/30/2021    History of artificial lens replacement 03/24/2015    History of blood transfusion     1956-after delivery of first child    History of breast cancer 02/2014    Invasive ductal cancer right breast, Dr Castanon Self, Dr Jeremias Madden    History of therapeutic radiation     HTN (hypertension) 1980    was with [facility]    Hx of blood clots     Hx of ischemic left MCA stroke 2013    no residual,

## 2023-03-22 ENCOUNTER — TELEPHONE (OUTPATIENT)
Dept: CARDIOLOGY CLINIC | Age: 83
End: 2023-03-22

## 2023-03-22 RX ORDER — MECLIZINE HCL 12.5 MG/1
12.5 TABLET ORAL 2 TIMES DAILY
Qty: 30 TABLET | Refills: 2 | Status: SHIPPED | OUTPATIENT
Start: 2023-03-22

## 2023-03-22 NOTE — TELEPHONE ENCOUNTER
The patient verbalized that her right axilla is a little firmer and slightly red. The patient denies any warmth, drainage that looks like pus, increase in pain , fever or chills. I scheduled the patient to see Dr. Sang Mcfadden on 3/27/2023. The patient is aware of the date/time/location of the appointment. The patient is aware to call the office with any worsening of symptoms. The patient verbalized understanding and has no further questions at this time.

## 2023-03-27 ENCOUNTER — OFFICE VISIT (OUTPATIENT)
Dept: SURGERY | Age: 83
End: 2023-03-27
Payer: MEDICARE

## 2023-03-27 VITALS
DIASTOLIC BLOOD PRESSURE: 78 MMHG | HEIGHT: 65 IN | BODY MASS INDEX: 26.02 KG/M2 | TEMPERATURE: 97.1 F | OXYGEN SATURATION: 98 % | RESPIRATION RATE: 16 BRPM | WEIGHT: 156.2 LBS | SYSTOLIC BLOOD PRESSURE: 156 MMHG | HEART RATE: 62 BPM

## 2023-03-27 DIAGNOSIS — C50.911 BREAST CANCER METASTASIZED TO AXILLARY LYMPH NODE, RIGHT (HCC): Primary | ICD-10-CM

## 2023-03-27 DIAGNOSIS — C77.3 BREAST CANCER METASTASIZED TO AXILLARY LYMPH NODE, RIGHT (HCC): Primary | ICD-10-CM

## 2023-03-27 DIAGNOSIS — N64.89 SEROMA OF BREAST: ICD-10-CM

## 2023-03-27 DIAGNOSIS — C50.911 BREAST CANCER METASTASIZED TO AXILLARY LYMPH NODE, RIGHT (HCC): ICD-10-CM

## 2023-03-27 DIAGNOSIS — C77.3 BREAST CANCER METASTASIZED TO AXILLARY LYMPH NODE, RIGHT (HCC): ICD-10-CM

## 2023-03-27 PROCEDURE — 19000 PUNCTURE ASPIR CYST BREAST: CPT | Performed by: SURGERY

## 2023-03-27 RX ORDER — LIDOCAINE HYDROCHLORIDE AND EPINEPHRINE 15; 5 MG/ML; UG/ML
8 INJECTION, SOLUTION EPIDURAL ONCE
Status: SHIPPED | OUTPATIENT
Start: 2023-03-27

## 2023-03-27 NOTE — PROGRESS NOTES
POSTOPERATIVE NOTE    PATIENT:  Kayla Irwin     DATE:     3/27/2023     TIME: 1:04 PM EDT     HISTORY AND CHIEF COMPLAINT:    Kayla Irwin  is a 78y.o. year old  Female  status post  [address] Patient is on aggrenox    The pathology showed CLEAR MARGINS. PHYSICAL EXAMINATION:        Vitals:    23 1158 23 1202   BP: (!) 162/80 (!) 156/78   Site: Left Upper Arm    Pulse: 62    Resp: 16    Temp: 97.1 °F (36.2 °C)    SpO2: 98%    Weight: 156 lb 3.2 oz (70.9 kg)    Height: 5' 4.5\" (1.638 m)         Kayla Irwin  is a 78y.o. year old  Female in no acute distress, alert and oriented x 3. Incision: clean, dry, intact; large seroma / hematoma cavity    IMPRESSION:    Status post right mrm    PLAN:    Recommend drainage for comfort    Right chest wall aspiration    PATIENT:  LEONA BELLA     DATE: 3/27/2023    :      1940     INDICATION:  Right chest wall fluid    LOCATION:   Right chest wall    DESCRIPTION:    A timeout was performed immediately prior to the start of the Right chest wall aspiration procedure and included the correct patient (two identifiers), correct procedure and correct site(s). Procedure consent and allergies were also verified. The patient understood the risks and benefits of the procedure and consented on the day of her visit. The skin was prepped with chlorhexidine. 3 cc of lidocaine with epi /bicarb mixture was used to anesthetize the skin and breast. A 16 gauge needle was used for FNA. 200 cc of seroma mixed with blood was drained. Hemostasis was obtained. Fluid sent for microbiology. The wound was cleaned. Steri-strips applied. Five minutes of pressure was held and wrapped patient in an ace wrap. The patient tolerated the procedure well.        IMPRESSION:  Successful Right chest wall aspiration     Category 2         Dictated by:  Baljeet Briceno MD 3/27/2023 1:04 PM EDT

## 2023-03-27 NOTE — PATIENT INSTRUCTIONS
immediate medical care if:    You have signs of infection, such as: Increased pain, swelling, warmth, or redness. Red streaks leading from the area. Pus draining from the area. A fever. Watch closely for changes in your health, and be sure to contact your doctor if:    You have new or worse symptoms from lymphedema. You do not get better as expected. Where can you learn more? Go to http://www.woods.com/ and enter G546 to learn more about \"Preventing Lymphedema After Treatment for Breast Cancer: Care Instructions. \"  Current as of: May 4, 2022               Content Version: 13.6  © 9107-2145 Healthwise, [employer]. Care instructions adapted under license by [facility] ([facility]). If you have questions about a medical condition or this instruction, always ask your healthcare professional. [address] any warranty or liability for your use of this information.

## 2023-03-28 ENCOUNTER — HOSPITAL ENCOUNTER (OUTPATIENT)
Dept: RADIATION ONCOLOGY | Age: 83
Discharge: HOME | End: 2023-03-28

## 2023-03-28 DIAGNOSIS — I67.9 CEREBROVASCULAR DISEASE: Primary | ICD-10-CM

## 2023-03-28 DIAGNOSIS — E78.5 HYPERLIPIDEMIA, UNSPECIFIED HYPERLIPIDEMIA TYPE: ICD-10-CM

## 2023-03-28 DIAGNOSIS — C50.911 BREAST CANCER METASTASIZED TO AXILLARY LYMPH NODE, RIGHT (HCC): Primary | ICD-10-CM

## 2023-03-28 DIAGNOSIS — C77.3 BREAST CANCER METASTASIZED TO AXILLARY LYMPH NODE, RIGHT (HCC): Primary | ICD-10-CM

## 2023-03-28 RX ORDER — INCLISIRAN 284 MG/1.5ML
284 INJECTION, SOLUTION SUBCUTANEOUS ONCE
Qty: 1.5 ML | Refills: 5 | Status: SHIPPED | OUTPATIENT
Start: 2023-03-28 | End: 2023-03-28

## 2023-04-02 LAB — CULTURE WOUND: NORMAL

## 2023-04-02 NOTE — PROGRESS NOTES
tablet by mouth in the morning and at bedtime Half a tablet, Disp: 30 tablet, Rfl: 2    amLODIPine-benazepril (LOTREL) 5-20 MG per capsule, Take 1 capsule by mouth daily TAKE 1 CAPSULE DAILY, Disp: 90 capsule, Rfl: 4    b complex vitamins capsule, Take 1 capsule by mouth daily, Disp: , Rfl:     Multiple Vitamins-Minerals (ABC COMPLETE SENIOR WOMENS 50+ PO), Take 1 tablet by mouth daily, Disp: , Rfl:     aspirin-dipyridamole (AGGRENOX)  MG per extended release capsule, TAKE 1 CAPSULE TWICE DAILY, Disp: 180 capsule, Rfl: 3    ezetimibe (ZETIA) 10 MG tablet, TAKE 1 TABLET DAILY, Disp: 90 tablet, Rfl: 3    niacin (NIASPAN) 500 MG extended release tablet, TAKE 1 TABLET 3 TIMES A DAY, Disp: 270 tablet, Rfl: 4    Polyvinyl Alcohol-Povidone (REFRESH OP), Apply 1 drop to eye 4 times daily as needed. , Disp: , Rfl:     Vitamin D (CHOLECALCIFEROL) 1000 UNITS CAPS capsule, Take 1,000 Units by mouth daily. , Disp: , Rfl:     fish oil-omega-3 fatty acids 1000 MG capsule, Take 1 g by mouth 3 times daily. , Disp: , Rfl:     Current Facility-Administered Medications:     Lidocaine-EPINEPHrine 1.5 %-1:[id number] 8 mL, 8 mL, IntraDERmal, Once, Jayleen Reyna MD    sodium bicarbonate 8.4 % injection 2 mEq, 2 mL, IntraVENous, Once, Jayleen Reyna MD    Review of Systems   All other systems reviewed and are negative. PHYSICAL EXAMINATION:      There were no vitals filed for this visit. Wt Readings from Last 3 Encounters:   03/27/23 156 lb 3.2 oz (70.9 kg)   03/21/23 152 lb (68.9 kg)   03/08/23 153 lb (69.4 kg)       ECOG PERFORMANCE STATUS:  1     Physical Exam  Vitals and nursing note reviewed. Constitutional:       Appearance: Normal appearance. Comments: Accompanied by her daughter-in-law. HENT:      Head: Normocephalic and atraumatic. Eyes:      General: No scleral icterus. Extraocular Movements: Extraocular movements intact.       Conjunctiva/sclera: Conjunctivae normal.   Cardiovascular:      Rate and Rhythm: Normal

## 2023-04-03 ENCOUNTER — OFFICE VISIT (OUTPATIENT)
Dept: SURGERY | Age: 83
End: 2023-04-03

## 2023-04-03 VITALS
DIASTOLIC BLOOD PRESSURE: 82 MMHG | SYSTOLIC BLOOD PRESSURE: 162 MMHG | HEIGHT: 65 IN | WEIGHT: 156 LBS | BODY MASS INDEX: 25.99 KG/M2 | HEART RATE: 62 BPM | RESPIRATION RATE: 16 BRPM | OXYGEN SATURATION: 97 % | TEMPERATURE: 97.8 F

## 2023-04-03 DIAGNOSIS — C77.3 BREAST CANCER METASTASIZED TO AXILLARY LYMPH NODE, RIGHT (HCC): Primary | ICD-10-CM

## 2023-04-03 DIAGNOSIS — C50.911 BREAST CANCER METASTASIZED TO AXILLARY LYMPH NODE, RIGHT (HCC): Primary | ICD-10-CM

## 2023-04-03 PROCEDURE — 99024 POSTOP FOLLOW-UP VISIT: CPT | Performed by: SURGERY

## 2023-04-03 RX ORDER — CEPHALEXIN 500 MG/1
1 CAPSULE ORAL DAILY
COMMUNITY
Start: 2023-04-03

## 2023-04-03 RX ORDER — TAMOXIFEN CITRATE 20 MG/1
1 TABLET ORAL DAILY
COMMUNITY
Start: 2023-04-03

## 2023-04-04 ENCOUNTER — APPOINTMENT (OUTPATIENT)
Dept: GENERAL RADIOLOGY | Age: 83
End: 2023-04-04
Payer: MEDICARE

## 2023-04-04 ENCOUNTER — APPOINTMENT (OUTPATIENT)
Dept: CT IMAGING | Age: 83
End: 2023-04-04
Payer: MEDICARE

## 2023-04-04 ENCOUNTER — HOSPITAL ENCOUNTER (EMERGENCY)
Age: 83
Discharge: HOME | End: 2023-04-04
Payer: MEDICARE

## 2023-04-04 VITALS
BODY MASS INDEX: 25.83 KG/M2 | SYSTOLIC BLOOD PRESSURE: 156 MMHG | HEIGHT: 65 IN | TEMPERATURE: 98 F | WEIGHT: 155 LBS | DIASTOLIC BLOOD PRESSURE: 61 MMHG | RESPIRATION RATE: 16 BRPM | HEART RATE: 74 BPM | OXYGEN SATURATION: 97 %

## 2023-04-04 DIAGNOSIS — E78.5 HYPERLIPIDEMIA, UNSPECIFIED HYPERLIPIDEMIA TYPE: ICD-10-CM

## 2023-04-04 DIAGNOSIS — S00.03XA HEMATOMA OF SCALP, INITIAL ENCOUNTER: ICD-10-CM

## 2023-04-04 DIAGNOSIS — S01.01XA LACERATION OF SCALP, INITIAL ENCOUNTER: ICD-10-CM

## 2023-04-04 DIAGNOSIS — I67.9 CEREBROVASCULAR DISEASE, UNSPECIFIED: ICD-10-CM

## 2023-04-04 DIAGNOSIS — I25.10 CARDIOVASCULAR DISEASE: ICD-10-CM
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
## 2023-04-04 LAB
ALBUMIN SERPL-MCNC: 3.9 G/DL (ref 3.5–4.6)
ALP SERPL-CCNC: 152 U/L (ref 40–130)
ALT SERPL-CCNC: 19 U/L (ref 0–33)
ANION GAP SERPL CALCULATED.3IONS-SCNC: 10 MEQ/L (ref 9–15)
APTT PPP: 26.9 SEC (ref 24.4–36.8)
AST SERPL-CCNC: 36 U/L (ref 0–35)
BASOPHILS # BLD: 0 K/UL (ref 0–0.2)
BASOPHILS NFR BLD: 0.6 %
BILIRUB SERPL-MCNC: 0.3 MG/DL (ref 0.2–0.7)
BUN SERPL-MCNC: 15 MG/DL (ref 8–23)
CALCIUM SERPL-MCNC: 10 MG/DL (ref 8.5–9.9)
CHLORIDE SERPL-SCNC: 102 MEQ/L (ref 95–107)
CO2 SERPL-SCNC: 27 MEQ/L (ref 20–31)
CREAT SERPL-MCNC: 0.72 MG/DL (ref 0.5–0.9)
EOSINOPHIL # BLD: 0.1 K/UL (ref 0–0.7)
EOSINOPHIL NFR BLD: 1.6 %
ERYTHROCYTE [DISTWIDTH] IN BLOOD BY AUTOMATED COUNT: 13.5 % (ref 11.5–14.5)
ETHANOL PERCENT: NORMAL G/DL
ETHANOLAMINE SERPL-MCNC: <10 MG/DL (ref 0–0.08)
GLOBULIN SER CALC-MCNC: 2.8 G/DL (ref 2.3–3.5)
GLUCOSE SERPL-MCNC: 110 MG/DL (ref 70–99)
HCT VFR BLD AUTO: 38 % (ref 37–47)
HGB BLD-MCNC: 12.6 G/DL (ref 12–16)
INR PPP: 1
LYMPHOCYTES # BLD: 1.8 K/UL (ref 1–4.8)
LYMPHOCYTES NFR BLD: 26.7 %
MCH RBC QN AUTO: 31.1 PG (ref 27–31.3)
MCHC RBC AUTO-ENTMCNC: 33.2 % (ref 33–37)
MCV RBC AUTO: 93.9 FL (ref 79.4–94.8)
MONOCYTES # BLD: 0.7 K/UL (ref 0.2–0.8)
MONOCYTES NFR BLD: 10.4 %
NEUTROPHILS # BLD: 4 K/UL (ref 1.4–6.5)
NEUTS SEG NFR BLD: 60.7 %
PLATELET # BLD AUTO: 248 K/UL (ref 130–400)
POTASSIUM SERPL-SCNC: 4.6 MEQ/L (ref 3.4–4.9)
PROT SERPL-MCNC: 6.7 G/DL (ref 6.3–8)
PROTHROMBIN TIME: 13.2 SEC (ref 12.3–14.9)
RBC # BLD AUTO: 4.05 M/UL (ref 4.2–5.4)
SODIUM SERPL-SCNC: 139 MEQ/L (ref 135–144)
WBC # BLD AUTO: 6.7 K/UL (ref 4.8–10.8)

## 2023-04-04 PROCEDURE — 90715 TDAP VACCINE 7 YRS/> IM: CPT | Performed by: STUDENT IN AN ORGANIZED HEALTH CARE EDUCATION/TRAINING PROGRAM

## 2023-04-04 PROCEDURE — 80053 COMPREHEN METABOLIC PANEL: CPT

## 2023-04-04 PROCEDURE — 70486 CT MAXILLOFACIAL W/O DYE: CPT

## 2023-04-04 PROCEDURE — 90471 IMMUNIZATION ADMIN: CPT | Performed by: STUDENT IN AN ORGANIZED HEALTH CARE EDUCATION/TRAINING PROGRAM

## 2023-04-04 PROCEDURE — 6830039000 HC L3 TRAUMA ALERT

## 2023-04-04 PROCEDURE — 2500000003 HC RX 250 WO HCPCS: Performed by: STUDENT IN AN ORGANIZED HEALTH CARE EDUCATION/TRAINING PROGRAM

## 2023-04-04 PROCEDURE — 72125 CT NECK SPINE W/O DYE: CPT

## 2023-04-04 PROCEDURE — 36415 COLL VENOUS BLD VENIPUNCTURE: CPT

## 2023-04-04 PROCEDURE — 6370000000 HC RX 637 (ALT 250 FOR IP): Performed by: STUDENT IN AN ORGANIZED HEALTH CARE EDUCATION/TRAINING PROGRAM

## 2023-04-04 PROCEDURE — 6360000002 HC RX W HCPCS: Performed by: STUDENT IN AN ORGANIZED HEALTH CARE EDUCATION/TRAINING PROGRAM

## 2023-04-04 PROCEDURE — 71045 X-RAY EXAM CHEST 1 VIEW: CPT

## 2023-04-04 PROCEDURE — 70450 CT HEAD/BRAIN W/O DYE: CPT

## 2023-04-04 PROCEDURE — 85610 PROTHROMBIN TIME: CPT

## 2023-04-04 PROCEDURE — 85025 COMPLETE CBC W/AUTO DIFF WBC: CPT

## 2023-04-04 PROCEDURE — 82077 ASSAY SPEC XCP UR&BREATH IA: CPT

## 2023-04-04 PROCEDURE — 85730 THROMBOPLASTIN TIME PARTIAL: CPT

## 2023-04-04 RX ORDER — LIDOCAINE HYDROCHLORIDE AND EPINEPHRINE BITARTRATE 20; .01 MG/ML; MG/ML
20 INJECTION, SOLUTION SUBCUTANEOUS ONCE
Status: COMPLETED | OUTPATIENT
Start: 2023-04-04 | End: 2023-04-04

## 2023-04-04 RX ORDER — TRAMADOL HYDROCHLORIDE 50 MG/1
50 TABLET ORAL EVERY 4 HOURS PRN
Qty: 18 TABLET | Refills: 0 | Status: SHIPPED | OUTPATIENT
Start: 2023-04-04 | End: 2023-04-07

## 2023-04-04 RX ORDER — HYDROCODONE BITARTRATE AND ACETAMINOPHEN 5; 325 MG/1; MG/1
1 TABLET ORAL ONCE
Status: COMPLETED | OUTPATIENT
Start: 2023-04-04 | End: 2023-04-04

## 2023-04-04 RX ORDER — ACETAMINOPHEN 500 MG
1000 TABLET ORAL 3 TIMES DAILY PRN
Qty: 30 TABLET | Refills: 0 | Status: SHIPPED | OUTPATIENT
Start: 2023-04-04

## 2023-04-04 RX ORDER — HYDRALAZINE HYDROCHLORIDE 20 MG/ML
10 INJECTION INTRAMUSCULAR; INTRAVENOUS ONCE
Status: DISCONTINUED | OUTPATIENT
Start: 2023-04-04 | End: 2023-04-04

## 2023-04-04 RX ADMIN — TETANUS TOXOID, REDUCED DIPHTHERIA TOXOID AND ACELLULAR PERTUSSIS VACCINE, ADSORBED 0.5 ML: 5; 2.5; 8; 8; 2.5 SUSPENSION INTRAMUSCULAR at 18:44

## 2023-04-04 RX ADMIN — LIDOCAINE HYDROCHLORIDE,EPINEPHRINE BITARTRATE 20 ML: 20; .01 INJECTION, SOLUTION INFILTRATION; PERINEURAL at 20:39

## 2023-04-04 RX ADMIN — HYDROCODONE BITARTRATE AND ACETAMINOPHEN 1 TABLET: 5; 325 TABLET ORAL at 18:45

## 2023-04-04 ASSESSMENT — PAIN DESCRIPTION - PAIN TYPE: TYPE: ACUTE PAIN

## 2023-04-04 ASSESSMENT — PAIN DESCRIPTION - ORIENTATION: ORIENTATION: LEFT

## 2023-04-04 ASSESSMENT — PAIN DESCRIPTION - LOCATION: LOCATION: EYE

## 2023-04-04 ASSESSMENT — ENCOUNTER SYMPTOMS
DIARRHEA: 0
SHORTNESS OF BREATH: 0
BACK PAIN: 0
COLOR CHANGE: 1
CHEST TIGHTNESS: 0
SORE THROAT: 0
EYE PAIN: 0
NAUSEA: 0

## 2023-04-04 ASSESSMENT — LIFESTYLE VARIABLES
HOW MANY STANDARD DRINKS CONTAINING ALCOHOL DO YOU HAVE ON A TYPICAL DAY: PATIENT DOES NOT DRINK
HOW MANY STANDARD DRINKS CONTAINING ALCOHOL DO YOU HAVE ON A TYPICAL DAY: PATIENT DOES NOT DRINK
HOW OFTEN DO YOU HAVE A DRINK CONTAINING ALCOHOL: NEVER
HOW OFTEN DO YOU HAVE A DRINK CONTAINING ALCOHOL: NEVER

## 2023-04-04 ASSESSMENT — PAIN SCALES - GENERAL
PAINLEVEL_OUTOF10: 7

## 2023-04-04 ASSESSMENT — PAIN - FUNCTIONAL ASSESSMENT: PAIN_FUNCTIONAL_ASSESSMENT: 0-10

## 2023-04-04 NOTE — ED NOTES
Patient up to the bathroom with standby assist  Pt tolerated well  Denies dizziness     Ping [place], KATEY  04/04/23 1924

## 2023-04-05 NOTE — ED PROVIDER NOTES
General: No focal deficit present. Mental Status: She is alert and oriented to person, place, and time. GCS: GCS eye subscore is 4. GCS verbal subscore is 5. GCS motor subscore is 6. Cranial Nerves: Cranial nerves 2-12 are intact. Motor: Motor function is intact. Coordination: Coordination is intact. Deep Tendon Reflexes: Reflexes are normal and symmetric. Psychiatric:         Mood and Affect: Mood normal.         Behavior: Behavior normal.         MDM  This is an 78-year-old female presenting with a scalp hematoma after a fall. Patient is afebrile and hemodynamically stable. CT head negative for bleeds, chest x-ray negative for pneumonia, CT cervical spine and CT facial negative for fractures. Wound cleaned thoroughly with Hibiclens solution, patient given Tdap update and p.o. Norco for pain. Wound was anesthetized with 2% lighter with epi and 3 simple interrupted 4-0 Ethilon sutures were used to bring the wound edges close together. Patient given wound care instructions and will follow-up at an urgent care or primary care for suture removal.  She will return if symptoms change or worsen. FINAL IMPRESSION      1. Fall, initial encounter    2. Hematoma of scalp, initial encounter    3.  Laceration of scalp, initial encounter          DISPOSITION/PLAN   DISPOSITION Decision To Discharge 04/04/2023 08:29:49 PM        DISCHARGE MEDICATIONS:  [unfilled]         Latoya Fernandez PA-C(electronically signed)  Attending Emergency Physician           Latoya Fernandez PA-C  04/05/23 [id number]

## 2023-04-05 NOTE — PROGRESS NOTES
POSTOPERATIVE NOTE    PATIENT:  Roslyn Rose     DATE:     4/5/2023     TIME: 8:55 AM EDT     HISTORY AND CHIEF COMPLAINT:    Roslyn Rose  is a 78y.o. year old  Female  status post  rmrm. She is on aggrenox. We drained right chest that showed bloody serous fluid         PHYSICAL EXAMINATION:         Vitals:    04/03/23 1403 04/03/23 1410 04/03/23 1429   BP: (!) 202/81 (!) 200/80 (!) 162/82   Site: Left Upper Arm  Left Upper Arm   Pulse: 62     Resp: 16     Temp: 97.8 °F (36.6 °C)     SpO2: 97%     Weight: 156 lb (70.8 kg)     Height: 5' 4.5\" (1.638 m)          Roslyn Rose  is a 78y.o. year old  Female in no acute distress, alert and oriented x 3. Incision: clean, dry, intact; seroma in chest wall; ecchymosis in chest wall; not infected    IMPRESSION:    Status post right MRM    PLAN:    In light of her aggrenox, we will continue to monitor. Do not recommend drainage as it is not bothering her  Recommend to f/u pcp regarding bp.     Dictated by:  Bob Martínez MD 4/5/2023 8:55 AM EDT

## 2023-04-05 NOTE — ED NOTES
Sutures covered with non adherent   IV discontinued  Patient up with steady gait with standby assist and discharged home with family   Denies any questions   Pt to FU with PCP in 1 week      Мария Conner RN  04/04/23 2044

## 2023-04-12 PROBLEM — Z48.02 VISIT FOR SUTURE REMOVAL: Status: ACTIVE | Noted: 2023-04-12

## 2023-04-17 ENCOUNTER — OFFICE VISIT (OUTPATIENT)
Dept: SURGERY | Age: 83
End: 2023-04-17

## 2023-04-17 VITALS
BODY MASS INDEX: 26.33 KG/M2 | SYSTOLIC BLOOD PRESSURE: 182 MMHG | WEIGHT: 158 LBS | TEMPERATURE: 97.7 F | RESPIRATION RATE: 18 BRPM | HEART RATE: 65 BPM | HEIGHT: 65 IN | DIASTOLIC BLOOD PRESSURE: 72 MMHG | OXYGEN SATURATION: 99 %

## 2023-04-17 DIAGNOSIS — C50.911 BREAST CANCER METASTASIZED TO AXILLARY LYMPH NODE, RIGHT (HCC): Primary | ICD-10-CM

## 2023-04-17 DIAGNOSIS — C77.3 BREAST CANCER METASTASIZED TO AXILLARY LYMPH NODE, RIGHT (HCC): Primary | ICD-10-CM

## 2023-04-17 DIAGNOSIS — N64.89 SEROMA OF BREAST: ICD-10-CM

## 2023-04-17 PROCEDURE — 99024 POSTOP FOLLOW-UP VISIT: CPT | Performed by: SURGERY

## 2023-04-17 NOTE — PATIENT INSTRUCTIONS
immediate medical care if:    You have signs of infection, such as: Increased pain, swelling, warmth, or redness. Red streaks leading from the area. Pus draining from the area. A fever. Watch closely for changes in your health, and be sure to contact your doctor if:    You have new or worse symptoms from lymphedema. You do not get better as expected. Where can you learn more? Go to http://www.woods.com/ and enter G546 to learn more about \"Preventing Lymphedema After Treatment for Breast Cancer: Care Instructions. \"  Current as of: May 4, 2022               Content Version: 13.6  © 2374-7498 Healthwise, [employer]. Care instructions adapted under license by [facility] ([facility]). If you have questions about a medical condition or this instruction, always ask your healthcare professional. [address] any warranty or liability for your use of this information.

## 2023-04-17 NOTE — PROGRESS NOTES
POSTOPERATIVE NOTE    PATIENT:  Fabian Randle     DATE:     4/17/2023     TIME: 1:35 PM EDT     HISTORY AND CHIEF COMPLAINT:    Fabian Randle  is a 78y.o. 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 year old  Female in no acute distress, alert and oriented x 3. Incision: clean, dry, intact; hematoma / seroma unchanged    IMPRESSION:    Status post right MRM    PLAN:    Patient is doing well. Recommend clinical breast exams in the breast surgical suite in 6 month(s)  Recommend an active lifestyle, healthy diet, limited alcohol intake, achieve and maintain a healthy BMI to optimize breast cancer outcomes / decrease risk of breast cancer.   Referral to or Follow up with Medical Oncology  Referral to or Follow up with Radiation Oncology  Follow up with PCP regarding all medical problems  BP is high / recommend PCP    Dictated by:  Nicol Sierra MD 4/17/2023 1:35 PM EDT

## 2023-04-18 NOTE — PROGRESS NOTES
Please call patient, does she have a blood pressure cuff at home? Noted to have consistently elevated blood pressure in the offices. When is she taking her BP medication? Any change to foods with some higher salt/sodium contact?

## 2023-05-05 ENCOUNTER — HOSPITAL ENCOUNTER (OUTPATIENT)
Dept: INFUSION THERAPY | Age: 83
Setting detail: INFUSION SERIES
Discharge: HOME | End: 2023-05-05
Payer: MEDICARE

## 2023-05-05 VITALS — RESPIRATION RATE: 18 BRPM | HEART RATE: 63 BPM | TEMPERATURE: 98.1 F | SYSTOLIC BLOOD PRESSURE: 171 MMHG

## 2023-05-05 DIAGNOSIS — I25.10 CARDIOVASCULAR DISEASE: ICD-10-CM

## 2023-05-05 DIAGNOSIS — E78.5 HYPERLIPIDEMIA, UNSPECIFIED HYPERLIPIDEMIA TYPE: Primary | ICD-10-CM

## 2023-05-05 DIAGNOSIS — I67.9 CEREBROVASCULAR DISEASE, UNSPECIFIED: ICD-10-CM

## 2023-05-05 PROCEDURE — 96372 THER/PROPH/DIAG INJ SC/IM: CPT

## 2023-05-05 PROCEDURE — 6360000002 HC RX W HCPCS: Performed by: PHYSICIAN ASSISTANT

## 2023-05-05 RX ADMIN — INCLISIRAN 284 MG: 284 INJECTION, SOLUTION SUBCUTANEOUS at 13:15

## 2023-05-05 NOTE — PROGRESS NOTES
Pt to [facility] ambulatory for Leqviio, first time dose. Hx of allergy to statins. States no c/o. Injection admin. Pt tolerated well. Observation showed no sxs reactions. Information given on med, alsoAVS given. Card given to make next appt. Pt left unit, no c/o or requests. All equipment used in the care for this patient has been cleaned.

## 2023-05-15 ENCOUNTER — OFFICE VISIT (OUTPATIENT)
Dept: FAMILY MEDICINE CLINIC | Age: 83
End: 2023-05-15
Payer: MEDICARE

## 2023-05-15 VITALS
DIASTOLIC BLOOD PRESSURE: 70 MMHG | HEART RATE: 70 BPM | OXYGEN SATURATION: 97 % | RESPIRATION RATE: 18 BRPM | OXYGEN SATURATION: 97 % | RESPIRATION RATE: 16 BRPM | DIASTOLIC BLOOD PRESSURE: 70 MMHG | WEIGHT: 155 LBS | SYSTOLIC BLOOD PRESSURE: 110 MMHG | HEIGHT: 65 IN | SYSTOLIC BLOOD PRESSURE: 110 MMHG | HEART RATE: 70 BPM | BODY MASS INDEX: 25.83 KG/M2 | BODY MASS INDEX: 25.79 KG/M2 | WEIGHT: 155 LBS

## 2023-05-15 DIAGNOSIS — C77.3 BREAST CANCER METASTASIZED TO AXILLARY LYMPH NODE, RIGHT (HCC): Primary | ICD-10-CM

## 2023-05-15 DIAGNOSIS — E78.2 MIXED HYPERLIPIDEMIA: ICD-10-CM

## 2023-05-15 DIAGNOSIS — S06.0X0D CLOSED HEAD INJURY WITH CONCUSSION, WITHOUT LOSS OF CONSCIOUSNESS, SUBSEQUENT ENCOUNTER: ICD-10-CM

## 2023-05-15 DIAGNOSIS — I10 ESSENTIAL HYPERTENSION: ICD-10-CM

## 2023-05-15 DIAGNOSIS — Z90.11 STATUS POST MASTECTOMY, RIGHT: ICD-10-CM

## 2023-05-15 DIAGNOSIS — C50.911 ADENOCARCINOMA, BREAST, RIGHT (HCC): ICD-10-CM

## 2023-05-15 DIAGNOSIS — I67.9 CEREBROVASCULAR DISEASE: Chronic | ICD-10-CM

## 2023-05-15 DIAGNOSIS — C50.911 BREAST CANCER METASTASIZED TO AXILLARY LYMPH NODE, RIGHT (HCC): Primary | ICD-10-CM

## 2023-05-15 DIAGNOSIS — Z00.00 MEDICARE ANNUAL WELLNESS VISIT, SUBSEQUENT: Primary | ICD-10-CM

## 2023-05-15 DIAGNOSIS — E78.5 HYPERLIPIDEMIA, UNSPECIFIED HYPERLIPIDEMIA TYPE: ICD-10-CM

## 2023-05-15 PROBLEM — Z48.02 VISIT FOR SUTURE REMOVAL: Status: RESOLVED | Noted: 2023-04-12 | Resolved: 2023-05-15

## 2023-05-15 PROBLEM — N39.0 RECURRENT UTI: Status: RESOLVED | Noted: 2020-11-30 | Resolved: 2023-05-15

## 2023-05-15 PROCEDURE — 99214 OFFICE O/P EST MOD 30 MIN: CPT | Performed by: PHYSICIAN ASSISTANT

## 2023-05-15 PROCEDURE — 3074F SYST BP LT 130 MM HG: CPT | Performed by: PHYSICIAN ASSISTANT

## 2023-05-15 PROCEDURE — 3078F DIAST BP <80 MM HG: CPT | Performed by: PHYSICIAN ASSISTANT

## 2023-05-15 PROCEDURE — 1123F ACP DISCUSS/DSCN MKR DOCD: CPT | Performed by: PHYSICIAN ASSISTANT

## 2023-05-15 PROCEDURE — 1090F PRES/ABSN URINE INCON ASSESS: CPT | Performed by: PHYSICIAN ASSISTANT

## 2023-05-15 PROCEDURE — G8417 CALC BMI ABV UP PARAM F/U: HCPCS | Performed by: PHYSICIAN ASSISTANT

## 2023-05-15 PROCEDURE — 1036F TOBACCO NON-USER: CPT | Performed by: PHYSICIAN ASSISTANT

## 2023-05-15 PROCEDURE — G8427 DOCREV CUR MEDS BY ELIG CLIN: HCPCS | Performed by: PHYSICIAN ASSISTANT

## 2023-05-15 PROCEDURE — G0439 PPPS, SUBSEQ VISIT: HCPCS | Performed by: PHYSICIAN ASSISTANT

## 2023-05-15 PROCEDURE — G8399 PT W/DXA RESULTS DOCUMENT: HCPCS | Performed by: PHYSICIAN ASSISTANT

## 2023-05-15 RX ORDER — THIAMINE HCL 100 MG
TABLET ORAL
COMMUNITY

## 2023-05-15 ASSESSMENT — PATIENT HEALTH QUESTIONNAIRE - PHQ9
9. THOUGHTS THAT YOU WOULD BE BETTER OFF DEAD, OR OF HURTING YOURSELF: 0
SUM OF ALL RESPONSES TO PHQ QUESTIONS 1-9: 0
7. TROUBLE CONCENTRATING ON THINGS, SUCH AS READING THE NEWSPAPER OR WATCHING TELEVISION: 0
8. MOVING OR SPEAKING SO SLOWLY THAT OTHER PEOPLE COULD HAVE NOTICED. OR THE OPPOSITE, BEING SO FIGETY OR RESTLESS THAT YOU HAVE BEEN MOVING AROUND A LOT MORE THAN USUAL: 0
SUM OF ALL RESPONSES TO PHQ QUESTIONS 1-9: 0
SUM OF ALL RESPONSES TO PHQ9 QUESTIONS 1 & 2: 0
SUM OF ALL RESPONSES TO PHQ QUESTIONS 1-9: 0
4. FEELING TIRED OR HAVING LITTLE ENERGY: 0
10. IF YOU CHECKED OFF ANY PROBLEMS, HOW DIFFICULT HAVE THESE PROBLEMS MADE IT FOR YOU TO DO YOUR WORK, TAKE CARE OF THINGS AT HOME, OR GET ALONG WITH OTHER PEOPLE: 0
3. TROUBLE FALLING OR STAYING ASLEEP: 0
5. POOR APPETITE OR OVEREATING: 0
SUM OF ALL RESPONSES TO PHQ QUESTIONS 1-9: 0
6. FEELING BAD ABOUT YOURSELF - OR THAT YOU ARE A FAILURE OR HAVE LET YOURSELF OR YOUR FAMILY DOWN: 0
1. LITTLE INTEREST OR PLEASURE IN DOING THINGS: 0
2. FEELING DOWN, DEPRESSED OR HOPELESS: 0

## 2023-05-15 ASSESSMENT — ENCOUNTER SYMPTOMS
VOMITING: 0
ABDOMINAL PAIN: 0
PHOTOPHOBIA: 0
NAUSEA: 0
SHORTNESS OF BREATH: 0
DIARRHEA: 0
BLOOD IN STOOL: 0
CHEST TIGHTNESS: 0

## 2023-05-15 ASSESSMENT — LIFESTYLE VARIABLES
HOW MANY STANDARD DRINKS CONTAINING ALCOHOL DO YOU HAVE ON A TYPICAL DAY: PATIENT DOES NOT DRINK
HOW OFTEN DO YOU HAVE A DRINK CONTAINING ALCOHOL: NEVER

## 2023-05-15 NOTE — PROGRESS NOTES
Gastrointestinal:  Negative for abdominal pain, blood in stool, diarrhea, nausea and vomiting. Endocrine: Negative for cold intolerance and heat intolerance. Genitourinary:  Negative for dysuria, hematuria, menstrual problem and urgency. Musculoskeletal:  Negative for arthralgias, joint swelling and myalgias. Skin:  Negative for rash. Neurological:  Negative for dizziness and headaches. Hematological:  Bruises/bleeds easily. Psychiatric/Behavioral:  Negative for dysphoric mood and sleep disturbance. The patient is not nervous/anxious.       Past Medical History:   Diagnosis Date    Asthma     Asthma in remission 1990    Breast cancer ([address] 2014    right breast    Cerebral artery occlusion with cerebral infarction [address]     2014    Elevated TSH     Hematoma of right thigh 09/30/2021    History of artificial lens replacement 03/24/2015    History of blood transfusion     1956-after delivery of first child    History of breast cancer 02/2014    Invasive ductal cancer right breast, Dr Jensen Rubio, Dr Rosi Kim    History of therapeutic radiation     HTN (hypertension) 1980    was with [facility]    Hx of blood clots     Hx of ischemic left MCA stroke 2013    no residual, Dr Walterine Babinski    Hx of ischemic right MCA stroke 12/2014    no residual, frontal lobe    Hypercalcemia 07/11/2022    Hyperlipidemia     Osteoarthritis     Secondary and unspecified malignant neoplasm of axilla and upper limb lymph nodes ([facility] 11/08/2018    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

## 2023-05-15 NOTE — PROGRESS NOTES
Medicare Annual Wellness Visit    Kilo Mahajan is here for Medicare AWV    Assessment & Plan   Medicare annual wellness visit, subsequent      Recommendations for Preventive Services Due: see orders and patient instructions/AVS.  Recommended screening schedule for the next 5-10 years is provided to the patient in written form: see Patient Instructions/AVS.     No follow-ups on file. Subjective     Patient's complete Health Risk Assessment and screening values have been reviewed and are found in Flowsheets. The following problems were reviewed today and where indicated follow up appointments were made and/or referrals ordered. Positive Risk Factor Screenings with Interventions:    Fall Risk:  Do you feel unsteady or are you worried about falling? : no  2 or more falls in past year?: (!) yes  Fall with injury in past year?: (!) yes     Interventions:    Patient declines any further evaluation or treatment  Walking to regain balance/strength. Dentist Screen:  Have you seen the dentist within the past year?: (!) No    Intervention:  No concerns. Objective   Vitals:    05/15/23 1444   BP: 110/70   Site: Left Upper Arm   Position: Sitting   Cuff Size: Large Adult   Pulse: 70   Resp: 18   SpO2: 97%   Weight: 155 lb (70.3 kg)   Height: 5' 5\" (1.651 m)      Body mass index is 25.79 kg/m². Allergies   Allergen Reactions    Sulfa Antibiotics      Unsure of reaction     Iodinated Contrast Media      Oral & IV dye group    Other Diarrhea     Dairy upsets stomach    Statins Other (See Comments)     Prior to Visit Medications    Medication Sig Taking?  Authorizing Provider   Potassium 99 MG TABS Take by mouth Yes Historical Provider, MD   Magnesium 500 MG TABS Take by mouth Yes Historical Provider, MD   amLODIPine-benazepril (LOTREL) 5-20 MG per capsule Take 1 capsule by mouth daily TAKE 1 CAPSULE DAILY Yes Debi Malone PA-C   b complex vitamins capsule Take 1 capsule by

## 2023-05-30 DIAGNOSIS — E78.5 HYPERLIPIDEMIA, UNSPECIFIED HYPERLIPIDEMIA TYPE: ICD-10-CM

## 2023-05-30 RX ORDER — EZETIMIBE 10 MG/1
10 TABLET ORAL DAILY
Qty: 90 TABLET | Refills: 3 | Status: SHIPPED | OUTPATIENT
Start: 2023-05-30

## 2023-06-07 RX ORDER — MECLIZINE HCL 12.5 MG/1
12.5 TABLET ORAL 2 TIMES DAILY
Qty: 30 TABLET | Refills: 2 | Status: SHIPPED | OUTPATIENT
Start: 2023-06-07

## 2023-06-07 NOTE — TELEPHONE ENCOUNTER
Last refill 3/22/23  Medication is discontinued but pt states she still uses this and is in need of it

## 2023-06-16 ENCOUNTER — TELEPHONE (OUTPATIENT)
Dept: PULMONOLOGY | Age: 83
End: 2023-06-16

## 2023-07-31 ENCOUNTER — TELEPHONE (OUTPATIENT)
Dept: PULMONOLOGY | Age: 83
End: 2023-07-31

## 2023-08-04 ENCOUNTER — HOSPITAL ENCOUNTER (OUTPATIENT)
Dept: INFUSION THERAPY | Age: 83
Setting detail: INFUSION SERIES
Discharge: HOME | End: 2023-08-04
Payer: MEDICARE

## 2023-08-04 VITALS
TEMPERATURE: 98.2 F | DIASTOLIC BLOOD PRESSURE: 68 MMHG | HEART RATE: 67 BPM | RESPIRATION RATE: 18 BRPM | SYSTOLIC BLOOD PRESSURE: 158 MMHG

## 2023-08-04 DIAGNOSIS — I25.10 CARDIOVASCULAR DISEASE: ICD-10-CM

## 2023-08-04 DIAGNOSIS — I67.9 CEREBROVASCULAR DISEASE, UNSPECIFIED: ICD-10-CM

## 2023-08-04 DIAGNOSIS — E78.5 HYPERLIPIDEMIA, UNSPECIFIED HYPERLIPIDEMIA TYPE: Primary | ICD-10-CM

## 2023-08-04 PROCEDURE — 96372 THER/PROPH/DIAG INJ SC/IM: CPT

## 2023-08-04 PROCEDURE — 6360000002 HC RX W HCPCS: Performed by: PHYSICIAN ASSISTANT

## 2023-08-04 RX ADMIN — INCLISIRAN 284 MG: 284 INJECTION, SOLUTION SUBCUTANEOUS at 09:52

## 2023-08-04 NOTE — PROGRESS NOTES
Injection provided, patient tolerated well.provided reminder card for next dose due. Left unit by ambulation. All equipment used in the care for this patient has been cleaned.

## 2023-08-08 ENCOUNTER — PATIENT MESSAGE (OUTPATIENT)
Dept: FAMILY MEDICINE CLINIC | Age: 83
End: 2023-08-08

## 2023-08-08 DIAGNOSIS — N39.0 RECURRENT UTI: ICD-10-CM

## 2023-08-10 NOTE — TELEPHONE ENCOUNTER
From: Tracy Coles  To: Guillermina Malone  Sent: 8/8/2023 2:49 PM EDT  Subject: prescription renewal    need renewal of cephalexin for UTI, please.  [facility] [place], [address]

## 2023-08-11 RX ORDER — CEPHALEXIN 250 MG/1
1 TABLET ORAL DAILY
Qty: 90 TABLET | Refills: 5 | Status: SHIPPED | OUTPATIENT
Start: 2023-08-11

## 2023-08-21 ENCOUNTER — TELEPHONE (OUTPATIENT)
Dept: FAMILY MEDICINE CLINIC | Age: 83
End: 2023-08-21

## 2023-08-21 ENCOUNTER — PATIENT MESSAGE (OUTPATIENT)
Dept: FAMILY MEDICINE CLINIC | Age: 83
End: 2023-08-21

## 2023-08-21 DIAGNOSIS — M25.50 ARTHRALGIA, UNSPECIFIED JOINT: Primary | ICD-10-CM

## 2023-08-21 NOTE — TELEPHONE ENCOUNTER
From: Andrei Plaza  To: Art Malone  Sent: 8/21/2023 7:13 AM EDT  Subject: pain    I would like to see a physical therapist asap. My whole right side from head to toe is aching causing me loss of sleep at night.  Do I need to see you first? Thank you. b

## 2023-08-21 NOTE — TELEPHONE ENCOUNTER
Pt calling and sated that she is having bad pain in her body, specifically her whole right side, from head to feet. Pt wants to have some kind of pain therapy or pain management. Pt is aware of her upcoming appt on 9/18/2023 but wants a referral before that.      Pt phone [phone]

## 2023-08-22 NOTE — TELEPHONE ENCOUNTER
I need to see and assess her, unsure if this is joint related? If it is one sided, I am very worried about something going on with stroke risk. Did she have a fall? Is this recurrent?

## 2023-08-28 RX ORDER — ASPIRIN AND DIPYRIDAMOLE 25; 200 MG/1; MG/1
CAPSULE, EXTENDED RELEASE ORAL
Qty: 180 CAPSULE | Refills: 3 | Status: SHIPPED | OUTPATIENT
Start: 2023-08-28

## 2023-08-28 NOTE — TELEPHONE ENCOUNTER
Pharmacy is requesting medication refill.  Please approve or deny this request.    Rx requested:  Requested Prescriptions     Pending Prescriptions Disp Refills    aspirin-dipyridamole (AGGRENOX)  MG per extended release capsule [Pharmacy Med Name: ASA/DIPYRIDA CAP 25-200MG] 180 capsule 3     Sig: TAKE 1 CAPSULE TWICE DAILY         Last Office Visit:   2/7/2023      Next Visit Date:  Future Appointments   Date Time Provider [address]   8/29/2023 11:30 AM SPIKE Gonzalez [address] [place] [address]   9/12/2023 10:00 AM Hattie Kussmaul, PT MLOZ [facility]   10/3/2023 11:00 AM SPIKE Gonzalez [facility] Robi   10/18/2023 11:00 AM Steve Charles MD [facility]   11/22/2023 10:00 AM [address]   2/7/2024  1:00 PM MD [facility] -   3/20/2024  1:00 PM Toma Lanes, MD [facility]

## 2023-08-29 ENCOUNTER — HOSPITAL ENCOUNTER (OUTPATIENT)
Dept: GENERAL RADIOLOGY | Age: 83
Discharge: HOME | End: 2023-08-31
Payer: MEDICARE

## 2023-08-29 ENCOUNTER — OFFICE VISIT (OUTPATIENT)
Dept: FAMILY MEDICINE CLINIC | Age: 83
End: 2023-08-29
Payer: MEDICARE

## 2023-08-29 VITALS
BODY MASS INDEX: 25.83 KG/M2 | OXYGEN SATURATION: 98 % | RESPIRATION RATE: 16 BRPM | SYSTOLIC BLOOD PRESSURE: 128 MMHG | HEART RATE: 58 BPM | WEIGHT: 155 LBS | HEIGHT: 65 IN | TEMPERATURE: 96.8 F | DIASTOLIC BLOOD PRESSURE: 78 MMHG

## 2023-08-29 DIAGNOSIS — C50.911 ADENOCARCINOMA, BREAST, RIGHT (HCC): ICD-10-CM

## 2023-08-29 DIAGNOSIS — I67.9 CEREBROVASCULAR DISEASE: Chronic | ICD-10-CM

## 2023-08-29 DIAGNOSIS — M54.41 ACUTE MIDLINE LOW BACK PAIN WITH RIGHT-SIDED SCIATICA: ICD-10-CM

## 2023-08-29 DIAGNOSIS — M25.551 RIGHT HIP PAIN: ICD-10-CM

## 2023-08-29 DIAGNOSIS — I10 ESSENTIAL HYPERTENSION: ICD-10-CM

## 2023-08-29 DIAGNOSIS — R30.0 DYSURIA: ICD-10-CM

## 2023-08-29 DIAGNOSIS — M54.41 ACUTE MIDLINE LOW BACK PAIN WITH RIGHT-SIDED SCIATICA: Primary | ICD-10-CM

## 2023-08-29 DIAGNOSIS — Z78.0 POST-MENOPAUSAL: ICD-10-CM

## 2023-08-29 DIAGNOSIS — Z90.11 STATUS POST MASTECTOMY, RIGHT: ICD-10-CM

## 2023-08-29 DIAGNOSIS — E78.2 MIXED HYPERLIPIDEMIA: ICD-10-CM

## 2023-08-29 DIAGNOSIS — C50.911 BREAST CANCER METASTASIZED TO AXILLARY LYMPH NODE, RIGHT (HCC): ICD-10-CM

## 2023-08-29 DIAGNOSIS — C77.3 BREAST CANCER METASTASIZED TO AXILLARY LYMPH NODE, RIGHT (HCC): ICD-10-CM

## 2023-08-29 LAB
ALBUMIN SERPL-MCNC: 4.5 G/DL (ref 3.5–4.6)
ALP SERPL-CCNC: 149 U/L (ref 40–130)
ALT SERPL-CCNC: 24 U/L (ref 0–33)
ANION GAP SERPL CALCULATED.3IONS-SCNC: 10 MEQ/L (ref 9–15)
AST SERPL-CCNC: 30 U/L (ref 0–35)
BILIRUB SERPL-MCNC: 0.4 MG/DL (ref 0.2–0.7)
BUN SERPL-MCNC: 14 MG/DL (ref 8–23)
CALCIUM SERPL-MCNC: 10.2 MG/DL (ref 8.5–9.9)
CHLORIDE SERPL-SCNC: 101 MEQ/L (ref 95–107)
CO2 SERPL-SCNC: 28 MEQ/L (ref 20–31)
CREAT SERPL-MCNC: 0.64 MG/DL (ref 0.5–0.9)
ERYTHROCYTE [DISTWIDTH] IN BLOOD BY AUTOMATED COUNT: 15.6 % (ref 11.5–14.5)
GLOBULIN SER CALC-MCNC: 2.6 G/DL (ref 2.3–3.5)
GLUCOSE SERPL-MCNC: 93 MG/DL (ref 70–99)
HCT VFR BLD AUTO: 39.4 % (ref 37–47)
HGB BLD-MCNC: 13.2 G/DL (ref 12–16)
MCH RBC QN AUTO: 30.5 PG (ref 27–31.3)
MCHC RBC AUTO-ENTMCNC: 33.6 % (ref 33–37)
MCV RBC AUTO: 90.7 FL (ref 79.4–94.8)
PLATELET # BLD AUTO: 211 K/UL (ref 130–400)
POTASSIUM SERPL-SCNC: 4.8 MEQ/L (ref 3.4–4.9)
PROT SERPL-MCNC: 7.1 G/DL (ref 6.3–8)
RBC # BLD AUTO: 4.34 M/UL (ref 4.2–5.4)
SODIUM SERPL-SCNC: 139 MEQ/L (ref 135–144)
WBC # BLD AUTO: 6.9 K/UL (ref 4.8–10.8)

## 2023-08-29 PROCEDURE — 1036F TOBACCO NON-USER: CPT | Performed by: PHYSICIAN ASSISTANT

## 2023-08-29 PROCEDURE — 72110 X-RAY EXAM L-2 SPINE 4/>VWS: CPT

## 2023-08-29 PROCEDURE — 3074F SYST BP LT 130 MM HG: CPT | Performed by: PHYSICIAN ASSISTANT

## 2023-08-29 PROCEDURE — 1123F ACP DISCUSS/DSCN MKR DOCD: CPT | Performed by: PHYSICIAN ASSISTANT

## 2023-08-29 PROCEDURE — 99214 OFFICE O/P EST MOD 30 MIN: CPT | Performed by: PHYSICIAN ASSISTANT

## 2023-08-29 PROCEDURE — G8399 PT W/DXA RESULTS DOCUMENT: HCPCS | Performed by: PHYSICIAN ASSISTANT

## 2023-08-29 PROCEDURE — G8427 DOCREV CUR MEDS BY ELIG CLIN: HCPCS | Performed by: PHYSICIAN ASSISTANT

## 2023-08-29 PROCEDURE — 1090F PRES/ABSN URINE INCON ASSESS: CPT | Performed by: PHYSICIAN ASSISTANT

## 2023-08-29 PROCEDURE — G8417 CALC BMI ABV UP PARAM F/U: HCPCS | Performed by: PHYSICIAN ASSISTANT

## 2023-08-29 PROCEDURE — 3078F DIAST BP <80 MM HG: CPT | Performed by: PHYSICIAN ASSISTANT

## 2023-08-29 RX ORDER — METHYLPREDNISOLONE 4 MG/1
TABLET ORAL
Qty: 1 KIT | Refills: 0 | Status: SHIPPED | OUTPATIENT
Start: 2023-08-29 | End: 2023-09-04

## 2023-08-29 ASSESSMENT — PATIENT HEALTH QUESTIONNAIRE - PHQ9
9. THOUGHTS THAT YOU WOULD BE BETTER OFF DEAD, OR OF HURTING YOURSELF: 0
SUM OF ALL RESPONSES TO PHQ QUESTIONS 1-9: 0
4. FEELING TIRED OR HAVING LITTLE ENERGY: 0
2. FEELING DOWN, DEPRESSED OR HOPELESS: 0
6. FEELING BAD ABOUT YOURSELF - OR THAT YOU ARE A FAILURE OR HAVE LET YOURSELF OR YOUR FAMILY DOWN: 0
1. LITTLE INTEREST OR PLEASURE IN DOING THINGS: 0
5. POOR APPETITE OR OVEREATING: 0
SUM OF ALL RESPONSES TO PHQ QUESTIONS 1-9: 0
10. IF YOU CHECKED OFF ANY PROBLEMS, HOW DIFFICULT HAVE THESE PROBLEMS MADE IT FOR YOU TO DO YOUR WORK, TAKE CARE OF THINGS AT HOME, OR GET ALONG WITH OTHER PEOPLE: 0
SUM OF ALL RESPONSES TO PHQ QUESTIONS 1-9: 0
8. MOVING OR SPEAKING SO SLOWLY THAT OTHER PEOPLE COULD HAVE NOTICED. OR THE OPPOSITE, BEING SO FIGETY OR RESTLESS THAT YOU HAVE BEEN MOVING AROUND A LOT MORE THAN USUAL: 0
SUM OF ALL RESPONSES TO PHQ QUESTIONS 1-9: 0
3. TROUBLE FALLING OR STAYING ASLEEP: 0
7. TROUBLE CONCENTRATING ON THINGS, SUCH AS READING THE NEWSPAPER OR WATCHING TELEVISION: 0
SUM OF ALL RESPONSES TO PHQ9 QUESTIONS 1 & 2: 0

## 2023-08-29 ASSESSMENT — ENCOUNTER SYMPTOMS
CHEST TIGHTNESS: 0
ABDOMINAL PAIN: 0
PHOTOPHOBIA: 0
NAUSEA: 0
BACK PAIN: 1
SHORTNESS OF BREATH: 0
VOMITING: 0
BLOOD IN STOOL: 0
DIARRHEA: 0

## 2023-08-30 LAB — BACTERIA UR CULT: NORMAL

## 2023-09-12 ENCOUNTER — HOSPITAL ENCOUNTER (OUTPATIENT)
Dept: PHYSICAL THERAPY | Age: 83
Setting detail: THERAPIES SERIES
Discharge: HOME | End: 2023-09-12
Payer: MEDICARE

## 2023-09-12 PROCEDURE — 97162 PT EVAL MOD COMPLEX 30 MIN: CPT

## 2023-09-12 ASSESSMENT — PAIN DESCRIPTION - DESCRIPTORS: DESCRIPTORS: ACHING;DULL

## 2023-09-12 ASSESSMENT — PAIN DESCRIPTION - ORIENTATION: ORIENTATION: RIGHT

## 2023-09-12 ASSESSMENT — PAIN DESCRIPTION - LOCATION: LOCATION: BACK;HIP

## 2023-09-12 ASSESSMENT — PAIN SCALES - GENERAL: PAINLEVEL_OUTOF10: 2

## 2023-09-12 NOTE — PROGRESS NOTES
[address] [facility], [address]  Phone: [phone]                             Physical Therapy: Initial Evaluation    Patient: Katiana Mendoza (27 y.o.     female)   Examination Date: 2023   :  1940 ;    ConfirmedJames Junior MRN: [id number]  CSN: [id number]   Insurance: Payor: MEDICARE / Plan: MEDICARE PART A AND B / Product Type: *No Product type* /   Insurance ID: [id number] - (Medicare) PT Insurance Information: Medicare/AARP Secondary Insurance (if applicable): [place] HEALTHCARE   Referring Physician: Darryl Negro PA-C      PCP: Darryl Negro PA-C Visits to Date/Visits Approved:  /  (BMN)    No Show/Cancelled Appts: 0 / 0     Medical Diagnosis: Arthralgia, unspecified joint [M25.50]    Treatment Diagnosis: Lumbar and R hip pain with decrease strength and ROM affecting overall functional tolerance.      PERTINENT MEDICAL HISTORY   Patient Assessed for Rehabilitation Services: Yes       Medical History: Chart Reviewed: Yes   Past Medical History:   Diagnosis Date    Asthma     Asthma in remission     Breast cancer ([address])     right breast    Cerebral artery occlusion with cerebral infarction ([address])         Elevated TSH     Hematoma of right thigh 2021    History of artificial lens replacement 2015    History of blood transfusion     -after delivery of first child    History of breast cancer 2014    Invasive ductal cancer right breast, Dr Nnamdi Rooney, Dr Lima Many    History of therapeutic radiation     HTN (hypertension)     was with [facility]    Hx of blood clots     Hx of ischemic left MCA stroke     no residual, Dr Dean Josue    Hx of ischemic right MCA stroke 2014    no residual, frontal lobe    Hypercalcemia 2022    Hyperlipidemia     Osteoarthritis     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
Strengthening, ROM, Balance training, Functional mobility training, Manual, Neuromuscular re-education, Stair training, Gait training, Home exercise program, Modalities  Additional Comments: cold therapy unless cleared by oncologist for heat/increase circulation modalities. Precautions: Falls risk                           Patient Status:[x] Continue/ Initiate plan of Care     [] Discharge PT. Recommend pt continue with HEP. [] Additional visits requested, Please re-certify for additional visits:        Signature: Electronically signed by Preston Mohan PT on 9/12/23 at 10:59 AM EDT      If you have any questions or concerns, please don't hesitate to call. Thank you for your referral.    I have reviewed this plan of care and certify a need for medically necessary rehabilitation services.     Physician Signature:__________________________________________________________  Date:  Please sign and return

## 2023-09-15 ENCOUNTER — HOSPITAL ENCOUNTER (OUTPATIENT)
Dept: PHYSICAL THERAPY | Age: 83
Setting detail: THERAPIES SERIES
Discharge: HOME | End: 2023-09-15
Payer: MEDICARE

## 2023-09-15 PROCEDURE — 97110 THERAPEUTIC EXERCISES: CPT

## 2023-09-15 ASSESSMENT — PAIN DESCRIPTION - DESCRIPTORS: DESCRIPTORS: ACHING;DULL

## 2023-09-15 ASSESSMENT — PAIN SCALES - GENERAL: PAINLEVEL_OUTOF10: 2

## 2023-09-15 ASSESSMENT — PAIN DESCRIPTION - LOCATION: LOCATION: BACK

## 2023-09-15 ASSESSMENT — PAIN DESCRIPTION - ORIENTATION: ORIENTATION: RIGHT;LOWER

## 2023-09-15 NOTE — PROGRESS NOTES
[address] [facility], [address]  [facility]:[phone]      Physical TherapyTreatment Note        Date: 9/15/2023  Patient: Katiana Mendoza  : 1940   Confirmed: Yes  MRN: [id number]  Referring Provider: Darryl Negro PA-C  Medical Diagnosis: Arthralgia, unspecified joint [M25.50]   Treatment Diagnosis: Lumbar and R hip pain with decrease strength and ROM affecting overall functional tolerance. Visit Information:  Insurance: Payor: MEDICARE / Plan: MEDICARE PART A AND B / Product Type: *No Product type* /   PT Visit Information  Onset Date: 23  PT Insurance Information: Medicare/AARP  Total # of Visits to Date: 2  Plan of Care/Certification Expiration Date: 23  No Show: 0  Progress Note Due Date: 10/12/23  Canceled Appointment: 0  Progress Note Counter:  ( PN due 10/12/23)    Subjective Information:  Subjective: Pt states that she has a dull, achy pain in her right low back this morning. Her back bothers her the most when she gets out of bed in the mornings. Reports going up and down stairs loosens it up. Pt had to discontinue going to [employer] due to insurance not covering it anymore. She is thinking about stopping at a [employer] to try.   HEP Compliance:  [] Good [] Fair    [] Poor [x] Reports not doing due to: not issued     Pain Screening  Patient Currently in Pain: Yes  Pain Assessment: 0-10  Pain Level: 2  Pain Location: Back  Pain Orientation: Right, Lower  Pain Descriptors: Aching, Dull    Treatment:  Exercises:  Exercises  Exercise 1: ham stretch 3 x 20\"  Exercise 2: piriformis stretch 3 x 20\"  Exercise 3: supine SKTC 3 x 20\", LTR 10 x 5\"  Exercise 4: Supine DLS: TA iso 10 x 5\" , TA march 10 x 3\", chest pull*  Exercise 5: sink ex's (focus on balance*)  Exercise 6: gait drills*  Exercise 8: HEP: ham/piriformis str, SKTC, LTR, TA iso, TA march  Treatment Reasoning  Limitations addressed: Pain modulation, Posture, Strength, Mobility, Activity

## 2023-09-18 ENCOUNTER — HOSPITAL ENCOUNTER (OUTPATIENT)
Dept: PHYSICAL THERAPY | Age: 83
Setting detail: THERAPIES SERIES
Discharge: HOME | End: 2023-09-18
Payer: MEDICARE

## 2023-09-18 PROCEDURE — 97110 THERAPEUTIC EXERCISES: CPT

## 2023-09-18 ASSESSMENT — PAIN SCALES - GENERAL: PAINLEVEL_OUTOF10: 2

## 2023-09-18 ASSESSMENT — PAIN DESCRIPTION - DESCRIPTORS: DESCRIPTORS: ACHING;DULL

## 2023-09-18 ASSESSMENT — PAIN DESCRIPTION - LOCATION: LOCATION: BACK

## 2023-09-18 ASSESSMENT — PAIN DESCRIPTION - ORIENTATION: ORIENTATION: RIGHT;LOWER

## 2023-09-18 NOTE — PROGRESS NOTES
[address] [facility], [address]  JSW:[phone]      Physical TherapyTreatment Note        Date: 2023  Patient: Onelia Elise  : 1940   Confirmed: Yes  MRN: [id number]  Referring Provider: Kirk Ash PA-C  Medical Diagnosis: Arthralgia, unspecified joint [M25.50]   Treatment Diagnosis: Lumbar and R hip pain with decrease strength and ROM affecting overall functional tolerance. Visit Information:  Insurance: Payor: MEDICARE / Plan: MEDICARE PART A AND B / Product Type: *No Product type* /   PT Visit Information  Onset Date: 23  PT Insurance Information: Medicare/AARP  Total # of Visits to Date: 3  Plan of Care/Certification Expiration Date: 23  No Show: 0  Progress Note Due Date: 10/12/23  Canceled Appointment: 0  Progress Note Counter: 3/8 ( PN due 10/12/23)    Subjective Information:  Subjective: Pt felt ok after the first therapy visit. Has no new complaints today. Reports a dull ache in R LB. HEP Compliance:  [x] Good [] Fair    [] Poor [] Reports not doing due to:    Pain Screening  Patient Currently in Pain: Yes  Pain Assessment: 0-10  Pain Level: 2  Pain Location: Back  Pain Orientation: Right, Lower  Pain Descriptors: Aching, Dull    Treatment:  Exercises:  Exercises  Exercise 1: ham stretch 3 x 20\"  Exercise 2: piriformis stretch 3 x 20\"  Exercise 3: supine SKTC 3 x 20\", LTR 10 x 5\"  Exercise 4: Supine DLS: TA iso 10 x 5\" , TA march 10 x 3\", TA SLR 10 x 3\" , chest pull*  Exercise 5: Bridge 10 x 3\"  Exercise 6: sink ex's (focus on balance*)  Exercise 7: gait drills*  Treatment Reasoning  Limitations addressed: Pain modulation, Posture, Strength, Mobility, Activity tolerance, Flexibility  Functional ability(s) targeted: Ambulating community distances, Performing self care actvities      *Indicates exercise, modality, or manual techniques to be initiated when appropriate      Assessment:    Body Structures, Functions, Activity Limitations

## 2023-09-20 ENCOUNTER — HOSPITAL ENCOUNTER (OUTPATIENT)
Dept: PHYSICAL THERAPY | Age: 83
Setting detail: THERAPIES SERIES
Discharge: HOME | End: 2023-09-20
Payer: MEDICARE

## 2023-09-20 PROCEDURE — 97110 THERAPEUTIC EXERCISES: CPT

## 2023-09-20 ASSESSMENT — PAIN DESCRIPTION - ORIENTATION: ORIENTATION: RIGHT;LOWER

## 2023-09-20 ASSESSMENT — PAIN SCALES - GENERAL: PAINLEVEL_OUTOF10: 2

## 2023-09-20 ASSESSMENT — PAIN DESCRIPTION - DESCRIPTORS: DESCRIPTORS: ACHING;DULL

## 2023-09-20 ASSESSMENT — PAIN DESCRIPTION - LOCATION: LOCATION: BACK

## 2023-09-20 NOTE — PROGRESS NOTES
[address] [facility], [address]  JBFPU:[phone]      Physical TherapyTreatment Note        Date: 2023  Patient: Preet Pink  : 1940   Confirmed: Yes  MRN: [id number]  Referring Provider: Jasen Menendez PA-C  Medical Diagnosis: Arthralgia, unspecified joint [M25.50]   Treatment Diagnosis: Lumbar and R hip pain with decrease strength and ROM affecting overall functional tolerance. Visit Information:  Insurance: Payor: MEDICARE / Plan: MEDICARE PART A AND B / Product Type: *No Product type* /   PT Visit Information  Onset Date: 23  PT Insurance Information: Medicare/AARP  Total # of Visits to Date: 4  Plan of Care/Certification Expiration Date: 23  No Show: 0  Progress Note Due Date: 10/12/23  Canceled Appointment: 0  Progress Note Counter:  ( PN due 10/12/23)    Subjective Information:  Subjective: Pt states that she just has a dull ache in the center of her spine in her low back. She was sore after the last therapy visit but expected that.   HEP Compliance:  [x] Good [] Fair  [] Poor [] Reports not doing due to:    Pain Screening  Patient Currently in Pain: Yes  Pain Assessment: 0-10  Pain Level: 2  Pain Location: Back  Pain Orientation: Right, Lower  Pain Descriptors: Aching, Dull    Treatment:  Exercises:  Exercises  Exercise 1: seated ham stretch with stool 3 x 20\"  Exercise 2: piriformis stretch 3 x 20\"  Exercise 3: supine SKTC 3 x 20\", LTR 10 x 5\"  Exercise 4: Supine DLS: TA iso 10 x 5\" , TA march 10 x 3\", TA SLR 10 x 3\"  Exercise 5: Bridge 10 x 3\"  Exercise 6: sink ex's (stand behind bike): hip abd, ext, alt march x 10 ea  Exercise 7: gait drills*  Exercise 8: SLS: L 3 sec x 3 attempts, R 6-8 sec x 5 attempts  Treatment Reasoning  Limitations addressed: Pain modulation, Posture, Strength, Mobility, Activity tolerance, Flexibility  Functional ability(s) targeted: Ambulating community distances, Performing self care actvities      *Indicates

## 2023-09-25 ENCOUNTER — HOSPITAL ENCOUNTER (OUTPATIENT)
Dept: PHYSICAL THERAPY | Age: 83
Setting detail: THERAPIES SERIES
Discharge: HOME | End: 2023-09-25
Payer: MEDICARE

## 2023-09-25 PROCEDURE — 97110 THERAPEUTIC EXERCISES: CPT

## 2023-09-25 ASSESSMENT — PAIN DESCRIPTION - LOCATION: LOCATION: BACK

## 2023-09-25 ASSESSMENT — PAIN DESCRIPTION - ORIENTATION: ORIENTATION: RIGHT;LOWER

## 2023-09-25 ASSESSMENT — PAIN DESCRIPTION - DESCRIPTORS: DESCRIPTORS: ACHING;DULL

## 2023-09-25 ASSESSMENT — PAIN SCALES - GENERAL: PAINLEVEL_OUTOF10: 0

## 2023-09-25 NOTE — PROGRESS NOTES
[address] [facility], [address]  VZUDN:[phone]      Physical TherapyTreatment Note        Date: 2023  Patient: Dilip Ace  : 1940   Confirmed: Yes  MRN: [id number]  Referring Provider: Timothy Medeiros PA-C  Medical Diagnosis: Arthralgia, unspecified joint [M25.50]   Treatment Diagnosis: Lumbar and R hip pain with decrease strength and ROM affecting overall functional tolerance. Visit Information:  Insurance: Payor: MEDICARE / Plan: MEDICARE PART A AND B / Product Type: *No Product type* /   PT Visit Information  Onset Date: 23  PT Insurance Information: Medicare/AARP  Total # of Visits to Date: 5  Plan of Care/Certification Expiration Date: 23  No Show: 0  Progress Note Due Date: 10/12/23  Canceled Appointment: 0  Progress Note Counter:  ( PN due 10/12/23)    Subjective Information:  Subjective: Pt states that she is feeling ok this morning except she tripped over the dog and bruised her knee.   HEP Compliance:  [x] Good [] Fair  [] Poor [] Reports not doing due to:    Pain Screening  Patient Currently in Pain: Yes  Pain Assessment: 0-10  Pain Level: 0  Best Pain Level: 2  Pain Location: Back  Pain Orientation: Right, Lower  Pain Descriptors: Aching, Dull    Treatment:  Exercises:  Exercises  Exercise 1: seated ham stretch with stool 3 x 20\"  Exercise 2: piriformis stretch 3 x 20\"  Exercise 3: supine SKTC 3 x 20\", LTR 10 x 5\"  Exercise 4: Supine DLS: TA iso 10 x 5\" , TA march 10 x 3\", TA SLR 10 x 3\"  Exercise 5: Bridge 10 x 3\"  Exercise 6: sink ex's (stand behind bike): hip abd, ext, alt march x 10 ea  Exercise 7: gait drills*  Exercise 8: uni step-ups, 8 inch, x 10 ea  Exercise 9: stand TA shl ext 10 x 3\" , rows 10 x 3\" GTB  Treatment Reasoning  Limitations addressed: Pain modulation, Posture, Strength, Mobility, Activity tolerance, Flexibility  Functional ability(s) targeted: Ambulating community distances, Performing self care

## 2023-09-29 ENCOUNTER — HOSPITAL ENCOUNTER (OUTPATIENT)
Dept: PHYSICAL THERAPY | Age: 83
Setting detail: THERAPIES SERIES
Discharge: HOME | End: 2023-09-29
Payer: MEDICARE

## 2023-09-29 PROCEDURE — 97110 THERAPEUTIC EXERCISES: CPT

## 2023-09-29 ASSESSMENT — PAIN SCALES - GENERAL: PAINLEVEL_OUTOF10: 2

## 2023-09-29 ASSESSMENT — PAIN DESCRIPTION - ORIENTATION: ORIENTATION: LEFT;LOWER;RIGHT

## 2023-09-29 ASSESSMENT — PAIN DESCRIPTION - LOCATION: LOCATION: BACK

## 2023-09-29 ASSESSMENT — PAIN DESCRIPTION - DESCRIPTORS: DESCRIPTORS: ACHING;DULL

## 2023-09-29 NOTE — PROGRESS NOTES
[address] [facility], [address]  CZHLL:[phone]      Physical TherapyTreatment Note        Date: 2023  Patient: Yesenia Spence  : 1940   Confirmed: Yes  MRN: [id number]  Referring Provider: Iqra Medina PA-C  Medical Diagnosis: Arthralgia, unspecified joint [M25.50]   Treatment Diagnosis: Lumbar and R hip pain with decrease strength and ROM affecting overall functional tolerance. Visit Information:  Insurance: Payor: MEDICARE / Plan: MEDICARE PART A AND B / Product Type: *No Product type* /   PT Visit Information  Onset Date: 23  PT Insurance Information: Medicare/AARP  Total # of Visits to Date: 6  Plan of Care/Certification Expiration Date: 23  No Show: 0  Progress Note Due Date: 10/12/23  Canceled Appointment: 0  Progress Note Counter:  ( PN due 10/12/23)    Subjective Information:  Subjective: Pt states that she is feeling more pain on the left sisde of her low back this morning.   HEP Compliance:  [x] Good [] Fair  [] Poor [] Reports not doing due to:    Pain Screening  Patient Currently in Pain: Yes  Pain Assessment: 0-10  Pain Level: 2  Pain Location: Back  Pain Orientation: Left, Lower, Right  Pain Descriptors: Aching, Dull    Treatment:  Exercises:  Exercises  Exercise 1: seated ham stretch with stool 3 x 20\"  Exercise 2: piriformis stretch 3 x 20\"  Exercise 3: supine SKTC 3 x 20\", LTR 10 x 5\"  Exercise 4: Supine DLS: TA iso 10 x 5\" , TA march 10 x 3\", TA SLR 10 x 3\"  Exercise 5: Bridge 10 x 3\"  Exercise 6: sink ex's (stand behind bike): hip abd, ext, alt march x 10 ea  Exercise 8: stand TA shl ext 10 x 3\" , rows 10 x 3\" GTB  Exercise 10: HEP: SLR, bridge, sink ex's, tband row/lats  Treatment Reasoning  Limitations addressed: Pain modulation, Posture, Strength, Mobility, Activity tolerance, Flexibility  Functional ability(s) targeted: Ambulating community distances, Performing self care actvities      *Indicates exercise, modality, or

## 2023-10-02 ENCOUNTER — HOSPITAL ENCOUNTER (OUTPATIENT)
Dept: PHYSICAL THERAPY | Age: 83
Setting detail: THERAPIES SERIES
Discharge: HOME | End: 2023-10-02
Payer: MEDICARE

## 2023-10-02 PROCEDURE — 97110 THERAPEUTIC EXERCISES: CPT

## 2023-10-02 ASSESSMENT — PAIN SCALES - GENERAL: PAINLEVEL_OUTOF10: 0

## 2023-10-02 NOTE — PROGRESS NOTES
[address] [facility], [address]  GKNGW:[phone]      Physical TherapyTreatment Note        Date: 10/2/2023  Patient: Homar Arthur  : 1940   Confirmed: Yes  MRN: [id number]  Referring Provider: Shabbir Baxter PA-C  Medical Diagnosis: Arthralgia, unspecified joint [M25.50]   Treatment Diagnosis: Lumbar and R hip pain with decrease strength and ROM affecting overall functional tolerance. Visit Information:  Insurance: Payor: MEDICARE / Plan: MEDICARE PART A AND B / Product Type: *No Product type* /   PT Visit Information  Onset Date: 23  PT Insurance Information: Medicare/AARP  Total # of Visits to Date: 7  Plan of Care/Certification Expiration Date: 23  No Show: 0  Progress Note Due Date: 10/12/23  Canceled Appointment: 0  Progress Note Counter:  ( PN due 10/12/23)    Subjective Information:  Subjective: Pt states that her low back feels goos this morning. Her knees are bothering her though. HEP Compliance:  [x] Good [] Fair    [] Poor [] Reports not doing due to:    Pain Screening  Patient Currently in Pain: No  Pain Assessment: 0-10  Pain Level: 0      Treatment:  Exercises:  Exercises  Exercise 1: seated ham stretch with stool 3 x 20\"  Exercise 2: piriformis stretch 3 x 20\"  Exercise 3: supine SKTC 3 x 20\", LTR 10 x 5\"  Exercise 4: Supine DLS: TA iso 10 x 5\" , TA march 10 x 3\", TA SLR 10 x 3\"  Exercise 5: Bridge 10 x 3\"  Exercise 6: sink ex's (stand behind bike) on BBD: hip abd, ext, alt march x 10 ea  Exercise 7: gait drills* (add NV)  Exercise 8: stand TA shl ext 10 x 3\" , rows 10 x 3\" GTB  Treatment Reasoning  Limitations addressed: Pain modulation, Posture, Strength, Mobility, Activity tolerance, Flexibility  Functional ability(s) targeted: Ambulating community distances, Performing self care actvities      *Indicates exercise, modality, or manual techniques to be initiated when appropriate      Assessment:    Body Structures, Functions, Activity

## 2023-10-03 ENCOUNTER — OFFICE VISIT (OUTPATIENT)
Dept: FAMILY MEDICINE CLINIC | Age: 83
End: 2023-10-03
Payer: MEDICARE

## 2023-10-03 VITALS
BODY MASS INDEX: 25.99 KG/M2 | SYSTOLIC BLOOD PRESSURE: 130 MMHG | DIASTOLIC BLOOD PRESSURE: 70 MMHG | RESPIRATION RATE: 18 BRPM | WEIGHT: 156 LBS | HEIGHT: 65 IN | HEART RATE: 67 BPM | OXYGEN SATURATION: 98 %

## 2023-10-03 DIAGNOSIS — C50.911 ADENOCARCINOMA, BREAST, RIGHT (HCC): ICD-10-CM

## 2023-10-03 DIAGNOSIS — I10 ESSENTIAL HYPERTENSION: ICD-10-CM

## 2023-10-03 DIAGNOSIS — E78.5 HYPERLIPIDEMIA, UNSPECIFIED HYPERLIPIDEMIA TYPE: Primary | ICD-10-CM

## 2023-10-03 DIAGNOSIS — C77.3 BREAST CANCER METASTASIZED TO AXILLARY LYMPH NODE, RIGHT (HCC): ICD-10-CM

## 2023-10-03 DIAGNOSIS — C50.911 BREAST CANCER METASTASIZED TO AXILLARY LYMPH NODE, RIGHT (HCC): ICD-10-CM

## 2023-10-03 DIAGNOSIS — Z90.11 STATUS POST MASTECTOMY, RIGHT: ICD-10-CM

## 2023-10-03 DIAGNOSIS — I67.9 CEREBROVASCULAR DISEASE, UNSPECIFIED: ICD-10-CM

## 2023-10-03 PROBLEM — R30.0 DYSURIA: Status: RESOLVED | Noted: 2023-08-29 | Resolved: 2023-10-03

## 2023-10-03 PROCEDURE — G8417 CALC BMI ABV UP PARAM F/U: HCPCS | Performed by: PHYSICIAN ASSISTANT

## 2023-10-03 PROCEDURE — G8399 PT W/DXA RESULTS DOCUMENT: HCPCS | Performed by: PHYSICIAN ASSISTANT

## 2023-10-03 PROCEDURE — 1123F ACP DISCUSS/DSCN MKR DOCD: CPT | Performed by: PHYSICIAN ASSISTANT

## 2023-10-03 PROCEDURE — 3078F DIAST BP <80 MM HG: CPT | Performed by: PHYSICIAN ASSISTANT

## 2023-10-03 PROCEDURE — G8427 DOCREV CUR MEDS BY ELIG CLIN: HCPCS | Performed by: PHYSICIAN ASSISTANT

## 2023-10-03 PROCEDURE — G8484 FLU IMMUNIZE NO ADMIN: HCPCS | Performed by: PHYSICIAN ASSISTANT

## 2023-10-03 PROCEDURE — 1036F TOBACCO NON-USER: CPT | Performed by: PHYSICIAN ASSISTANT

## 2023-10-03 PROCEDURE — 99214 OFFICE O/P EST MOD 30 MIN: CPT | Performed by: PHYSICIAN ASSISTANT

## 2023-10-03 PROCEDURE — 1090F PRES/ABSN URINE INCON ASSESS: CPT | Performed by: PHYSICIAN ASSISTANT

## 2023-10-03 PROCEDURE — 3075F SYST BP GE 130 - 139MM HG: CPT | Performed by: PHYSICIAN ASSISTANT

## 2023-10-03 RX ORDER — VIT C/B6/B5/MAGNESIUM/HERB 173 50-5-6-5MG
CAPSULE ORAL DAILY
COMMUNITY

## 2023-10-03 RX ORDER — DICYCLOMINE HYDROCHLORIDE 10 MG/1
10 CAPSULE ORAL
COMMUNITY

## 2023-10-03 ASSESSMENT — ENCOUNTER SYMPTOMS
VOMITING: 0
BACK PAIN: 0
NAUSEA: 0
BLOOD IN STOOL: 0
CHEST TIGHTNESS: 0
DIARRHEA: 0
PHOTOPHOBIA: 0
ABDOMINAL PAIN: 0
SHORTNESS OF BREATH: 0

## 2023-10-04 ENCOUNTER — HOSPITAL ENCOUNTER (OUTPATIENT)
Dept: PHYSICAL THERAPY | Age: 83
Setting detail: THERAPIES SERIES
Discharge: HOME | End: 2023-10-04
Payer: MEDICARE

## 2023-10-04 PROCEDURE — 97140 MANUAL THERAPY 1/> REGIONS: CPT

## 2023-10-04 PROCEDURE — 97110 THERAPEUTIC EXERCISES: CPT

## 2023-10-04 ASSESSMENT — PAIN SCALES - GENERAL: PAINLEVEL_OUTOF10: 0

## 2023-10-04 NOTE — PROGRESS NOTES
[address]  [address] [facility], [address]  VFZWT:[phone]      Physical TherapyTreatment Note        Date: 10/4/2023  Patient: Trupti Mayo  : 1940   Confirmed: Yes  MRN: [id number]  Referring Provider: Gary Monk PA-C      Medical Diagnosis: Arthralgia, unspecified joint [M25.50]      Treatment Diagnosis: Lumbar and R hip pain with decrease strength and ROM affecting overall functional tolerance. Visit Information:  Insurance: Payor: MEDICARE / Plan: MEDICARE PART A AND B / Product Type: *No Product type* /   PT Visit Information  Onset Date: 23  PT Insurance Information: Medicare/AARP  Total # of Visits to Date: 8  Plan of Care/Certification Expiration Date: 23  No Show: 0  Progress Note Due Date: 10/12/23  Canceled Appointment: 0  Progress Note Counter:  ( PN due 10/12/23)    Subjective Information:  Subjective: Pt reprots that she is doing better. HEP Compliance:  [x] Good [] Fair [] Poor [] Reports not doing due to:    Pain Screening  Patient Currently in Pain: No  Pain Assessment: 0-10  Pain Level: 0    Treatment:  Exercises:  Exercises  Exercise 1: seated ham stretch with stool 3 x 20\"  Exercise 2: piriformis stretch 3 x 20\"  Exercise 3: supine SKTC 3 x 20\", LTR 10 x 5\"  Exercise 5: Bridge 10 x 3\"  Exercise 6: sink ex's (stand behind bike) on BBD: hip abd, ext, alt march x 10 ea  Exercise 9: SLS L 14 sec R 8 sec       Manual:   Manual Therapy  Other: objective measuremnts taken 10 min    Objective Measures:   STG 1 Current Status[de-identified] Pt with improved postural awareness needing vc 50 %  STG 2 Current Status[de-identified] Pt reports that pain in back is 90-95% better.   STG 3 Current Status[de-identified] SLS: L 14  sec x 3 attempts, R 8 sec with no UE        LTG 1 Current Status[de-identified] I with HEP  LTG 2 Current Status[de-identified] LEFS 55/80 69% functional  LTG 3 Current Status[de-identified] R DF 4+/5, Knee flexion and extension 4+/5, Hip flexion and abduction 4+/5 Hip extension 4/5,  L DF, knee flexion and

## 2023-10-04 NOTE — PROGRESS NOTES
[address]     [address]      OQAJK:[phone]      PHYSICAL THERAPY PLAN OF CARE     [] Certification  [] Recertification []  Plan of Care  [] Progress Note [x] Discharge      Referring Provider: Wai Lopez PA-C    From:  Lorie Dance PT    Patient: Jarrett Rueda (71 y.o. female) : 1940 Date: 10/04/2023   Medical Diagnosis: Arthralgia, unspecified joint [M25.50]    Treatment Diagnosis: Lumbar and R hip pain with decrease strength and ROM affecting overall functional tolerance. Plan of Care/Certification Expiration Date: 23   Progress Report Period from: 23  to 10/4/2023    Visits to Date: 8 No Show: 0 Cancelled Appts: 0    OBJECTIVE:   Short Term Goals - Time Frame for Short Term Goals: 2 weeks    Goals Current/Discharge status  Status   Short Term Goal 1: The pt will demonstrate improved postural awareness requiring <25% VC's during treatment  STG 1 Current Status[de-identified] Pt with improved postural awareness needing vc 50 %   Not Met   Short Term Goal 2: Decrease lumbar pain 50% to assist with improved functional gains and improved pain upon waking. STG 2 Current Status[de-identified] Pt reports that pain in back is 90-95% better. Met   Short Term Goal 3: Pt to perform SLS B LE 6 sec without UE assist to improve overall dynamic balance during gait. STG 3 Current Status[de-identified] SLS: L 14  sec x 3 attempts, R 8 sec with no UE   Met     Long Term Goals - Time Frame for Long Term Goals : 6 weeks  Goals Current/ Discharge status Status   Long Term Goal 1: Indep HEP for symptom management LTG 1 Current Status[de-identified] I with HEP   Met   Long Term Goal 2: Pt demo improved overall function by reporting greater than 80% per functional survey score LTG 2 Current Status[de-identified] LEFS 55/80 69% functional   Not Met   Long Term Goal 3: Improve B hip strength 4+/5 and core stretch 3/5 to allow patient to improve overall functional tolerance for returning to work out class weekly.  LTG 3 Current

## 2023-10-18 ENCOUNTER — OFFICE VISIT (OUTPATIENT)
Dept: SURGERY | Age: 83
End: 2023-10-18

## 2023-10-18 VITALS
BODY MASS INDEX: 26.01 KG/M2 | DIASTOLIC BLOOD PRESSURE: 80 MMHG | OXYGEN SATURATION: 97 % | HEIGHT: 65 IN | HEART RATE: 68 BPM | RESPIRATION RATE: 16 BRPM | TEMPERATURE: 97.5 F | SYSTOLIC BLOOD PRESSURE: 148 MMHG | WEIGHT: 156.09 LBS

## 2023-10-18 DIAGNOSIS — C50.911 BREAST CANCER METASTASIZED TO AXILLARY LYMPH NODE, RIGHT (HCC): Primary | ICD-10-CM

## 2023-10-18 DIAGNOSIS — N64.89 SEROMA OF BREAST: ICD-10-CM

## 2023-10-18 DIAGNOSIS — C77.3 BREAST CANCER METASTASIZED TO AXILLARY LYMPH NODE, RIGHT (HCC): Primary | ICD-10-CM

## 2023-10-18 RX ADMIN — Medication 1 MEQ: at 11:56

## 2023-10-18 RX ADMIN — LIDOCAINE HYDROCHLORIDE 2 ML: 10 INJECTION, SOLUTION INFILTRATION; PERINEURAL at 11:56

## 2023-10-18 NOTE — PROGRESS NOTES
Numbness:  No                                                          Pain: Yes    tenderness right chest wall and right axilla  Psychosocial/Depression: No                                    Other (enter type(s)): Familial Cancer Risk Assessment   Breast and or ovarian cancer in 1st or 2nd degree relatives:  Yes     paternal cousin   Received Genetic counseling:  Yes    Genetic testing: No  Genetic testing results:not completed       Follow-up Care Plan   Your follow-up care plan is design to inform you and primary care providers regarding the recommended and required follow-up, cancer screening and routine health maintenance that is needed to maintain optimal health. Possible late- and long-term effects that someone with this type of cancer and treatment may experience:  Weakening of the heart presenting as shortness of breath and swelling of legs (rare < 5%); and bones become weak and at risk for fracture (osteoporosis). It is important to remember that these symptoms can be due to other causes like diabetes or with normal aging. If these or any other new symptoms occur bring these to attention of your health care provider. These symptoms should be brought to the attention of your provider:   Anything that represents a brand new symptom; Anything that represents a persistent symptom;  3. Anything you are worried about that might be related to the cancer coming back. Please continue to see your primary care provider for all general health care recommended for a woman your age such as routine immunizations, and routine non-breast cancer screening like colonoscopy or bone density exams. Consult with your health care provider about prevention and screening for bone loss using bone density tests.    Schedule for Clinical Visits   Coordinating Provider When/How often   Maria Elena Vargas MD FACS Every 6 months for two years / Yearly afterwards   Medical Oncology Every 3-6 months for 5 years, yearly
Cervical: No cervical adenopathy. Right cervical: No superficial, deep or posterior cervical adenopathy. Left cervical: No superficial, deep or posterior cervical adenopathy. Upper Body:      Right upper body: No supraclavicular, axillary or pectoral adenopathy. Left upper body: No supraclavicular, axillary or pectoral adenopathy. Skin:     General: Skin is warm and dry. Findings: No abrasion, bruising, erythema or lesion. Neurological:      Mental Status: She is alert and oriented to person, place, and time. She is not disoriented. Psychiatric:         Speech: Speech normal.         Behavior: Behavior normal. Behavior is cooperative. Thought Content: Thought content normal.         Judgment: Judgment normal.                   IMAGING:         Assessment:       ICD-10-CM    1. 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, right (HCC)  C50.911     C77.3       2. Seroma of breast  N64.89             Plan:     Recommend incision and drainage as this is likely old hematoma. 10/18/2023    11:52 AM 3/27/2023    12:00 PM   Ambulatory Procedure Time Out   Correct Patient Yes Yes   Correct Procedure Yes Yes   Correct Site/Side Yes Yes   Correct Site(s) Marked Yes Yes   Informed Consent Signed Yes Yes   Allergies Verified Yes Yes   Staff Present & Credential: song raza rn song raza rn         PROCEDURE NOTE : INCISION AND DRAINAGE: simple hematoma    [unfilled]  Brad Redmond   [id number]   1940   78 y.o. PROCEDURE:  Incision and Drainage of  right  breast hematoma    PREOPERATIVE DIAGNOSIS:  right breast hematoma    POSTOPERATIVE DIAGNOSIS:  Same    ANESTHESIA: Local    ESTIMATED BLOOD LOSS: 1 cc    COMPLICATIONS: none    FINDINGS: llarge clot    SPECIMENS: none    DRAINS: None    INDICATIONS:  Brad Redmond is a 78 y.o.  female who presents with a right breast hematoma.  I recommend an incision and drainage to drain

## 2023-10-19 RX ORDER — LIDOCAINE HYDROCHLORIDE 10 MG/ML
2 INJECTION, SOLUTION INFILTRATION; PERINEURAL ONCE
Status: COMPLETED | OUTPATIENT
Start: 2023-10-19 | End: 2023-10-18

## 2023-10-25 ENCOUNTER — OFFICE VISIT (OUTPATIENT)
Dept: SURGERY | Age: 83
End: 2023-10-25

## 2023-10-25 VITALS
OXYGEN SATURATION: 98 % | SYSTOLIC BLOOD PRESSURE: 136 MMHG | DIASTOLIC BLOOD PRESSURE: 82 MMHG | HEART RATE: 67 BPM | RESPIRATION RATE: 12 BRPM | TEMPERATURE: 97.5 F

## 2023-10-25 DIAGNOSIS — C50.911 BREAST CANCER METASTASIZED TO AXILLARY LYMPH NODE, RIGHT (HCC): Primary | ICD-10-CM

## 2023-10-25 DIAGNOSIS — C77.3 BREAST CANCER METASTASIZED TO AXILLARY LYMPH NODE, RIGHT (HCC): Primary | ICD-10-CM

## 2023-10-25 PROCEDURE — 99024 POSTOP FOLLOW-UP VISIT: CPT | Performed by: SURGERY

## 2023-10-30 ENCOUNTER — OFFICE VISIT (OUTPATIENT)
Dept: SURGERY | Age: 83
End: 2023-10-30

## 2023-10-30 ENCOUNTER — TELEPHONE (OUTPATIENT)
Dept: SURGERY | Age: 83
End: 2023-10-30

## 2023-10-30 VITALS
TEMPERATURE: 97.6 F | RESPIRATION RATE: 16 BRPM | HEIGHT: 65 IN | BODY MASS INDEX: 26.49 KG/M2 | WEIGHT: 159 LBS | SYSTOLIC BLOOD PRESSURE: 170 MMHG | OXYGEN SATURATION: 98 % | DIASTOLIC BLOOD PRESSURE: 82 MMHG | HEART RATE: 70 BPM

## 2023-10-30 DIAGNOSIS — C50.911 BREAST CANCER METASTASIZED TO AXILLARY LYMPH NODE, RIGHT (HCC): Primary | ICD-10-CM

## 2023-10-30 DIAGNOSIS — C77.3 BREAST CANCER METASTASIZED TO AXILLARY LYMPH NODE, RIGHT (HCC): Primary | ICD-10-CM

## 2023-10-30 DIAGNOSIS — N64.89 HEMATOMA OF RIGHT BREAST: ICD-10-CM

## 2023-10-30 PROCEDURE — 99024 POSTOP FOLLOW-UP VISIT: CPT | Performed by: SURGERY

## 2023-10-30 NOTE — TELEPHONE ENCOUNTER
----- Message from Temo Madden sent at 10/29/2023  1:35 PM EDT -----  Regarding: breast surgery recovery  Contact: [phone]  On Saturday, October 28, I started cleaning & re-bandaging my right breast area. On Sunday, October 29, I  found what looks like puss coming out of the wound. Do I need an appointment with you or with Dereck Mcnamara, my primary medical person?

## 2023-11-06 ENCOUNTER — OFFICE VISIT (OUTPATIENT)
Dept: SURGERY | Age: 83
End: 2023-11-06
Payer: MEDICARE

## 2023-11-06 VITALS
BODY MASS INDEX: 26.19 KG/M2 | WEIGHT: 157.2 LBS | HEART RATE: 60 BPM | HEIGHT: 65 IN | RESPIRATION RATE: 14 BRPM | DIASTOLIC BLOOD PRESSURE: 82 MMHG | OXYGEN SATURATION: 97 % | SYSTOLIC BLOOD PRESSURE: 148 MMHG | TEMPERATURE: 97.2 F

## 2023-11-06 DIAGNOSIS — N64.89 HEMATOMA OF RIGHT BREAST: ICD-10-CM

## 2023-11-06 DIAGNOSIS — C50.911 BREAST CANCER METASTASIZED TO AXILLARY LYMPH NODE, RIGHT (HCC): Primary | ICD-10-CM

## 2023-11-06 DIAGNOSIS — C77.3 BREAST CANCER METASTASIZED TO AXILLARY LYMPH NODE, RIGHT (HCC): Primary | ICD-10-CM

## 2023-11-06 PROCEDURE — 3079F DIAST BP 80-89 MM HG: CPT | Performed by: SURGERY

## 2023-11-06 PROCEDURE — 3077F SYST BP >= 140 MM HG: CPT | Performed by: SURGERY

## 2023-11-06 PROCEDURE — 1036F TOBACCO NON-USER: CPT | Performed by: SURGERY

## 2023-11-06 PROCEDURE — G8427 DOCREV CUR MEDS BY ELIG CLIN: HCPCS | Performed by: SURGERY

## 2023-11-06 PROCEDURE — 1090F PRES/ABSN URINE INCON ASSESS: CPT | Performed by: SURGERY

## 2023-11-06 PROCEDURE — 1123F ACP DISCUSS/DSCN MKR DOCD: CPT | Performed by: SURGERY

## 2023-11-06 PROCEDURE — G8399 PT W/DXA RESULTS DOCUMENT: HCPCS | Performed by: SURGERY

## 2023-11-06 PROCEDURE — G8417 CALC BMI ABV UP PARAM F/U: HCPCS | Performed by: SURGERY

## 2023-11-06 PROCEDURE — G8484 FLU IMMUNIZE NO ADMIN: HCPCS | Performed by: SURGERY

## 2023-11-06 PROCEDURE — 99212 OFFICE O/P EST SF 10 MIN: CPT | Performed by: SURGERY

## 2023-11-22 ENCOUNTER — APPOINTMENT (OUTPATIENT)
Dept: WOMENS IMAGING | Age: 83
End: 2023-11-22
Payer: MEDICARE

## 2023-11-22 ENCOUNTER — HOSPITAL ENCOUNTER (OUTPATIENT)
Dept: WOMENS IMAGING | Age: 83
Discharge: HOME | End: 2023-11-24
Payer: MEDICARE

## 2023-11-22 VITALS — BODY MASS INDEX: 25.85 KG/M2 | HEIGHT: 65 IN

## 2023-11-22 DIAGNOSIS — Z12.31 ENCOUNTER FOR SCREENING MAMMOGRAM FOR BREAST CANCER: ICD-10-CM

## 2023-11-22 DIAGNOSIS — Z78.0 POST-MENOPAUSAL: ICD-10-CM

## 2023-11-22 PROCEDURE — 77080 DXA BONE DENSITY AXIAL: CPT

## 2023-11-22 PROCEDURE — 77063 BREAST TOMOSYNTHESIS BI: CPT

## 2024-01-24 DIAGNOSIS — M25.551 RIGHT HIP PAIN: ICD-10-CM

## 2024-01-24 DIAGNOSIS — M54.41 ACUTE MIDLINE LOW BACK PAIN WITH RIGHT-SIDED SCIATICA: ICD-10-CM

## 2024-01-25 RX ORDER — AMLODIPINE BESYLATE AND BENAZEPRIL HYDROCHLORIDE 5; 20 MG/1; MG/1
1 CAPSULE ORAL DAILY
Qty: 90 CAPSULE | Refills: 3 | Status: SHIPPED | OUTPATIENT
Start: 2024-01-25

## 2024-01-25 NOTE — TELEPHONE ENCOUNTER
Comments:     Last Office Visit (last PCP visit):   10/3/2023    Next Visit Date:  Future Appointments   Date Time Provider Department Center   2/5/2024 10:00 AM Debi Malone PA-C [place]  [facility]   2/7/2024  1:00 PM Arnaldo Toth MD [place] [facility] -   3/20/2024  1:00 PM [facility]en [facility] [place] [facility]   4/24/2024 10:30 AM Julienne Toth MD [facility]       **If hasn't been seen in over a year OR hasn't followed up according to last diabetes/ADHD visit, make appointment for patient before sending refill to provider.    Rx requested:  Requested Prescriptions     Pending Prescriptions Disp Refills    amLODIPine-benazepril (LOTREL) 5-20 MG per capsule [Pharmacy Med Name: AMLOD/BENAZP CAP 5-20MG] 90 capsule 3     Sig: TAKE 1 CAPSULE DAILY

## 2024-01-26 PROBLEM — B35.1 ONYCHOMYCOSIS: Status: ACTIVE | Noted: 2023-01-11

## 2024-01-26 PROBLEM — I73.9 PERIPHERAL VASCULAR DISEASE OF FOOT (HCC): Status: ACTIVE | Noted: 2023-01-11

## 2024-01-26 PROBLEM — M79.674 PAIN IN TOES OF BOTH FEET: Status: ACTIVE | Noted: 2023-01-11

## 2024-01-26 PROBLEM — M79.672 PAIN IN LEFT FOOT: Status: ACTIVE | Noted: 2022-12-06

## 2024-01-26 PROBLEM — L85.3 XEROSIS OF SKIN: Status: ACTIVE | Noted: 2023-01-11

## 2024-01-26 PROBLEM — M79.675 PAIN IN TOES OF BOTH FEET: Status: ACTIVE | Noted: 2023-01-11

## 2024-01-26 PROBLEM — M72.2 PLANTAR FASCIITIS OF LEFT FOOT: Status: ACTIVE | Noted: 2022-12-06

## 2024-01-26 RX ORDER — METHYLPREDNISOLONE 4 MG/1
TABLET ORAL
Qty: 1 KIT | Refills: 0 | Status: SHIPPED | OUTPATIENT
Start: 2024-01-26

## 2024-01-26 NOTE — TELEPHONE ENCOUNTER
Comments:     Last Office Visit (last PCP visit):   10/3/2023    Next Visit Date:  Future Appointments   Date Time Provider Department Center   2/5/2024 10:00 AM [facility] [place] [facility]   2/7/2024  1:00 PM Arnaldo Toth MD [place] [facility] -   3/20/2024  1:00 PM [facility] [place] [facility]   4/24/2024 10:30 AM [facility]       **If hasn't been seen in over a year OR hasn't followed up according to last diabetes/ADHD visit, make appointment for patient before sending refill to provider.    Rx requested:  Requested Prescriptions     Pending Prescriptions Disp Refills    methylPREDNISolone (MEDROL DOSEPACK) 4 MG tablet [Pharmacy Med Name: METHYLPREDNISOLONE 4 MG DOSEPK]       Sig: TAKE 6 TABLETS ON DAY 1 AS DIRECTED ON PACKAGE AND DECREASE BY 1 TAB EACH DAY FOR A TOTAL OF 6 DAYS

## 2024-02-05 ENCOUNTER — OFFICE VISIT (OUTPATIENT)
Dept: FAMILY MEDICINE CLINIC | Age: 84
End: 2024-02-05
Payer: MEDICARE

## 2024-02-05 VITALS
BODY MASS INDEX: 26.33 KG/M2 | SYSTOLIC BLOOD PRESSURE: 120 MMHG | RESPIRATION RATE: 18 BRPM | WEIGHT: 158 LBS | OXYGEN SATURATION: 99 % | HEIGHT: 65 IN | HEART RATE: 71 BPM | DIASTOLIC BLOOD PRESSURE: 78 MMHG

## 2024-02-05 DIAGNOSIS — E78.5 HYPERLIPIDEMIA, UNSPECIFIED HYPERLIPIDEMIA TYPE: ICD-10-CM

## 2024-02-05 DIAGNOSIS — I10 ESSENTIAL HYPERTENSION: ICD-10-CM

## 2024-02-05 DIAGNOSIS — N81.4 CYSTOCELE WITH PROLAPSE: ICD-10-CM

## 2024-02-05 DIAGNOSIS — C50.911 ADENOCARCINOMA, BREAST, RIGHT (HCC): ICD-10-CM

## 2024-02-05 DIAGNOSIS — C50.911 BREAST CANCER METASTASIZED TO AXILLARY LYMPH NODE, RIGHT (HCC): Primary | ICD-10-CM

## 2024-02-05 DIAGNOSIS — I25.10 CARDIOVASCULAR DISEASE: ICD-10-CM

## 2024-02-05 DIAGNOSIS — R35.0 URINARY FREQUENCY: ICD-10-CM

## 2024-02-05 DIAGNOSIS — C77.3 BREAST CANCER METASTASIZED TO AXILLARY LYMPH NODE, RIGHT (HCC): Primary | ICD-10-CM

## 2024-02-05 DIAGNOSIS — L85.3 XEROSIS OF SKIN: ICD-10-CM

## 2024-02-05 DIAGNOSIS — I73.9 PERIPHERAL VASCULAR DISEASE OF FOOT (HCC): ICD-10-CM

## 2024-02-05 DIAGNOSIS — G47.33 OSA (OBSTRUCTIVE SLEEP APNEA): ICD-10-CM

## 2024-02-05 PROCEDURE — 3074F SYST BP LT 130 MM HG: CPT | Performed by: PHYSICIAN ASSISTANT

## 2024-02-05 PROCEDURE — 3078F DIAST BP <80 MM HG: CPT | Performed by: PHYSICIAN ASSISTANT

## 2024-02-05 PROCEDURE — G8417 CALC BMI ABV UP PARAM F/U: HCPCS | Performed by: PHYSICIAN ASSISTANT

## 2024-02-05 PROCEDURE — G8399 PT W/DXA RESULTS DOCUMENT: HCPCS | Performed by: PHYSICIAN ASSISTANT

## 2024-02-05 PROCEDURE — 81002 URINALYSIS NONAUTO W/O SCOPE: CPT | Performed by: PHYSICIAN ASSISTANT

## 2024-02-05 PROCEDURE — G8427 DOCREV CUR MEDS BY ELIG CLIN: HCPCS | Performed by: PHYSICIAN ASSISTANT

## 2024-02-05 PROCEDURE — G8484 FLU IMMUNIZE NO ADMIN: HCPCS | Performed by: PHYSICIAN ASSISTANT

## 2024-02-05 PROCEDURE — 1123F ACP DISCUSS/DSCN MKR DOCD: CPT | Performed by: PHYSICIAN ASSISTANT

## 2024-02-05 PROCEDURE — 1090F PRES/ABSN URINE INCON ASSESS: CPT | Performed by: PHYSICIAN ASSISTANT

## 2024-02-05 PROCEDURE — 1036F TOBACCO NON-USER: CPT | Performed by: PHYSICIAN ASSISTANT

## 2024-02-05 PROCEDURE — 99214 OFFICE O/P EST MOD 30 MIN: CPT | Performed by: PHYSICIAN ASSISTANT

## 2024-02-05 RX ORDER — TRIAMCINOLONE ACETONIDE 1 MG/G
OINTMENT TOPICAL
Qty: 30 G | Refills: 1 | Status: SHIPPED | OUTPATIENT
Start: 2024-02-05

## 2024-02-05 ASSESSMENT — PATIENT HEALTH QUESTIONNAIRE - PHQ9
10. IF YOU CHECKED OFF ANY PROBLEMS, HOW DIFFICULT HAVE THESE PROBLEMS MADE IT FOR YOU TO DO YOUR WORK, TAKE CARE OF THINGS AT HOME, OR GET ALONG WITH OTHER PEOPLE: 0
SUM OF ALL RESPONSES TO PHQ9 QUESTIONS 1 & 2: 0
5. POOR APPETITE OR OVEREATING: 0
SUM OF ALL RESPONSES TO PHQ QUESTIONS 1-9: 0
8. MOVING OR SPEAKING SO SLOWLY THAT OTHER PEOPLE COULD HAVE NOTICED. OR THE OPPOSITE, BEING SO FIGETY OR RESTLESS THAT YOU HAVE BEEN MOVING AROUND A LOT MORE THAN USUAL: 0
4. FEELING TIRED OR HAVING LITTLE ENERGY: 0
3. TROUBLE FALLING OR STAYING ASLEEP: 0
7. TROUBLE CONCENTRATING ON THINGS, SUCH AS READING THE NEWSPAPER OR WATCHING TELEVISION: 0
SUM OF ALL RESPONSES TO PHQ QUESTIONS 1-9: 0
2. FEELING DOWN, DEPRESSED OR HOPELESS: 0
SUM OF ALL RESPONSES TO PHQ QUESTIONS 1-9: 0
6. FEELING BAD ABOUT YOURSELF - OR THAT YOU ARE A FAILURE OR HAVE LET YOURSELF OR YOUR FAMILY DOWN: 0
9. THOUGHTS THAT YOU WOULD BE BETTER OFF DEAD, OR OF HURTING YOURSELF: 0
SUM OF ALL RESPONSES TO PHQ QUESTIONS 1-9: 0
1. LITTLE INTEREST OR PLEASURE IN DOING THINGS: 0

## 2024-02-05 ASSESSMENT — ENCOUNTER SYMPTOMS
CHEST TIGHTNESS: 0
ABDOMINAL PAIN: 0
BACK PAIN: 0
DIARRHEA: 0
SHORTNESS OF BREATH: 0
PHOTOPHOBIA: 0
NAUSEA: 0
VOMITING: 0
BLOOD IN STOOL: 0

## 2024-02-05 NOTE — PROGRESS NOTES
Subjective  Fern Recio, 83 y.o. female presents today with:  Chief Complaint   Patient presents with    Follow-up     4 month follow up        HPI  Overall, feeling pretty well.  Last ov with me: 10/3/2023    History of cystocele with mesh.  This was repaired a number of years ago.    Feels like there is a bulge in vagina.   No pain, not sexually active.  Urinary frequency/pressure.  No hematuria/dysuria.      Compliant with her BP medications.  No dizziness, no chest pain.      Adenocarcinoma is being monitored.  No aggressive therapies.  Followed by Dr. Toth.      She has had lequivio infusion for HLD.  Lipid panel is at good range.  No reaction to infusion.      DEXA--osteopenia.  No injury/fall.     Followed yearly by neurology.  History of CV disease.     Rash of face from CPAP use.  Using machine nightly for GHAZAL.  Sleeping well.       Review of Systems   Constitutional:  Negative for activity change, appetite change, chills, fatigue, fever and unexpected weight change.   HENT:  Negative for congestion, hearing loss and nosebleeds.    Eyes:  Negative for photophobia and visual disturbance.   Respiratory:  Negative for chest tightness and shortness of breath.    Cardiovascular:  Negative for chest pain, palpitations and leg swelling.   Gastrointestinal:  Negative for abdominal pain, blood in stool, diarrhea, nausea and vomiting.   Endocrine: Negative for cold intolerance and heat intolerance.   Genitourinary:  Positive for frequency and pelvic pain (pressure). Negative for dysuria, hematuria, menstrual problem and urgency.   Musculoskeletal:  Negative for arthralgias, back pain, gait problem, joint swelling and myalgias.   Skin:  Negative for rash.   Neurological:  Negative for dizziness and headaches.   Hematological:  Bruises/bleeds easily.   Psychiatric/Behavioral:  Negative for dysphoric mood and sleep disturbance. The patient is not nervous/anxious.        Past Medical History:   Diagnosis Date

## 2024-02-07 ENCOUNTER — OFFICE VISIT (OUTPATIENT)
Dept: NEUROLOGY | Age: 84
End: 2024-02-07
Payer: MEDICARE

## 2024-02-07 VITALS
BODY MASS INDEX: 26.09 KG/M2 | SYSTOLIC BLOOD PRESSURE: 130 MMHG | WEIGHT: 158.7 LBS | HEART RATE: 71 BPM | DIASTOLIC BLOOD PRESSURE: 74 MMHG

## 2024-02-07 DIAGNOSIS — G25.0 ESSENTIAL TREMOR: ICD-10-CM

## 2024-02-07 DIAGNOSIS — S06.0X0S: ICD-10-CM

## 2024-02-07 DIAGNOSIS — I67.9 CEREBROVASCULAR DISEASE, UNSPECIFIED: ICD-10-CM

## 2024-02-07 DIAGNOSIS — G25.0 ESSENTIAL PALATAL TREMOR: ICD-10-CM

## 2024-02-07 DIAGNOSIS — I63.512 CEREBRAL INFARCTION DUE TO UNSPECIFIED OCCLUSION OR STENOSIS OF LEFT MIDDLE CEREBRAL ARTERY (HCC): Primary | ICD-10-CM

## 2024-02-07 LAB — BACTERIA UR CULT: NORMAL

## 2024-02-07 PROCEDURE — 1090F PRES/ABSN URINE INCON ASSESS: CPT | Performed by: PSYCHIATRY & NEUROLOGY

## 2024-02-07 PROCEDURE — 1123F ACP DISCUSS/DSCN MKR DOCD: CPT | Performed by: PSYCHIATRY & NEUROLOGY

## 2024-02-07 PROCEDURE — 3075F SYST BP GE 130 - 139MM HG: CPT | Performed by: PSYCHIATRY & NEUROLOGY

## 2024-02-07 PROCEDURE — G8484 FLU IMMUNIZE NO ADMIN: HCPCS | Performed by: PSYCHIATRY & NEUROLOGY

## 2024-02-07 PROCEDURE — 3078F DIAST BP <80 MM HG: CPT | Performed by: PSYCHIATRY & NEUROLOGY

## 2024-02-07 PROCEDURE — 1036F TOBACCO NON-USER: CPT | Performed by: PSYCHIATRY & NEUROLOGY

## 2024-02-07 PROCEDURE — G8399 PT W/DXA RESULTS DOCUMENT: HCPCS | Performed by: PSYCHIATRY & NEUROLOGY

## 2024-02-07 PROCEDURE — G8427 DOCREV CUR MEDS BY ELIG CLIN: HCPCS | Performed by: PSYCHIATRY & NEUROLOGY

## 2024-02-07 PROCEDURE — 99214 OFFICE O/P EST MOD 30 MIN: CPT | Performed by: PSYCHIATRY & NEUROLOGY

## 2024-02-07 PROCEDURE — G8417 CALC BMI ABV UP PARAM F/U: HCPCS | Performed by: PSYCHIATRY & NEUROLOGY

## 2024-02-07 NOTE — PROGRESS NOTES
Financial Resource Strain: Low Risk  (2/13/2023)    Overall Financial Resource Strain (CARDIA)     Difficulty of Paying Living Expenses: Not hard at all   Food Insecurity: Not on file (2/13/2023)   Transportation Needs: Unknown (2/13/2023)    PRAPARE - Transportation     Lack of Transportation (Medical): Not on file     Lack of Transportation (Non-Medical): No   Physical Activity: Insufficiently Active (5/15/2023)    Exercise Vital Sign     Days of Exercise per Week: 1 day     Minutes of Exercise per Session: 30 min   Stress: Not on file   Social Connections: Not on file   Intimate Partner Violence: Not on file   Housing Stability: Unknown (2/13/2023)    Housing Stability Vital Sign     Unable to Pay for Housing in the Last Year: Not on file     Number of Places Lived in the Last Year: Not on file     Unstable Housing in the Last Year: No     Family History   Problem Relation Age of Onset    Kidney Disease Mother         dec age 70    Hypertension Mother     Migraines Mother     Diabetes Father     Heart Failure Father         dec age 68    Diabetes Brother     Cancer Brother         dec age 75    Diabetes Daughter     Diabetes Son     Heart Attack Paternal Grandmother     Stroke Paternal Grandfather     Breast Cancer Paternal Cousin         in her 70's     Allergies   Allergen Reactions    Sulfa Antibiotics      Unsure of reaction     Iodinated Contrast Media      Oral & IV dye group    Other Diarrhea     Dairy upsets stomach    Statins Other (See Comments)     Pain       Current Outpatient Medications   Medication Sig Dispense Refill    triamcinolone (KENALOG) 0.1 % ointment Apply a thin layer to rash of face as needed. 30 g 1    amLODIPine-benazepril (LOTREL) 5-20 MG per capsule TAKE 1 CAPSULE DAILY 90 capsule 3    turmeric 500 MG CAPS Take by mouth daily      dicyclomine (BENTYL) 10 MG capsule Take 1 capsule by mouth 4 times daily (before meals and nightly)      linaclotide (LINZESS) 145 MCG capsule Take 1

## 2024-02-12 DIAGNOSIS — E78.5 HYPERLIPIDEMIA, UNSPECIFIED HYPERLIPIDEMIA TYPE: ICD-10-CM

## 2024-02-12 DIAGNOSIS — C77.3 BREAST CANCER METASTASIZED TO AXILLARY LYMPH NODE, RIGHT (HCC): ICD-10-CM

## 2024-02-12 DIAGNOSIS — I10 ESSENTIAL HYPERTENSION: ICD-10-CM

## 2024-02-12 DIAGNOSIS — C50.911 BREAST CANCER METASTASIZED TO AXILLARY LYMPH NODE, RIGHT (HCC): ICD-10-CM

## 2024-02-12 LAB
ALBUMIN SERPL-MCNC: 4.3 G/DL (ref 3.5–4.6)
ALP SERPL-CCNC: 138 U/L (ref 40–130)
ALT SERPL-CCNC: 20 U/L (ref 0–33)
ANION GAP SERPL CALCULATED.3IONS-SCNC: 10 MEQ/L (ref 9–15)
AST SERPL-CCNC: 25 U/L (ref 0–35)
BASOPHILS # BLD: 0 K/UL (ref 0–0.2)
BASOPHILS NFR BLD: 0.7 %
BILIRUB SERPL-MCNC: 0.3 MG/DL (ref 0.2–0.7)
BUN SERPL-MCNC: 13 MG/DL (ref 8–23)
CALCIUM SERPL-MCNC: 10.2 MG/DL (ref 8.5–9.9)
CHLORIDE SERPL-SCNC: 104 MEQ/L (ref 95–107)
CHOLEST SERPL-MCNC: 147 MG/DL (ref 0–199)
CO2 SERPL-SCNC: 28 MEQ/L (ref 20–31)
CREAT SERPL-MCNC: 0.67 MG/DL (ref 0.5–0.9)
EOSINOPHIL # BLD: 0.1 K/UL (ref 0–0.7)
EOSINOPHIL NFR BLD: 0.9 %
ERYTHROCYTE [DISTWIDTH] IN BLOOD BY AUTOMATED COUNT: 13.4 % (ref 11.5–14.5)
GLOBULIN SER CALC-MCNC: 2.9 G/DL (ref 2.3–3.5)
GLUCOSE SERPL-MCNC: 93 MG/DL (ref 70–99)
HCT VFR BLD AUTO: 42.3 % (ref 37–47)
HDLC SERPL-MCNC: 62 MG/DL (ref 40–59)
HGB BLD-MCNC: 13.8 G/DL (ref 12–16)
LDL CHOLESTEROL CALCULATED: 63 MG/DL (ref 0–129)
LYMPHOCYTES # BLD: 1.5 K/UL (ref 1–4.8)
LYMPHOCYTES NFR BLD: 27.2 %
MCH RBC QN AUTO: 30.9 PG (ref 27–31.3)
MCHC RBC AUTO-ENTMCNC: 32.6 % (ref 33–37)
MCV RBC AUTO: 94.6 FL (ref 79.4–94.8)
MONOCYTES # BLD: 0.5 K/UL (ref 0.2–0.8)
MONOCYTES NFR BLD: 8.6 %
NEUTROPHILS # BLD: 3.5 K/UL (ref 1.4–6.5)
NEUTS SEG NFR BLD: 62.2 %
PLATELET # BLD AUTO: 217 K/UL (ref 130–400)
POTASSIUM SERPL-SCNC: 4.4 MEQ/L (ref 3.4–4.9)
PROT SERPL-MCNC: 7.2 G/DL (ref 6.3–8)
RBC # BLD AUTO: 4.47 M/UL (ref 4.2–5.4)
SODIUM SERPL-SCNC: 142 MEQ/L (ref 135–144)
TRIGLYCERIDE, FASTING: 112 MG/DL (ref 0–150)
WBC # BLD AUTO: 5.7 K/UL (ref 4.8–10.8)

## 2024-02-14 ENCOUNTER — HOSPITAL ENCOUNTER (OUTPATIENT)
Dept: ULTRASOUND IMAGING | Age: 84
Discharge: HOME | End: 2024-02-16
Payer: MEDICARE

## 2024-02-14 ENCOUNTER — HOSPITAL ENCOUNTER (OUTPATIENT)
Dept: INFUSION THERAPY | Age: 84
Setting detail: INFUSION SERIES
Discharge: HOME | End: 2024-02-14
Payer: MEDICARE

## 2024-02-14 VITALS — DIASTOLIC BLOOD PRESSURE: 69 MMHG | SYSTOLIC BLOOD PRESSURE: 174 MMHG | HEART RATE: 59 BPM

## 2024-02-14 DIAGNOSIS — I67.9 CEREBROVASCULAR DISEASE, UNSPECIFIED: ICD-10-CM

## 2024-02-14 DIAGNOSIS — I25.10 CARDIOVASCULAR DISEASE: ICD-10-CM

## 2024-02-14 DIAGNOSIS — I63.512 CEREBRAL INFARCTION DUE TO UNSPECIFIED OCCLUSION OR STENOSIS OF LEFT MIDDLE CEREBRAL ARTERY (HCC): ICD-10-CM

## 2024-02-14 DIAGNOSIS — E78.5 HYPERLIPIDEMIA, UNSPECIFIED HYPERLIPIDEMIA TYPE: Primary | ICD-10-CM

## 2024-02-14 PROCEDURE — 96372 THER/PROPH/DIAG INJ SC/IM: CPT

## 2024-02-14 PROCEDURE — 93880 EXTRACRANIAL BILAT STUDY: CPT

## 2024-02-14 PROCEDURE — 6360000002 HC RX W HCPCS: Performed by: PHYSICIAN ASSISTANT

## 2024-02-14 RX ADMIN — INCLISIRAN 284 MG: 284 INJECTION, SOLUTION SUBCUTANEOUS at 14:55

## 2024-02-14 NOTE — PROGRESS NOTES
Injection administered. Pt tolerated well.  No c/o or requests.  Card given to call and make next appt.

## 2024-02-26 ENCOUNTER — OFFICE VISIT (OUTPATIENT)
Dept: OBGYN CLINIC | Age: 84
End: 2024-02-26
Payer: MEDICARE

## 2024-02-26 ENCOUNTER — TELEPHONE (OUTPATIENT)
Dept: NEUROLOGY | Age: 84
End: 2024-02-26

## 2024-02-26 VITALS
HEIGHT: 65 IN | HEART RATE: 66 BPM | WEIGHT: 161 LBS | BODY MASS INDEX: 26.82 KG/M2 | DIASTOLIC BLOOD PRESSURE: 60 MMHG | SYSTOLIC BLOOD PRESSURE: 150 MMHG

## 2024-02-26 DIAGNOSIS — N39.41 URGE INCONTINENCE: ICD-10-CM

## 2024-02-26 DIAGNOSIS — N39.41 URGE INCONTINENCE: Primary | ICD-10-CM

## 2024-02-26 LAB
BILIRUB UR QL STRIP: NEGATIVE
CLARITY UR: CLEAR
COLOR UR: YELLOW
GLUCOSE UR STRIP-MCNC: NEGATIVE MG/DL
HGB UR QL STRIP: NEGATIVE
KETONES UR STRIP-MCNC: NEGATIVE MG/DL
LEUKOCYTE ESTERASE UR QL STRIP: NEGATIVE
NITRITE UR QL STRIP: NEGATIVE
PH UR STRIP: 7.5 [PH] (ref 5–9)
PROT UR STRIP-MCNC: NEGATIVE MG/DL
SP GR UR STRIP: 1.01 (ref 1–1.03)
UROBILINOGEN UR STRIP-ACNC: 0.2 E.U./DL

## 2024-02-26 PROCEDURE — 1036F TOBACCO NON-USER: CPT | Performed by: OBSTETRICS & GYNECOLOGY

## 2024-02-26 PROCEDURE — 3077F SYST BP >= 140 MM HG: CPT | Performed by: OBSTETRICS & GYNECOLOGY

## 2024-02-26 PROCEDURE — G8427 DOCREV CUR MEDS BY ELIG CLIN: HCPCS | Performed by: OBSTETRICS & GYNECOLOGY

## 2024-02-26 PROCEDURE — G8484 FLU IMMUNIZE NO ADMIN: HCPCS | Performed by: OBSTETRICS & GYNECOLOGY

## 2024-02-26 PROCEDURE — 0509F URINE INCON PLAN DOCD: CPT | Performed by: OBSTETRICS & GYNECOLOGY

## 2024-02-26 PROCEDURE — G8399 PT W/DXA RESULTS DOCUMENT: HCPCS | Performed by: OBSTETRICS & GYNECOLOGY

## 2024-02-26 PROCEDURE — 1123F ACP DISCUSS/DSCN MKR DOCD: CPT | Performed by: OBSTETRICS & GYNECOLOGY

## 2024-02-26 PROCEDURE — 3078F DIAST BP <80 MM HG: CPT | Performed by: OBSTETRICS & GYNECOLOGY

## 2024-02-26 PROCEDURE — 99203 OFFICE O/P NEW LOW 30 MIN: CPT | Performed by: OBSTETRICS & GYNECOLOGY

## 2024-02-26 PROCEDURE — G8417 CALC BMI ABV UP PARAM F/U: HCPCS | Performed by: OBSTETRICS & GYNECOLOGY

## 2024-02-26 PROCEDURE — 1090F PRES/ABSN URINE INCON ASSESS: CPT | Performed by: OBSTETRICS & GYNECOLOGY

## 2024-02-26 SDOH — ECONOMIC STABILITY: FOOD INSECURITY: WITHIN THE PAST 12 MONTHS, THE FOOD YOU BOUGHT JUST DIDN'T LAST AND YOU DIDN'T HAVE MONEY TO GET MORE.: NEVER TRUE

## 2024-02-26 SDOH — ECONOMIC STABILITY: INCOME INSECURITY: HOW HARD IS IT FOR YOU TO PAY FOR THE VERY BASICS LIKE FOOD, HOUSING, MEDICAL CARE, AND HEATING?: NOT VERY HARD

## 2024-02-26 SDOH — ECONOMIC STABILITY: FOOD INSECURITY: WITHIN THE PAST 12 MONTHS, YOU WORRIED THAT YOUR FOOD WOULD RUN OUT BEFORE YOU GOT MONEY TO BUY MORE.: NEVER TRUE

## 2024-02-26 NOTE — PROGRESS NOTES
Fern Recio is a 83 y.o. female who presents here today for complaints of New Patient (Referral from Debi Malone for cystocele and prolaps. Patient stated she forgot to take her medications this morning. )  Patient comes in complaining of urinary urgency, frequency, nocturia 3-4 times, and patient also states that she has several episodes of incontinence during the day after those urge episodes where she cannot make it to the bathroom.  Patient changes pads frequently during the day as well, patient mentions that she has been having those symptoms for years.  Patient is currently 83 years old with multiple medical problems including dizziness, the patient had a dizziness episode on the exam table today.  Patient with history of hypertension and is on anticoagulants.  .      Vitals:  BP (!) 150/60 (Site: Right Upper Arm)   Pulse 66   Ht 1.651 m (5' 5\")   Wt 73 kg (161 lb)   BMI 26.79 kg/m²   Allergies:  Sulfa antibiotics, Iodinated contrast media, Other, and Statins  Past Medical History:   Diagnosis Date    Asthma     Asthma in remission 1990    Breast cancer (HCC) 2014    right breast    Breast cancer (HCC) 2023    right breast (mastectomy)    Cardiovascular disease 04/04/2023    Cerebral artery occlusion with cerebral infarction (HCC)     2014    Elevated TSH     Hematoma of right thigh 09/30/2021    History of artificial lens replacement 03/24/2015    History of blood transfusion     1956-after delivery of first child    History of breast cancer 02/2014    Invasive ductal cancer right breast, Dr Umana, Dr Lares    History of therapeutic radiation 2014    right breast cancer.    HTN (hypertension) 1980    was with [facility]    Hx of blood clots     Hx of ischemic left MCA stroke 2013    no residual, Dr Toth    Hx of ischemic right MCA stroke 12/2014    no residual, frontal lobe    Hypercalcemia 07/11/2022    Hyperlipidemia     Osteoarthritis     Secondary and unspecified malignant neoplasm of axilla

## 2024-02-26 NOTE — TELEPHONE ENCOUNTER
Patient called and states that she is going top be having a PNE which is A Percutaneous Nerve Evaluation and it is a test done in the office to determine if InterStim™ therapy will improve her urinary symptoms. For the PNE, a temporary wire will be placed along side the third sacral nerve in her lower back. This  nerve controls bladder function. The wire is the size of a thick hair and will carry gentle electrical pulses to the nerve. The wire will be inserted by Dr Zheng using a local anesthetic. Once the wire is in the correct position, it will exit the skin and  be connected to a portable stimulator box that can be clipped to the waistband of pants.    Please advise how patient should stop aggernox  bid that she takes for Left middle cerebral infarction without stenosis of the middle cerebral artery and M2. She is on Aggrenox as she had failed Plavix in the past.     Patient can be reached at [phone]

## 2024-02-29 RX ORDER — LEVOFLOXACIN 500 MG/1
500 TABLET, FILM COATED ORAL DAILY
Qty: 7 TABLET | Refills: 0 | Status: SHIPPED | OUTPATIENT
Start: 2024-02-29 | End: 2024-03-07

## 2024-03-01 LAB
BACTERIA UR CULT: ABNORMAL
ORGANISM: ABNORMAL
ORGANISM: ABNORMAL

## 2024-03-20 ENCOUNTER — OFFICE VISIT (OUTPATIENT)
Dept: PULMONOLOGY | Age: 84
End: 2024-03-20
Payer: MEDICARE

## 2024-03-20 VITALS
BODY MASS INDEX: 26.49 KG/M2 | DIASTOLIC BLOOD PRESSURE: 78 MMHG | SYSTOLIC BLOOD PRESSURE: 122 MMHG | TEMPERATURE: 97.6 F | OXYGEN SATURATION: 97 % | HEART RATE: 76 BPM | HEIGHT: 65 IN | WEIGHT: 159 LBS

## 2024-03-20 DIAGNOSIS — I10 HYPERTENSION, UNSPECIFIED TYPE: ICD-10-CM

## 2024-03-20 DIAGNOSIS — G47.33 OSA ON CPAP: Primary | ICD-10-CM

## 2024-03-20 PROCEDURE — G8417 CALC BMI ABV UP PARAM F/U: HCPCS | Performed by: INTERNAL MEDICINE

## 2024-03-20 PROCEDURE — 99213 OFFICE O/P EST LOW 20 MIN: CPT | Performed by: INTERNAL MEDICINE

## 2024-03-20 PROCEDURE — 3074F SYST BP LT 130 MM HG: CPT | Performed by: INTERNAL MEDICINE

## 2024-03-20 PROCEDURE — G8399 PT W/DXA RESULTS DOCUMENT: HCPCS | Performed by: INTERNAL MEDICINE

## 2024-03-20 PROCEDURE — 1036F TOBACCO NON-USER: CPT | Performed by: INTERNAL MEDICINE

## 2024-03-20 PROCEDURE — G8428 CUR MEDS NOT DOCUMENT: HCPCS | Performed by: INTERNAL MEDICINE

## 2024-03-20 PROCEDURE — 1090F PRES/ABSN URINE INCON ASSESS: CPT | Performed by: INTERNAL MEDICINE

## 2024-03-20 PROCEDURE — G8484 FLU IMMUNIZE NO ADMIN: HCPCS | Performed by: INTERNAL MEDICINE

## 2024-03-20 PROCEDURE — 3078F DIAST BP <80 MM HG: CPT | Performed by: INTERNAL MEDICINE

## 2024-03-20 PROCEDURE — 1123F ACP DISCUSS/DSCN MKR DOCD: CPT | Performed by: INTERNAL MEDICINE

## 2024-03-20 NOTE — PROGRESS NOTES
compliance data with patient in details and answered all her questions.   Her overall compliance is not the best but does likely due to having sinus infection and not be able to use the machine.  Advised her to start using the machine regularly and remain off 4 hours overnight.  -New supplies for next year.  - Continue blood pressure medications.  -  No driving if sleepy or drowsy or otherwise impaired.       Return in about 1 year (around 3/20/2025).          Electronically signed by Mariam Holden MD on 3/20/2024 at 1:39 PM

## 2024-03-29 ENCOUNTER — TELEPHONE (OUTPATIENT)
Dept: OBGYN CLINIC | Age: 84
End: 2024-03-29

## 2024-03-29 NOTE — TELEPHONE ENCOUNTER
Patient called to set up surgery. Informed you were out of the office until Monday. Please call patient to set up.

## 2024-04-09 RX ORDER — AMLODIPINE BESYLATE AND BENAZEPRIL HYDROCHLORIDE 5; 20 MG/1; MG/1
1 CAPSULE ORAL DAILY
Qty: 90 CAPSULE | Refills: 3 | Status: SHIPPED | OUTPATIENT
Start: 2024-04-09

## 2024-04-24 ENCOUNTER — OFFICE VISIT (OUTPATIENT)
Dept: SURGERY | Age: 84
End: 2024-04-24
Payer: MEDICARE

## 2024-04-24 VITALS
BODY MASS INDEX: 26.06 KG/M2 | HEIGHT: 65 IN | RESPIRATION RATE: 16 BRPM | DIASTOLIC BLOOD PRESSURE: 68 MMHG | WEIGHT: 156.4 LBS | HEART RATE: 63 BPM | OXYGEN SATURATION: 98 % | SYSTOLIC BLOOD PRESSURE: 134 MMHG

## 2024-04-24 DIAGNOSIS — Z12.31 ENCOUNTER FOR SCREENING MAMMOGRAM FOR BREAST CANCER: ICD-10-CM

## 2024-04-24 DIAGNOSIS — C50.911 BREAST CANCER METASTASIZED TO AXILLARY LYMPH NODE, RIGHT (HCC): Primary | ICD-10-CM

## 2024-04-24 DIAGNOSIS — E66.3 OVERWEIGHT (BMI 25.0-29.9): ICD-10-CM

## 2024-04-24 DIAGNOSIS — C77.3 BREAST CANCER METASTASIZED TO AXILLARY LYMPH NODE, RIGHT (HCC): Primary | ICD-10-CM

## 2024-04-24 PROCEDURE — G8417 CALC BMI ABV UP PARAM F/U: HCPCS | Performed by: SURGERY

## 2024-04-24 PROCEDURE — 1036F TOBACCO NON-USER: CPT | Performed by: SURGERY

## 2024-04-24 PROCEDURE — 3075F SYST BP GE 130 - 139MM HG: CPT | Performed by: SURGERY

## 2024-04-24 PROCEDURE — 1090F PRES/ABSN URINE INCON ASSESS: CPT | Performed by: SURGERY

## 2024-04-24 PROCEDURE — 1123F ACP DISCUSS/DSCN MKR DOCD: CPT | Performed by: SURGERY

## 2024-04-24 PROCEDURE — 3078F DIAST BP <80 MM HG: CPT | Performed by: SURGERY

## 2024-04-24 PROCEDURE — 99213 OFFICE O/P EST LOW 20 MIN: CPT | Performed by: SURGERY

## 2024-04-24 PROCEDURE — G8399 PT W/DXA RESULTS DOCUMENT: HCPCS | Performed by: SURGERY

## 2024-04-24 PROCEDURE — G8427 DOCREV CUR MEDS BY ELIG CLIN: HCPCS | Performed by: SURGERY

## 2024-04-24 ASSESSMENT — ENCOUNTER SYMPTOMS
CHEST TIGHTNESS: 0
COUGH: 0
VOMITING: 0
SHORTNESS OF BREATH: 0
NAUSEA: 0
ABDOMINAL PAIN: 0
SORE THROAT: 0

## 2024-04-24 NOTE — PATIENT INSTRUCTIONS
Don't wear clothing or jewelry that is tight on your arm or hand. Your doctor may advise you not to wear a watch or rings on the affected hand.     Wear gloves when you do activities that could hurt the skin on your fingers or hand. Wear them when you garden, do yard work, wash dishes, and clean with chemicals. Use oven mitts when you handle hot food.     Try not to have blood drawn from the arm on the side of the lymph node surgery. Do not get injections (shots) or have an I.V. put in the affected arm.     Try not to have a blood pressure cuff placed on that arm. If you are in the hospital, make sure you tell your nurse and other hospital staff about your condition.     Do not expose your arm to very hot or very cold temperatures. For example, don't use hot tubs, saunas, or steam rooms. Don't use a heating pad or cold pack on that arm or shoulder.     Rest your arm often when you do repeated movements, such as vacuum, scrub, or mop.     Try to use your other arm to carry heavy things, such as grocery bags. If you carry a briefcase or a purse, avoid shoulder straps and carry it on your good arm.     Ask your doctor about wearing a compression sleeve and glove (gauntlet). Your doctor may want you to wear these when you exercise or when you fly in an airplane. They can help keep fluid from pooling in your arm and hand.   Maintain a healthy weight    Try to get to a healthy weight and stay there. If you need to lose weight, talk with your doctor.   Exercise    Ask your doctor about exercises for your arm and hand. Your doctor may recommend that you see a health professional trained in lymphedema management, such as a physical therapist. This person can teach you how to do self-massage to move fluid out of your arm.     Check with your doctor before you start exercises that use the arm. This includes tennis, rowing, or weight lifting. Your doctor can help you find an activity level that's right for you.   When should

## 2024-04-24 NOTE — PROGRESS NOTES
PATIENT:    Fern Recio     DATE:      4/24/2024     TIME: 10:43 AM     Subjective:     Fern Recio presents for follow-up of breast cancer. She is well. She is not on anastrazole by choic3    The breast cancer is  Cancer Staging   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, right (HCC)  Staging form: Breast, AJCC 8th Edition  - Clinical: Stage IIIB (cT4b, cN1, cM0, G2, ER+, WA+, HER2-) - Signed by Julienne Toth MD on 2/9/2023  - Pathologic: No Stage Recommended (ypTis (Paget), pN1, cM0, ER+, WA+, HER2-) - Signed by Sharan Starr MD on 4/1/2023       She does perform self breast exams.  She denies breast pain, masses, skin changes, nipple discharge, nipple retraction, or recent breast trauma bilaterally.      Patient's medications, allergies, past medical, surgical, social and family histories were reviewed and updated as appropriate.    Current Outpatient Medications on File Prior to Visit   Medication Sig Dispense Refill    amLODIPine-benazepril (LOTREL) 5-20 MG per capsule Take 1 capsule by mouth daily 90 capsule 3    turmeric 500 MG CAPS Take by mouth daily      aspirin-dipyridamole (AGGRENOX)  MG per extended release capsule TAKE 1 CAPSULE TWICE DAILY 180 capsule 3    niacin (NIASPAN) 500 MG extended release tablet TAKE 1 TABLET 3 TIMES A  tablet 4    ezetimibe (ZETIA) 10 MG tablet Take 1 tablet by mouth daily 90 tablet 3    Potassium 99 MG TABS Take by mouth      Magnesium 500 MG TABS Take by mouth      b complex vitamins capsule Take 1 capsule by mouth daily      Multiple Vitamins-Minerals (ABC COMPLETE SENIOR WOMENS 50+ PO) Take 1 tablet by mouth daily      Vitamin D (CHOLECALCIFEROL) 1000 UNITS CAPS capsule Take 1 capsule by mouth daily      fish oil-omega-3 fatty acids 1000 MG capsule Take 1 capsule by mouth 3 times daily      triamcinolone (KENALOG) 0.1 % ointment Apply a thin layer to rash of face as needed. (Patient not taking: Reported on 2/14/2024) 30 g 1

## 2024-04-24 NOTE — PROGRESS NOTES
Reason for today's visit:  6 month right breast cancer follow up    Palpates a breast change? no  Nipple discharge: denies left nipple drainage  Breast Pain: no  Breast Mass: no  Swelling in either upper extremity? no    Wellness:    Self breast self exams: yes   Regular exercise routine: active at home  Following Lymphedema awareness/precautions: yes   Managing stress:yes   Stress level on a scale of 1 - 10: 5, doing remodeling in her house    Instruction:    Follow up per provider  Imaging per provider  Monthly self breast exams  Healthy diet  Exercise program  Lymphedema precautions/awareness    Other:    Requisitions needed:no  Bras/prosthesis: no  Compression Sleeve/glove: no  Lymphedema Measurements: see flow sheet  Therapy: no  PT/OT/Lymphedema: no  Follow up as advised per Dr Toth

## 2024-05-26 DIAGNOSIS — E78.5 HYPERLIPIDEMIA, UNSPECIFIED HYPERLIPIDEMIA TYPE: ICD-10-CM

## 2024-05-28 RX ORDER — EZETIMIBE 10 MG/1
10 TABLET ORAL DAILY
Qty: 90 TABLET | Refills: 3 | Status: SHIPPED | OUTPATIENT
Start: 2024-05-28

## 2024-05-28 NOTE — TELEPHONE ENCOUNTER
Comments:     Last Office Visit (last PCP visit):   2/5/2024    Next Visit Date:  Future Appointments   Date Time Provider Department Center   6/7/2024 10:30 AM Debi Malone PA-C [place]  [facility]   11/25/2024 10:00 AM [facility] ROOM [facility]   11/27/2024 11:00 AM Julienne Toth MD [facility]   2/5/2025  1:00 PM Arnaldo Toth MD [place] [facility] -   3/19/2025  1:00 PM Mariam Holden MD [place] [facility]       **If hasn't been seen in over a year OR hasn't followed up according to last diabetes/ADHD visit, make appointment for patient before sending refill to provider.    Rx requested:  Requested Prescriptions     Pending Prescriptions Disp Refills    ezetimibe (ZETIA) 10 MG tablet [Pharmacy Med Name: EZETIMIBE TAB 10MG] 90 tablet 3     Sig: TAKE 1 TABLET DAILY

## 2024-06-07 ENCOUNTER — OFFICE VISIT (OUTPATIENT)
Dept: FAMILY MEDICINE CLINIC | Age: 84
End: 2024-06-07
Payer: MEDICARE

## 2024-06-07 VITALS
HEIGHT: 65 IN | HEART RATE: 60 BPM | SYSTOLIC BLOOD PRESSURE: 128 MMHG | WEIGHT: 156 LBS | HEART RATE: 60 BPM | SYSTOLIC BLOOD PRESSURE: 128 MMHG | BODY MASS INDEX: 25.99 KG/M2 | DIASTOLIC BLOOD PRESSURE: 82 MMHG | BODY MASS INDEX: 25.99 KG/M2 | WEIGHT: 156 LBS | OXYGEN SATURATION: 96 % | RESPIRATION RATE: 16 BRPM | HEIGHT: 65 IN | DIASTOLIC BLOOD PRESSURE: 82 MMHG | RESPIRATION RATE: 16 BRPM | OXYGEN SATURATION: 96 %

## 2024-06-07 DIAGNOSIS — C77.3 BREAST CANCER METASTASIZED TO AXILLARY LYMPH NODE, RIGHT (HCC): ICD-10-CM

## 2024-06-07 DIAGNOSIS — Z00.00 MEDICARE ANNUAL WELLNESS VISIT, SUBSEQUENT: Primary | ICD-10-CM

## 2024-06-07 DIAGNOSIS — N39.46 MIXED STRESS AND URGE URINARY INCONTINENCE: ICD-10-CM

## 2024-06-07 DIAGNOSIS — C50.911 ADENOCARCINOMA, BREAST, RIGHT (HCC): ICD-10-CM

## 2024-06-07 DIAGNOSIS — K21.9 GASTROESOPHAGEAL REFLUX DISEASE WITHOUT ESOPHAGITIS: Primary | ICD-10-CM

## 2024-06-07 DIAGNOSIS — R35.0 URINARY FREQUENCY: ICD-10-CM

## 2024-06-07 DIAGNOSIS — G47.33 OSA (OBSTRUCTIVE SLEEP APNEA): ICD-10-CM

## 2024-06-07 DIAGNOSIS — C50.911 BREAST CANCER METASTASIZED TO AXILLARY LYMPH NODE, RIGHT (HCC): ICD-10-CM

## 2024-06-07 PROCEDURE — G8417 CALC BMI ABV UP PARAM F/U: HCPCS | Performed by: PHYSICIAN ASSISTANT

## 2024-06-07 PROCEDURE — G8427 DOCREV CUR MEDS BY ELIG CLIN: HCPCS | Performed by: PHYSICIAN ASSISTANT

## 2024-06-07 PROCEDURE — G0439 PPPS, SUBSEQ VISIT: HCPCS | Performed by: PHYSICIAN ASSISTANT

## 2024-06-07 PROCEDURE — 0509F URINE INCON PLAN DOCD: CPT | Performed by: PHYSICIAN ASSISTANT

## 2024-06-07 PROCEDURE — 1123F ACP DISCUSS/DSCN MKR DOCD: CPT | Performed by: PHYSICIAN ASSISTANT

## 2024-06-07 PROCEDURE — 99214 OFFICE O/P EST MOD 30 MIN: CPT | Performed by: PHYSICIAN ASSISTANT

## 2024-06-07 PROCEDURE — 1036F TOBACCO NON-USER: CPT | Performed by: PHYSICIAN ASSISTANT

## 2024-06-07 PROCEDURE — 1090F PRES/ABSN URINE INCON ASSESS: CPT | Performed by: PHYSICIAN ASSISTANT

## 2024-06-07 RX ORDER — FAMOTIDINE 40 MG/1
40 TABLET, FILM COATED ORAL EVERY EVENING
Qty: 30 TABLET | Refills: 3 | Status: SHIPPED | OUTPATIENT
Start: 2024-06-07

## 2024-06-07 RX ORDER — SOLIFENACIN SUCCINATE 5 MG/1
5 TABLET, FILM COATED ORAL DAILY
Qty: 30 TABLET | Refills: 3 | Status: SHIPPED | OUTPATIENT
Start: 2024-06-07 | End: 2025-06-07

## 2024-06-07 ASSESSMENT — PATIENT HEALTH QUESTIONNAIRE - PHQ9
SUM OF ALL RESPONSES TO PHQ QUESTIONS 1-9: 5
1. LITTLE INTEREST OR PLEASURE IN DOING THINGS: MORE THAN HALF THE DAYS
8. MOVING OR SPEAKING SO SLOWLY THAT OTHER PEOPLE COULD HAVE NOTICED. OR THE OPPOSITE, BEING SO FIGETY OR RESTLESS THAT YOU HAVE BEEN MOVING AROUND A LOT MORE THAN USUAL: NOT AT ALL
SUM OF ALL RESPONSES TO PHQ QUESTIONS 1-9: 5
4. FEELING TIRED OR HAVING LITTLE ENERGY: MORE THAN HALF THE DAYS
6. FEELING BAD ABOUT YOURSELF - OR THAT YOU ARE A FAILURE OR HAVE LET YOURSELF OR YOUR FAMILY DOWN: NOT AT ALL
5. POOR APPETITE OR OVEREATING: NOT AT ALL
SUM OF ALL RESPONSES TO PHQ QUESTIONS 1-9: 5
SUM OF ALL RESPONSES TO PHQ9 QUESTIONS 1 & 2: 3
9. THOUGHTS THAT YOU WOULD BE BETTER OFF DEAD, OR OF HURTING YOURSELF: NOT AT ALL
2. FEELING DOWN, DEPRESSED OR HOPELESS: SEVERAL DAYS
10. IF YOU CHECKED OFF ANY PROBLEMS, HOW DIFFICULT HAVE THESE PROBLEMS MADE IT FOR YOU TO DO YOUR WORK, TAKE CARE OF THINGS AT HOME, OR GET ALONG WITH OTHER PEOPLE: SOMEWHAT DIFFICULT
7. TROUBLE CONCENTRATING ON THINGS, SUCH AS READING THE NEWSPAPER OR WATCHING TELEVISION: NOT AT ALL
SUM OF ALL RESPONSES TO PHQ QUESTIONS 1-9: 5
3. TROUBLE FALLING OR STAYING ASLEEP: NOT AT ALL

## 2024-06-07 ASSESSMENT — ENCOUNTER SYMPTOMS
DIARRHEA: 0
NAUSEA: 0
PHOTOPHOBIA: 0
BACK PAIN: 0
ABDOMINAL PAIN: 1
VOMITING: 0
SHORTNESS OF BREATH: 0
CHEST TIGHTNESS: 0
BLOOD IN STOOL: 0
TROUBLE SWALLOWING: 0
ABDOMINAL DISTENTION: 0

## 2024-06-07 ASSESSMENT — LIFESTYLE VARIABLES
HOW OFTEN DO YOU HAVE A DRINK CONTAINING ALCOHOL: NEVER
HOW MANY STANDARD DRINKS CONTAINING ALCOHOL DO YOU HAVE ON A TYPICAL DAY: PATIENT DOES NOT DRINK

## 2024-06-07 NOTE — PROGRESS NOTES
Breath sounds: Normal breath sounds. No wheezing or rales.   Abdominal:      General: There is no distension.      Palpations: Abdomen is soft. There is no mass.      Tenderness: There is abdominal tenderness (epigastric area).   Musculoskeletal:         General: No tenderness. Normal range of motion.      Cervical back: Normal range of motion.   Skin:     General: Skin is warm and dry.      Findings: No erythema or rash.   Neurological:      General: No focal deficit present.      Mental Status: She is alert and oriented to person, place, and time.   Psychiatric:         Attention and Perception: Attention normal.         Mood and Affect: Mood normal. Affect is not flat.         Thought Content: Thought content normal.         Judgment: Judgment normal.       Assessment & Plan    Diagnosis Orders   1. Gastroesophageal reflux disease without esophagitis  famotidine (PEPCID) 40 MG tablet      2. Urinary frequency        3. Mixed stress and urge urinary incontinence  solifenacin (VESICARE) 5 MG tablet      4. Adenocarcinoma, breast, right (HCC)        5. 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, right (HCC)        6. GHAZAL (obstructive sleep apnea)        Trial pepcid for relux.  Low dose vesicare.  4 month follow up with me.   Message me if there is no improvement in symptoms.  Labs next OV.    No orders of the defined types were placed in this encounter.    Orders Placed This Encounter   Medications    famotidine (PEPCID) 40 MG tablet     Sig: Take 1 tablet by mouth every evening     Dispense:  30 tablet     Refill:  3    solifenacin (VESICARE) 5 MG tablet     Sig: Take 1 tablet by mouth daily     Dispense:  30 tablet     Refill:  3     There are no discontinued medications.  Return in about 4 months (around 10/7/2024) for follow up in office.      Reviewed with the patient: current clinical status, medications, activities and diet.     Side effects, adverse effects of the medication prescribed today, as

## 2024-06-07 NOTE — PROGRESS NOTES
Medicare Annual Wellness Visit    Fern Recio is here for Medicare AWV    Assessment & Plan   Medicare annual wellness visit, subsequent  Recommendations for Preventive Services Due: see orders and patient instructions/AVS.  Recommended screening schedule for the next 5-10 years is provided to the patient in written form: see Patient Instructions/AVS.     No follow-ups on file.     Subjective       Patient's complete Health Risk Assessment and screening values have been reviewed and are found in Flowsheets. The following problems were reviewed today and where indicated follow up appointments were made and/or referrals ordered.    Positive Risk Factor Screenings with Interventions:        Depression:  PHQ-2 Score: 3  PHQ-9 Total Score: 5    Interpretation:  5-9 mild   10-14 moderate   15-19 moderately severe   20-27 severe     Interventions:  Patient declines any further evaluation or treatment           Activity, Diet, and Weight:  On average, how many days per week do you engage in moderate to strenuous exercise (like a brisk walk)?: 7 days  On average, how many minutes do you engage in exercise at this level?: 10 min    Do you eat balanced/healthy meals regularly?: (!) No    Body mass index is 25.96 kg/m².    Do you eat balanced/healthy meals regularly Interventions:  Patient declines any further evaluation or treatment          Vision Screen:  Do you have difficulty driving, watching TV, or doing any of your daily activities because of your eyesight?: No  Have you had an eye exam within the past year?: (!) No  No results found.    Interventions:   Patient encouraged to make appointment with their eye specialist    Safety:  Do you have non-slip mats or non-slip surfaces or shower bars or grab bars in your shower or bathtub?: (!) No  Interventions:  Has safety bars in bathroom.                    Objective   Vitals:    06/07/24 1054   BP: 128/82   Site: Right Upper Arm   Position: Sitting   Cuff Size: Large

## 2024-07-02 DIAGNOSIS — E78.5 HYPERLIPIDEMIA, UNSPECIFIED HYPERLIPIDEMIA TYPE: ICD-10-CM

## 2024-07-02 RX ORDER — NIACIN 500 MG/1
TABLET, EXTENDED RELEASE ORAL
Qty: 270 TABLET | Refills: 3 | Status: SHIPPED | OUTPATIENT
Start: 2024-07-02

## 2024-07-02 NOTE — TELEPHONE ENCOUNTER
Comments:     Last Office Visit (last PCP visit):   6/7/2024    Next Visit Date:  Future Appointments   Date Time Provider Department Center   10/7/2024 10:30 AM Debi Malone PA-C [place]  [facility]   11/25/2024 10:00 AM [facility] ROOM 1 [facility]   11/27/2024 11:00 AM Julienne Toth MD [facility]   2/5/2025  1:00 PM Arnaldo Toth MD [place] [facility] Neu[facility] -   3/19/2025  1:00 PM Mariam Holden MD [place] [facility]       **If hasn't been seen in over a year OR hasn't followed up according to last diabetes/ADHD visit, make appointment for patient before sending refill to provider.    Rx requested:  Requested Prescriptions     Pending Prescriptions Disp Refills    niacin (NIASPAN) 500 MG extended release tablet [Pharmacy Med Name: NIACIN ER TAB 500MG] 270 tablet 3     Sig: TAKE 1 TABLET 3 TIMES A DAY

## 2024-08-12 ENCOUNTER — TELEPHONE (OUTPATIENT)
Dept: FAMILY MEDICINE CLINIC | Age: 84
End: 2024-08-12

## 2024-08-12 DIAGNOSIS — E78.2 MIXED HYPERLIPIDEMIA: ICD-10-CM

## 2024-08-12 DIAGNOSIS — I67.9 CEREBROVASCULAR DISEASE, UNSPECIFIED: Primary | ICD-10-CM

## 2024-08-12 DIAGNOSIS — Z78.9 STATIN INTOLERANCE: ICD-10-CM

## 2024-08-12 NOTE — TELEPHONE ENCOUNTER
Central Scheduling states patient needs new order for Leqivio. The last one has  and patient is attempting to schedule infusion.    Please update    [facility]

## 2024-08-15 RX ORDER — INCLISIRAN 284 MG/1.5ML
284 INJECTION, SOLUTION SUBCUTANEOUS ONCE
Qty: 1.5 ML | Refills: 2 | Status: SHIPPED | OUTPATIENT
Start: 2024-08-15 | End: 2024-08-15

## 2024-10-07 ENCOUNTER — OFFICE VISIT (OUTPATIENT)
Dept: FAMILY MEDICINE CLINIC | Age: 84
End: 2024-10-07
Payer: MEDICARE

## 2024-10-07 VITALS
OXYGEN SATURATION: 98 % | SYSTOLIC BLOOD PRESSURE: 130 MMHG | RESPIRATION RATE: 16 BRPM | HEIGHT: 65 IN | DIASTOLIC BLOOD PRESSURE: 82 MMHG | HEART RATE: 69 BPM | BODY MASS INDEX: 24.56 KG/M2 | WEIGHT: 147.4 LBS

## 2024-10-07 DIAGNOSIS — I67.9 CEREBROVASCULAR DISEASE, UNSPECIFIED: ICD-10-CM

## 2024-10-07 DIAGNOSIS — G47.33 OSA (OBSTRUCTIVE SLEEP APNEA): ICD-10-CM

## 2024-10-07 DIAGNOSIS — C77.3 BREAST CANCER METASTASIZED TO AXILLARY LYMPH NODE, RIGHT (HCC): ICD-10-CM

## 2024-10-07 DIAGNOSIS — Z78.9 STATIN INTOLERANCE: ICD-10-CM

## 2024-10-07 DIAGNOSIS — E78.2 MIXED HYPERLIPIDEMIA: ICD-10-CM

## 2024-10-07 DIAGNOSIS — C50.911 BREAST CANCER METASTASIZED TO AXILLARY LYMPH NODE, RIGHT (HCC): ICD-10-CM

## 2024-10-07 DIAGNOSIS — C50.911 ADENOCARCINOMA, BREAST, RIGHT (HCC): Primary | ICD-10-CM

## 2024-10-07 DIAGNOSIS — E78.5 HYPERLIPIDEMIA, UNSPECIFIED HYPERLIPIDEMIA TYPE: ICD-10-CM

## 2024-10-07 PROCEDURE — G8484 FLU IMMUNIZE NO ADMIN: HCPCS | Performed by: PHYSICIAN ASSISTANT

## 2024-10-07 PROCEDURE — G8420 CALC BMI NORM PARAMETERS: HCPCS | Performed by: PHYSICIAN ASSISTANT

## 2024-10-07 PROCEDURE — 1123F ACP DISCUSS/DSCN MKR DOCD: CPT | Performed by: PHYSICIAN ASSISTANT

## 2024-10-07 PROCEDURE — 1090F PRES/ABSN URINE INCON ASSESS: CPT | Performed by: PHYSICIAN ASSISTANT

## 2024-10-07 PROCEDURE — G8427 DOCREV CUR MEDS BY ELIG CLIN: HCPCS | Performed by: PHYSICIAN ASSISTANT

## 2024-10-07 PROCEDURE — 1036F TOBACCO NON-USER: CPT | Performed by: PHYSICIAN ASSISTANT

## 2024-10-07 PROCEDURE — 99214 OFFICE O/P EST MOD 30 MIN: CPT | Performed by: PHYSICIAN ASSISTANT

## 2024-10-07 RX ORDER — ASPIRIN AND DIPYRIDAMOLE 25; 200 MG/1; MG/1
CAPSULE, EXTENDED RELEASE ORAL
Qty: 180 CAPSULE | Refills: 3 | Status: SHIPPED | OUTPATIENT
Start: 2024-10-07

## 2024-10-07 RX ORDER — INCLISIRAN 284 MG/1.5ML
284 INJECTION, SOLUTION SUBCUTANEOUS ONCE
Qty: 1.5 ML | Refills: 2 | Status: SHIPPED | OUTPATIENT
Start: 2024-10-07 | End: 2024-10-07

## 2024-10-07 ASSESSMENT — ENCOUNTER SYMPTOMS
ABDOMINAL DISTENTION: 0
PHOTOPHOBIA: 0
CHEST TIGHTNESS: 0
DIARRHEA: 0
SHORTNESS OF BREATH: 0
TROUBLE SWALLOWING: 0
ABDOMINAL PAIN: 0
BLOOD IN STOOL: 0
VOMITING: 0
NAUSEA: 0
BACK PAIN: 0

## 2024-10-07 NOTE — PROGRESS NOTES
Subjective  Fern Recio, 84 y.o. female presents today with:  Chief Complaint   Patient presents with    Follow-up     General follow up no new concerns        HPI  Last OV with me: 6/7/24.   4 month follow up.    Doing very well.  Compliant with her medications.  Responded extremely well to leqvio injection.  She cannot tolerate statin medications.     OSA_using CPAP nightly, followed by pulmonology.   Sleeping well.    No recurrent UTI, has not needed keflex.     She sees Dr. Julienne del angel for breast CA.  At this time, they are doing active surveillance.  Has upcoming mammogram and appointment in November.     BP controlled. No chest pain, dizziness.     Review of Systems   Constitutional:  Negative for activity change, appetite change, chills, fatigue, fever and unexpected weight change.   HENT:  Negative for congestion, hearing loss, nosebleeds and trouble swallowing.    Eyes:  Negative for photophobia and visual disturbance.   Respiratory:  Negative for chest tightness and shortness of breath.    Cardiovascular:  Negative for chest pain, palpitations and leg swelling.   Gastrointestinal:  Negative for abdominal distention, abdominal pain, blood in stool, diarrhea, nausea and vomiting.   Endocrine: Negative for cold intolerance and heat intolerance.   Genitourinary:  Negative for dysuria, frequency, hematuria, menstrual problem, pelvic pain and urgency.   Musculoskeletal:  Negative for arthralgias, back pain, gait problem, joint swelling and myalgias.   Skin:  Negative for rash.   Neurological:  Negative for dizziness and headaches.   Hematological:  Bruises/bleeds easily.   Psychiatric/Behavioral:  Negative for dysphoric mood and sleep disturbance. The patient is not nervous/anxious.        Past Medical History:   Diagnosis Date    Asthma     Asthma in remission 1990    Breast cancer (HCC) 2014    right breast    Breast cancer (HCC) 2023    right breast (mastectomy)    Cardiovascular disease 04/04/2023

## 2024-10-07 NOTE — TELEPHONE ENCOUNTER
Requesting medication refill. Please approve or deny this request.    Rx requested:  Requested Prescriptions     Pending Prescriptions Disp Refills    aspirin-dipyridamole (AGGRENOX)  MG per extended release capsule [Pharmacy Med Name: ASA/DIPYRIDA CAP 25-200MG] 180 capsule 3     Sig: TAKE 1 CAPSULE TWICE DAILY         Last Office Visit:   2/7/2024      Next Visit Date:  Future Appointments   Date Time Provider Department Center   10/7/2024 10:30 AM Debi Malone PA-C [place] [facility]   11/25/2024 10:00 AM [facility] ROOM 1 [facility]   11/27/2024 11:00 AM Julienne Toth MD [facility]  [facility]   2/5/2025  1:00 PM Arnaldo Toth MD [place] [facility] Neu[facility] -   3/19/2025  1:00 PM Mariam Holden MD [place] [facility]               Last refill 8/28/23. Please approve or deny.

## 2024-10-12 DIAGNOSIS — N39.46 MIXED STRESS AND URGE URINARY INCONTINENCE: ICD-10-CM

## 2024-10-14 RX ORDER — SOLIFENACIN SUCCINATE 5 MG/1
5 TABLET, FILM COATED ORAL DAILY
Qty: 90 TABLET | Refills: 1 | Status: SHIPPED | OUTPATIENT
Start: 2024-10-14

## 2024-10-14 NOTE — TELEPHONE ENCOUNTER
Comments:     Last Office Visit (last PCP visit):   10/7/2024    Next Visit Date:  Future Appointments   Date Time Provider Department Center   11/25/2024 10:00 AM [facility] ROOM 1 [facility]   11/27/2024 11:00 AM Julienne Toth MD [facility]   2/5/2025  1:00 PM Arnaldo Toth MD [place] [facility] -   3/19/2025  1:00 PM Mariam Holden MD [place] [facility]   4/7/2025 10:30 AM Debi Malone PA-C [place] [facility]       **If hasn't been seen in over a year OR hasn't followed up according to last diabetes/ADHD visit, make appointment for patient before sending refill to provider.    Rx requested:  Requested Prescriptions     Pending Prescriptions Disp Refills    solifenacin (VESICARE) 5 MG tablet [Pharmacy Med Name: SOLIFENACIN 5 MG TABLET] 90 tablet 1     Sig: TAKE 1 TABLET BY MOUTH EVERY DAY

## 2024-10-19 DIAGNOSIS — K21.9 GASTROESOPHAGEAL REFLUX DISEASE WITHOUT ESOPHAGITIS: ICD-10-CM

## 2024-10-21 RX ORDER — FAMOTIDINE 40 MG/1
40 TABLET, FILM COATED ORAL EVERY EVENING
Qty: 90 TABLET | Refills: 1 | Status: SHIPPED | OUTPATIENT
Start: 2024-10-21

## 2024-10-21 NOTE — TELEPHONE ENCOUNTER
Comments:     Last Office Visit (last PCP visit):   10/7/2024    Next Visit Date:  Future Appointments   Date Time Provider Department Center   11/25/2024 10:00 AM [facility] ROOM 1 [facility]   11/27/2024 11:00 AM Julienne Toth MD [facility]   2/5/2025  1:00 PM Arnaldo Toth MD [place] [facility] -   3/19/2025  1:00 PM Mariam Holden MD [place] [facility]   4/7/2025 10:30 AM Debi Malone PA-C [place] [facility]       **If hasn't been seen in over a year OR hasn't followed up according to last diabetes/ADHD visit, make appointment for patient before sending refill to provider.    Rx requested:  Requested Prescriptions     Pending Prescriptions Disp Refills    famotidine (PEPCID) 40 MG tablet [Pharmacy Med Name: FAMOTIDINE 40 MG TABLET] 90 tablet 1     Sig: TAKE 1 TABLET BY MOUTH EVERY DAY IN THE EVENING

## 2024-10-25 ENCOUNTER — PATIENT MESSAGE (OUTPATIENT)
Dept: FAMILY MEDICINE CLINIC | Age: 84
End: 2024-10-25

## 2024-10-28 ENCOUNTER — HOSPITAL ENCOUNTER (OUTPATIENT)
Dept: INFUSION THERAPY | Age: 84
Setting detail: INFUSION SERIES
Discharge: HOME | End: 2024-10-28
Payer: MEDICARE

## 2024-10-28 VITALS
RESPIRATION RATE: 16 BRPM | OXYGEN SATURATION: 98 % | DIASTOLIC BLOOD PRESSURE: 60 MMHG | HEART RATE: 95 BPM | TEMPERATURE: 97 F | SYSTOLIC BLOOD PRESSURE: 159 MMHG

## 2024-10-28 DIAGNOSIS — I67.9 CEREBROVASCULAR DISEASE, UNSPECIFIED: ICD-10-CM

## 2024-10-28 DIAGNOSIS — I25.10 CARDIOVASCULAR DISEASE: Primary | ICD-10-CM

## 2024-10-28 DIAGNOSIS — E78.5 HYPERLIPIDEMIA, UNSPECIFIED HYPERLIPIDEMIA TYPE: ICD-10-CM

## 2024-10-28 PROCEDURE — 96372 THER/PROPH/DIAG INJ SC/IM: CPT

## 2024-10-28 PROCEDURE — 6360000002 HC RX W HCPCS: Performed by: PHYSICIAN ASSISTANT

## 2024-10-28 RX ADMIN — INCLISIRAN 284 MG: 284 INJECTION, SOLUTION SUBCUTANEOUS at 11:01

## 2024-10-28 NOTE — PROGRESS NOTES
[facility]     PT arrived via:  [x] Ambulatory         [] Wheelchair  []With transport                [] Other:     [x]PT oriented to room and call light in reach.   [x] Vital signs obtained and are stable.   [x]Medication education provided and reviewed with patient.             .

## 2024-10-28 NOTE — PROGRESS NOTES
Pt received injection to left arm. Tolerated well. Pt left unit ambulatory. All equipment used in care has been cleaned.    Electronically signed by Brigitte Henderson RN on 10/28/2024 at 11:07 AM

## 2024-12-04 ENCOUNTER — HOSPITAL ENCOUNTER (OUTPATIENT)
Dept: WOMENS IMAGING | Age: 84
Discharge: HOME | End: 2024-12-06
Payer: MEDICARE

## 2024-12-04 VITALS — HEIGHT: 65 IN | BODY MASS INDEX: 24.53 KG/M2

## 2024-12-04 DIAGNOSIS — Z12.31 ENCOUNTER FOR SCREENING MAMMOGRAM FOR BREAST CANCER: ICD-10-CM

## 2024-12-04 PROCEDURE — 77063 BREAST TOMOSYNTHESIS BI: CPT

## 2024-12-11 ENCOUNTER — OFFICE VISIT (OUTPATIENT)
Dept: SURGERY | Age: 84
End: 2024-12-11
Payer: MEDICARE

## 2024-12-11 VITALS
RESPIRATION RATE: 12 BRPM | BODY MASS INDEX: 24.49 KG/M2 | SYSTOLIC BLOOD PRESSURE: 158 MMHG | TEMPERATURE: 98.1 F | HEIGHT: 65 IN | HEART RATE: 55 BPM | DIASTOLIC BLOOD PRESSURE: 80 MMHG | WEIGHT: 147 LBS | OXYGEN SATURATION: 98 %

## 2024-12-11 DIAGNOSIS — Z12.31 ENCOUNTER FOR SCREENING MAMMOGRAM FOR BREAST CANCER: ICD-10-CM

## 2024-12-11 DIAGNOSIS — C77.3 BREAST CANCER METASTASIZED TO AXILLARY LYMPH NODE, RIGHT (HCC): Primary | ICD-10-CM

## 2024-12-11 DIAGNOSIS — C50.911 BREAST CANCER METASTASIZED TO AXILLARY LYMPH NODE, RIGHT (HCC): Primary | ICD-10-CM

## 2024-12-11 PROCEDURE — 1126F AMNT PAIN NOTED NONE PRSNT: CPT | Performed by: SURGERY

## 2024-12-11 PROCEDURE — 1123F ACP DISCUSS/DSCN MKR DOCD: CPT | Performed by: SURGERY

## 2024-12-11 PROCEDURE — G8420 CALC BMI NORM PARAMETERS: HCPCS | Performed by: SURGERY

## 2024-12-11 PROCEDURE — 99214 OFFICE O/P EST MOD 30 MIN: CPT | Performed by: SURGERY

## 2024-12-11 PROCEDURE — G8427 DOCREV CUR MEDS BY ELIG CLIN: HCPCS | Performed by: SURGERY

## 2024-12-11 PROCEDURE — 1159F MED LIST DOCD IN RCRD: CPT | Performed by: SURGERY

## 2024-12-11 PROCEDURE — 1036F TOBACCO NON-USER: CPT | Performed by: SURGERY

## 2024-12-11 PROCEDURE — 1090F PRES/ABSN URINE INCON ASSESS: CPT | Performed by: SURGERY

## 2024-12-11 PROCEDURE — G8484 FLU IMMUNIZE NO ADMIN: HCPCS | Performed by: SURGERY

## 2024-12-11 ASSESSMENT — ENCOUNTER SYMPTOMS
SORE THROAT: 0
ABDOMINAL PAIN: 0
COLOR CHANGE: 0
VOMITING: 0
NAUSEA: 0
CHEST TIGHTNESS: 0
SHORTNESS OF BREATH: 0
COUGH: 0

## 2024-12-11 NOTE — PROGRESS NOTES
Patient accepts doctor student to be present/perform exam.   Reason for today's visit:  right breast cancer follow up    Palpates a breast change? no  Nipple discharge: denies left  Breast Pain: no  Breast Mass: no  Swelling in either upper extremity? no    Wellness:    Self breast self exams: yes   Regular exercise routine: no, but active at home   Following Lymphedema awareness/precautions: yes   Managing stress:yes   Stress level on a scale of 1 - 10: 2    Instruction:    Follow up per provider  Imaging per provider  Monthly self breast exams  Healthy diet  Exercise program  Lymphedema precautions/awareness    Other:    Requisitions needed:no  Bras/prosthesis: no  Compression Sleeve/glove: no  Lymphedema Measurements: see flow sheet  Therapy: no  PT/OT/Lymphedema: no  Follow up as advised per Dr Toth        12/11/2024    11:47 AM 4/24/2024    10:21 AM 10/18/2023    11:21 AM   Lymphedema Screening   RIGHT 10 cm (Hand) 18 19.5 19   RIGHT 20 cm (Wrist) 16 17 18   RIGHT 30 cm (Forearm) 22.5 22 21.5   RIGHT 60 cm (Upper Arm) 30 32 33       
12/11/24 1139 12/11/24 1148   BP: (!) 168/82 (!) 158/80   Pulse: 55    Resp: 12    Temp: 98.1 °F (36.7 °C)    SpO2: 98%    Weight: 66.7 kg (147 lb)    Height: 1.651 m (5' 5\")             12/11/2024    11:47 AM 4/24/2024    10:21 AM 10/18/2023    11:21 AM   Lymphedema Screening   RIGHT 10 cm (Hand) 18 19.5 19   RIGHT 20 cm (Wrist) 16 17 18   RIGHT 30 cm (Forearm) 22.5 22 21.5   RIGHT 60 cm (Upper Arm) 30 32 33       Physical Exam  Vitals reviewed.   Constitutional:       Appearance: Normal appearance. She is well-developed.   HENT:      Head: Normocephalic and atraumatic.      Nose: Nose normal.   Eyes:      Conjunctiva/sclera: Conjunctivae normal.      Right eye: No hemorrhage.     Left eye: No hemorrhage.  Cardiovascular:      Rate and Rhythm: Normal rate.   Pulmonary:      Effort: Pulmonary effort is normal. No respiratory distress.   Chest:   Breasts:     Breasts are symmetrical.      Right: Absent. No mass, skin change or tenderness.      Left: No inverted nipple, mass, nipple discharge, skin change or tenderness.   Musculoskeletal:         General: Normal range of motion.      Cervical back: Normal range of motion and neck supple.      Comments: Normal Range of motion in upper and lower extremities.   Lymphadenopathy:      Cervical: No cervical adenopathy.      Right cervical: No superficial, deep or posterior cervical adenopathy.     Left cervical: No superficial, deep or posterior cervical adenopathy.      Upper Body:      Right upper body: No supraclavicular, axillary or pectoral adenopathy.      Left upper body: No supraclavicular, axillary or pectoral adenopathy.   Skin:     General: Skin is warm and dry.      Findings: No abrasion, bruising, erythema or lesion.   Neurological:      Mental Status: She is alert and oriented to person, place, and time. She is not disoriented.      Comments: Dizziness when she got up, didn't take her meds today   Psychiatric:         Speech: Speech normal.         Behavior:

## 2024-12-30 ENCOUNTER — PATIENT MESSAGE (OUTPATIENT)
Age: 84
End: 2024-12-30

## 2024-12-30 NOTE — TELEPHONE ENCOUNTER
Comments:     Last Office Visit (last PCP visit):   10/7/2024    Next Visit Date:  Future Appointments   Date Time Provider Department Center   2/5/2025  1:00 PM Arnaldo Toth MD [place] [facility] Neurology -   3/19/2025  1:00 PM Mariam Holden MD [place] [facility]   4/7/2025 10:30 AM Debi Malone PA-C [facility]   12/10/2025 11:00 AM Julienne Toth MD [facility]  [facility]         Rx requested:  Requested Prescriptions     Pending Prescriptions Disp Refills    meclizine (ANTIVERT) 12.5 MG tablet 30 tablet 2     Sig: Take 1 tablet by mouth in the morning and at bedtime Half a tablet

## 2024-12-31 ENCOUNTER — APPOINTMENT (OUTPATIENT)
Dept: GENERAL RADIOLOGY | Age: 84
End: 2024-12-31
Payer: MEDICARE

## 2024-12-31 ENCOUNTER — HOSPITAL ENCOUNTER (EMERGENCY)
Age: 84
Discharge: HOME | End: 2024-12-31
Attending: STUDENT IN AN ORGANIZED HEALTH CARE EDUCATION/TRAINING PROGRAM
Payer: MEDICARE

## 2024-12-31 ENCOUNTER — APPOINTMENT (OUTPATIENT)
Dept: CT IMAGING | Age: 84
End: 2024-12-31
Payer: MEDICARE

## 2024-12-31 VITALS
WEIGHT: 155 LBS | DIASTOLIC BLOOD PRESSURE: 70 MMHG | TEMPERATURE: 98.4 F | BODY MASS INDEX: 25.83 KG/M2 | HEIGHT: 65 IN | SYSTOLIC BLOOD PRESSURE: 183 MMHG | RESPIRATION RATE: 11 BRPM | HEART RATE: 64 BPM | OXYGEN SATURATION: 99 %

## 2024-12-31 DIAGNOSIS — R42 DIZZINESS: ICD-10-CM

## 2024-12-31 DIAGNOSIS — S16.1XXA CERVICAL STRAIN, ACUTE, INITIAL ENCOUNTER: ICD-10-CM
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
## 2024-12-31 LAB
ALBUMIN SERPL-MCNC: 4.5 G/DL (ref 3.5–4.6)
ALP SERPL-CCNC: 145 U/L (ref 40–130)
ALT SERPL-CCNC: 22 U/L (ref 0–33)
ANION GAP SERPL CALCULATED.3IONS-SCNC: 11 MEQ/L (ref 9–15)
AST SERPL-CCNC: 33 U/L (ref 0–35)
BACTERIA URNS QL MICRO: NEGATIVE /HPF
BASOPHILS # BLD: 0.1 K/UL (ref 0–0.2)
BASOPHILS NFR BLD: 0.9 %
BILIRUB SERPL-MCNC: 0.4 MG/DL (ref 0.2–0.7)
BILIRUB UR QL STRIP: NEGATIVE
BNP BLD-MCNC: 294 PG/ML
BUN SERPL-MCNC: 12 MG/DL (ref 8–23)
CALCIUM SERPL-MCNC: 10.8 MG/DL (ref 8.5–9.9)
CHLORIDE SERPL-SCNC: 104 MEQ/L (ref 95–107)
CLARITY UR: CLEAR
CO2 SERPL-SCNC: 27 MEQ/L (ref 20–31)
COLOR UR: YELLOW
CREAT SERPL-MCNC: 0.7 MG/DL (ref 0.5–0.9)
EOSINOPHIL # BLD: 0.2 K/UL (ref 0–0.7)
EOSINOPHIL NFR BLD: 3 %
EPI CELLS #/AREA URNS AUTO: ABNORMAL /HPF (ref 0–5)
ERYTHROCYTE [DISTWIDTH] IN BLOOD BY AUTOMATED COUNT: 12.6 % (ref 11.5–14.5)
GLOBULIN SER CALC-MCNC: 3.2 G/DL (ref 2.3–3.5)
GLUCOSE SERPL-MCNC: 108 MG/DL (ref 70–99)
GLUCOSE UR STRIP-MCNC: NEGATIVE MG/DL
HCT VFR BLD AUTO: 45.7 % (ref 37–47)
HGB BLD-MCNC: 15.3 G/DL (ref 12–16)
HGB UR QL STRIP: ABNORMAL
HYALINE CASTS #/AREA URNS AUTO: ABNORMAL /HPF (ref 0–5)
INFLUENZA A BY PCR: NEGATIVE
INFLUENZA B BY PCR: NEGATIVE
KETONES UR STRIP-MCNC: NEGATIVE MG/DL
LACTATE BLDV-SCNC: 1 MMOL/L (ref 0.5–2.2)
LEUKOCYTE ESTERASE UR QL STRIP: NEGATIVE
LYMPHOCYTES # BLD: 1.7 K/UL (ref 1–4.8)
LYMPHOCYTES NFR BLD: 29.3 %
MAGNESIUM SERPL-MCNC: 2.3 MG/DL (ref 1.7–2.4)
MCH RBC QN AUTO: 32 PG (ref 27–31.3)
MCHC RBC AUTO-ENTMCNC: 33.5 % (ref 33–37)
MCV RBC AUTO: 95.6 FL (ref 79.4–94.8)
MONOCYTES # BLD: 0.4 K/UL (ref 0.2–0.8)
MONOCYTES NFR BLD: 7.4 %
NEUTROPHILS # BLD: 3.4 K/UL (ref 1.4–6.5)
NEUTS SEG NFR BLD: 59.2 %
NITRITE UR QL STRIP: NEGATIVE
PH UR STRIP: 8.5 [PH] (ref 5–9)
PLATELET # BLD AUTO: 237 K/UL (ref 130–400)
POTASSIUM SERPL-SCNC: 3.6 MEQ/L (ref 3.4–4.9)
PROT SERPL-MCNC: 7.7 G/DL (ref 6.3–8)
PROT UR STRIP-MCNC: NEGATIVE MG/DL
RBC # BLD AUTO: 4.78 M/UL (ref 4.2–5.4)
RBC #/AREA URNS AUTO: ABNORMAL /HPF (ref 0–5)
SARS-COV-2 RDRP RESP QL NAA+PROBE: NOT DETECTED
SODIUM SERPL-SCNC: 142 MEQ/L (ref 135–144)
SP GR UR STRIP: 1 (ref 1–1.03)
TROPONIN, HIGH SENSITIVITY: 21 NG/L (ref 0–19)
TROPONIN, HIGH SENSITIVITY: 22 NG/L (ref 0–19)
TROPONIN, HIGH SENSITIVITY: 23 NG/L (ref 0–19)
TSH REFLEX: 3.65 UIU/ML (ref 0.44–3.86)
URINE REFLEX TO CULTURE: ABNORMAL
UROBILINOGEN UR STRIP-ACNC: 0.2 E.U./DL
WBC # BLD AUTO: 5.7 K/UL (ref 4.8–10.8)
WBC #/AREA URNS AUTO: ABNORMAL /HPF (ref 0–5)

## 2024-12-31 PROCEDURE — 80053 COMPREHEN METABOLIC PANEL: CPT

## 2024-12-31 PROCEDURE — 81001 URINALYSIS AUTO W/SCOPE: CPT

## 2024-12-31 PROCEDURE — 70450 CT HEAD/BRAIN W/O DYE: CPT

## 2024-12-31 PROCEDURE — 85025 COMPLETE CBC W/AUTO DIFF WBC: CPT

## 2024-12-31 PROCEDURE — 6370000000 HC RX 637 (ALT 250 FOR IP): Performed by: STUDENT IN AN ORGANIZED HEALTH CARE EDUCATION/TRAINING PROGRAM

## 2024-12-31 PROCEDURE — 87635 SARS-COV-2 COVID-19 AMP PRB: CPT

## 2024-12-31 PROCEDURE — 93005 ELECTROCARDIOGRAM TRACING: CPT | Performed by: STUDENT IN AN ORGANIZED HEALTH CARE EDUCATION/TRAINING PROGRAM

## 2024-12-31 PROCEDURE — 71045 X-RAY EXAM CHEST 1 VIEW: CPT

## 2024-12-31 PROCEDURE — 83735 ASSAY OF MAGNESIUM: CPT

## 2024-12-31 PROCEDURE — 84443 ASSAY THYROID STIM HORMONE: CPT

## 2024-12-31 PROCEDURE — 99285 EMERGENCY DEPT VISIT HI MDM: CPT

## 2024-12-31 PROCEDURE — 96374 THER/PROPH/DIAG INJ IV PUSH: CPT

## 2024-12-31 PROCEDURE — 6360000002 HC RX W HCPCS: Performed by: STUDENT IN AN ORGANIZED HEALTH CARE EDUCATION/TRAINING PROGRAM

## 2024-12-31 PROCEDURE — 72125 CT NECK SPINE W/O DYE: CPT

## 2024-12-31 PROCEDURE — 83605 ASSAY OF LACTIC ACID: CPT

## 2024-12-31 PROCEDURE — 83880 ASSAY OF NATRIURETIC PEPTIDE: CPT

## 2024-12-31 PROCEDURE — 87502 INFLUENZA DNA AMP PROBE: CPT

## 2024-12-31 PROCEDURE — 84484 ASSAY OF TROPONIN QUANT: CPT

## 2024-12-31 RX ORDER — AMLODIPINE BESYLATE 5 MG/1
5 TABLET ORAL ONCE
Status: COMPLETED | OUTPATIENT
Start: 2024-12-31 | End: 2024-12-31

## 2024-12-31 RX ORDER — MECLIZINE HYDROCHLORIDE 25 MG/1
25 TABLET ORAL ONCE
Status: COMPLETED | OUTPATIENT
Start: 2024-12-31 | End: 2024-12-31

## 2024-12-31 RX ORDER — LISINOPRIL 10 MG/1
20 TABLET ORAL ONCE
Status: COMPLETED | OUTPATIENT
Start: 2024-12-31 | End: 2024-12-31

## 2024-12-31 RX ORDER — ACETAMINOPHEN 500 MG
1000 TABLET ORAL EVERY 6 HOURS PRN
Qty: 60 TABLET | Refills: 0 | Status: SHIPPED | OUTPATIENT
Start: 2024-12-31

## 2024-12-31 RX ORDER — HYDRALAZINE HYDROCHLORIDE 20 MG/ML
10 INJECTION INTRAMUSCULAR; INTRAVENOUS ONCE
Status: COMPLETED | OUTPATIENT
Start: 2024-12-31 | End: 2024-12-31

## 2024-12-31 RX ORDER — MECLIZINE HCL 12.5 MG 12.5 MG/1
12.5 TABLET ORAL 2 TIMES DAILY
Qty: 30 TABLET | Refills: 2 | Status: SHIPPED | OUTPATIENT
Start: 2024-12-31

## 2024-12-31 RX ORDER — ACETAMINOPHEN 500 MG
1000 TABLET ORAL ONCE
Status: COMPLETED | OUTPATIENT
Start: 2024-12-31 | End: 2024-12-31

## 2024-12-31 RX ADMIN — HYDRALAZINE HYDROCHLORIDE 10 MG: 20 INJECTION INTRAMUSCULAR; INTRAVENOUS at 10:44

## 2024-12-31 RX ADMIN — AMLODIPINE BESYLATE 5 MG: 5 TABLET ORAL at 11:39

## 2024-12-31 RX ADMIN — ACETAMINOPHEN 1000 MG: 500 TABLET ORAL at 11:06

## 2024-12-31 RX ADMIN — MECLIZINE HYDROCHLORIDE 25 MG: 25 TABLET ORAL at 09:44

## 2024-12-31 RX ADMIN — LISINOPRIL 20 MG: 10 TABLET ORAL at 11:39

## 2024-12-31 ASSESSMENT — PAIN DESCRIPTION - LOCATION: LOCATION: NECK

## 2024-12-31 ASSESSMENT — PAIN SCALES - GENERAL: PAINLEVEL_OUTOF10: 3

## 2024-12-31 ASSESSMENT — PAIN - FUNCTIONAL ASSESSMENT: PAIN_FUNCTIONAL_ASSESSMENT: NONE - DENIES PAIN

## 2024-12-31 NOTE — ED TRIAGE NOTES
Patient called EMS due to a fall at home. Patient states she hit her head. Patient states no loss of consciousness. Positive for blood thinners. Patient is alert and oriented.

## 2024-12-31 NOTE — ED PROVIDER NOTES
SPINE WO CONTRAST   Final Result   No acute fracture         CT HEAD WO CONTRAST   Final Result   No acute intracranial abnormality.      Moderate chronic senescent change         XR CHEST PORTABLE   Final Result   1. Cardiomegaly.   2. Mild lung hyperinflation.             ED Course as of 01/02/25 0938   Tue Dec 31, 2024   0908 EKG 12 Lead  EKG showing sinus bradycardia, rate of 56 bpm.  Normal axis.  Normal intervals.  No gross acute ST-T wave abnormalities. [NA]      ED Course User Index  [NA] Ryan Chapman MD       84 y.o. female with a PMH clinically significant for Vertigo, HTN, HLD, GHAZAL, DVT/PE, GERD, OA, Anemia, TIA, and CVA presenting to the ED via EMS c/o fall from standing in the home occurring just prior to arrival with preceding lightheadedness, dizziness and generalized weakness.  Upon initial evaluation, Pt fatigued, anxious, and hypertensive, but otherwise Afebrile, grossly NV Intact, satting normally on RA, and in NAD. PE as noted above.  Labs, EKG, and Imaging visualized and interpreted by myself as noted above in ED Course.  Given findings, clinical presentation most likely consistent w/ fall from standing possibly secondary to mechanical etiology versus vertigo with patient later complaining of vertiginous dizziness in the setting of a history of known vertigo.  Patient only complaining of neck pain upon arrival to the ED.  Denying any headache, numbness, weakness or tingling.  Dizziness also resolved while at rest.  No clear acute neurological deficits concerning for central processes causing vertigo.  EKG without evidence of severe arrhythmias and no acute ischemic changes.  Did obtain a CT head, C-spine and chest x-ray which did not show any evidence of acute traumatic pathology.  Administered meds as noted below with significant symptomatic relief and dizziness.  Ambulated in the ED without significant difficulty and feeling improved.  Stable for further evaluation and management as an

## 2025-01-03 LAB
EKG ATRIAL RATE: 56 BPM
EKG P AXIS: 65 DEGREES
EKG P-R INTERVAL: 192 MS
EKG Q-T INTERVAL: 424 MS
EKG QRS DURATION: 102 MS
EKG QTC CALCULATION (BAZETT): 409 MS
EKG R AXIS: -5 DEGREES
EKG T AXIS: 33 DEGREES
EKG VENTRICULAR RATE: 56 BPM

## 2025-02-05 ENCOUNTER — OFFICE VISIT (OUTPATIENT)
Dept: NEUROLOGY | Age: 85
End: 2025-02-05
Payer: MEDICARE

## 2025-02-05 VITALS
SYSTOLIC BLOOD PRESSURE: 120 MMHG | WEIGHT: 154 LBS | DIASTOLIC BLOOD PRESSURE: 70 MMHG | BODY MASS INDEX: 25.63 KG/M2 | HEART RATE: 66 BPM

## 2025-02-05 DIAGNOSIS — I67.9 CEREBROVASCULAR DISEASE, UNSPECIFIED: Primary | ICD-10-CM

## 2025-02-05 DIAGNOSIS — G25.0 ESSENTIAL PALATAL TREMOR: ICD-10-CM

## 2025-02-05 DIAGNOSIS — R29.6 FALLS: ICD-10-CM

## 2025-02-05 DIAGNOSIS — G43.709 CHRONIC MIGRAINE WITHOUT AURA WITHOUT STATUS MIGRAINOSUS, NOT INTRACTABLE: ICD-10-CM

## 2025-02-05 DIAGNOSIS — S06.0X0S: ICD-10-CM

## 2025-02-05 DIAGNOSIS — I63.89 OTHER CEREBRAL INFARCTION (HCC): ICD-10-CM

## 2025-02-05 PROCEDURE — 1090F PRES/ABSN URINE INCON ASSESS: CPT | Performed by: PSYCHIATRY & NEUROLOGY

## 2025-02-05 PROCEDURE — G8427 DOCREV CUR MEDS BY ELIG CLIN: HCPCS | Performed by: PSYCHIATRY & NEUROLOGY

## 2025-02-05 PROCEDURE — 99214 OFFICE O/P EST MOD 30 MIN: CPT | Performed by: PSYCHIATRY & NEUROLOGY

## 2025-02-05 PROCEDURE — G8417 CALC BMI ABV UP PARAM F/U: HCPCS | Performed by: PSYCHIATRY & NEUROLOGY

## 2025-02-05 PROCEDURE — 1126F AMNT PAIN NOTED NONE PRSNT: CPT | Performed by: PSYCHIATRY & NEUROLOGY

## 2025-02-05 PROCEDURE — 1159F MED LIST DOCD IN RCRD: CPT | Performed by: PSYCHIATRY & NEUROLOGY

## 2025-02-05 PROCEDURE — 1036F TOBACCO NON-USER: CPT | Performed by: PSYCHIATRY & NEUROLOGY

## 2025-02-05 PROCEDURE — 1123F ACP DISCUSS/DSCN MKR DOCD: CPT | Performed by: PSYCHIATRY & NEUROLOGY

## 2025-02-10 DIAGNOSIS — N39.46 MIXED STRESS AND URGE URINARY INCONTINENCE: ICD-10-CM

## 2025-02-10 DIAGNOSIS — K21.9 GASTROESOPHAGEAL REFLUX DISEASE WITHOUT ESOPHAGITIS: ICD-10-CM

## 2025-02-10 NOTE — TELEPHONE ENCOUNTER
Comments:     Last Office Visit (last PCP visit):   10/7/2024    Next Visit Date:  Future Appointments   Date Time Provider Department Center   3/19/2025  1:00 PM Mariam Holden MD [place] [facility]   4/7/2025 10:30 AM Debi Malone PA-C [facility]   12/10/2025 11:00 AM Julienne Toth MD [facility]   2/4/2026  1:00 PM Arnaldo Toth MD [place] [facility] Neurology -         Rx requested:  Requested Prescriptions     Pending Prescriptions Disp Refills    solifenacin (VESICARE) 5 MG tablet [Pharmacy Med Name: SOLIFENACIN 5 MG TABLET] 90 tablet 1     Sig: Take 1 tablet by mouth daily    famotidine (PEPCID) 40 MG tablet [Pharmacy Med Name: FAMOTIDINE 40 MG TABLET] 90 tablet 1     Sig: Take 1 tablet by mouth every evening

## 2025-02-11 RX ORDER — FAMOTIDINE 40 MG/1
40 TABLET, FILM COATED ORAL EVERY EVENING
Qty: 90 TABLET | Refills: 1 | Status: SHIPPED | OUTPATIENT
Start: 2025-02-11

## 2025-02-11 RX ORDER — SOLIFENACIN SUCCINATE 5 MG/1
5 TABLET, FILM COATED ORAL DAILY
Qty: 90 TABLET | Refills: 1 | Status: SHIPPED | OUTPATIENT
Start: 2025-02-11

## 2025-02-14 ENCOUNTER — HOSPITAL ENCOUNTER (OUTPATIENT)
Dept: ULTRASOUND IMAGING | Age: 85
Discharge: HOME | End: 2025-02-16
Payer: MEDICARE

## 2025-02-14 DIAGNOSIS — I63.89 OTHER CEREBRAL INFARCTION (HCC): ICD-10-CM

## 2025-02-14 DIAGNOSIS — I67.9 CEREBROVASCULAR DISEASE, UNSPECIFIED: ICD-10-CM

## 2025-02-14 PROCEDURE — 93880 EXTRACRANIAL BILAT STUDY: CPT

## 2025-02-26 DIAGNOSIS — E78.5 HYPERLIPIDEMIA, UNSPECIFIED HYPERLIPIDEMIA TYPE: ICD-10-CM

## 2025-02-26 RX ORDER — EZETIMIBE 10 MG/1
10 TABLET ORAL DAILY
Qty: 90 TABLET | Refills: 3 | Status: SHIPPED | OUTPATIENT
Start: 2025-02-26

## 2025-02-26 NOTE — TELEPHONE ENCOUNTER
Comments:     Last Office Visit (last PCP visit):   10/7/2024    Next Visit Date:  Future Appointments   Date Time Provider Department Center   3/3/2025  9:45 AM [facility] ULTRASOUND ROOM 1 [facility]   3/19/2025  1:00 PM Mariam Holden MD [place] [facility]   4/7/2025 10:30 AM Debi Malone PA-C [facility]   12/10/2025 11:00 AM Julienne Toth MD [facility]   2/4/2026  1:00 PM Arnaldo Toth MD [place] [facility] Neurology -       **If hasn't been seen in over a year OR hasn't followed up according to last diabetes/ADHD visit, make appointment for patient before sending refill to provider.    Rx requested:  Requested Prescriptions     Pending Prescriptions Disp Refills    ezetimibe (ZETIA) 10 MG tablet [Pharmacy Med Name: EZETIMIBE TAB 10MG] 90 tablet 3     Sig: TAKE 1 TABLET DAILY

## 2025-03-03 ENCOUNTER — HOSPITAL ENCOUNTER (OUTPATIENT)
Dept: ULTRASOUND IMAGING | Age: 85
Discharge: HOME | End: 2025-03-05
Payer: MEDICARE

## 2025-03-03 DIAGNOSIS — E04.1 THYROID NODULE: ICD-10-CM

## 2025-03-03 PROCEDURE — 76536 US EXAM OF HEAD AND NECK: CPT

## 2025-03-07 ENCOUNTER — APPOINTMENT (OUTPATIENT)
Dept: CT IMAGING | Age: 85
DRG: 305 | End: 2025-03-07
Payer: MEDICARE

## 2025-03-07 ENCOUNTER — HOSPITAL ENCOUNTER (INPATIENT)
Age: 85
LOS: 3 days | Discharge: NURSING, REHAB OR LONG-TERM CARE | DRG: 305 | End: 2025-03-10
Attending: STUDENT IN AN ORGANIZED HEALTH CARE EDUCATION/TRAINING PROGRAM | Admitting: INTERNAL MEDICINE
Payer: MEDICARE

## 2025-03-07 ENCOUNTER — APPOINTMENT (OUTPATIENT)
Dept: GENERAL RADIOLOGY | Age: 85
DRG: 305 | End: 2025-03-07
Payer: MEDICARE

## 2025-03-07 DIAGNOSIS — M25.561 ACUTE PAIN OF RIGHT KNEE: ICD-10-CM
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
## 2025-03-07 DIAGNOSIS — I10 ESSENTIAL HYPERTENSION: ICD-10-CM

## 2025-03-07 PROBLEM — I16.0 HYPERTENSIVE URGENCY: Status: ACTIVE | Noted: 2025-03-07

## 2025-03-07 LAB
ALBUMIN SERPL-MCNC: 3.8 G/DL (ref 3.5–4.6)
ALP SERPL-CCNC: 120 U/L (ref 40–130)
ALT SERPL-CCNC: 16 U/L (ref 0–33)
ANION GAP SERPL CALCULATED.3IONS-SCNC: 11 MEQ/L (ref 9–15)
APTT PPP: 26.8 SEC (ref 24.4–36.8)
AST SERPL-CCNC: 22 U/L (ref 0–35)
BASOPHILS # BLD: 0 K/UL (ref 0–0.2)
BASOPHILS NFR BLD: 0.4 %
BILIRUB SERPL-MCNC: 0.8 MG/DL (ref 0.2–0.7)
BUN SERPL-MCNC: 14 MG/DL (ref 8–23)
CALCIUM SERPL-MCNC: 10.2 MG/DL (ref 8.5–9.9)
CHLORIDE SERPL-SCNC: 102 MEQ/L (ref 95–107)
CO2 SERPL-SCNC: 25 MEQ/L (ref 20–31)
CREAT SERPL-MCNC: 0.66 MG/DL (ref 0.5–0.9)
EOSINOPHIL # BLD: 0 K/UL (ref 0–0.7)
EOSINOPHIL NFR BLD: 0.4 %
ERYTHROCYTE [DISTWIDTH] IN BLOOD BY AUTOMATED COUNT: 12.8 % (ref 11.5–14.5)
GLOBULIN SER CALC-MCNC: 2.9 G/DL (ref 2.3–3.5)
GLUCOSE SERPL-MCNC: 102 MG/DL (ref 70–99)
HCT VFR BLD AUTO: 38.6 % (ref 37–47)
HGB BLD-MCNC: 13.5 G/DL (ref 12–16)
INR PPP: 1.1
LYMPHOCYTES # BLD: 1.2 K/UL (ref 1–4.8)
LYMPHOCYTES NFR BLD: 13.4 %
MCH RBC QN AUTO: 32.3 PG (ref 27–31.3)
MCHC RBC AUTO-ENTMCNC: 35 % (ref 33–37)
MCV RBC AUTO: 92.3 FL (ref 79.4–94.8)
MONOCYTES # BLD: 1.1 K/UL (ref 0.2–0.8)
MONOCYTES NFR BLD: 11.9 %
NEUTROPHILS # BLD: 6.6 K/UL (ref 1.4–6.5)
NEUTS SEG NFR BLD: 73.8 %
PLATELET # BLD AUTO: 215 K/UL (ref 130–400)
POTASSIUM SERPL-SCNC: 3.1 MEQ/L (ref 3.4–4.9)
PROT SERPL-MCNC: 6.7 G/DL (ref 6.3–8)
PROTHROMBIN TIME: 14.2 SEC (ref 12.3–14.9)
RBC # BLD AUTO: 4.18 M/UL (ref 4.2–5.4)
SODIUM SERPL-SCNC: 138 MEQ/L (ref 135–144)
TROPONIN, HIGH SENSITIVITY: 20 NG/L (ref 0–19)
TROPONIN, HIGH SENSITIVITY: 21 NG/L (ref 0–19)
WBC # BLD AUTO: 9 K/UL (ref 4.8–10.8)

## 2025-03-07 PROCEDURE — 6370000000 HC RX 637 (ALT 250 FOR IP): Performed by: STUDENT IN AN ORGANIZED HEALTH CARE EDUCATION/TRAINING PROGRAM

## 2025-03-07 PROCEDURE — 80053 COMPREHEN METABOLIC PANEL: CPT

## 2025-03-07 PROCEDURE — 84484 ASSAY OF TROPONIN QUANT: CPT

## 2025-03-07 PROCEDURE — 6360000002 HC RX W HCPCS: Performed by: INTERNAL MEDICINE

## 2025-03-07 PROCEDURE — 96374 THER/PROPH/DIAG INJ IV PUSH: CPT

## 2025-03-07 PROCEDURE — 73562 X-RAY EXAM OF KNEE 3: CPT

## 2025-03-07 PROCEDURE — 93005 ELECTROCARDIOGRAM TRACING: CPT | Performed by: STUDENT IN AN ORGANIZED HEALTH CARE EDUCATION/TRAINING PROGRAM

## 2025-03-07 PROCEDURE — 73502 X-RAY EXAM HIP UNI 2-3 VIEWS: CPT

## 2025-03-07 PROCEDURE — 85730 THROMBOPLASTIN TIME PARTIAL: CPT

## 2025-03-07 PROCEDURE — 2500000003 HC RX 250 WO HCPCS: Performed by: INTERNAL MEDICINE

## 2025-03-07 PROCEDURE — 85025 COMPLETE CBC W/AUTO DIFF WBC: CPT

## 2025-03-07 PROCEDURE — 99285 EMERGENCY DEPT VISIT HI MDM: CPT

## 2025-03-07 PROCEDURE — 2060000000 HC ICU INTERMEDIATE R&B

## 2025-03-07 PROCEDURE — 70450 CT HEAD/BRAIN W/O DYE: CPT

## 2025-03-07 PROCEDURE — 71045 X-RAY EXAM CHEST 1 VIEW: CPT

## 2025-03-07 PROCEDURE — 6370000000 HC RX 637 (ALT 250 FOR IP): Performed by: INTERNAL MEDICINE

## 2025-03-07 PROCEDURE — 6360000002 HC RX W HCPCS: Performed by: STUDENT IN AN ORGANIZED HEALTH CARE EDUCATION/TRAINING PROGRAM

## 2025-03-07 PROCEDURE — 72125 CT NECK SPINE W/O DYE: CPT

## 2025-03-07 PROCEDURE — 96376 TX/PRO/DX INJ SAME DRUG ADON: CPT

## 2025-03-07 PROCEDURE — 85610 PROTHROMBIN TIME: CPT

## 2025-03-07 RX ORDER — LISINOPRIL 10 MG/1
10 TABLET ORAL DAILY
Status: DISCONTINUED | OUTPATIENT
Start: 2025-03-07 | End: 2025-03-07

## 2025-03-07 RX ORDER — AMLODIPINE AND BENAZEPRIL HYDROCHLORIDE 5; 10 MG/1; MG/1
2 CAPSULE ORAL ONCE
Status: DISCONTINUED | OUTPATIENT
Start: 2025-03-07 | End: 2025-03-07 | Stop reason: ALTCHOICE

## 2025-03-07 RX ORDER — SODIUM CHLORIDE 9 MG/ML
INJECTION, SOLUTION INTRAVENOUS PRN
Status: DISCONTINUED | OUTPATIENT
Start: 2025-03-07 | End: 2025-03-10 | Stop reason: HOSPADM

## 2025-03-07 RX ORDER — LABETALOL HYDROCHLORIDE 5 MG/ML
10 INJECTION, SOLUTION INTRAVENOUS ONCE
Status: COMPLETED | OUTPATIENT
Start: 2025-03-07 | End: 2025-03-07

## 2025-03-07 RX ORDER — HYDRALAZINE HYDROCHLORIDE 20 MG/ML
10 INJECTION INTRAMUSCULAR; INTRAVENOUS ONCE
Status: DISCONTINUED | OUTPATIENT
Start: 2025-03-07 | End: 2025-03-07

## 2025-03-07 RX ORDER — HYDRALAZINE HYDROCHLORIDE 20 MG/ML
10 INJECTION INTRAMUSCULAR; INTRAVENOUS EVERY 4 HOURS PRN
Status: DISCONTINUED | OUTPATIENT
Start: 2025-03-07 | End: 2025-03-10 | Stop reason: HOSPADM

## 2025-03-07 RX ORDER — AMLODIPINE BESYLATE 5 MG/1
10 TABLET ORAL ONCE
Status: COMPLETED | OUTPATIENT
Start: 2025-03-07 | End: 2025-03-07

## 2025-03-07 RX ORDER — ONDANSETRON 2 MG/ML
4 INJECTION INTRAMUSCULAR; INTRAVENOUS EVERY 6 HOURS PRN
Status: DISCONTINUED | OUTPATIENT
Start: 2025-03-07 | End: 2025-03-10 | Stop reason: HOSPADM

## 2025-03-07 RX ORDER — POLYETHYLENE GLYCOL 3350 17 G/17G
17 POWDER, FOR SOLUTION ORAL DAILY PRN
Status: DISCONTINUED | OUTPATIENT
Start: 2025-03-07 | End: 2025-03-10 | Stop reason: HOSPADM

## 2025-03-07 RX ORDER — ONDANSETRON 4 MG/1
4 TABLET, ORALLY DISINTEGRATING ORAL EVERY 8 HOURS PRN
Status: DISCONTINUED | OUTPATIENT
Start: 2025-03-07 | End: 2025-03-10 | Stop reason: HOSPADM

## 2025-03-07 RX ORDER — ACETAMINOPHEN 650 MG/1
650 SUPPOSITORY RECTAL EVERY 6 HOURS PRN
Status: DISCONTINUED | OUTPATIENT
Start: 2025-03-07 | End: 2025-03-10 | Stop reason: HOSPADM

## 2025-03-07 RX ORDER — SODIUM CHLORIDE 0.9 % (FLUSH) 0.9 %
5-40 SYRINGE (ML) INJECTION EVERY 12 HOURS SCHEDULED
Status: DISCONTINUED | OUTPATIENT
Start: 2025-03-07 | End: 2025-03-10 | Stop reason: HOSPADM

## 2025-03-07 RX ORDER — SODIUM CHLORIDE 0.9 % (FLUSH) 0.9 %
5-40 SYRINGE (ML) INJECTION PRN
Status: DISCONTINUED | OUTPATIENT
Start: 2025-03-07 | End: 2025-03-10 | Stop reason: HOSPADM

## 2025-03-07 RX ORDER — MAGNESIUM SULFATE IN WATER 40 MG/ML
2000 INJECTION, SOLUTION INTRAVENOUS PRN
Status: DISCONTINUED | OUTPATIENT
Start: 2025-03-07 | End: 2025-03-10 | Stop reason: HOSPADM

## 2025-03-07 RX ORDER — ENOXAPARIN SODIUM 100 MG/ML
40 INJECTION SUBCUTANEOUS DAILY
Status: DISCONTINUED | OUTPATIENT
Start: 2025-03-07 | End: 2025-03-10 | Stop reason: HOSPADM

## 2025-03-07 RX ORDER — LISINOPRIL 10 MG/1
10 TABLET ORAL ONCE
Status: COMPLETED | OUTPATIENT
Start: 2025-03-07 | End: 2025-03-07

## 2025-03-07 RX ORDER — POTASSIUM CHLORIDE 1500 MG/1
40 TABLET, EXTENDED RELEASE ORAL PRN
Status: DISCONTINUED | OUTPATIENT
Start: 2025-03-07 | End: 2025-03-10 | Stop reason: HOSPADM

## 2025-03-07 RX ORDER — ACETAMINOPHEN 325 MG/1
650 TABLET ORAL EVERY 6 HOURS PRN
Status: DISCONTINUED | OUTPATIENT
Start: 2025-03-07 | End: 2025-03-10 | Stop reason: HOSPADM

## 2025-03-07 RX ORDER — POTASSIUM CHLORIDE 7.45 MG/ML
10 INJECTION INTRAVENOUS PRN
Status: DISCONTINUED | OUTPATIENT
Start: 2025-03-07 | End: 2025-03-10 | Stop reason: HOSPADM

## 2025-03-07 RX ADMIN — AMLODIPINE BESYLATE 10 MG: 5 TABLET ORAL at 19:29

## 2025-03-07 RX ADMIN — Medication 10 ML: at 21:10

## 2025-03-07 RX ADMIN — LABETALOL HYDROCHLORIDE 10 MG: 5 INJECTION, SOLUTION INTRAVENOUS at 15:28

## 2025-03-07 RX ADMIN — LABETALOL HYDROCHLORIDE 10 MG: 5 INJECTION, SOLUTION INTRAVENOUS at 17:07

## 2025-03-07 RX ADMIN — Medication 3 MG: at 23:52

## 2025-03-07 RX ADMIN — LISINOPRIL 10 MG: 10 TABLET ORAL at 19:29

## 2025-03-07 RX ADMIN — POTASSIUM CHLORIDE 40 MEQ: 1500 TABLET, EXTENDED RELEASE ORAL at 23:52

## 2025-03-07 RX ADMIN — HYDRALAZINE HYDROCHLORIDE 10 MG: 20 INJECTION INTRAMUSCULAR; INTRAVENOUS at 23:52

## 2025-03-07 ASSESSMENT — PAIN - FUNCTIONAL ASSESSMENT
PAIN_FUNCTIONAL_ASSESSMENT: 0-10
PAIN_FUNCTIONAL_ASSESSMENT: 0-10

## 2025-03-07 ASSESSMENT — PAIN SCALES - GENERAL
PAINLEVEL_OUTOF10: 4
PAINLEVEL_OUTOF10: 5
PAINLEVEL_OUTOF10: 7

## 2025-03-07 ASSESSMENT — PAIN DESCRIPTION - ORIENTATION: ORIENTATION: RIGHT

## 2025-03-07 ASSESSMENT — PAIN DESCRIPTION - DESCRIPTORS: DESCRIPTORS: ACHING

## 2025-03-07 ASSESSMENT — PAIN DESCRIPTION - LOCATION
LOCATION: NECK
LOCATION: KNEE;NECK

## 2025-03-07 NOTE — ED PROVIDER NOTES
KNEE RIGHT (3 VIEWS)    XR HIP 2-3 VW W PELVIS RIGHT    CT HEAD WO CONTRAST    CT CERVICAL SPINE WO CONTRAST    CBC with Auto Differential    CMP    Troponin    Protime-INR    APTT    EKG 12 Lead       MEDICATIONS ORDERED:  Orders Placed This Encounter   Medications    labetalol (NORMODYNE;TRANDATE) injection 10 mg    labetalol (NORMODYNE;TRANDATE) injection 10 mg    DISCONTD: amLODIPine-benazepril (LOTREL) 5-10 MG per capsule 2 capsule     Order Specific Question:   Please select a reason the therapeutic interchange was not accepted:     Answer:   Okay for Pharmacy to Substitute with Components    DISCONTD: hydrALAZINE (APRESOLINE) injection 10 mg    amLODIPine (NORVASC) tablet 10 mg    DISCONTD: lisinopril (PRINIVIL;ZESTRIL) tablet 10 mg    lisinopril (PRINIVIL;ZESTRIL) tablet 10 mg       DIAGNOSTIC RESULTS / EMERGENCY DEPARTMENT COURSE / MDM     LABS:  Results for orders placed or performed during the hospital encounter of 03/07/25   CBC with Auto Differential   Result Value Ref Range    WBC 9.0 4.8 - 10.8 K/uL    RBC 4.18 (L) 4.20 - 5.40 M/uL    Hemoglobin 13.5 12.0 - 16.0 g/dL    Hematocrit 38.6 37.0 - 47.0 %    MCV 92.3 79.4 - 94.8 fL    MCH 32.3 (H) 27.0 - 31.3 pg    MCHC 35.0 33.0 - 37.0 %    RDW 12.8 11.5 - 14.5 %    Platelets 215 130 - 400 K/uL    Neutrophils % 73.8 %    Lymphocytes % 13.4 %    Monocytes % 11.9 %    Eosinophils % 0.4 %    Basophils % 0.4 %    Neutrophils Absolute 6.6 (H) 1.4 - 6.5 K/uL    Lymphocytes Absolute 1.2 1.0 - 4.8 K/uL    Monocytes Absolute 1.1 (H) 0.2 - 0.8 K/uL    Eosinophils Absolute 0.0 0.0 - 0.7 K/uL    Basophils Absolute 0.0 0.0 - 0.2 K/uL   CMP   Result Value Ref Range    Sodium 138 135 - 144 mEq/L    Potassium 3.1 (L) 3.4 - 4.9 mEq/L    Chloride 102 95 - 107 mEq/L    CO2 25 20 - 31 mEq/L    Anion Gap 11 9 - 15 mEq/L    Glucose 102 (H) 70 - 99 mg/dL    BUN 14 8 - 23 mg/dL    Creatinine 0.66 0.50 - 0.90 mg/dL    Est, Glom Filt Rate 84.9 >60    Calcium 10.2 (H) 8.5 - 9.9

## 2025-03-07 NOTE — ED TRIAGE NOTES
Pt presents to ED via EMS from home due to falling that she was unaware of. Pt is complaining of neck pain. Pt placed on cardiac monitor and showing NSR at a rate of 65. Pt on room air and maintaining at 97%.

## 2025-03-08 LAB
ALBUMIN SERPL-MCNC: 3.6 G/DL (ref 3.5–4.6)
ALP SERPL-CCNC: 101 U/L (ref 40–130)
ALT SERPL-CCNC: 16 U/L (ref 0–33)
ANION GAP SERPL CALCULATED.3IONS-SCNC: 7 MEQ/L (ref 9–15)
AST SERPL-CCNC: 18 U/L (ref 0–35)
BASOPHILS # BLD: 0 K/UL (ref 0–0.2)
BASOPHILS NFR BLD: 0.3 %
BILIRUB SERPL-MCNC: 0.5 MG/DL (ref 0.2–0.7)
BUN SERPL-MCNC: 13 MG/DL (ref 8–23)
CALCIUM SERPL-MCNC: 9.9 MG/DL (ref 8.5–9.9)
CHLORIDE SERPL-SCNC: 105 MEQ/L (ref 95–107)
CO2 SERPL-SCNC: 25 MEQ/L (ref 20–31)
CREAT SERPL-MCNC: 0.64 MG/DL (ref 0.5–0.9)
EOSINOPHIL # BLD: 0 K/UL (ref 0–0.7)
EOSINOPHIL NFR BLD: 0 %
ERYTHROCYTE [DISTWIDTH] IN BLOOD BY AUTOMATED COUNT: 13 % (ref 11.5–14.5)
GLOBULIN SER CALC-MCNC: 2.5 G/DL (ref 2.3–3.5)
GLUCOSE SERPL-MCNC: 159 MG/DL (ref 70–99)
HCT VFR BLD AUTO: 38.2 % (ref 37–47)
HGB BLD-MCNC: 12.9 G/DL (ref 12–16)
LYMPHOCYTES # BLD: 0.9 K/UL (ref 1–4.8)
LYMPHOCYTES NFR BLD: 8.9 %
MCH RBC QN AUTO: 30.9 PG (ref 27–31.3)
MCHC RBC AUTO-ENTMCNC: 33.8 % (ref 33–37)
MCV RBC AUTO: 91.6 FL (ref 79.4–94.8)
MONOCYTES # BLD: 0.8 K/UL (ref 0.2–0.8)
MONOCYTES NFR BLD: 7.6 %
NEUTROPHILS # BLD: 8.3 K/UL (ref 1.4–6.5)
NEUTS SEG NFR BLD: 82.9 %
PLATELET # BLD AUTO: 215 K/UL (ref 130–400)
POTASSIUM SERPL-SCNC: 3.6 MEQ/L (ref 3.4–4.9)
PROT SERPL-MCNC: 6.1 G/DL (ref 6.3–8)
RBC # BLD AUTO: 4.17 M/UL (ref 4.2–5.4)
SODIUM SERPL-SCNC: 137 MEQ/L (ref 135–144)
TROPONIN, HIGH SENSITIVITY: 24 NG/L (ref 0–19)
WBC # BLD AUTO: 10 K/UL (ref 4.8–10.8)

## 2025-03-08 PROCEDURE — 84484 ASSAY OF TROPONIN QUANT: CPT

## 2025-03-08 PROCEDURE — 6360000002 HC RX W HCPCS: Performed by: INTERNAL MEDICINE

## 2025-03-08 PROCEDURE — 36415 COLL VENOUS BLD VENIPUNCTURE: CPT

## 2025-03-08 PROCEDURE — 80053 COMPREHEN METABOLIC PANEL: CPT

## 2025-03-08 PROCEDURE — 2500000003 HC RX 250 WO HCPCS: Performed by: INTERNAL MEDICINE

## 2025-03-08 PROCEDURE — 85025 COMPLETE CBC W/AUTO DIFF WBC: CPT

## 2025-03-08 PROCEDURE — 97163 PT EVAL HIGH COMPLEX 45 MIN: CPT

## 2025-03-08 PROCEDURE — 6370000000 HC RX 637 (ALT 250 FOR IP): Performed by: INTERNAL MEDICINE

## 2025-03-08 PROCEDURE — 2060000000 HC ICU INTERMEDIATE R&B

## 2025-03-08 PROCEDURE — 97167 OT EVAL HIGH COMPLEX 60 MIN: CPT

## 2025-03-08 RX ADMIN — ACETAMINOPHEN 650 MG: 325 TABLET ORAL at 20:21

## 2025-03-08 RX ADMIN — HYDRALAZINE HYDROCHLORIDE 10 MG: 20 INJECTION INTRAMUSCULAR; INTRAVENOUS at 13:52

## 2025-03-08 RX ADMIN — ENOXAPARIN SODIUM 40 MG: 100 INJECTION SUBCUTANEOUS at 10:31

## 2025-03-08 RX ADMIN — Medication 10 ML: at 20:22

## 2025-03-08 RX ADMIN — HYDRALAZINE HYDROCHLORIDE 10 MG: 20 INJECTION INTRAMUSCULAR; INTRAVENOUS at 18:28

## 2025-03-08 RX ADMIN — Medication 3 MG: at 20:21

## 2025-03-08 ASSESSMENT — PAIN SCALES - GENERAL
PAINLEVEL_OUTOF10: 2
PAINLEVEL_OUTOF10: 5

## 2025-03-08 ASSESSMENT — PAIN DESCRIPTION - LOCATION: LOCATION: KNEE

## 2025-03-08 ASSESSMENT — PAIN DESCRIPTION - ORIENTATION: ORIENTATION: RIGHT

## 2025-03-08 NOTE — H&P
Drug use: No    Sexual activity: Not Currently   Other Topics Concern    Not on file   Social History Narrative    Born in [place], 2 younger brothers, one dec 2014, one in [place]     July 2020    , retired     Lives in a house in [place]     of first spouse    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, owned a convenient store    Hobbies reading, [employer] sales, travel    4 children, 2 daughters in [facility] ([place]), 2 sons in [place]    Grandchildren & great grandchildren >50        Son lives with her temporarily (01/08/2021)     Social Determinants of Health     Financial Resource Strain: Low Risk  (2/26/2024)    Overall Financial Resource Strain (CARDIA)     Difficulty of Paying Living Expenses: Not very hard   Food Insecurity: No Food Insecurity (2/26/2024)    Hunger Vital Sign     Worried About Running Out of Food in the Last Year: Never true     Ran Out of Food in the Last Year: Never true   Transportation Needs: Unknown (2/26/2024)    PRAPARE - Transportation     Lack of Transportation (Medical): Not on file     Lack of Transportation (Non-Medical): No   Physical Activity: Insufficiently Active (6/7/2024)    Exercise Vital Sign     Days of Exercise per Week: 7 days     Minutes of Exercise per Session: 10 min   Stress: Not on file   Social Connections: Not on file   Intimate Partner Violence: Not on file   Housing Stability: Unknown (2/26/2024)    Housing Stability Vital Sign     Unable to Pay for Housing in the Last Year: Not on file     Number of Places Lived in the Last Year: Not on file     Unstable Housing in the Last Year: No     MEDICATIONS:   Prior to Admission medications    Medication Sig Start Date End Date Taking? Authorizing Provider   ezetimibe (ZETIA) 10 MG tablet TAKE 1 TABLET DAILY 2/26/25   Debi Malone PA-C   solifenacin (VESICARE) 5 MG tablet Take 1 tablet by mouth daily 2/11/25   Debi Malone PA-C   famotidine (PEPCID) 40 MG tablet Take 1 tablet by

## 2025-03-08 NOTE — CARE COORDINATION
Case Management Assessment  Initial Evaluation    Date/Time of Evaluation: 3/8/2025 2:49 PM  Assessment Completed by: Linda Garay RN    If patient is discharged prior to next notation, then this note serves as note for discharge by case management.    Patient Name: Fern Recio                   YOB: 1940  Diagnosis: Essential hypertension [I10]  Hypertensive urgency [I16.0]  Fall, initial encounter [W19.XXXA]  Acute pain of right knee [M25.561]                   Date / Time: 3/7/2025  2:54 PM    Patient Admission Status: Inpatient   Readmission Risk (Low < 19, Mod (19-27), High > 27): Readmission Risk Score: 13.3    Current PCP: Debi Malone PA-C  PCP verified by CM? Yes    Chart Reviewed: Yes      History Provided by: Patient  Patient Orientation: Alert and Oriented, Person, Place, Situation, Self    Patient Cognition: Alert    Hospitalization in the last 30 days (Readmission):  No    If yes, Readmission Assessment in CM Navigator will be completed.    Advance Directives:      Code Status: Full Code   Patient's Primary Decision Maker is: Legal Next of Kin    Primary Decision Maker: Peggy Arce - Child - [phone]    Discharge Planning:    Patient lives with: Children (SON) Type of Home: House  Primary Care Giver: Self  Patient Support Systems include: Children, Family Members, Friends/Neighbors   Current services prior to admission: Durable Medical Equipment            Current DME: Cane            Type of Home Care services:  None    ADLS  Prior functional level: Independent in ADLs/IADLs  Current functional level: Independent in ADLs/IADLs, Assistance with the following:, Mobility    PT AM-PAC:   /24  OT AM-PAC:   /24    Family can provide assistance at DC: Yes  Would you like Case Management to discuss the discharge plan with any other family members/significant others, and if so, who? Yes (SON, PEGGY)  Plans to Return to Present Housing: Unknown at present (AWAITING PT/OT)  Other

## 2025-03-08 NOTE — ACP (ADVANCE CARE PLANNING)
Advance Care Planning   Healthcare Decision Maker:    Primary Decision Maker: Henrry Arce - Child - [phone]

## 2025-03-08 NOTE — ED NOTES
Transport request placed, report called to Diana DEL TORO all questions answered to the best of this nurses abilities

## 2025-03-09 LAB
ALBUMIN SERPL-MCNC: 3.6 G/DL (ref 3.5–4.6)
ALP SERPL-CCNC: 107 U/L (ref 40–130)
ALT SERPL-CCNC: 15 U/L (ref 0–33)
ANION GAP SERPL CALCULATED.3IONS-SCNC: 11 MEQ/L (ref 9–15)
AST SERPL-CCNC: 18 U/L (ref 0–35)
BASOPHILS # BLD: 0 K/UL (ref 0–0.2)
BASOPHILS NFR BLD: 0.3 %
BILIRUB SERPL-MCNC: 0.8 MG/DL (ref 0.2–0.7)
BUN SERPL-MCNC: 14 MG/DL (ref 8–23)
CALCIUM SERPL-MCNC: 10 MG/DL (ref 8.5–9.9)
CHLORIDE SERPL-SCNC: 105 MEQ/L (ref 95–107)
CO2 SERPL-SCNC: 24 MEQ/L (ref 20–31)
CREAT SERPL-MCNC: 0.63 MG/DL (ref 0.5–0.9)
EOSINOPHIL # BLD: 0 K/UL (ref 0–0.7)
EOSINOPHIL NFR BLD: 0.1 %
ERYTHROCYTE [DISTWIDTH] IN BLOOD BY AUTOMATED COUNT: 13.3 % (ref 11.5–14.5)
GLOBULIN SER CALC-MCNC: 2.8 G/DL (ref 2.3–3.5)
GLUCOSE SERPL-MCNC: 124 MG/DL (ref 70–99)
HCT VFR BLD AUTO: 39 % (ref 37–47)
HGB BLD-MCNC: 13.6 G/DL (ref 12–16)
LYMPHOCYTES # BLD: 1.3 K/UL (ref 1–4.8)
LYMPHOCYTES NFR BLD: 12.3 %
MAGNESIUM SERPL-MCNC: 2.1 MG/DL (ref 1.7–2.4)
MCH RBC QN AUTO: 32.3 PG (ref 27–31.3)
MCHC RBC AUTO-ENTMCNC: 34.9 % (ref 33–37)
MCV RBC AUTO: 92.6 FL (ref 79.4–94.8)
MONOCYTES # BLD: 1.2 K/UL (ref 0.2–0.8)
MONOCYTES NFR BLD: 11.2 %
NEUTROPHILS # BLD: 7.9 K/UL (ref 1.4–6.5)
NEUTS SEG NFR BLD: 75.7 %
PLATELET # BLD AUTO: 226 K/UL (ref 130–400)
POTASSIUM SERPL-SCNC: 3.3 MEQ/L (ref 3.4–4.9)
PROT SERPL-MCNC: 6.4 G/DL (ref 6.3–8)
RBC # BLD AUTO: 4.21 M/UL (ref 4.2–5.4)
SODIUM SERPL-SCNC: 140 MEQ/L (ref 135–144)
WBC # BLD AUTO: 10.4 K/UL (ref 4.8–10.8)

## 2025-03-09 PROCEDURE — 97535 SELF CARE MNGMENT TRAINING: CPT

## 2025-03-09 PROCEDURE — 36415 COLL VENOUS BLD VENIPUNCTURE: CPT

## 2025-03-09 PROCEDURE — 6360000002 HC RX W HCPCS: Performed by: INTERNAL MEDICINE

## 2025-03-09 PROCEDURE — 2500000003 HC RX 250 WO HCPCS: Performed by: INTERNAL MEDICINE

## 2025-03-09 PROCEDURE — 6370000000 HC RX 637 (ALT 250 FOR IP): Performed by: INTERNAL MEDICINE

## 2025-03-09 PROCEDURE — 2060000000 HC ICU INTERMEDIATE R&B

## 2025-03-09 PROCEDURE — 6370000000 HC RX 637 (ALT 250 FOR IP): Performed by: STUDENT IN AN ORGANIZED HEALTH CARE EDUCATION/TRAINING PROGRAM

## 2025-03-09 PROCEDURE — 83735 ASSAY OF MAGNESIUM: CPT

## 2025-03-09 PROCEDURE — 80053 COMPREHEN METABOLIC PANEL: CPT

## 2025-03-09 PROCEDURE — 85025 COMPLETE CBC W/AUTO DIFF WBC: CPT

## 2025-03-09 RX ORDER — LISINOPRIL 20 MG/1
20 TABLET ORAL DAILY
Status: DISCONTINUED | OUTPATIENT
Start: 2025-03-09 | End: 2025-03-10 | Stop reason: HOSPADM

## 2025-03-09 RX ORDER — AMLODIPINE AND BENAZEPRIL HYDROCHLORIDE 5; 20 MG/1; MG/1
1 CAPSULE ORAL DAILY
Status: DISCONTINUED | OUTPATIENT
Start: 2025-03-09 | End: 2025-03-09 | Stop reason: CLARIF

## 2025-03-09 RX ORDER — MECLIZINE HCL 12.5 MG 12.5 MG/1
12.5 TABLET ORAL 2 TIMES DAILY
Status: DISCONTINUED | OUTPATIENT
Start: 2025-03-09 | End: 2025-03-10 | Stop reason: HOSPADM

## 2025-03-09 RX ORDER — AMLODIPINE BESYLATE 5 MG/1
5 TABLET ORAL DAILY
Status: DISCONTINUED | OUTPATIENT
Start: 2025-03-09 | End: 2025-03-10 | Stop reason: HOSPADM

## 2025-03-09 RX ADMIN — Medication 10 ML: at 20:14

## 2025-03-09 RX ADMIN — ACETAMINOPHEN 650 MG: 325 TABLET ORAL at 12:57

## 2025-03-09 RX ADMIN — ENOXAPARIN SODIUM 40 MG: 100 INJECTION SUBCUTANEOUS at 11:04

## 2025-03-09 RX ADMIN — LISINOPRIL 20 MG: 20 TABLET ORAL at 12:56

## 2025-03-09 RX ADMIN — MECLIZINE HCL 12.5 MG 12.5 MG: 12.5 TABLET ORAL at 12:56

## 2025-03-09 RX ADMIN — HYDRALAZINE HYDROCHLORIDE 10 MG: 20 INJECTION INTRAMUSCULAR; INTRAVENOUS at 11:16

## 2025-03-09 RX ADMIN — MECLIZINE HCL 12.5 MG 12.5 MG: 12.5 TABLET ORAL at 20:14

## 2025-03-09 RX ADMIN — HYDRALAZINE HYDROCHLORIDE 10 MG: 20 INJECTION INTRAMUSCULAR; INTRAVENOUS at 00:02

## 2025-03-09 RX ADMIN — AMLODIPINE BESYLATE 5 MG: 5 TABLET ORAL at 12:57

## 2025-03-09 RX ADMIN — POTASSIUM BICARBONATE 40 MEQ: 782 TABLET, EFFERVESCENT ORAL at 12:57

## 2025-03-09 ASSESSMENT — PAIN SCALES - GENERAL: PAINLEVEL_OUTOF10: 0

## 2025-03-09 NOTE — CARE COORDINATION
[facility] Pre-Admission Screening Document      Patient Name: Fern Recio       MRN: [id number]    : 1940    Age: 84 y.o.  Gender: female   Payor: Payor: MEDICARE / Plan: MEDICARE PART A AND B / Product Type: *No Product type* /   MSSP: No    Admitted from: [facility] Floor: 1W  Attending Care Provider: Pallavi Saunders MD  Inpatient Rehab Referring Care Provider: Pallavi Saunders  Primary Care Provider: Debi Malone PA-C  Inpatient Treatment Team including Consults: Treatment Team:   Pallavi Saunders MD Shrader, Brandy Houston, RN  Lorenza Evans, Srinivas Lee, Sirena Garay, Linda PALACIOS RN    Reason for Hospitalization:   1. Fall, initial encounter    2. Essential hypertension    3. Acute pain of right knee      Chief Complaint   Patient presents with    Fall     Isolation:No active isolations    Hospital Course:  Admit Date: 3/7/2025  2:54 PM  Inpatient Rehab Referral Date: 3/8/25  Narrative of hospital course/history of present illness:   83 y.o. female with PMHxof hypertension, CAD on Aggrenox who presented to ED for fall. Patient does not recall whether the fall was mechanical or loss of consciousness. Imaging was negative for fractures. Patient not sure if she took antihypertensive medication on day of fall. Patient's blood pressure has been consistently over 200s. She is complained of headache but denied any lightheadedness or dizziness (however, has Hx of Vertigo). Other than hypokalemia, labs are unremarkable. CT of the head was negative for any acute intracranial abnormality. Troponins were slightly elevated but remain stabel in the 20s.   After meeting with the patient, she stated that she fell because she has vertigo.    Internal Medicine:  Hypertensive urgency  - BP improved with home BP meds  - IV hydralazine prn     Fall with generalized weakness and vertigo  - PT/OT following, acute rehab referral sent

## 2025-03-09 NOTE — CARE COORDINATION
Met with patient to discuss Inpatient Rehab Facility (IRF) referral. Discussed IRF mission, goals, daily process, 3 hours of intensive therapy per day, approximate length of stay, Team, Medicare coverage, and discharge planning with the goal of home with Home Health Care.  Asked the patient if she knew how she fell at home and the patient replied, \"Vertigo. I have vertigo and it made me fall.\" This nurse noted that the patient's speech was slightly slurred when she responded. Questions asked and understanding verbalized when answered. Asked the patient if she wanted to admit to IRF prior to being discharged home and the patient stated, \"Yes, I do. I need rehab before I go home.\" Using [place] [facility] the patient choose [facility] Inpatient Rehab facility if approved by PM&R. Electronically signed by Kvng Alonzo RN on 3/9/25 at 10:14 AM EDT

## 2025-03-09 NOTE — PLAN OF CARE
Problem: Chronic Conditions and Co-morbidities  Goal: Patient's chronic conditions and co-morbidity symptoms are monitored and maintained or improved  Outcome: Progressing  Flowsheets (Taken 3/8/2025 1051 by Frances Borja, RN)  Care Plan - Patient's Chronic Conditions and Co-Morbidity Symptoms are Monitored and Maintained or Improved:   Monitor and assess patient's chronic conditions and comorbid symptoms for stability, deterioration, or improvement   Collaborate with multidisciplinary team to address chronic and comorbid conditions and prevent exacerbation or deterioration   Update acute care plan with appropriate goals if chronic or comorbid symptoms are exacerbated and prevent overall improvement and discharge     Problem: Discharge Planning  Goal: Discharge to home or other facility with appropriate resources  Outcome: Progressing  Flowsheets (Taken 3/8/2025 1051 by Frances Borja, RN)  Discharge to home or other facility with appropriate resources:   Identify barriers to discharge with patient and caregiver   Arrange for needed discharge resources and transportation as appropriate   Identify discharge learning needs (meds, wound care, etc)   Refer to discharge planning if patient needs post-hospital services based on physician order or complex needs related to functional status, cognitive ability or social support system     Problem: Pain  Goal: Verbalizes/displays adequate comfort level or baseline comfort level  Outcome: Progressing     Problem: Safety - Adult  Goal: Free from fall injury  Outcome: Progressing     Problem: ABCDS Injury Assessment  Goal: Absence of physical injury  Outcome: Progressing     Problem: Skin/Tissue Integrity  Goal: Skin integrity remains intact  Description: 1.  Monitor for areas of redness and/or skin breakdown  2.  Assess vascular access sites hourly  3.  Every 4-6 hours minimum:  Change oxygen saturation probe site  4.  Every 4-6 hours:  If on nasal continuous

## 2025-03-10 ENCOUNTER — HOSPITAL ENCOUNTER (INPATIENT)
Age: 85
LOS: 13 days | Discharge: HOME HEALTH | DRG: 093 | End: 2025-03-23
Attending: PHYSICAL MEDICINE & REHABILITATION | Admitting: PHYSICAL MEDICINE & REHABILITATION
Payer: MEDICARE

## 2025-03-10 VITALS
SYSTOLIC BLOOD PRESSURE: 150 MMHG | TEMPERATURE: 99 F | HEART RATE: 66 BPM | DIASTOLIC BLOOD PRESSURE: 64 MMHG | BODY MASS INDEX: 25.67 KG/M2 | HEIGHT: 65 IN | RESPIRATION RATE: 18 BRPM | WEIGHT: 154.1 LBS | OXYGEN SATURATION: 96 %

## 2025-03-10 DIAGNOSIS — M15.3 POST-TRAUMATIC OSTEOARTHRITIS OF MULTIPLE JOINTS: ICD-10-CM

## 2025-03-10 DIAGNOSIS — M79.672 PAIN IN LEFT FOOT: Primary | ICD-10-CM

## 2025-03-10 DIAGNOSIS — M79.675 PAIN IN TOES OF BOTH FEET: ICD-10-CM

## 2025-03-10 DIAGNOSIS — Z78.9 IMPAIRED MOBILITY AND ACTIVITIES OF DAILY LIVING: ICD-10-CM

## 2025-03-10 DIAGNOSIS — M79.674 PAIN IN TOES OF BOTH FEET: ICD-10-CM

## 2025-03-10 DIAGNOSIS — Z74.09 IMPAIRED MOBILITY AND ACTIVITIES OF DAILY LIVING: ICD-10-CM

## 2025-03-10 DIAGNOSIS — T79.6XXS TRAUMATIC RHABDOMYOLYSIS, SEQUELA: ICD-10-CM

## 2025-03-10 PROBLEM — M62.82 RHABDOMYOLYSIS: Status: ACTIVE | Noted: 2025-03-10

## 2025-03-10 LAB
ANION GAP SERPL CALCULATED.3IONS-SCNC: 7 MEQ/L (ref 9–15)
BUN SERPL-MCNC: 21 MG/DL (ref 8–23)
CALCIUM SERPL-MCNC: 9.5 MG/DL (ref 8.5–9.9)
CHLORIDE SERPL-SCNC: 104 MEQ/L (ref 95–107)
CO2 SERPL-SCNC: 26 MEQ/L (ref 20–31)
CREAT SERPL-MCNC: 0.67 MG/DL (ref 0.5–0.9)
EKG ATRIAL RATE: 68 BPM
EKG P AXIS: 72 DEGREES
EKG P-R INTERVAL: 196 MS
EKG Q-T INTERVAL: 398 MS
EKG QRS DURATION: 100 MS
EKG QTC CALCULATION (BAZETT): 423 MS
EKG R AXIS: 7 DEGREES
EKG T AXIS: 32 DEGREES
EKG VENTRICULAR RATE: 68 BPM
GLUCOSE SERPL-MCNC: 98 MG/DL (ref 70–99)
MAGNESIUM SERPL-MCNC: 2.1 MG/DL (ref 1.7–2.4)
POTASSIUM SERPL-SCNC: 3.4 MEQ/L (ref 3.4–4.9)
SODIUM SERPL-SCNC: 137 MEQ/L (ref 135–144)

## 2025-03-10 PROCEDURE — 2500000003 HC RX 250 WO HCPCS: Performed by: INTERNAL MEDICINE

## 2025-03-10 PROCEDURE — 6370000000 HC RX 637 (ALT 250 FOR IP): Performed by: INTERNAL MEDICINE

## 2025-03-10 PROCEDURE — 80048 BASIC METABOLIC PNL TOTAL CA: CPT

## 2025-03-10 PROCEDURE — 1180000000 HC REHAB R&B

## 2025-03-10 PROCEDURE — 93010 ELECTROCARDIOGRAM REPORT: CPT | Performed by: INTERNAL MEDICINE

## 2025-03-10 PROCEDURE — 6360000002 HC RX W HCPCS: Performed by: INTERNAL MEDICINE

## 2025-03-10 PROCEDURE — 36415 COLL VENOUS BLD VENIPUNCTURE: CPT

## 2025-03-10 PROCEDURE — 6370000000 HC RX 637 (ALT 250 FOR IP): Performed by: STUDENT IN AN ORGANIZED HEALTH CARE EDUCATION/TRAINING PROGRAM

## 2025-03-10 PROCEDURE — 99232 SBSQ HOSP IP/OBS MODERATE 35: CPT | Performed by: PHYSICAL MEDICINE & REHABILITATION

## 2025-03-10 PROCEDURE — 83735 ASSAY OF MAGNESIUM: CPT

## 2025-03-10 RX ORDER — MECLIZINE HCL 12.5 MG 12.5 MG/1
12.5 TABLET ORAL 2 TIMES DAILY
Status: CANCELLED | OUTPATIENT
Start: 2025-03-10

## 2025-03-10 RX ORDER — POLYETHYLENE GLYCOL 3350 17 G/17G
17 POWDER, FOR SOLUTION ORAL DAILY PRN
Status: DISCONTINUED | OUTPATIENT
Start: 2025-03-10 | End: 2025-03-23 | Stop reason: HOSPADM

## 2025-03-10 RX ORDER — AMLODIPINE BESYLATE 5 MG/1
5 TABLET ORAL DAILY
Status: DISCONTINUED | OUTPATIENT
Start: 2025-03-11 | End: 2025-03-15

## 2025-03-10 RX ORDER — ENOXAPARIN SODIUM 100 MG/ML
40 INJECTION SUBCUTANEOUS DAILY
Status: DISCONTINUED | OUTPATIENT
Start: 2025-03-11 | End: 2025-03-20

## 2025-03-10 RX ORDER — LISINOPRIL 20 MG/1
20 TABLET ORAL DAILY
Status: DISCONTINUED | OUTPATIENT
Start: 2025-03-11 | End: 2025-03-15

## 2025-03-10 RX ORDER — HYDRALAZINE HYDROCHLORIDE 20 MG/ML
10 INJECTION INTRAMUSCULAR; INTRAVENOUS EVERY 4 HOURS PRN
Status: DISCONTINUED | OUTPATIENT
Start: 2025-03-10 | End: 2025-03-23 | Stop reason: HOSPADM

## 2025-03-10 RX ORDER — ONDANSETRON 2 MG/ML
4 INJECTION INTRAMUSCULAR; INTRAVENOUS EVERY 6 HOURS PRN
Status: CANCELLED | OUTPATIENT
Start: 2025-03-10

## 2025-03-10 RX ORDER — AMLODIPINE BESYLATE 5 MG/1
5 TABLET ORAL DAILY
Status: CANCELLED | OUTPATIENT
Start: 2025-03-11

## 2025-03-10 RX ORDER — ACETAMINOPHEN 325 MG/1
650 TABLET ORAL EVERY 6 HOURS PRN
Status: CANCELLED | OUTPATIENT
Start: 2025-03-10

## 2025-03-10 RX ORDER — POTASSIUM CHLORIDE 7.45 MG/ML
10 INJECTION INTRAVENOUS PRN
Status: DISCONTINUED | OUTPATIENT
Start: 2025-03-10 | End: 2025-03-17

## 2025-03-10 RX ORDER — POTASSIUM CHLORIDE 1500 MG/1
40 TABLET, EXTENDED RELEASE ORAL PRN
Status: CANCELLED | OUTPATIENT
Start: 2025-03-10

## 2025-03-10 RX ORDER — SODIUM CHLORIDE 0.9 % (FLUSH) 0.9 %
5-40 SYRINGE (ML) INJECTION EVERY 12 HOURS SCHEDULED
Status: CANCELLED | OUTPATIENT
Start: 2025-03-10

## 2025-03-10 RX ORDER — MAGNESIUM SULFATE IN WATER 40 MG/ML
2000 INJECTION, SOLUTION INTRAVENOUS PRN
Status: CANCELLED | OUTPATIENT
Start: 2025-03-10

## 2025-03-10 RX ORDER — ACETAMINOPHEN 650 MG/1
650 SUPPOSITORY RECTAL EVERY 6 HOURS PRN
Status: CANCELLED | OUTPATIENT
Start: 2025-03-10

## 2025-03-10 RX ORDER — MAGNESIUM SULFATE IN WATER 40 MG/ML
2000 INJECTION, SOLUTION INTRAVENOUS PRN
Status: DISCONTINUED | OUTPATIENT
Start: 2025-03-10 | End: 2025-03-17

## 2025-03-10 RX ORDER — ONDANSETRON 4 MG/1
4 TABLET, ORALLY DISINTEGRATING ORAL EVERY 8 HOURS PRN
Status: CANCELLED | OUTPATIENT
Start: 2025-03-10

## 2025-03-10 RX ORDER — LISINOPRIL 20 MG/1
20 TABLET ORAL DAILY
Status: CANCELLED | OUTPATIENT
Start: 2025-03-11

## 2025-03-10 RX ORDER — MECLIZINE HYDROCHLORIDE 25 MG/1
12.5 TABLET ORAL 2 TIMES DAILY
Status: DISCONTINUED | OUTPATIENT
Start: 2025-03-10 | End: 2025-03-19

## 2025-03-10 RX ORDER — SODIUM CHLORIDE 0.9 % (FLUSH) 0.9 %
5-40 SYRINGE (ML) INJECTION EVERY 12 HOURS SCHEDULED
Status: DISCONTINUED | OUTPATIENT
Start: 2025-03-10 | End: 2025-03-12 | Stop reason: ALTCHOICE

## 2025-03-10 RX ORDER — POTASSIUM CHLORIDE 1500 MG/1
40 TABLET, EXTENDED RELEASE ORAL PRN
Status: DISCONTINUED | OUTPATIENT
Start: 2025-03-10 | End: 2025-03-17

## 2025-03-10 RX ORDER — ACETAMINOPHEN 650 MG/1
650 SUPPOSITORY RECTAL EVERY 6 HOURS PRN
Status: DISCONTINUED | OUTPATIENT
Start: 2025-03-10 | End: 2025-03-11

## 2025-03-10 RX ORDER — ONDANSETRON 2 MG/ML
4 INJECTION INTRAMUSCULAR; INTRAVENOUS EVERY 6 HOURS PRN
Status: DISCONTINUED | OUTPATIENT
Start: 2025-03-10 | End: 2025-03-23 | Stop reason: HOSPADM

## 2025-03-10 RX ORDER — POLYETHYLENE GLYCOL 3350 17 G/17G
17 POWDER, FOR SOLUTION ORAL DAILY PRN
Status: CANCELLED | OUTPATIENT
Start: 2025-03-10

## 2025-03-10 RX ORDER — ONDANSETRON 4 MG/1
4 TABLET, ORALLY DISINTEGRATING ORAL EVERY 8 HOURS PRN
Status: DISCONTINUED | OUTPATIENT
Start: 2025-03-10 | End: 2025-03-23 | Stop reason: HOSPADM

## 2025-03-10 RX ORDER — ENOXAPARIN SODIUM 100 MG/ML
40 INJECTION SUBCUTANEOUS DAILY
Status: CANCELLED | OUTPATIENT
Start: 2025-03-11

## 2025-03-10 RX ORDER — POTASSIUM CHLORIDE 7.45 MG/ML
10 INJECTION INTRAVENOUS PRN
Status: CANCELLED | OUTPATIENT
Start: 2025-03-10

## 2025-03-10 RX ORDER — HYDRALAZINE HYDROCHLORIDE 20 MG/ML
10 INJECTION INTRAMUSCULAR; INTRAVENOUS EVERY 4 HOURS PRN
Status: CANCELLED | OUTPATIENT
Start: 2025-03-10

## 2025-03-10 RX ORDER — ACETAMINOPHEN 325 MG/1
650 TABLET ORAL EVERY 6 HOURS PRN
Status: DISCONTINUED | OUTPATIENT
Start: 2025-03-10 | End: 2025-03-11

## 2025-03-10 RX ADMIN — ACETAMINOPHEN 650 MG: 325 TABLET ORAL at 02:17

## 2025-03-10 RX ADMIN — ACETAMINOPHEN 650 MG: 325 TABLET ORAL at 10:05

## 2025-03-10 RX ADMIN — AMLODIPINE BESYLATE 5 MG: 5 TABLET ORAL at 10:05

## 2025-03-10 RX ADMIN — Medication 10 ML: at 12:17

## 2025-03-10 RX ADMIN — ACETAMINOPHEN 650 MG: 325 TABLET ORAL at 17:04

## 2025-03-10 RX ADMIN — ENOXAPARIN SODIUM 40 MG: 100 INJECTION SUBCUTANEOUS at 10:05

## 2025-03-10 RX ADMIN — LISINOPRIL 20 MG: 20 TABLET ORAL at 10:05

## 2025-03-10 RX ADMIN — MECLIZINE HCL 12.5 MG 12.5 MG: 12.5 TABLET ORAL at 10:05

## 2025-03-10 RX ADMIN — MECLIZINE HYDROCHLORIDE 12.5 MG: 25 TABLET ORAL at 21:16

## 2025-03-10 ASSESSMENT — PAIN SCALES - GENERAL
PAINLEVEL_OUTOF10: 0
PAINLEVEL_OUTOF10: 4
PAINLEVEL_OUTOF10: 7
PAINLEVEL_OUTOF10: 6

## 2025-03-10 ASSESSMENT — PAIN DESCRIPTION - DESCRIPTORS
DESCRIPTORS: DISCOMFORT
DESCRIPTORS: ACHING
DESCRIPTORS: ACHING

## 2025-03-10 ASSESSMENT — PAIN - FUNCTIONAL ASSESSMENT: PAIN_FUNCTIONAL_ASSESSMENT: ACTIVITIES ARE NOT PREVENTED

## 2025-03-10 ASSESSMENT — PAIN DESCRIPTION - ORIENTATION
ORIENTATION: RIGHT;LEFT
ORIENTATION: RIGHT;LEFT;LOWER
ORIENTATION: RIGHT;LEFT

## 2025-03-10 ASSESSMENT — PAIN DESCRIPTION - LOCATION
LOCATION: FOOT
LOCATION: LEG
LOCATION: KNEE

## 2025-03-10 NOTE — PLAN OF CARE
Problem: Chronic Conditions and Co-morbidities  Goal: Patient's chronic conditions and co-morbidity symptoms are monitored and maintained or improved  3/10/2025 1230 by Mariela Estevez RN  Outcome: Progressing  Flowsheets (Taken 3/10/2025 1007)  Care Plan - Patient's Chronic Conditions and Co-Morbidity Symptoms are Monitored and Maintained or Improved: Monitor and assess patient's chronic conditions and comorbid symptoms for stability, deterioration, or improvement  3/10/2025 0201 by Brandy Guzman RN  Outcome: Progressing  Flowsheets (Taken 3/9/2025 1942)  Care Plan - Patient's Chronic Conditions and Co-Morbidity Symptoms are Monitored and Maintained or Improved: Monitor and assess patient's chronic conditions and comorbid symptoms for stability, deterioration, or improvement     Problem: Discharge Planning  Goal: Discharge to home or other facility with appropriate resources  3/10/2025 1230 by Mariela Estevez RN  Outcome: Progressing  Flowsheets (Taken 3/10/2025 1007)  Discharge to home or other facility with appropriate resources: Identify barriers to discharge with patient and caregiver  3/10/2025 0201 by Brandy Guzman RN  Outcome: Progressing  Flowsheets (Taken 3/9/2025 1942)  Discharge to home or other facility with appropriate resources: Identify barriers to discharge with patient and caregiver     Problem: Pain  Goal: Verbalizes/displays adequate comfort level or baseline comfort level  3/10/2025 1230 by Mariela Estevez RN  Outcome: Progressing  3/10/2025 0201 by Brandy Guzman RN  Outcome: Progressing     Problem: Safety - Adult  Goal: Free from fall injury  Outcome: Progressing     Problem: ABCDS Injury Assessment  Goal: Absence of physical injury  Outcome: Progressing     Problem: Skin/Tissue Integrity  Goal: Skin integrity remains intact  Description: 1.  Monitor for areas of redness and/or skin breakdown  2.  Assess vascular access sites hourly  3.  Every 4-6 hours

## 2025-03-10 NOTE — PLAN OF CARE
Problem: Chronic Conditions and Co-morbidities  Goal: Patient's chronic conditions and co-morbidity symptoms are monitored and maintained or improved  3/10/2025 1336 by Mariela Estevez RN  Outcome: Completed  3/10/2025 1230 by Mariela Estevez RN  Outcome: Progressing  Flowsheets (Taken 3/10/2025 1007)  Care Plan - Patient's Chronic Conditions and Co-Morbidity Symptoms are Monitored and Maintained or Improved: Monitor and assess patient's chronic conditions and comorbid symptoms for stability, deterioration, or improvement  3/10/2025 0201 by Brandy Guzman, RN  Outcome: Progressing  Flowsheets (Taken 3/9/2025 1942)  Care Plan - Patient's Chronic Conditions and Co-Morbidity Symptoms are Monitored and Maintained or Improved: Monitor and assess patient's chronic conditions and comorbid symptoms for stability, deterioration, or improvement     Problem: Discharge Planning  Goal: Discharge to home or other facility with appropriate resources  3/10/2025 1336 by Mariela Estevez RN  Outcome: Completed  3/10/2025 1230 by Mariela Estevez RN  Outcome: Progressing  Flowsheets (Taken 3/10/2025 1007)  Discharge to home or other facility with appropriate resources: Identify barriers to discharge with patient and caregiver  3/10/2025 0201 by Brandy Guzman RN  Outcome: Progressing  Flowsheets (Taken 3/9/2025 1942)  Discharge to home or other facility with appropriate resources: Identify barriers to discharge with patient and caregiver     Problem: Pain  Goal: Verbalizes/displays adequate comfort level or baseline comfort level  3/10/2025 1336 by Mariela Estevez RN  Outcome: Completed  3/10/2025 1230 by Mariela Estevez RN  Outcome: Progressing  3/10/2025 0201 by Brandy Guzman RN  Outcome: Progressing     Problem: Safety - Adult  Goal: Free from fall injury  3/10/2025 1336 by Mariela Estevez RN  Outcome: Completed  3/10/2025 1230 by Mariela Estevez RN  Outcome: Progressing     Problem:

## 2025-03-10 NOTE — DISCHARGE SUMMARY
Hospital Medicine Discharge Summary    Fern Recio  :  1940  MRN:  [id number]    Admit date:  3/7/2025  Discharge date:  3/10/2025    Admitting Physician:  Shanti Degroot MD  Primary Care Physician:  Debi Malone PA-C      Discharge Diagnoses:      Hypertensive urgency  Fall with generalized weakness and vertigo    Chief Complaint   Patient presents with    Fall     Hospital Course:     Patient is an 84-year-old female who was admitted to the hospital after a fall.  She was noted to have hypertensive urgency and generalized weakness in the setting of vertigo.  Her antihypertensive medication were adjusted.  Patient worked with PT/OT.  She was discharged to rehab in a stable condition after being evaluated by PM&R.        Exam on discharge:   BP (!) 151/59   Pulse 65   Temp 98.4 °F (36.9 °C) (Oral)   Resp 18   Ht 1.651 m (5' 5\")   Wt 69.9 kg (154 lb 1.6 oz)   SpO2 94%   BMI 25.64 kg/m²   General appearance: No apparent distress, appears stated age and cooperative.  HEENT: Pupils equal, round, and reactive to light. Conjunctivae/corneas clear.  Neck: Supple, with full range of motion. No jugular venous distention. Trachea midline.  Respiratory:  Normal respiratory effort. Clear to auscultation, bilaterally without Rales/Wheezes/Rhonchi.  Cardiovascular: Regular rate and rhythm with normal S1/S2 without murmurs, rubs or gallops.  Abdomen: Soft, non-tender, non-distended with normal bowel sounds.  Musculoskeletal: No clubbing, cyanosis or edema bilaterally.  Full range of motion without deformity.  Skin: Skin color, texture, turgor normal.  No rashes or lesions.  Neuro: Non Focal. Symetrical motor and tone. Nl Comprehension, Alert,awake and oriented. NL CN. Symetrical tone and reflexes.  Psychiatric: Alert and oriented, thought content appropriate, normal insight  Capillary Refill: Brisk,< 3 seconds   Peripheral Pulses: +2 palpable, equal bilaterally     Patient was seen by the following

## 2025-03-10 NOTE — PLAN OF CARE
Problem: Chronic Conditions and Co-morbidities  Goal: Patient's chronic conditions and co-morbidity symptoms are monitored and maintained or improved  Outcome: Progressing  Flowsheets (Taken 3/9/2025 1942)  Care Plan - Patient's Chronic Conditions and Co-Morbidity Symptoms are Monitored and Maintained or Improved: Monitor and assess patient's chronic conditions and comorbid symptoms for stability, deterioration, or improvement     Problem: Discharge Planning  Goal: Discharge to home or other facility with appropriate resources  Outcome: Progressing  Flowsheets (Taken 3/9/2025 1942)  Discharge to home or other facility with appropriate resources: Identify barriers to discharge with patient and caregiver     Problem: Pain  Goal: Verbalizes/displays adequate comfort level or baseline comfort level  Outcome: Progressing

## 2025-03-10 NOTE — DISCHARGE INSTR - COC
Continuity of Care Form    Patient Name: Fern Recio   :  1940  MRN:  [id number]    Admit date:  3/7/2025  Discharge date:  ***    Code Status Order: Full Code   Advance Directives:     Admitting Physician:  Shanti Degroot MD  PCP: Debi Malone PA-C    Discharging Nurse: ***  Discharging Hospital Unit/Room#: W183/W183-01  Discharging Unit Phone Number: ***    Emergency Contact:   Extended Emergency Contact Information  Primary Emergency Contact: brea zheng  Home Phone: [phone]  Mobile Phone: [phone]  Relation: Child   needed? No  Secondary Emergency Contact: Henrry Arce  Home Phone: [phone]  Relation: Child    Past Surgical History:  Past Surgical History:   Procedure Laterality Date    BLADDER SUSPENSION      BREAST BIOPSY Right 2014    malignant    BREAST BIOPSY Right 2023    malignant (mastectomy)    BREAST LUMPECTOMY Right 2014    Lumpectomy with SLNB, Dr jun [address]ER[address] Dr in [facility]      EYE SURGERY      bilateral cataract surgery    FRACTURE SURGERY      \"as child right leg\"    MASTECTOMY Right     malignant    MASTECTOMY, MODIFIED RADICAL Right 2023    RIGHT BREAST MASTECTOMY MODIFIED RADICAL performed by Julienne Toth MD at [facility] OR    TONSILLECTOMY      TUBAL LIGATION      US BIOPSY LYMPH NODE  2023    US LYMPH NODE BIOPSY 2023 [facility] ULTRASOUND       Immunization History:   Immunization History   Administered Date(s) Administered    COVID-19, PFIZER Bivalent, DO NOT Dilute, (age 12y+), IM, 30 mcg/0.3 mL 10/14/2022, 2023    COVID-19, PFIZER GRAY top, DO NOT Dilute, (age 12 y+), IM, 30 mcg/0.3 mL 2022    COVID-19, PFIZER PURPLE top, DILUTE for use, (age 12 y+), 30mcg/0.3mL 2021, 2021, 10/22/2021    COVID-19, PFIZER, (age 12y+), IM, 30mcg/0.3mL 12/15/2023, 10/16/2024    Influenza Vaccine, unspecified formulation 2007, 2011, 2015, 2016    Influenza Virus

## 2025-03-10 NOTE — CONSULTS
Personal history of malignant neoplasm of breast    Estrogen receptor positive status (ER+)    Migraines    Essential palatal tremor    Benign paroxysmal vertigo of both ears    Closed head injury with concussion    Essential tremor    Hypercalcemia    GHAZAL (obstructive sleep apnea)    Adenocarcinoma, breast, right (HCC)    Grief reaction    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, right (HCC)    Status post mastectomy, right    Hyperlipemia    Cardiovascular disease    Cerebrovascular disease, unspecified    Right hip pain    Post-menopausal    Onychomycosis    Pain in left foot    Pain in toes of both feet    Peripheral vascular disease of foot    Plantar fasciitis of left foot    Xerosis of skin    Cystocele with prolapse    Urinary frequency    Gastroesophageal reflux disease without esophagitis    Mixed stress and urge urinary incontinence    Statin intolerance    Falls    Hypertensive urgency               Recommendations:    Considering all of the factors above including the patient's current medical status, social status/home environment, their functional needs, and their ability to participate in a therapy program, I feel that they would best be served at:    acute intensive comprehensive inpatient rehabilitation program.  Due to the diagnoses above the patient has had significant decline in function and is now requiring acute intensive therapy to optimize function.  They are expected to achieve meaningful functional gains.  Due to medical complexity as above, rehabilitation physician services is reasonable and necessary including face-to-face visits at least 3 days/week with anticipated need to treat, manage and modify the rehabilitation course of treatment including interdisciplinary team conferences.  They will require rehab physician care to monitor for neurogenic bowel bladder, postoperative pain, titration of opiate and high risk medications,   blood pressure and blood sugar control.    It is

## 2025-03-11 PROCEDURE — 97535 SELF CARE MNGMENT TRAINING: CPT

## 2025-03-11 PROCEDURE — 99223 1ST HOSP IP/OBS HIGH 75: CPT | Performed by: PHYSICAL MEDICINE & REHABILITATION

## 2025-03-11 PROCEDURE — 97163 PT EVAL HIGH COMPLEX 45 MIN: CPT

## 2025-03-11 PROCEDURE — 6360000002 HC RX W HCPCS: Performed by: PHYSICAL MEDICINE & REHABILITATION

## 2025-03-11 PROCEDURE — 1180000000 HC REHAB R&B

## 2025-03-11 PROCEDURE — 92523 SPEECH SOUND LANG COMPREHEN: CPT

## 2025-03-11 PROCEDURE — 6370000000 HC RX 637 (ALT 250 FOR IP): Performed by: INTERNAL MEDICINE

## 2025-03-11 PROCEDURE — 97110 THERAPEUTIC EXERCISES: CPT

## 2025-03-11 PROCEDURE — 97116 GAIT TRAINING THERAPY: CPT

## 2025-03-11 PROCEDURE — 6360000002 HC RX W HCPCS: Performed by: INTERNAL MEDICINE

## 2025-03-11 PROCEDURE — 97167 OT EVAL HIGH COMPLEX 60 MIN: CPT

## 2025-03-11 PROCEDURE — 6370000000 HC RX 637 (ALT 250 FOR IP): Performed by: PHYSICAL MEDICINE & REHABILITATION

## 2025-03-11 PROCEDURE — 97530 THERAPEUTIC ACTIVITIES: CPT

## 2025-03-11 RX ORDER — VITAMIN B COMPLEX
2000 TABLET ORAL
Status: DISCONTINUED | OUTPATIENT
Start: 2025-03-11 | End: 2025-03-23 | Stop reason: HOSPADM

## 2025-03-11 RX ORDER — ACETAMINOPHEN 325 MG/1
650 TABLET ORAL EVERY 4 HOURS PRN
Status: DISCONTINUED | OUTPATIENT
Start: 2025-03-11 | End: 2025-03-23 | Stop reason: HOSPADM

## 2025-03-11 RX ORDER — ACETAMINOPHEN 500 MG
500 TABLET ORAL EVERY 8 HOURS SCHEDULED
Status: DISCONTINUED | OUTPATIENT
Start: 2025-03-11 | End: 2025-03-23 | Stop reason: HOSPADM

## 2025-03-11 RX ORDER — ASPIRIN AND DIPYRIDAMOLE 25; 200 MG/1; MG/1
1 CAPSULE, EXTENDED RELEASE ORAL 2 TIMES DAILY
Status: DISCONTINUED | OUTPATIENT
Start: 2025-03-11 | End: 2025-03-23 | Stop reason: HOSPADM

## 2025-03-11 RX ORDER — LIDOCAINE 4 G/G
3 PATCH TOPICAL DAILY
Status: DISCONTINUED | OUTPATIENT
Start: 2025-03-11 | End: 2025-03-23 | Stop reason: HOSPADM

## 2025-03-11 RX ORDER — SODIUM PHOSPHATE, DIBASIC AND SODIUM PHOSPHATE, MONOBASIC 7; 19 G/230ML; G/230ML
1 ENEMA RECTAL DAILY PRN
Status: DISCONTINUED | OUTPATIENT
Start: 2025-03-11 | End: 2025-03-23 | Stop reason: HOSPADM

## 2025-03-11 RX ORDER — EZETIMIBE 10 MG/1
10 TABLET ORAL DAILY
Status: DISCONTINUED | OUTPATIENT
Start: 2025-03-11 | End: 2025-03-23 | Stop reason: HOSPADM

## 2025-03-11 RX ORDER — UBIDECARENONE 100 MG
100 CAPSULE ORAL DAILY
Status: DISCONTINUED | OUTPATIENT
Start: 2025-03-11 | End: 2025-03-23 | Stop reason: HOSPADM

## 2025-03-11 RX ORDER — BISACODYL 10 MG
10 SUPPOSITORY, RECTAL RECTAL DAILY PRN
Status: DISCONTINUED | OUTPATIENT
Start: 2025-03-11 | End: 2025-03-23 | Stop reason: HOSPADM

## 2025-03-11 RX ORDER — CYANOCOBALAMIN 1000 UG/ML
1000 INJECTION, SOLUTION INTRAMUSCULAR; SUBCUTANEOUS WEEKLY
Status: DISCONTINUED | OUTPATIENT
Start: 2025-03-11 | End: 2025-03-23 | Stop reason: HOSPADM

## 2025-03-11 RX ADMIN — CYANOCOBALAMIN 1000 MCG: 1000 INJECTION, SOLUTION INTRAMUSCULAR; SUBCUTANEOUS at 09:49

## 2025-03-11 RX ADMIN — MECLIZINE HYDROCHLORIDE 12.5 MG: 25 TABLET ORAL at 09:48

## 2025-03-11 RX ADMIN — ACETAMINOPHEN 650 MG: 325 TABLET ORAL at 00:44

## 2025-03-11 RX ADMIN — EZETIMIBE 10 MG: 10 TABLET ORAL at 13:20

## 2025-03-11 RX ADMIN — AMLODIPINE BESYLATE 5 MG: 5 TABLET ORAL at 09:49

## 2025-03-11 RX ADMIN — MECLIZINE HYDROCHLORIDE 12.5 MG: 25 TABLET ORAL at 21:35

## 2025-03-11 RX ADMIN — ENOXAPARIN SODIUM 40 MG: 100 INJECTION SUBCUTANEOUS at 09:50

## 2025-03-11 RX ADMIN — Medication 100 MG: at 09:49

## 2025-03-11 RX ADMIN — LISINOPRIL 20 MG: 20 TABLET ORAL at 09:47

## 2025-03-11 RX ADMIN — ACETAMINOPHEN 500 MG: 500 TABLET ORAL at 21:34

## 2025-03-11 RX ADMIN — ACETAMINOPHEN 500 MG: 500 TABLET ORAL at 15:45

## 2025-03-11 RX ADMIN — Medication 2000 UNITS: at 15:46

## 2025-03-11 RX ADMIN — ASPRIN AND EXTENDED-RELEASE DIPYRIDAMOLE 1 CAPSULE: 25; 200 CAPSULE ORAL at 13:20

## 2025-03-11 RX ADMIN — ASPRIN AND EXTENDED-RELEASE DIPYRIDAMOLE 1 CAPSULE: 25; 200 CAPSULE ORAL at 21:35

## 2025-03-11 RX ADMIN — Medication 3 MG: at 00:47

## 2025-03-11 RX ADMIN — ACETAMINOPHEN 650 MG: 325 TABLET ORAL at 09:47

## 2025-03-11 ASSESSMENT — PAIN DESCRIPTION - ORIENTATION
ORIENTATION: LEFT;RIGHT
ORIENTATION: RIGHT;LEFT

## 2025-03-11 ASSESSMENT — PAIN SCALES - GENERAL
PAINLEVEL_OUTOF10: 5
PAINLEVEL_OUTOF10: 3
PAINLEVEL_OUTOF10: 5
PAINLEVEL_OUTOF10: 5

## 2025-03-11 ASSESSMENT — PAIN DESCRIPTION - DESCRIPTORS
DESCRIPTORS: ACHING
DESCRIPTORS: ACHING

## 2025-03-11 ASSESSMENT — PAIN DESCRIPTION - LOCATION
LOCATION: HIP
LOCATION: KNEE
LOCATION: HIP;LEG

## 2025-03-11 NOTE — H&P
HISTORY & PHYSICAL       DATE OF ADMISSION:  3/10/2025    DATE OF SERVICE:  3/11/25    Subjective:    Fern Recio, 84 y.o. female presents today with:     CHIEF COMPLAINT:    83 yo female  complains of severe acute on chronic progressive fatigue and bilateral lower extremity pain partially relieved by rest, PT, OT and meds   and exacerbated by exertion and recent rhabdomyolysis after falling at home due to gait instability.     Imaging was negative for fracture.  Blood pressure was very high on admission consistently systolic over 200s cannot remember she had taken her blood pressure medications that day.  She does have a history of vertigo and increased falls at home.  It was felt that she fell due to her vertigo.  Troponins were found to be elevated.  She was treated for hypertensive urgency and stabilized medically.     Fatigue  This is a recurrent problem. The current episode started in the past 7 days. The problem occurs constantly. The problem has been gradually improving. Associated symptoms include arthralgias, fatigue, myalgias, neck pain and weakness. Pertinent negatives include no abdominal pain, chest pain, chills, coughing, fever, headaches, nausea, numbness, rash or vomiting.   Neck Pain   This is a recurrent problem. The current episode started in the past 7 days. The problem occurs constantly. The problem has been unchanged. The pain is associated with a fall. The pain is at a severity of 8/10. The pain is severe. The symptoms are aggravated by bending. Stiffness is present In the morning. Associated symptoms include weakness. Pertinent negatives include no chest pain, fever, headaches, numbness, photophobia, syncope, tingling or trouble swallowing. She has tried acetaminophen, bed rest, heat and oral narcotics for the symptoms. The treatment provided mild relief.       I reviewed recent nursing note, \" is resting in bed with c/o a headache. PRN Tylenol administered and was effective. Pt

## 2025-03-11 NOTE — PLAN OF CARE
Problem: Chronic Conditions and Co-morbidities  Goal: Patient's chronic conditions and co-morbidity symptoms are monitored and maintained or improved  3/10/2025 2320 by Debbie Estevez RN  Outcome: Progressing     Problem: Discharge Planning  Goal: Discharge to home or other facility with appropriate resources  3/10/2025 2320 by Debbie Estevez, RN  Outcome: Progressing  Flowsheets (Taken 3/10/2025 1831 by Natalie Sanders RN)  Discharge to home or other facility with appropriate resources: Identify barriers to discharge with patient and caregiver

## 2025-03-11 NOTE — PROGRESS NOTES
Hospitalist Progress Note      PCP: Debi Malone PA-C    Date of Admission: 3/7/2025    Chief Complaint:    Chief Complaint   Patient presents with    Fall     Subjective:  No acute events overnight. Pt resting comfortably in bed. No acute complaints at this time. Denies chest pain or shortness of breath.    Medications:  Reviewed    Infusion Medications    sodium chloride       Scheduled Medications    meclizine  12.5 mg Oral BID    amLODIPine  5 mg Oral Daily    And    lisinopril  20 mg Oral Daily    sodium chloride flush  5-40 mL IntraVENous 2 times per day    enoxaparin  40 mg SubCUTAneous Daily     PRN Meds: sodium chloride flush, sodium chloride, potassium chloride **OR** potassium alternative oral replacement **OR** potassium chloride, magnesium sulfate, ondansetron **OR** ondansetron, melatonin, polyethylene glycol, acetaminophen **OR** acetaminophen, hydrALAZINE      Intake/Output Summary (Last 24 hours) at 3/10/2025 0148  Last data filed at 3/9/2025 0644  Gross per 24 hour   Intake --   Output 350 ml   Net -350 ml     Exam:  BP (!) 153/65   Pulse 71   Temp 98.2 °F (36.8 °C) (Oral)   Resp 16   Ht 1.651 m (5' 5\")   Wt 69.9 kg (154 lb 1.6 oz)   SpO2 97%   BMI 25.64 kg/m²   Physical Exam  Cardiovascular:      Rate and Rhythm: Normal rate and regular rhythm.   Pulmonary:      Effort: Pulmonary effort is normal. No respiratory distress.   Abdominal:      Palpations: Abdomen is soft.      Tenderness: There is no abdominal tenderness.   Neurological:      Mental Status: She is alert and oriented to person, place, and time.   Psychiatric:         Mood and Affect: Mood normal.         Behavior: Behavior normal.       Labs:   Recent Labs     03/07/25  1534 03/08/25  0453 03/09/25  0536   WBC 9.0 10.0 10.4   HGB 13.5 12.9 13.6   HCT 38.6 38.2 39.0    215 226     Recent Labs     03/07/25  1534 03/08/25  0453 03/09/25  0536    137 140   K 3.1* 3.6 3.3*    105 105   CO2 25 25 24   BUN 14 13 14 
Hospitalist Progress Note      PCP: Debi Malone PA-C    Date of Admission: 3/7/2025    Chief Complaint:    Chief Complaint   Patient presents with    Fall     Subjective:  No acute events overnight. Pt resting comfortably in bed. No acute complaints at this time. Denies chest pain or shortness of breath.    Medications:  Reviewed    Infusion Medications    sodium chloride       Scheduled Medications    meclizine  12.5 mg Oral BID    amLODIPine  5 mg Oral Daily    And    lisinopril  20 mg Oral Daily    sodium chloride flush  5-40 mL IntraVENous 2 times per day    enoxaparin  40 mg SubCUTAneous Daily     PRN Meds: sodium chloride flush, sodium chloride, potassium chloride **OR** potassium alternative oral replacement **OR** potassium chloride, magnesium sulfate, ondansetron **OR** ondansetron, melatonin, polyethylene glycol, acetaminophen **OR** acetaminophen, hydrALAZINE      Intake/Output Summary (Last 24 hours) at 3/10/2025 0157  Last data filed at 3/9/2025 1942  Gross per 24 hour   Intake 120 ml   Output 350 ml   Net -230 ml     Exam:  BP (!) 145/59   Pulse 66   Temp 98.5 °F (36.9 °C) (Oral)   Resp 19   Ht 1.651 m (5' 5\")   Wt 69.9 kg (154 lb 1.6 oz)   SpO2 95%   BMI 25.64 kg/m²   Physical Exam  Cardiovascular:      Rate and Rhythm: Normal rate and regular rhythm.   Pulmonary:      Effort: Pulmonary effort is normal. No respiratory distress.   Abdominal:      Palpations: Abdomen is soft.      Tenderness: There is no abdominal tenderness.   Neurological:      Mental Status: She is alert and oriented to person, place, and time.   Psychiatric:         Mood and Affect: Mood normal.         Behavior: Behavior normal.       Labs:   Recent Labs     03/07/25  1534 03/08/25  0453 03/09/25  0536   WBC 9.0 10.0 10.4   HGB 13.5 12.9 13.6   HCT 38.6 38.2 39.0    215 226     Recent Labs     03/07/25  1534 03/08/25  0453 03/09/25  0536    137 140   K 3.1* 3.6 3.3*    105 105   CO2 25 25 24   BUN 14 
[facility] OCCUPATIONAL THERAPY EVALUATION - ACUTE     NAME: Fern Recio  : 1940 (84 y.o.)  MRN: [id number]  CODE STATUS: Full Code  Room: 83/83-01    Date of Service: 3/8/2025    Patient Diagnosis(es): Essential hypertension [I10]  Hypertensive urgency [I16.0]  Fall, initial encounter [W19.XXXA]  Acute pain of right knee [M25.561]   Patient Active Problem List    Diagnosis Date Noted    Status post mastectomy, right 2023    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, right (HCC) 2023    Adenocarcinoma, breast, right (HCC) 2023    Grief reaction 2023    GHAZAL (obstructive sleep apnea) 2022    Hypercalcemia 2022    Essential tremor 2022    Hypertensive urgency 2025    Falls 2025    Statin intolerance 10/07/2024    Gastroesophageal reflux disease without esophagitis 2024    Mixed stress and urge urinary incontinence 2024    Cystocele with prolapse 2024    Urinary frequency 2024    Right hip pain 2023    Post-menopausal 2023    Hyperlipemia 2023    Cardiovascular disease 2023    Cerebrovascular disease, unspecified 2023    Onychomycosis 2023    Pain in toes of both feet 2023    Peripheral vascular disease of foot 2023    Xerosis of skin 2023    Pain in left foot 2022    Plantar fasciitis of left foot 2022    Closed head injury with concussion 2021    Migraines 2020    Essential palatal tremor 2020    Benign paroxysmal vertigo of both ears 2020    Estrogen receptor positive status (ER+) 2018    Hypertensive retinopathy 2015    Retinal pigment epithelial atrophy 2015    Tear film insufficiency 2015    Vitreous degeneration 2015    History of recurrent TIAs 2014    Personal history of malignant neoplasm of breast 2014    Vitamin D deficiency 2012    Essential hypertension     
Physical Therapy  Facility/Department: [facility] MED SURG W183/W183-01  Physical Therapy Discharge      NAME: Fern Recio    : 1940 (84 y.o.)  MRN: [id number]    Account: [id number]  Gender: female      Patient has been discharged from acute care hospital. DC patient from current PT program.      Electronically signed by Tameka Muñoz PT on 3/11/25 at 2:09 PM EDT    
Physical Therapy Med Surg Initial Assessment  Facility/Department: [address] TELEMETRY  Room: [place] [address]       NAME: Fern Recio  : 1940 (84 y.o.)  MRN: [id number]  CODE STATUS: Full Code    Date of Service: 3/8/2025    Patient Diagnosis(es): Essential hypertension [I10]  Hypertensive urgency [I16.0]  Fall, initial encounter [W19.XXXA]  Acute pain of right knee [M25.561]   Chief Complaint   Patient presents with    Fall     Patient Active Problem List    Diagnosis Date Noted    Status post mastectomy, right 2023    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, right (HCC) 2023    Adenocarcinoma, breast, right (HCC) 2023    Grief reaction 2023    GHAZAL (obstructive sleep apnea) 2022    Hypercalcemia 2022    Essential tremor 2022    Hypertensive urgency 2025    Falls 2025    Statin intolerance 10/07/2024    Gastroesophageal reflux disease without esophagitis 2024    Mixed stress and urge urinary incontinence 2024    Cystocele with prolapse 2024    Urinary frequency 2024    Right hip pain 2023    Post-menopausal 2023    Hyperlipemia 2023    Cardiovascular disease 2023    Cerebrovascular disease, unspecified 2023    Onychomycosis 2023    Pain in toes of both feet 2023    Peripheral vascular disease of foot 2023    Xerosis of skin 2023    Pain in left foot 2022    Plantar fasciitis of left foot 2022    Closed head injury with concussion 2021    Migraines 2020    Essential palatal tremor 2020    Benign paroxysmal vertigo of both ears 2020    Estrogen receptor positive status (ER+) 2018    Hypertensive retinopathy 2015    Retinal pigment epithelial atrophy 2015    Tear film insufficiency 2015    Vitreous degeneration 2015    History of recurrent TIAs 2014    Personal history of malignant neoplasm of breast 
Report given to Sandy DEL TORO on Rehab unit.  Plan is for pt to transfer to Rehab room 251. Pt and daughter both updated.    
See OT evaluation for all goals and OT POC. Electronically signed by Cody Dixon, OTR/L on 3/8/2025 at 3:52 PM   
Spiritual Health History and Assessment/Progress Note  [facility]    Initial Encounter,  ,  ,      Name: Fern Recio MRN: [id number]    Age: 84 y.o.     Sex: female   Language: English   Jehovah's witness: [employer]   Hypertensive urgency     Date: 3/10/2025            Total Time Calculated: (P) 15 min              Spiritual Assessment began in [facility]LEMET[facility]        Referral/Consult From: Rounding   Encounter Overview/Reason: Initial Encounter  Service Provided For: Patient    Patient, resting in bed, and with daughter at time of chaplains care encounter visit. Patient reports continued progress within her health and is excited and enthusiastic about being transferred to [address] [place] [address]. Patient looking forward to her journey in moving toward her full.  Uses  words of comfort, celebration, affirmation and validation.      Yadira, Belief, Meaning:   Patient identifies as spiritual, is connected with a yadira tradition or spiritual practice, and has beliefs or practices that help with coping during difficult times  Family/Friends identify as spiritual, are connected with a yadira tradition or spiritual practice, and have beliefs or practices that help with coping during difficult times      Importance and Influence:  Patient has spiritual/personal beliefs that influence decisions regarding their health  Family/Friends have spiritual/personal beliefs that influence decisions regarding the patient's health    Community:  Patient is connected with a spiritual community and feels well-supported. Support system includes: Children  Family/Friends are connected with a spiritual community: and feel well-supported. Support system includes: Spouse/Partner    Assessment and Plan of Care:     Patient Interventions include: Facilitated expression of thoughts and feelings, Explored spiritual coping/struggle/distress, and Affirmed coping skills/support systems  Family/Friends Interventions include: Facilitated 
Subjective:  The patient complains of severe acute on chronic progressive fatigue and bilateral lower extremity pain partially relieved by rest, PT, OT and meds   and exacerbated by exertion and recent rhabdomyolysis after falling at home due to gait instability.      I am concerned about patient’s medical complexities including:  Principal Problem:    Hypertensive urgency  Active Problems:    Vitamin D deficiency    Impaired mobility and activities of daily living    Rhabdomyolysis  Resolved Problems:    * No resolved hospital problems. *      .    Controlled Substance Monitoring:    Acute and Chronic Pain Monitoring:   RX Monitoring Acute Pain Prescriptions Periodic Controlled Substance Monitoring   3/10/2025  10:03 AM Prescription exceeds daily limit for a specific reason. See comments or note.;Severe pain not adequately treated with lower dose.;Not required given exclusionary diagnoses... Possible medication side effects, risk of tolerance/dependence & alternative treatments discussed.;No signs of potential drug abuse or diversion identified.;Assessed functional status (ability to engage in work or other purposeful activities, the pain intensity and its interference with activities of daily living, quality of family life and social activities, and the physical activity);Obtaining appropriate analgesic effect of treatment.           Reviewed recent nursing note and discussed current status and planned care with acute care providers, \"Discussed IRF mission, goals, daily process, 3 hours of intensive therapy per day, approximate length of stay, Team, Medicare coverage, and discharge planning with the goal of home with Home Health Care. Asked the patient if she knew how she fell at home and the patient replied, \"Vertigo. I have vertigo and it made me fall.\" This nurse noted that the patient's speech was slightly slurred when she responded. Questions asked and understanding verbalized when answered. Asked the patient 
BIOPSY 1/26/2023 [facility]       Chart Reviewed: Yes    Restrictions:  Restrictions/Precautions: Fall Risk  Position Activity Restriction  Other Position/Activity Restrictions: Limb alert RUE noted in room.    SUBJECTIVE:   Subjective: \"I can tell I'm work\"    Pain  Pain  Pre-Pain: 0  Post-Pain: 5  Pain Location: Knee;Left  Pain Descriptor: Aching  Pain Interventions: Repositioning    OBJECTIVE:        Bed mobility  Supine to Sit: Substantial/Maximal assistance  Sit to Supine: Substantial/Maximal assistance  Scooting: Partial/Moderate assistance  Bed Mobility Comments: 2nd person used as SBA, VCs for sequencing and assistance at trunk    Transfers  Sit to Stand: Partial/Moderate assistance;2 Person Assistance  Stand to Sit: Partial/Moderate assistance;2 Person Assistance  Comment: Able to obtain upright position with increased time, no dizziness reported throughout transitions.    Ambulation  Surface: Level tile  Device: Rolling Walker  Assistance: 2 Person assistance  Quality of Gait: Poor Lt knee stability, improved with cues and standing duration.  Distance: 4 lateral steps           PT Exercises  Exercise Treatment: Supine antiembolics x10, seated LAQ x10  Pressure Relief Exercises: Standing weightshifts laterally with cues for Quad activation WBing through Lt             ASSESSMENT   Body Structures, Functions, Activity Limitations Requiring Skilled Therapeutic Intervention: Decreased functional mobility ;Decreased strength;Decreased safe awareness;Decreased endurance;Decreased balance;Increased pain;Decreased posture;Decreased ROM  Assessment: Patient able to progress to standing, decreased weight acceptance initially through LT LE however improved as treatment progressed. Increased time to complete all tasks. Minimal dizziness throughout session.     Discharge Recommendations:  Continue to assess pending progress         Goals  Long Term Goals  Long Term Goal 1: indep bed mobility  Long Term Goal 2: min

## 2025-03-11 NOTE — PLAN OF CARE
Problem: Chronic Conditions and Co-morbidities  Goal: Patient's chronic conditions and co-morbidity symptoms are monitored and maintained or improved  3/11/2025 1127 by Claire Whelan RN  Outcome: Progressing  3/10/2025 2323 by Debbie Estevez RN  Outcome: Progressing  3/10/2025 2320 by Debbie Estevez RN  Outcome: Progressing     Problem: Discharge Planning  Goal: Discharge to home or other facility with appropriate resources  3/11/2025 1127 by Claire Whelan RN  Outcome: Progressing  3/10/2025 2323 by Debbie Estevez RN  Outcome: Progressing  3/10/2025 2320 by Debbie Estevez RN  Outcome: Progressing  Flowsheets (Taken 3/10/2025 1831 by Natalie Sanders RN)  Discharge to home or other facility with appropriate resources: Identify barriers to discharge with patient and caregiver     Problem: Safety - Adult  Goal: Free from fall injury  3/11/2025 1127 by Claire Whelan RN  Outcome: Progressing  3/10/2025 2323 by Debbie Estevez RN  Outcome: Progressing     Problem: ABCDS Injury Assessment  Goal: Absence of physical injury  Outcome: Progressing     Problem: Pain  Goal: Verbalizes/displays adequate comfort level or baseline comfort level  Outcome: Progressing

## 2025-03-11 NOTE — CONSULTS
Hospital Medicine  History and Physical    Patient:  Fern Recio  MRN: [id number]    CHIEF COMPLAINT:  No chief complaint on file.      History Obtained From:  Patient, EMR  Primary Care Physician: Debi Malone PA-C    HISTORY OF PRESENT ILLNESS:   The patient is a 84 y.o. female with PMH of Hypertensive urgency, vertigo, CVA, HLD, breast cancer who presents with falls and we were consulted for assistance with blood pressure management.       Patient denies chest pain, shortness of breath, fevers, chills, sweats, diarrhea or burning micturition.      Past Medical History:      Diagnosis Date    Asthma     Asthma in remission 1990    Breast cancer (HCC) 2014    right breast    Breast cancer (HCC) 2023    right breast (mastectomy)    Cardiovascular disease 04/04/2023    Cerebral artery occlusion with cerebral infarction (HCC)     2014    Elevated TSH     Hematoma of right thigh 09/30/2021    History of artificial lens replacement 03/24/2015    History of blood transfusion     1956-after delivery of first child    History of breast cancer 02/2014    Invasive ductal cancer right breast, Dr Umana, Dr Lares    History of therapeutic radiation 2014    right breast cancer.    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

## 2025-03-12 LAB
ANION GAP SERPL CALCULATED.3IONS-SCNC: 10 MEQ/L (ref 9–15)
BASOPHILS # BLD: 0 K/UL (ref 0–0.2)
BASOPHILS NFR BLD: 0.5 %
BUN SERPL-MCNC: 21 MG/DL (ref 8–23)
CALCIUM SERPL-MCNC: 9.4 MG/DL (ref 8.5–9.9)
CHLORIDE SERPL-SCNC: 104 MEQ/L (ref 95–107)
CO2 SERPL-SCNC: 23 MEQ/L (ref 20–31)
CREAT SERPL-MCNC: 0.59 MG/DL (ref 0.5–0.9)
EOSINOPHIL # BLD: 0.2 K/UL (ref 0–0.7)
EOSINOPHIL NFR BLD: 2.2 %
ERYTHROCYTE [DISTWIDTH] IN BLOOD BY AUTOMATED COUNT: 12.9 % (ref 11.5–14.5)
GLUCOSE SERPL-MCNC: 98 MG/DL (ref 70–99)
HCT VFR BLD AUTO: 36.8 % (ref 37–47)
HGB BLD-MCNC: 12.7 G/DL (ref 12–16)
LYMPHOCYTES # BLD: 1.6 K/UL (ref 1–4.8)
LYMPHOCYTES NFR BLD: 19.2 %
MAGNESIUM SERPL-MCNC: 2.1 MG/DL (ref 1.7–2.4)
MCH RBC QN AUTO: 32.2 PG (ref 27–31.3)
MCHC RBC AUTO-ENTMCNC: 34.5 % (ref 33–37)
MCV RBC AUTO: 93.2 FL (ref 79.4–94.8)
MONOCYTES # BLD: 0.7 K/UL (ref 0.2–0.8)
MONOCYTES NFR BLD: 9.1 %
NEUTROPHILS # BLD: 5.6 K/UL (ref 1.4–6.5)
NEUTS SEG NFR BLD: 68.8 %
PLATELET # BLD AUTO: 231 K/UL (ref 130–400)
POTASSIUM SERPL-SCNC: 3.5 MEQ/L (ref 3.4–4.9)
RBC # BLD AUTO: 3.95 M/UL (ref 4.2–5.4)
SODIUM SERPL-SCNC: 137 MEQ/L (ref 135–144)
WBC # BLD AUTO: 8.1 K/UL (ref 4.8–10.8)

## 2025-03-12 PROCEDURE — 1180000000 HC REHAB R&B

## 2025-03-12 PROCEDURE — 97110 THERAPEUTIC EXERCISES: CPT

## 2025-03-12 PROCEDURE — 6370000000 HC RX 637 (ALT 250 FOR IP): Performed by: INTERNAL MEDICINE

## 2025-03-12 PROCEDURE — 97535 SELF CARE MNGMENT TRAINING: CPT

## 2025-03-12 PROCEDURE — 83735 ASSAY OF MAGNESIUM: CPT

## 2025-03-12 PROCEDURE — 6370000000 HC RX 637 (ALT 250 FOR IP): Performed by: PHYSICAL MEDICINE & REHABILITATION

## 2025-03-12 PROCEDURE — 80048 BASIC METABOLIC PNL TOTAL CA: CPT

## 2025-03-12 PROCEDURE — 36415 COLL VENOUS BLD VENIPUNCTURE: CPT

## 2025-03-12 PROCEDURE — 99233 SBSQ HOSP IP/OBS HIGH 50: CPT | Performed by: PHYSICAL MEDICINE & REHABILITATION

## 2025-03-12 PROCEDURE — 97116 GAIT TRAINING THERAPY: CPT

## 2025-03-12 PROCEDURE — 6360000002 HC RX W HCPCS: Performed by: INTERNAL MEDICINE

## 2025-03-12 PROCEDURE — 85025 COMPLETE CBC W/AUTO DIFF WBC: CPT

## 2025-03-12 RX ORDER — OXYCODONE HYDROCHLORIDE 5 MG/1
5 TABLET ORAL EVERY 4 HOURS PRN
Refills: 0 | Status: DISCONTINUED | OUTPATIENT
Start: 2025-03-12 | End: 2025-03-23 | Stop reason: HOSPADM

## 2025-03-12 RX ADMIN — ASPRIN AND EXTENDED-RELEASE DIPYRIDAMOLE 1 CAPSULE: 25; 200 CAPSULE ORAL at 20:58

## 2025-03-12 RX ADMIN — OXYCODONE 5 MG: 5 TABLET ORAL at 12:11

## 2025-03-12 RX ADMIN — Medication 100 MG: at 09:38

## 2025-03-12 RX ADMIN — OXYCODONE 5 MG: 5 TABLET ORAL at 20:58

## 2025-03-12 RX ADMIN — ACETAMINOPHEN 500 MG: 500 TABLET ORAL at 06:29

## 2025-03-12 RX ADMIN — MECLIZINE HYDROCHLORIDE 12.5 MG: 25 TABLET ORAL at 20:58

## 2025-03-12 RX ADMIN — EZETIMIBE 10 MG: 10 TABLET ORAL at 09:37

## 2025-03-12 RX ADMIN — ACETAMINOPHEN 500 MG: 500 TABLET ORAL at 14:53

## 2025-03-12 RX ADMIN — Medication 2000 UNITS: at 17:32

## 2025-03-12 RX ADMIN — MECLIZINE HYDROCHLORIDE 12.5 MG: 25 TABLET ORAL at 09:38

## 2025-03-12 RX ADMIN — ACETAMINOPHEN 500 MG: 500 TABLET ORAL at 20:57

## 2025-03-12 RX ADMIN — AMLODIPINE BESYLATE 5 MG: 5 TABLET ORAL at 09:38

## 2025-03-12 RX ADMIN — Medication 3 MG: at 20:58

## 2025-03-12 RX ADMIN — ASPRIN AND EXTENDED-RELEASE DIPYRIDAMOLE 1 CAPSULE: 25; 200 CAPSULE ORAL at 09:38

## 2025-03-12 RX ADMIN — ENOXAPARIN SODIUM 40 MG: 100 INJECTION SUBCUTANEOUS at 09:37

## 2025-03-12 RX ADMIN — LISINOPRIL 20 MG: 20 TABLET ORAL at 09:38

## 2025-03-12 ASSESSMENT — PAIN DESCRIPTION - LOCATION
LOCATION: FOOT
LOCATION: HIP;LEG;FOOT
LOCATION: ABDOMEN
LOCATION: HIP

## 2025-03-12 ASSESSMENT — PAIN DESCRIPTION - ORIENTATION
ORIENTATION: RIGHT;LEFT
ORIENTATION: RIGHT;LEFT;LOWER
ORIENTATION: RIGHT;LEFT

## 2025-03-12 ASSESSMENT — PAIN SCALES - GENERAL
PAINLEVEL_OUTOF10: 8
PAINLEVEL_OUTOF10: 2
PAINLEVEL_OUTOF10: 8
PAINLEVEL_OUTOF10: 2

## 2025-03-12 ASSESSMENT — PAIN - FUNCTIONAL ASSESSMENT: PAIN_FUNCTIONAL_ASSESSMENT: ACTIVITIES ARE NOT PREVENTED

## 2025-03-12 ASSESSMENT — PAIN DESCRIPTION - DESCRIPTORS
DESCRIPTORS: DISCOMFORT
DESCRIPTORS: ACHING

## 2025-03-12 NOTE — PLAN OF CARE
Problem: Chronic Conditions and Co-morbidities  Goal: Patient's chronic conditions and co-morbidity symptoms are monitored and maintained or improved  3/12/2025 1100 by Dariana Sprague RN  Outcome: Progressing  3/11/2025 2247 by Milagros Troncoso RN  Outcome: Progressing  Flowsheets (Taken 3/11/2025 2132)  Care Plan - Patient's Chronic Conditions and Co-Morbidity Symptoms are Monitored and Maintained or Improved:   Monitor and assess patient's chronic conditions and comorbid symptoms for stability, deterioration, or improvement   Collaborate with multidisciplinary team to address chronic and comorbid conditions and prevent exacerbation or deterioration   Update acute care plan with appropriate goals if chronic or comorbid symptoms are exacerbated and prevent overall improvement and discharge     Problem: Discharge Planning  Goal: Discharge to home or other facility with appropriate resources  3/12/2025 1100 by Dariana Sprague RN  Outcome: Progressing  3/11/2025 2247 by Milagros Troncoso RN  Outcome: Progressing  Flowsheets (Taken 3/11/2025 2132)  Discharge to home or other facility with appropriate resources:   Identify barriers to discharge with patient and caregiver   Arrange for needed discharge resources and transportation as appropriate   Identify discharge learning needs (meds, wound care, etc)   Arrange for interpreters to assist at discharge as needed   Refer to discharge planning if patient needs post-hospital services based on physician order or complex needs related to functional status, cognitive ability or social support system     Problem: Safety - Adult  Goal: Free from fall injury  3/12/2025 1100 by Dariana Sprague RN  Outcome: Progressing  3/11/2025 2247 by Milagros Troncoso RN  Outcome: Progressing     Problem: ABCDS Injury Assessment  Goal: Absence of physical injury  3/12/2025 1100 by Dariana Sprague RN  Outcome: Progressing  3/11/2025 2247 by Milagros Troncoso RN  Outcome: Progressing

## 2025-03-12 NOTE — PLAN OF CARE
Problem: Chronic Conditions and Co-morbidities  Goal: Patient's chronic conditions and co-morbidity symptoms are monitored and maintained or improved  3/11/2025 2247 by Milagros Troncoso RN  Outcome: Progressing  Flowsheets (Taken 3/11/2025 2132)  Care Plan - Patient's Chronic Conditions and Co-Morbidity Symptoms are Monitored and Maintained or Improved:   Monitor and assess patient's chronic conditions and comorbid symptoms for stability, deterioration, or improvement   Collaborate with multidisciplinary team to address chronic and comorbid conditions and prevent exacerbation or deterioration   Update acute care plan with appropriate goals if chronic or comorbid symptoms are exacerbated and prevent overall improvement and discharge  3/11/2025 1127 by Claire Whelan, RN  Outcome: Progressing     Problem: Discharge Planning  Goal: Discharge to home or other facility with appropriate resources  3/11/2025 2247 by Milagros Troncoso RN  Outcome: Progressing  Flowsheets (Taken 3/11/2025 2132)  Discharge to home or other facility with appropriate resources:   Identify barriers to discharge with patient and caregiver   Arrange for needed discharge resources and transportation as appropriate   Identify discharge learning needs (meds, wound care, etc)   Arrange for interpreters to assist at discharge as needed   Refer to discharge planning if patient needs post-hospital services based on physician order or complex needs related to functional status, cognitive ability or social support system  3/11/2025 1127 by Claire Whelan, RN  Outcome: Progressing     Problem: Safety - Adult  Goal: Free from fall injury  3/11/2025 2247 by Milagros Troncoso, RN  Outcome: Progressing  3/11/2025 1127 by Claire Whelan, RN  Outcome: Progressing     Problem: ABCDS Injury Assessment  Goal: Absence of physical injury  3/11/2025 2247 by Milagros Troncoso, RN  Outcome: Progressing  3/11/2025 1127 by Claire Whelan, RN  Outcome:

## 2025-03-13 PROBLEM — G20.A1 PARKINSON DISEASE (HCC): Status: ACTIVE | Noted: 2025-03-13

## 2025-03-13 PROCEDURE — 97535 SELF CARE MNGMENT TRAINING: CPT

## 2025-03-13 PROCEDURE — 97110 THERAPEUTIC EXERCISES: CPT

## 2025-03-13 PROCEDURE — 97116 GAIT TRAINING THERAPY: CPT

## 2025-03-13 PROCEDURE — 6360000002 HC RX W HCPCS: Performed by: INTERNAL MEDICINE

## 2025-03-13 PROCEDURE — 6370000000 HC RX 637 (ALT 250 FOR IP): Performed by: PHYSICAL MEDICINE & REHABILITATION

## 2025-03-13 PROCEDURE — 6370000000 HC RX 637 (ALT 250 FOR IP): Performed by: INTERNAL MEDICINE

## 2025-03-13 PROCEDURE — 97530 THERAPEUTIC ACTIVITIES: CPT

## 2025-03-13 PROCEDURE — 99233 SBSQ HOSP IP/OBS HIGH 50: CPT | Performed by: PHYSICAL MEDICINE & REHABILITATION

## 2025-03-13 PROCEDURE — 1180000000 HC REHAB R&B

## 2025-03-13 RX ORDER — PREDNISONE 20 MG/1
20 TABLET ORAL DAILY
Status: COMPLETED | OUTPATIENT
Start: 2025-03-13 | End: 2025-03-17

## 2025-03-13 RX ADMIN — Medication 100 MG: at 08:51

## 2025-03-13 RX ADMIN — AMLODIPINE BESYLATE 5 MG: 5 TABLET ORAL at 08:51

## 2025-03-13 RX ADMIN — Medication 2000 UNITS: at 17:31

## 2025-03-13 RX ADMIN — MECLIZINE HYDROCHLORIDE 12.5 MG: 25 TABLET ORAL at 21:35

## 2025-03-13 RX ADMIN — MECLIZINE HYDROCHLORIDE 12.5 MG: 25 TABLET ORAL at 08:51

## 2025-03-13 RX ADMIN — ACETAMINOPHEN 500 MG: 500 TABLET ORAL at 14:57

## 2025-03-13 RX ADMIN — ASPRIN AND EXTENDED-RELEASE DIPYRIDAMOLE 1 CAPSULE: 25; 200 CAPSULE ORAL at 08:48

## 2025-03-13 RX ADMIN — LISINOPRIL 20 MG: 20 TABLET ORAL at 08:52

## 2025-03-13 RX ADMIN — OXYCODONE 5 MG: 5 TABLET ORAL at 08:50

## 2025-03-13 RX ADMIN — ASPRIN AND EXTENDED-RELEASE DIPYRIDAMOLE 1 CAPSULE: 25; 200 CAPSULE ORAL at 21:37

## 2025-03-13 RX ADMIN — ACETAMINOPHEN 500 MG: 500 TABLET ORAL at 21:35

## 2025-03-13 RX ADMIN — EZETIMIBE 10 MG: 10 TABLET ORAL at 08:51

## 2025-03-13 RX ADMIN — ACETAMINOPHEN 650 MG: 325 TABLET ORAL at 08:48

## 2025-03-13 RX ADMIN — ENOXAPARIN SODIUM 40 MG: 100 INJECTION SUBCUTANEOUS at 08:47

## 2025-03-13 RX ADMIN — OXYCODONE 5 MG: 5 TABLET ORAL at 14:58

## 2025-03-13 RX ADMIN — PREDNISONE 20 MG: 20 TABLET ORAL at 12:53

## 2025-03-13 RX ADMIN — OXYCODONE 5 MG: 5 TABLET ORAL at 03:11

## 2025-03-13 ASSESSMENT — PAIN - FUNCTIONAL ASSESSMENT: PAIN_FUNCTIONAL_ASSESSMENT: ACTIVITIES ARE NOT PREVENTED

## 2025-03-13 ASSESSMENT — PAIN SCALES - GENERAL
PAINLEVEL_OUTOF10: 3
PAINLEVEL_OUTOF10: 0
PAINLEVEL_OUTOF10: 4
PAINLEVEL_OUTOF10: 7
PAINLEVEL_OUTOF10: 7

## 2025-03-13 ASSESSMENT — PAIN DESCRIPTION - ORIENTATION
ORIENTATION: LEFT
ORIENTATION: RIGHT
ORIENTATION: RIGHT

## 2025-03-13 ASSESSMENT — PAIN DESCRIPTION - DESCRIPTORS
DESCRIPTORS: ACHING
DESCRIPTORS: DISCOMFORT

## 2025-03-13 ASSESSMENT — PAIN DESCRIPTION - LOCATION
LOCATION: LEG
LOCATION: LEG

## 2025-03-13 NOTE — PLAN OF CARE
Problem: Chronic Conditions and Co-morbidities  Goal: Patient's chronic conditions and co-morbidity symptoms are monitored and maintained or improved  Outcome: Progressing  Flowsheets (Taken 3/13/2025 1709)  Care Plan - Patient's Chronic Conditions and Co-Morbidity Symptoms are Monitored and Maintained or Improved: Monitor and assess patient's chronic conditions and comorbid symptoms for stability, deterioration, or improvement     Problem: Discharge Planning  Goal: Discharge to home or other facility with appropriate resources  Outcome: Progressing  Flowsheets (Taken 3/13/2025 1709)  Discharge to home or other facility with appropriate resources:   Identify barriers to discharge with patient and caregiver   Identify discharge learning needs (meds, wound care, etc)     Problem: Safety - Adult  Goal: Free from fall injury  Outcome: Progressing     Problem: ABCDS Injury Assessment  Goal: Absence of physical injury  Outcome: Progressing     Problem: Pain  Goal: Verbalizes/displays adequate comfort level or baseline comfort level  Outcome: Progressing

## 2025-03-13 NOTE — FLOWSHEET NOTE
Consent papers signed by patient. Patient stated she has a DNR-CCA order and will have her daughter bring it in when possible. Form placed on chart for physician to sign.    Spoke with patients daughter regarding DNR status. Daughter  agrees patient is DNR-CCA and daughter to bring paperwork in as soon as she can.

## 2025-03-13 NOTE — PLAN OF CARE
Problem: Chronic Conditions and Co-morbidities  Goal: Patient's chronic conditions and co-morbidity symptoms are monitored and maintained or improved  3/12/2025 2245 by Pallavi Sarah RN  Outcome: Progressing  3/12/2025 1100 by Dariana Sprague RN  Outcome: Progressing     Problem: Discharge Planning  Goal: Discharge to home or other facility with appropriate resources  3/12/2025 2245 by Pallavi Sarah RN  Outcome: Progressing  3/12/2025 1100 by Dariana Sprague RN  Outcome: Progressing     Problem: Safety - Adult  Goal: Free from fall injury  3/12/2025 2245 by Pallavi Sarah RN  Outcome: Progressing  3/12/2025 1100 by Dariana Sprague RN  Outcome: Progressing     Problem: ABCDS Injury Assessment  Goal: Absence of physical injury  3/12/2025 2245 by Pallavi Sarah RN  Outcome: Progressing  3/12/2025 1100 by Dariana Sprague RN  Outcome: Progressing     Problem: Pain  Goal: Verbalizes/displays adequate comfort level or baseline comfort level  3/12/2025 2245 by Pallavi Sarah RN  Outcome: Progressing  3/12/2025 1100 by Dariana Sprague RN  Outcome: Progressing

## 2025-03-14 PROBLEM — R25.1 TREMOR: Status: ACTIVE | Noted: 2025-03-14

## 2025-03-14 LAB
ANION GAP SERPL CALCULATED.3IONS-SCNC: 7 MEQ/L (ref 9–15)
BASOPHILS # BLD: 0 K/UL (ref 0–0.2)
BASOPHILS NFR BLD: 0.4 %
BUN SERPL-MCNC: 17 MG/DL (ref 8–23)
CALCIUM SERPL-MCNC: 9.6 MG/DL (ref 8.5–9.9)
CHLORIDE SERPL-SCNC: 107 MEQ/L (ref 95–107)
CO2 SERPL-SCNC: 25 MEQ/L (ref 20–31)
CREAT SERPL-MCNC: 0.61 MG/DL (ref 0.5–0.9)
EOSINOPHIL # BLD: 0.1 K/UL (ref 0–0.7)
EOSINOPHIL NFR BLD: 0.9 %
ERYTHROCYTE [DISTWIDTH] IN BLOOD BY AUTOMATED COUNT: 12.7 % (ref 11.5–14.5)
GLUCOSE SERPL-MCNC: 90 MG/DL (ref 70–99)
HCT VFR BLD AUTO: 35.7 % (ref 37–47)
HGB BLD-MCNC: 12 G/DL (ref 12–16)
LYMPHOCYTES # BLD: 1.7 K/UL (ref 1–4.8)
LYMPHOCYTES NFR BLD: 21.2 %
MCH RBC QN AUTO: 30.8 PG (ref 27–31.3)
MCHC RBC AUTO-ENTMCNC: 33.6 % (ref 33–37)
MCV RBC AUTO: 91.8 FL (ref 79.4–94.8)
MONOCYTES # BLD: 0.7 K/UL (ref 0.2–0.8)
MONOCYTES NFR BLD: 8.8 %
NEUTROPHILS # BLD: 5.4 K/UL (ref 1.4–6.5)
NEUTS SEG NFR BLD: 68.3 %
PLATELET # BLD AUTO: 264 K/UL (ref 130–400)
POTASSIUM SERPL-SCNC: 3.6 MEQ/L (ref 3.4–4.9)
RBC # BLD AUTO: 3.89 M/UL (ref 4.2–5.4)
SODIUM SERPL-SCNC: 139 MEQ/L (ref 135–144)
WBC # BLD AUTO: 7.9 K/UL (ref 4.8–10.8)

## 2025-03-14 PROCEDURE — 6370000000 HC RX 637 (ALT 250 FOR IP): Performed by: INTERNAL MEDICINE

## 2025-03-14 PROCEDURE — 97535 SELF CARE MNGMENT TRAINING: CPT

## 2025-03-14 PROCEDURE — 97110 THERAPEUTIC EXERCISES: CPT

## 2025-03-14 PROCEDURE — 97530 THERAPEUTIC ACTIVITIES: CPT

## 2025-03-14 PROCEDURE — 36415 COLL VENOUS BLD VENIPUNCTURE: CPT

## 2025-03-14 PROCEDURE — 99222 1ST HOSP IP/OBS MODERATE 55: CPT | Performed by: PSYCHIATRY & NEUROLOGY

## 2025-03-14 PROCEDURE — 6370000000 HC RX 637 (ALT 250 FOR IP): Performed by: PHYSICAL MEDICINE & REHABILITATION

## 2025-03-14 PROCEDURE — 6360000002 HC RX W HCPCS: Performed by: INTERNAL MEDICINE

## 2025-03-14 PROCEDURE — 85025 COMPLETE CBC W/AUTO DIFF WBC: CPT

## 2025-03-14 PROCEDURE — 80048 BASIC METABOLIC PNL TOTAL CA: CPT

## 2025-03-14 PROCEDURE — APPSS45 APP SPLIT SHARED TIME 31-45 MINUTES: Performed by: NURSE PRACTITIONER

## 2025-03-14 PROCEDURE — 1180000000 HC REHAB R&B

## 2025-03-14 PROCEDURE — 97116 GAIT TRAINING THERAPY: CPT

## 2025-03-14 PROCEDURE — 99232 SBSQ HOSP IP/OBS MODERATE 35: CPT | Performed by: PHYSICAL MEDICINE & REHABILITATION

## 2025-03-14 RX ADMIN — Medication 100 MG: at 11:54

## 2025-03-14 RX ADMIN — PREDNISONE 20 MG: 20 TABLET ORAL at 11:54

## 2025-03-14 RX ADMIN — OXYCODONE 5 MG: 5 TABLET ORAL at 16:25

## 2025-03-14 RX ADMIN — ASPRIN AND EXTENDED-RELEASE DIPYRIDAMOLE 1 CAPSULE: 25; 200 CAPSULE ORAL at 11:54

## 2025-03-14 RX ADMIN — ACETAMINOPHEN 500 MG: 500 TABLET ORAL at 20:15

## 2025-03-14 RX ADMIN — ACETAMINOPHEN 650 MG: 325 TABLET ORAL at 11:55

## 2025-03-14 RX ADMIN — ACETAMINOPHEN 500 MG: 500 TABLET ORAL at 16:25

## 2025-03-14 RX ADMIN — MECLIZINE HYDROCHLORIDE 12.5 MG: 25 TABLET ORAL at 20:15

## 2025-03-14 RX ADMIN — OXYCODONE 5 MG: 5 TABLET ORAL at 20:15

## 2025-03-14 RX ADMIN — LISINOPRIL 20 MG: 20 TABLET ORAL at 11:54

## 2025-03-14 RX ADMIN — ASPRIN AND EXTENDED-RELEASE DIPYRIDAMOLE 1 CAPSULE: 25; 200 CAPSULE ORAL at 20:15

## 2025-03-14 RX ADMIN — MECLIZINE HYDROCHLORIDE 12.5 MG: 25 TABLET ORAL at 11:53

## 2025-03-14 RX ADMIN — ENOXAPARIN SODIUM 40 MG: 100 INJECTION SUBCUTANEOUS at 11:56

## 2025-03-14 RX ADMIN — ACETAMINOPHEN 500 MG: 500 TABLET ORAL at 06:15

## 2025-03-14 RX ADMIN — Medication 2000 UNITS: at 16:25

## 2025-03-14 RX ADMIN — AMLODIPINE BESYLATE 5 MG: 5 TABLET ORAL at 11:55

## 2025-03-14 RX ADMIN — EZETIMIBE 10 MG: 10 TABLET ORAL at 11:54

## 2025-03-14 ASSESSMENT — PAIN DESCRIPTION - LOCATION: LOCATION: HIP;FOOT

## 2025-03-14 ASSESSMENT — PAIN DESCRIPTION - ORIENTATION
ORIENTATION: RIGHT;LEFT
ORIENTATION: RIGHT;LEFT

## 2025-03-14 ASSESSMENT — PAIN SCALES - GENERAL
PAINLEVEL_OUTOF10: 6
PAINLEVEL_OUTOF10: 3
PAINLEVEL_OUTOF10: 2
PAINLEVEL_OUTOF10: 7

## 2025-03-14 ASSESSMENT — PAIN - FUNCTIONAL ASSESSMENT: PAIN_FUNCTIONAL_ASSESSMENT: ACTIVITIES ARE NOT PREVENTED

## 2025-03-14 NOTE — PLAN OF CARE
Problem: Chronic Conditions and Co-morbidities  Goal: Patient's chronic conditions and co-morbidity symptoms are monitored and maintained or improved  3/14/2025 0211 by Aisha Hdz RN  Outcome: Progressing  3/13/2025 1715 by Mandi Tsai RN  Outcome: Progressing  Flowsheets (Taken 3/13/2025 1709)  Care Plan - Patient's Chronic Conditions and Co-Morbidity Symptoms are Monitored and Maintained or Improved: Monitor and assess patient's chronic conditions and comorbid symptoms for stability, deterioration, or improvement     Problem: Discharge Planning  Goal: Discharge to home or other facility with appropriate resources  3/14/2025 0211 by Aisha Hdz RN  Outcome: Progressing  3/13/2025 1715 by Mandi Tsai RN  Outcome: Progressing  Flowsheets (Taken 3/13/2025 1709)  Discharge to home or other facility with appropriate resources:   Identify barriers to discharge with patient and caregiver   Identify discharge learning needs (meds, wound care, etc)     Problem: Safety - Adult  Goal: Free from fall injury  3/14/2025 0211 by Aisha Hdz RN  Outcome: Progressing  3/13/2025 1715 by Mandi Tsai RN  Outcome: Progressing     Problem: ABCDS Injury Assessment  Goal: Absence of physical injury  3/14/2025 0211 by Aisha Hdz RN  Outcome: Progressing  3/13/2025 1715 by Mandi Tsai RN  Outcome: Progressing     Problem: Pain  Goal: Verbalizes/displays adequate comfort level or baseline comfort level  3/14/2025 0211 by Aisha Hdz RN  Outcome: Progressing  3/13/2025 1715 by Mandi Tsai RN  Outcome: Progressing

## 2025-03-14 NOTE — CONSULTS
[facility] Consult  Note  Name: Fern Recio  Age: 84 y.o.  Gender: female  CodeStatus: Full Code  Allergies: Sulfa Antibiotics  Iodinated Contrast Media  Lactose Intolerance (Gi)  Statins    Chief Complaint:No chief complaint on file.    Primary Care Provider: Debi Malone PA-C  InpatientTreatment Team: Treatment Team:   Tameka Blanca DO Patel, Arnaldo ERAZO, MD Galvan, Tomi ZIMMERMAN, MD Tsai, Mandi COLEMAN, RN  Dolly Burgess, MSW, LSW  Bonnie Vergara, OTR/L  Triny Reed, Zach Hernandez  Admission Date: 3/10/2025      HPI   Consulting provider: Dr. Tameka Blanca for parkinsonian features-rigidity of lower extremities  Pt seen and examined for neurology consult.  Patient is a 84-year-old female with past medical history of asthma, right breast cancer status post radiation, CVA, hypertension, hyperlipidemia who is currently admitted to [facility] rehab after being hospitalized at [facility] from 3/7/2025 until 3/10/2025 for fall with hypertensive urgency and generalized weakness with vertigo.  We were consulted due to concerns over parkinsonian features.  Patient is followed by Dr. Toth with neurology on outpatient basis for history of CVA, essential tremors, close head injury.    Patient is currently alert and oriented x 3, no acute distress, cooperative.  Does have bilateral upper extremity tremor worse with movement.  No rigidity, bradykinesia, masked facies, hypophonia, ophthalmoplegia, dysphagia, hallucinations.  Denies lightheadedness or dizziness.  Patient seen and examined and events as noted above.  Vitals:    03/14/25 0805   BP: (!) 171/81   Pulse: 70   Resp: 17   Temp: 98.1 °F (36.7 °C)   SpO2: 97%     Family History   Problem Relation Age of Onset    Kidney Disease Mother         dec age 70    Hypertension Mother     Migraines Mother     Diabetes Father     Heart Failure Father         dec age 68    Diabetes Brother     Cancer Brother         dec age 75

## 2025-03-15 PROBLEM — I69.398 ABNORMALITY OF GAIT AS LATE EFFECT OF STROKE: Status: ACTIVE | Noted: 2025-03-15

## 2025-03-15 PROBLEM — R26.9 ABNORMALITY OF GAIT AS LATE EFFECT OF STROKE: Status: ACTIVE | Noted: 2025-03-15

## 2025-03-15 PROCEDURE — 6370000000 HC RX 637 (ALT 250 FOR IP): Performed by: STUDENT IN AN ORGANIZED HEALTH CARE EDUCATION/TRAINING PROGRAM

## 2025-03-15 PROCEDURE — 6360000002 HC RX W HCPCS: Performed by: INTERNAL MEDICINE

## 2025-03-15 PROCEDURE — 97116 GAIT TRAINING THERAPY: CPT

## 2025-03-15 PROCEDURE — 97535 SELF CARE MNGMENT TRAINING: CPT

## 2025-03-15 PROCEDURE — 97112 NEUROMUSCULAR REEDUCATION: CPT

## 2025-03-15 PROCEDURE — 6370000000 HC RX 637 (ALT 250 FOR IP): Performed by: INTERNAL MEDICINE

## 2025-03-15 PROCEDURE — 97110 THERAPEUTIC EXERCISES: CPT

## 2025-03-15 PROCEDURE — 97530 THERAPEUTIC ACTIVITIES: CPT

## 2025-03-15 PROCEDURE — 1180000000 HC REHAB R&B

## 2025-03-15 PROCEDURE — 6370000000 HC RX 637 (ALT 250 FOR IP): Performed by: PHYSICAL MEDICINE & REHABILITATION

## 2025-03-15 PROCEDURE — 99232 SBSQ HOSP IP/OBS MODERATE 35: CPT | Performed by: PHYSICAL MEDICINE & REHABILITATION

## 2025-03-15 RX ORDER — AMLODIPINE BESYLATE 10 MG/1
10 TABLET ORAL DAILY
Status: DISCONTINUED | OUTPATIENT
Start: 2025-03-16 | End: 2025-03-23 | Stop reason: HOSPADM

## 2025-03-15 RX ORDER — LISINOPRIL 20 MG/1
20 TABLET ORAL DAILY
Status: DISCONTINUED | OUTPATIENT
Start: 2025-03-16 | End: 2025-03-16

## 2025-03-15 RX ORDER — AMLODIPINE BESYLATE 5 MG/1
5 TABLET ORAL ONCE
Status: COMPLETED | OUTPATIENT
Start: 2025-03-15 | End: 2025-03-15

## 2025-03-15 RX ADMIN — MECLIZINE HYDROCHLORIDE 12.5 MG: 25 TABLET ORAL at 20:02

## 2025-03-15 RX ADMIN — ACETAMINOPHEN 500 MG: 500 TABLET ORAL at 08:19

## 2025-03-15 RX ADMIN — ACETAMINOPHEN 500 MG: 500 TABLET ORAL at 15:07

## 2025-03-15 RX ADMIN — EZETIMIBE 10 MG: 10 TABLET ORAL at 08:19

## 2025-03-15 RX ADMIN — PREDNISONE 20 MG: 20 TABLET ORAL at 08:19

## 2025-03-15 RX ADMIN — ASPRIN AND EXTENDED-RELEASE DIPYRIDAMOLE 1 CAPSULE: 25; 200 CAPSULE ORAL at 20:03

## 2025-03-15 RX ADMIN — Medication 100 MG: at 08:19

## 2025-03-15 RX ADMIN — ENOXAPARIN SODIUM 40 MG: 100 INJECTION SUBCUTANEOUS at 08:21

## 2025-03-15 RX ADMIN — ASPRIN AND EXTENDED-RELEASE DIPYRIDAMOLE 1 CAPSULE: 25; 200 CAPSULE ORAL at 08:19

## 2025-03-15 RX ADMIN — AMLODIPINE BESYLATE 5 MG: 5 TABLET ORAL at 08:20

## 2025-03-15 RX ADMIN — Medication 2000 UNITS: at 16:49

## 2025-03-15 RX ADMIN — Medication 3 MG: at 22:45

## 2025-03-15 RX ADMIN — AMLODIPINE BESYLATE 5 MG: 5 TABLET ORAL at 11:33

## 2025-03-15 RX ADMIN — LISINOPRIL 20 MG: 20 TABLET ORAL at 08:19

## 2025-03-15 RX ADMIN — ACETAMINOPHEN 500 MG: 500 TABLET ORAL at 20:02

## 2025-03-15 RX ADMIN — MECLIZINE HYDROCHLORIDE 12.5 MG: 25 TABLET ORAL at 08:21

## 2025-03-15 ASSESSMENT — PAIN DESCRIPTION - LOCATION
LOCATION: HIP
LOCATION: HEAD

## 2025-03-15 ASSESSMENT — PAIN DESCRIPTION - DESCRIPTORS
DESCRIPTORS: ACHING
DESCRIPTORS: ACHING

## 2025-03-15 ASSESSMENT — PAIN SCALES - GENERAL
PAINLEVEL_OUTOF10: 2
PAINLEVEL_OUTOF10: 3

## 2025-03-15 ASSESSMENT — PAIN DESCRIPTION - ORIENTATION: ORIENTATION: RIGHT

## 2025-03-16 LAB
ALBUMIN SERPL-MCNC: 3.3 G/DL (ref 3.5–4.6)
ALP SERPL-CCNC: 136 U/L (ref 40–130)
ALT SERPL-CCNC: 52 U/L (ref 0–33)
ANION GAP SERPL CALCULATED.3IONS-SCNC: 10 MEQ/L (ref 9–15)
ANION GAP SERPL CALCULATED.3IONS-SCNC: 9 MEQ/L (ref 9–15)
AST SERPL-CCNC: 41 U/L (ref 0–35)
BASOPHILS # BLD: 0 K/UL (ref 0–0.2)
BASOPHILS NFR BLD: 0.6 %
BILIRUB SERPL-MCNC: 0.3 MG/DL (ref 0.2–0.7)
BUN SERPL-MCNC: 12 MG/DL (ref 8–23)
BUN SERPL-MCNC: 12 MG/DL (ref 8–23)
CALCIUM SERPL-MCNC: 9.6 MG/DL (ref 8.5–9.9)
CALCIUM SERPL-MCNC: 9.7 MG/DL (ref 8.5–9.9)
CHLORIDE SERPL-SCNC: 104 MEQ/L (ref 95–107)
CHLORIDE SERPL-SCNC: 105 MEQ/L (ref 95–107)
CO2 SERPL-SCNC: 25 MEQ/L (ref 20–31)
CO2 SERPL-SCNC: 28 MEQ/L (ref 20–31)
CREAT SERPL-MCNC: 0.58 MG/DL (ref 0.5–0.9)
CREAT SERPL-MCNC: 0.64 MG/DL (ref 0.5–0.9)
EOSINOPHIL # BLD: 0 K/UL (ref 0–0.7)
EOSINOPHIL NFR BLD: 0.3 %
ERYTHROCYTE [DISTWIDTH] IN BLOOD BY AUTOMATED COUNT: 12.3 % (ref 11.5–14.5)
GLOBULIN SER CALC-MCNC: 2.8 G/DL (ref 2.3–3.5)
GLUCOSE SERPL-MCNC: 92 MG/DL (ref 70–99)
GLUCOSE SERPL-MCNC: 97 MG/DL (ref 70–99)
HCT VFR BLD AUTO: 33.6 % (ref 37–47)
HGB BLD-MCNC: 11.3 G/DL (ref 12–16)
LYMPHOCYTES # BLD: 2.2 K/UL (ref 1–4.8)
LYMPHOCYTES NFR BLD: 30.3 %
MAGNESIUM SERPL-MCNC: 2 MG/DL (ref 1.7–2.4)
MCH RBC QN AUTO: 31 PG (ref 27–31.3)
MCHC RBC AUTO-ENTMCNC: 33.6 % (ref 33–37)
MCV RBC AUTO: 92.1 FL (ref 79.4–94.8)
MONOCYTES # BLD: 0.6 K/UL (ref 0.2–0.8)
MONOCYTES NFR BLD: 8.3 %
NEUTROPHILS # BLD: 4.3 K/UL (ref 1.4–6.5)
NEUTS SEG NFR BLD: 60.1 %
PLATELET # BLD AUTO: 285 K/UL (ref 130–400)
POTASSIUM SERPL-SCNC: 3.5 MEQ/L (ref 3.4–4.9)
POTASSIUM SERPL-SCNC: 3.6 MEQ/L (ref 3.4–4.9)
PROT SERPL-MCNC: 6.1 G/DL (ref 6.3–8)
RBC # BLD AUTO: 3.65 M/UL (ref 4.2–5.4)
SODIUM SERPL-SCNC: 139 MEQ/L (ref 135–144)
SODIUM SERPL-SCNC: 142 MEQ/L (ref 135–144)
WBC # BLD AUTO: 7.2 K/UL (ref 4.8–10.8)

## 2025-03-16 PROCEDURE — 83735 ASSAY OF MAGNESIUM: CPT

## 2025-03-16 PROCEDURE — 85025 COMPLETE CBC W/AUTO DIFF WBC: CPT

## 2025-03-16 PROCEDURE — 6360000002 HC RX W HCPCS: Performed by: INTERNAL MEDICINE

## 2025-03-16 PROCEDURE — 36415 COLL VENOUS BLD VENIPUNCTURE: CPT

## 2025-03-16 PROCEDURE — 99232 SBSQ HOSP IP/OBS MODERATE 35: CPT | Performed by: PHYSICAL MEDICINE & REHABILITATION

## 2025-03-16 PROCEDURE — 6370000000 HC RX 637 (ALT 250 FOR IP): Performed by: INTERNAL MEDICINE

## 2025-03-16 PROCEDURE — 80053 COMPREHEN METABOLIC PANEL: CPT

## 2025-03-16 PROCEDURE — 6370000000 HC RX 637 (ALT 250 FOR IP): Performed by: STUDENT IN AN ORGANIZED HEALTH CARE EDUCATION/TRAINING PROGRAM

## 2025-03-16 PROCEDURE — 1180000000 HC REHAB R&B

## 2025-03-16 PROCEDURE — 6370000000 HC RX 637 (ALT 250 FOR IP): Performed by: PHYSICAL MEDICINE & REHABILITATION

## 2025-03-16 RX ORDER — LOSARTAN POTASSIUM 50 MG/1
50 TABLET ORAL DAILY
Status: DISCONTINUED | OUTPATIENT
Start: 2025-03-17 | End: 2025-03-16

## 2025-03-16 RX ORDER — LOSARTAN POTASSIUM 100 MG/1
100 TABLET ORAL DAILY
Status: DISCONTINUED | OUTPATIENT
Start: 2025-03-17 | End: 2025-03-16

## 2025-03-16 RX ADMIN — LISINOPRIL 20 MG: 20 TABLET ORAL at 09:19

## 2025-03-16 RX ADMIN — AMLODIPINE BESYLATE 10 MG: 10 TABLET ORAL at 09:19

## 2025-03-16 RX ADMIN — ACETAMINOPHEN 500 MG: 500 TABLET ORAL at 20:13

## 2025-03-16 RX ADMIN — EZETIMIBE 10 MG: 10 TABLET ORAL at 09:19

## 2025-03-16 RX ADMIN — PREDNISONE 20 MG: 20 TABLET ORAL at 09:19

## 2025-03-16 RX ADMIN — ACETAMINOPHEN 500 MG: 500 TABLET ORAL at 13:23

## 2025-03-16 RX ADMIN — MECLIZINE HYDROCHLORIDE 12.5 MG: 25 TABLET ORAL at 09:18

## 2025-03-16 RX ADMIN — ASPRIN AND EXTENDED-RELEASE DIPYRIDAMOLE 1 CAPSULE: 25; 200 CAPSULE ORAL at 20:14

## 2025-03-16 RX ADMIN — ENOXAPARIN SODIUM 40 MG: 100 INJECTION SUBCUTANEOUS at 09:23

## 2025-03-16 RX ADMIN — Medication 100 MG: at 09:18

## 2025-03-16 RX ADMIN — MECLIZINE HYDROCHLORIDE 12.5 MG: 25 TABLET ORAL at 20:14

## 2025-03-16 RX ADMIN — ACETAMINOPHEN 500 MG: 500 TABLET ORAL at 05:37

## 2025-03-16 RX ADMIN — ASPRIN AND EXTENDED-RELEASE DIPYRIDAMOLE 1 CAPSULE: 25; 200 CAPSULE ORAL at 09:19

## 2025-03-16 RX ADMIN — Medication 2000 UNITS: at 16:58

## 2025-03-16 ASSESSMENT — PAIN SCALES - GENERAL: PAINLEVEL_OUTOF10: 2

## 2025-03-16 ASSESSMENT — PAIN DESCRIPTION - DESCRIPTORS: DESCRIPTORS: ACHING

## 2025-03-16 ASSESSMENT — PAIN DESCRIPTION - LOCATION: LOCATION: BACK

## 2025-03-16 NOTE — PLAN OF CARE
Problem: Chronic Conditions and Co-morbidities  Goal: Patient's chronic conditions and co-morbidity symptoms are monitored and maintained or improved  3/16/2025 1043 by Claire Whelan RN  Outcome: Progressing  3/16/2025 0000 by Nicole Saunders RN  Outcome: Progressing     Problem: Discharge Planning  Goal: Discharge to home or other facility with appropriate resources  3/16/2025 1043 by Claire Whelan RN  Outcome: Progressing  3/16/2025 0000 by Nicole Saunders RN  Outcome: Progressing     Problem: Safety - Adult  Goal: Free from fall injury  3/16/2025 1043 by Claire Whelan RN  Outcome: Progressing  3/16/2025 0000 by Nicole Saunders RN  Outcome: Progressing     Problem: ABCDS Injury Assessment  Goal: Absence of physical injury  3/16/2025 1043 by Claire Whelan RN  Outcome: Progressing  3/16/2025 0000 by Nicole Saunders RN  Outcome: Progressing     Problem: Pain  Goal: Verbalizes/displays adequate comfort level or baseline comfort level  3/16/2025 1043 by Claire Whelan RN  Outcome: Progressing  3/16/2025 0000 by Nicole Saunders RN  Outcome: Progressing

## 2025-03-17 PROBLEM — I95.1 ORTHOSTATIC HYPOTENSION: Status: ACTIVE | Noted: 2025-03-17

## 2025-03-17 PROCEDURE — 6370000000 HC RX 637 (ALT 250 FOR IP): Performed by: PHYSICAL MEDICINE & REHABILITATION

## 2025-03-17 PROCEDURE — 6360000002 HC RX W HCPCS: Performed by: INTERNAL MEDICINE

## 2025-03-17 PROCEDURE — 97535 SELF CARE MNGMENT TRAINING: CPT

## 2025-03-17 PROCEDURE — 6370000000 HC RX 637 (ALT 250 FOR IP): Performed by: INTERNAL MEDICINE

## 2025-03-17 PROCEDURE — 97530 THERAPEUTIC ACTIVITIES: CPT

## 2025-03-17 PROCEDURE — 1180000000 HC REHAB R&B

## 2025-03-17 PROCEDURE — 99232 SBSQ HOSP IP/OBS MODERATE 35: CPT | Performed by: NURSE PRACTITIONER

## 2025-03-17 PROCEDURE — 6370000000 HC RX 637 (ALT 250 FOR IP): Performed by: STUDENT IN AN ORGANIZED HEALTH CARE EDUCATION/TRAINING PROGRAM

## 2025-03-17 PROCEDURE — 97116 GAIT TRAINING THERAPY: CPT

## 2025-03-17 PROCEDURE — 97110 THERAPEUTIC EXERCISES: CPT

## 2025-03-17 PROCEDURE — 99232 SBSQ HOSP IP/OBS MODERATE 35: CPT | Performed by: PHYSICAL MEDICINE & REHABILITATION

## 2025-03-17 RX ORDER — LOSARTAN POTASSIUM 100 MG/1
100 TABLET ORAL DAILY
Status: DISCONTINUED | OUTPATIENT
Start: 2025-03-18 | End: 2025-03-23 | Stop reason: HOSPADM

## 2025-03-17 RX ADMIN — EZETIMIBE 10 MG: 10 TABLET ORAL at 10:42

## 2025-03-17 RX ADMIN — LOSARTAN POTASSIUM 75 MG: 50 TABLET, FILM COATED ORAL at 10:42

## 2025-03-17 RX ADMIN — AMLODIPINE BESYLATE 10 MG: 10 TABLET ORAL at 10:43

## 2025-03-17 RX ADMIN — ASPRIN AND EXTENDED-RELEASE DIPYRIDAMOLE 1 CAPSULE: 25; 200 CAPSULE ORAL at 20:26

## 2025-03-17 RX ADMIN — ACETAMINOPHEN 500 MG: 500 TABLET ORAL at 17:56

## 2025-03-17 RX ADMIN — MECLIZINE HYDROCHLORIDE 12.5 MG: 25 TABLET ORAL at 10:43

## 2025-03-17 RX ADMIN — ACETAMINOPHEN 500 MG: 500 TABLET ORAL at 22:12

## 2025-03-17 RX ADMIN — ENOXAPARIN SODIUM 40 MG: 100 INJECTION SUBCUTANEOUS at 10:43

## 2025-03-17 RX ADMIN — Medication 2000 UNITS: at 17:57

## 2025-03-17 RX ADMIN — MECLIZINE HYDROCHLORIDE 12.5 MG: 25 TABLET ORAL at 20:26

## 2025-03-17 RX ADMIN — Medication 100 MG: at 10:42

## 2025-03-17 RX ADMIN — ASPRIN AND EXTENDED-RELEASE DIPYRIDAMOLE 1 CAPSULE: 25; 200 CAPSULE ORAL at 10:42

## 2025-03-17 RX ADMIN — PREDNISONE 20 MG: 20 TABLET ORAL at 10:42

## 2025-03-17 ASSESSMENT — PAIN SCALES - GENERAL
PAINLEVEL_OUTOF10: 0
PAINLEVEL_OUTOF10: 0

## 2025-03-17 NOTE — PLAN OF CARE
Problem: Chronic Conditions and Co-morbidities  Goal: Patient's chronic conditions and co-morbidity symptoms are monitored and maintained or improved  3/16/2025 2324 by Nicole Saunders RN  Outcome: Progressing     Problem: Discharge Planning  Goal: Discharge to home or other facility with appropriate resources  3/16/2025 2324 by Nicole Saunders RN  Outcome: Progressing     Problem: Safety - Adult  Goal: Free from fall injury  3/16/2025 2324 by Nicole Saunders RN  Outcome: Progressing     Problem: ABCDS Injury Assessment  Goal: Absence of physical injury  3/16/2025 2324 by Nicole Saunders RN  Outcome: Progressing     Problem: Pain  Goal: Verbalizes/displays adequate comfort level or baseline comfort level  3/16/2025 2324 by Nicole Saunders RN  Outcome: Progressing

## 2025-03-18 ENCOUNTER — TELEPHONE (OUTPATIENT)
Age: 85
End: 2025-03-18

## 2025-03-18 PROCEDURE — 97110 THERAPEUTIC EXERCISES: CPT

## 2025-03-18 PROCEDURE — 97535 SELF CARE MNGMENT TRAINING: CPT

## 2025-03-18 PROCEDURE — 97530 THERAPEUTIC ACTIVITIES: CPT

## 2025-03-18 PROCEDURE — 97116 GAIT TRAINING THERAPY: CPT

## 2025-03-18 PROCEDURE — 6360000002 HC RX W HCPCS: Performed by: PHYSICAL MEDICINE & REHABILITATION

## 2025-03-18 PROCEDURE — 6370000000 HC RX 637 (ALT 250 FOR IP): Performed by: PHYSICAL MEDICINE & REHABILITATION

## 2025-03-18 PROCEDURE — 6370000000 HC RX 637 (ALT 250 FOR IP): Performed by: INTERNAL MEDICINE

## 2025-03-18 PROCEDURE — 6370000000 HC RX 637 (ALT 250 FOR IP): Performed by: STUDENT IN AN ORGANIZED HEALTH CARE EDUCATION/TRAINING PROGRAM

## 2025-03-18 PROCEDURE — 99232 SBSQ HOSP IP/OBS MODERATE 35: CPT | Performed by: PHYSICAL MEDICINE & REHABILITATION

## 2025-03-18 PROCEDURE — 6360000002 HC RX W HCPCS: Performed by: INTERNAL MEDICINE

## 2025-03-18 PROCEDURE — 1180000000 HC REHAB R&B

## 2025-03-18 RX ADMIN — ONDANSETRON 4 MG: 4 TABLET, ORALLY DISINTEGRATING ORAL at 08:59

## 2025-03-18 RX ADMIN — ASPRIN AND EXTENDED-RELEASE DIPYRIDAMOLE 1 CAPSULE: 25; 200 CAPSULE ORAL at 20:50

## 2025-03-18 RX ADMIN — Medication 2000 UNITS: at 17:53

## 2025-03-18 RX ADMIN — CYANOCOBALAMIN 1000 MCG: 1000 INJECTION, SOLUTION INTRAMUSCULAR; SUBCUTANEOUS at 09:01

## 2025-03-18 RX ADMIN — AMLODIPINE BESYLATE 10 MG: 10 TABLET ORAL at 09:00

## 2025-03-18 RX ADMIN — MECLIZINE HYDROCHLORIDE 12.5 MG: 25 TABLET ORAL at 09:00

## 2025-03-18 RX ADMIN — MECLIZINE HYDROCHLORIDE 12.5 MG: 25 TABLET ORAL at 20:50

## 2025-03-18 RX ADMIN — ENOXAPARIN SODIUM 40 MG: 100 INJECTION SUBCUTANEOUS at 08:59

## 2025-03-18 RX ADMIN — Medication 100 MG: at 08:59

## 2025-03-18 RX ADMIN — ACETAMINOPHEN 650 MG: 325 TABLET ORAL at 09:00

## 2025-03-18 RX ADMIN — EZETIMIBE 10 MG: 10 TABLET ORAL at 09:00

## 2025-03-18 RX ADMIN — ASPRIN AND EXTENDED-RELEASE DIPYRIDAMOLE 1 CAPSULE: 25; 200 CAPSULE ORAL at 09:00

## 2025-03-18 RX ADMIN — LOSARTAN POTASSIUM 100 MG: 100 TABLET, FILM COATED ORAL at 09:00

## 2025-03-18 ASSESSMENT — PAIN SCALES - GENERAL
PAINLEVEL_OUTOF10: 0
PAINLEVEL_OUTOF10: 1
PAINLEVEL_OUTOF10: 0

## 2025-03-18 ASSESSMENT — PAIN DESCRIPTION - DESCRIPTORS: DESCRIPTORS: ACHING

## 2025-03-18 ASSESSMENT — PAIN DESCRIPTION - ORIENTATION: ORIENTATION: RIGHT;LEFT

## 2025-03-18 ASSESSMENT — PAIN DESCRIPTION - LOCATION: LOCATION: HIP

## 2025-03-18 NOTE — TELEPHONE ENCOUNTER
Patient states she will be due for a cholesterol injection, she could not remember the name of the medication, sometime in late April.    Patient is asking for an order to have this at the infusion center.    Patient is asking for the office to leave a VM on her home phone when this has been ordered because she is in the hospital.    Please Advise.    Callback  [phone]

## 2025-03-18 NOTE — FLOWSHEET NOTE
Patient assessment completed A&Ox4. Patient stated 1/10 bilateral hip pain. Tylenol was given. Patient stated feeling nauseated, and Zofran was given. LBM 3/18/25. Incontinent of bladder and continent of bowel. Patient had no other concerns. Patient on wheelchair, call light within reach, and chair alarm on.

## 2025-03-19 PROCEDURE — 6370000000 HC RX 637 (ALT 250 FOR IP): Performed by: STUDENT IN AN ORGANIZED HEALTH CARE EDUCATION/TRAINING PROGRAM

## 2025-03-19 PROCEDURE — 6370000000 HC RX 637 (ALT 250 FOR IP): Performed by: INTERNAL MEDICINE

## 2025-03-19 PROCEDURE — 97535 SELF CARE MNGMENT TRAINING: CPT

## 2025-03-19 PROCEDURE — 99231 SBSQ HOSP IP/OBS SF/LOW 25: CPT | Performed by: NURSE PRACTITIONER

## 2025-03-19 PROCEDURE — 97110 THERAPEUTIC EXERCISES: CPT

## 2025-03-19 PROCEDURE — 1180000000 HC REHAB R&B

## 2025-03-19 PROCEDURE — 97116 GAIT TRAINING THERAPY: CPT

## 2025-03-19 PROCEDURE — 97530 THERAPEUTIC ACTIVITIES: CPT

## 2025-03-19 PROCEDURE — 6370000000 HC RX 637 (ALT 250 FOR IP): Performed by: PHYSICAL MEDICINE & REHABILITATION

## 2025-03-19 PROCEDURE — 99232 SBSQ HOSP IP/OBS MODERATE 35: CPT | Performed by: PHYSICAL MEDICINE & REHABILITATION

## 2025-03-19 PROCEDURE — 6360000002 HC RX W HCPCS: Performed by: INTERNAL MEDICINE

## 2025-03-19 RX ORDER — HYDROCHLOROTHIAZIDE 25 MG/1
25 TABLET ORAL DAILY
Status: DISCONTINUED | OUTPATIENT
Start: 2025-03-19 | End: 2025-03-23 | Stop reason: HOSPADM

## 2025-03-19 RX ORDER — MECLIZINE HYDROCHLORIDE 25 MG/1
12.5 TABLET ORAL 3 TIMES DAILY PRN
Status: DISCONTINUED | OUTPATIENT
Start: 2025-03-19 | End: 2025-03-23 | Stop reason: HOSPADM

## 2025-03-19 RX ADMIN — EZETIMIBE 10 MG: 10 TABLET ORAL at 07:58

## 2025-03-19 RX ADMIN — LOSARTAN POTASSIUM 100 MG: 100 TABLET, FILM COATED ORAL at 08:02

## 2025-03-19 RX ADMIN — MECLIZINE HYDROCHLORIDE 12.5 MG: 25 TABLET ORAL at 07:58

## 2025-03-19 RX ADMIN — HYDRALAZINE HYDROCHLORIDE 10 MG: 20 INJECTION INTRAMUSCULAR; INTRAVENOUS at 22:40

## 2025-03-19 RX ADMIN — ASPRIN AND EXTENDED-RELEASE DIPYRIDAMOLE 1 CAPSULE: 25; 200 CAPSULE ORAL at 07:57

## 2025-03-19 RX ADMIN — AMLODIPINE BESYLATE 10 MG: 10 TABLET ORAL at 07:57

## 2025-03-19 RX ADMIN — ASPRIN AND EXTENDED-RELEASE DIPYRIDAMOLE 1 CAPSULE: 25; 200 CAPSULE ORAL at 21:23

## 2025-03-19 RX ADMIN — HYDROCHLOROTHIAZIDE 25 MG: 25 TABLET ORAL at 17:39

## 2025-03-19 RX ADMIN — Medication 2000 UNITS: at 17:36

## 2025-03-19 RX ADMIN — Medication 100 MG: at 07:57

## 2025-03-19 RX ADMIN — ENOXAPARIN SODIUM 40 MG: 100 INJECTION SUBCUTANEOUS at 07:58

## 2025-03-19 ASSESSMENT — PAIN SCALES - GENERAL
PAINLEVEL_OUTOF10: 0
PAINLEVEL_OUTOF10: 0

## 2025-03-19 NOTE — PLAN OF CARE
Problem: Chronic Conditions and Co-morbidities  Goal: Patient's chronic conditions and co-morbidity symptoms are monitored and maintained or improved  3/18/2025 2149 by Pallavi Sarah RN  Outcome: Progressing  3/18/2025 1130 by Tawana Baker RN  Outcome: Progressing     Problem: Discharge Planning  Goal: Discharge to home or other facility with appropriate resources  Outcome: Progressing     Problem: Safety - Adult  Goal: Free from fall injury  3/18/2025 2149 by Pallavi Sarah RN  Outcome: Progressing  3/18/2025 1130 by Tawana Baker RN  Outcome: Progressing     Problem: ABCDS Injury Assessment  Goal: Absence of physical injury  3/18/2025 2149 by Pallavi Sarah RN  Outcome: Progressing  3/18/2025 1130 by Tawana Baker RN  Outcome: Progressing     Problem: Pain  Goal: Verbalizes/displays adequate comfort level or baseline comfort level  3/18/2025 2149 by Pallavi Sarah RN  Outcome: Progressing  3/18/2025 1130 by Tawana Baker RN  Outcome: Progressing

## 2025-03-20 LAB
ANION GAP SERPL CALCULATED.3IONS-SCNC: 9 MEQ/L (ref 9–15)
BASOPHILS # BLD: 0.1 K/UL (ref 0–0.2)
BASOPHILS NFR BLD: 0.6 %
BUN SERPL-MCNC: 10 MG/DL (ref 8–23)
CALCIUM SERPL-MCNC: 9.9 MG/DL (ref 8.5–9.9)
CHLORIDE SERPL-SCNC: 102 MEQ/L (ref 95–107)
CO2 SERPL-SCNC: 27 MEQ/L (ref 20–31)
CREAT SERPL-MCNC: 0.56 MG/DL (ref 0.5–0.9)
EOSINOPHIL # BLD: 0.1 K/UL (ref 0–0.7)
EOSINOPHIL NFR BLD: 1.5 %
ERYTHROCYTE [DISTWIDTH] IN BLOOD BY AUTOMATED COUNT: 12.6 % (ref 11.5–14.5)
GLUCOSE SERPL-MCNC: 97 MG/DL (ref 70–99)
HCT VFR BLD AUTO: 38.4 % (ref 37–47)
HGB BLD-MCNC: 13.4 G/DL (ref 12–16)
LYMPHOCYTES # BLD: 1.9 K/UL (ref 1–4.8)
LYMPHOCYTES NFR BLD: 21.1 %
MCH RBC QN AUTO: 32.1 PG (ref 27–31.3)
MCHC RBC AUTO-ENTMCNC: 34.9 % (ref 33–37)
MCV RBC AUTO: 92.1 FL (ref 79.4–94.8)
MONOCYTES # BLD: 0.8 K/UL (ref 0.2–0.8)
MONOCYTES NFR BLD: 9 %
NEUTROPHILS # BLD: 5.9 K/UL (ref 1.4–6.5)
NEUTS SEG NFR BLD: 66.8 %
PLATELET # BLD AUTO: 414 K/UL (ref 130–400)
POTASSIUM SERPL-SCNC: 3.8 MEQ/L (ref 3.4–4.9)
RBC # BLD AUTO: 4.17 M/UL (ref 4.2–5.4)
SODIUM SERPL-SCNC: 138 MEQ/L (ref 135–144)
WBC # BLD AUTO: 8.9 K/UL (ref 4.8–10.8)

## 2025-03-20 PROCEDURE — 6370000000 HC RX 637 (ALT 250 FOR IP): Performed by: PHYSICAL MEDICINE & REHABILITATION

## 2025-03-20 PROCEDURE — 97535 SELF CARE MNGMENT TRAINING: CPT

## 2025-03-20 PROCEDURE — 6370000000 HC RX 637 (ALT 250 FOR IP): Performed by: INTERNAL MEDICINE

## 2025-03-20 PROCEDURE — 99233 SBSQ HOSP IP/OBS HIGH 50: CPT | Performed by: PHYSICAL MEDICINE & REHABILITATION

## 2025-03-20 PROCEDURE — 97110 THERAPEUTIC EXERCISES: CPT

## 2025-03-20 PROCEDURE — 36415 COLL VENOUS BLD VENIPUNCTURE: CPT

## 2025-03-20 PROCEDURE — 97116 GAIT TRAINING THERAPY: CPT

## 2025-03-20 PROCEDURE — 85025 COMPLETE CBC W/AUTO DIFF WBC: CPT

## 2025-03-20 PROCEDURE — 80048 BASIC METABOLIC PNL TOTAL CA: CPT

## 2025-03-20 PROCEDURE — 1180000000 HC REHAB R&B

## 2025-03-20 PROCEDURE — 6370000000 HC RX 637 (ALT 250 FOR IP): Performed by: STUDENT IN AN ORGANIZED HEALTH CARE EDUCATION/TRAINING PROGRAM

## 2025-03-20 PROCEDURE — 97530 THERAPEUTIC ACTIVITIES: CPT

## 2025-03-20 RX ORDER — OXYCODONE HYDROCHLORIDE 5 MG/1
5 TABLET ORAL EVERY 4 HOURS PRN
Qty: 30 TABLET | Refills: 0 | Status: SHIPPED | OUTPATIENT
Start: 2025-03-20 | End: 2025-03-27

## 2025-03-20 RX ADMIN — Medication 2000 UNITS: at 16:07

## 2025-03-20 RX ADMIN — ASPRIN AND EXTENDED-RELEASE DIPYRIDAMOLE 1 CAPSULE: 25; 200 CAPSULE ORAL at 22:28

## 2025-03-20 RX ADMIN — ACETAMINOPHEN 500 MG: 500 TABLET ORAL at 22:27

## 2025-03-20 RX ADMIN — Medication 100 MG: at 10:25

## 2025-03-20 RX ADMIN — ASPRIN AND EXTENDED-RELEASE DIPYRIDAMOLE 1 CAPSULE: 25; 200 CAPSULE ORAL at 10:25

## 2025-03-20 RX ADMIN — LOSARTAN POTASSIUM 100 MG: 100 TABLET, FILM COATED ORAL at 10:25

## 2025-03-20 RX ADMIN — MECLIZINE HYDROCHLORIDE 12.5 MG: 25 TABLET ORAL at 16:07

## 2025-03-20 RX ADMIN — HYDROCHLOROTHIAZIDE 25 MG: 25 TABLET ORAL at 10:25

## 2025-03-20 RX ADMIN — AMLODIPINE BESYLATE 10 MG: 10 TABLET ORAL at 10:25

## 2025-03-20 RX ADMIN — EZETIMIBE 10 MG: 10 TABLET ORAL at 10:25

## 2025-03-20 RX ADMIN — ACETAMINOPHEN 650 MG: 325 TABLET ORAL at 00:27

## 2025-03-20 ASSESSMENT — PAIN DESCRIPTION - DESCRIPTORS: DESCRIPTORS: ACHING

## 2025-03-20 ASSESSMENT — PAIN DESCRIPTION - ORIENTATION
ORIENTATION: POSTERIOR
ORIENTATION: ANTERIOR

## 2025-03-20 ASSESSMENT — PAIN DESCRIPTION - LOCATION
LOCATION: HEAD;NECK
LOCATION: HEAD

## 2025-03-20 ASSESSMENT — PAIN SCALES - GENERAL
PAINLEVEL_OUTOF10: 3
PAINLEVEL_OUTOF10: 4

## 2025-03-20 ASSESSMENT — PAIN - FUNCTIONAL ASSESSMENT
PAIN_FUNCTIONAL_ASSESSMENT: ACTIVITIES ARE NOT PREVENTED
PAIN_FUNCTIONAL_ASSESSMENT: ACTIVITIES ARE NOT PREVENTED

## 2025-03-21 ENCOUNTER — TELEPHONE (OUTPATIENT)
Age: 85
End: 2025-03-21

## 2025-03-21 DIAGNOSIS — R27.0 ATAXIA: Primary | ICD-10-CM

## 2025-03-21 DIAGNOSIS — H81.10 BENIGN PAROXYSMAL POSITIONAL VERTIGO, UNSPECIFIED LATERALITY: ICD-10-CM

## 2025-03-21 PROCEDURE — 6370000000 HC RX 637 (ALT 250 FOR IP): Performed by: INTERNAL MEDICINE

## 2025-03-21 PROCEDURE — 97110 THERAPEUTIC EXERCISES: CPT

## 2025-03-21 PROCEDURE — 6370000000 HC RX 637 (ALT 250 FOR IP): Performed by: STUDENT IN AN ORGANIZED HEALTH CARE EDUCATION/TRAINING PROGRAM

## 2025-03-21 PROCEDURE — 97550 CAREGIVER TRAING 1ST 30 MIN: CPT

## 2025-03-21 PROCEDURE — 97116 GAIT TRAINING THERAPY: CPT

## 2025-03-21 PROCEDURE — 97535 SELF CARE MNGMENT TRAINING: CPT

## 2025-03-21 PROCEDURE — 6370000000 HC RX 637 (ALT 250 FOR IP): Performed by: PHYSICAL MEDICINE & REHABILITATION

## 2025-03-21 PROCEDURE — 99232 SBSQ HOSP IP/OBS MODERATE 35: CPT | Performed by: PHYSICAL MEDICINE & REHABILITATION

## 2025-03-21 PROCEDURE — 1180000000 HC REHAB R&B

## 2025-03-21 RX ADMIN — Medication 2000 UNITS: at 18:22

## 2025-03-21 RX ADMIN — ASPRIN AND EXTENDED-RELEASE DIPYRIDAMOLE 1 CAPSULE: 25; 200 CAPSULE ORAL at 08:52

## 2025-03-21 RX ADMIN — AMLODIPINE BESYLATE 10 MG: 10 TABLET ORAL at 08:52

## 2025-03-21 RX ADMIN — LOSARTAN POTASSIUM 100 MG: 100 TABLET, FILM COATED ORAL at 08:52

## 2025-03-21 RX ADMIN — ACETAMINOPHEN 500 MG: 500 TABLET ORAL at 15:12

## 2025-03-21 RX ADMIN — ONDANSETRON 4 MG: 4 TABLET, ORALLY DISINTEGRATING ORAL at 16:27

## 2025-03-21 RX ADMIN — EZETIMIBE 10 MG: 10 TABLET ORAL at 08:52

## 2025-03-21 RX ADMIN — ACETAMINOPHEN 500 MG: 500 TABLET ORAL at 21:48

## 2025-03-21 RX ADMIN — MECLIZINE HYDROCHLORIDE 12.5 MG: 25 TABLET ORAL at 18:22

## 2025-03-21 RX ADMIN — Medication 100 MG: at 08:52

## 2025-03-21 RX ADMIN — MECLIZINE HYDROCHLORIDE 12.5 MG: 25 TABLET ORAL at 08:52

## 2025-03-21 RX ADMIN — HYDROCHLOROTHIAZIDE 25 MG: 25 TABLET ORAL at 08:52

## 2025-03-21 RX ADMIN — ASPRIN AND EXTENDED-RELEASE DIPYRIDAMOLE 1 CAPSULE: 25; 200 CAPSULE ORAL at 21:48

## 2025-03-21 ASSESSMENT — PAIN SCALES - GENERAL: PAINLEVEL_OUTOF10: 0

## 2025-03-21 NOTE — CARE COORDINATION
Case Management Initial Assessment        NAME:  Fern Recio  ROOM: R251/R251-01  :  1940  DATE: 3/12/2025        Social Functional:  Social/Functional History  Lives With: Son (son'isra al (does not work); Henrry works 5AM-5PM Mon-Fri in a factory, (lab/mary mix named [employer] and has an underground fence))  Type of Home: House  Home Layout: One level;Laundry in basement (9 steps to basement with left hand rail descending (pt uses stairs as her exercise in the morning and keeps dog food down there))  Home Access: Stairs to enter with rails  Entrance Stairs - Number of Steps: 3  Entrance Stairs - Rails: Left (up)  Bathroom Shower/Tub: Tub/Shower unit;Curtain  Bathroom Toilet: Handicap height  Bathroom Equipment: Hand-held shower;Grab bars in shower  Bathroom Accessibility: Walker accessible  Home Equipment: Rollator;Cane;Adjustable bed (pt working on purchasing lift chair)  Prior Level of Assist for ADLs: Independent  Prior Level of Assist for Homemaking: Independent (son and joseph help share with pt)  Homemaking Responsibilities: Yes  Meal Prep Responsibility: Secondary (son's mikaela)  Laundry Responsibility: Primary (basement)  Cleaning Responsibility: Secondary (sonWais mikaela does most)  Bill Paying/Finance Responsibility: Primary (on line checking (automatic payments) or some checks)  Shopping Responsibility: No (son completes)  Health Care Management: Primary (has three pill bottles (morning, afternoon, evening))  Prior Level of Assist for Transfers: Independent  Active : Yes  Mode of Transportation: Car  Education: some college--business courses and stopped in the second year of bookkeeping  Occupation: Retired  Type of Occupation: Patient is a retired  at [employer] in [place]    Spoke with patient and explained role in the team. Patient questions answered 
 [facility]  INPATIENT REHABILITATION  TEAM CONFERENCE NOTE  Room: R251/R251-01  Admit Date: 3/10/2025       Date: 3/13/2025  Patient Name: Fern Recio        MRN: [id number]    : 1940  (84 y.o.)  Gender: female        REHAB DIAGNOSIS:   Diagnosis: Impaired mobility adlS DT rhabdomyolysis status post fall DT gait ataxIA/vertigo. [facility] rehab admission 3/10/25    CO MORBIDITIES:      Past Medical History:   Diagnosis Date    Asthma     Asthma in remission     Breast cancer (HCC)     right breast    Breast cancer (HCC)     right breast (mastectomy)    Cardiovascular disease 2023    Cerebral artery occlusion with cerebral infarction (HCC)         Elevated TSH     Hematoma of right thigh 2021    History of artificial lens replacement 2015    History of blood transfusion     -after delivery of first child    History of breast cancer 2014    Invasive ductal cancer right breast, Dr Umana, Dr Lares    History of therapeutic radiation     right breast cancer.    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 
LSW called Henrry (son) and left a voicemail requesting a return call to discuss the discharge plan and goals. Electronically signed by JUAN CARLOS Eli LSW on 3/14/2025 at 10:32 AM    LSW received a voicemail from pt's dtr Lynn, stating that her brother notified her that LSW had attempted to provide a voicemail. LSW then called Lynn back and left a detailed voicemail with discharge date, goals, and requesting to clarify PLOF. Electronically signed by JUAN CARLOS Eli LSW on 3/14/2025 at 12:56 PM    
LSW met with pt and discussed the discharge date moving to 3/23, pt is very pleased. Pt noted that 3/23 is her son's birthday and he will be turning 64. She reported that she is happy with the progress she has made so far. Pt is now considering HHC vs OP, LSW will follow up after family training tomorrow. No new DME needed at this time. Pt plans to notify family of new discharge date and declined for LSW to call dtr and notify. Electronically signed by JUAN CARLOS Eli, MARYURI on 3/20/2025 at 2:04 PM    
LSW met with pt and dtr, and discussed discharge date and goals. Dtr confirmed pt was independent with ambulating prior to hospitalization. Pt feels that laying in bed for the first few days in the hospital made her stiff. However, she is starting to feel better. Family training is scheduled for Friday 3/21 at 2PM. HHC was discussed and given freedom of choice, pt would like [facility] at discharge. Referral will be made. Electronically signed by JUAN CARLOS Eli, MARYURI on 3/14/2025 at 4:11 PM    
LSW met with pt and updated her with projected discharge date and goals. Pt is in agreement with plan and was made aware of the barriers to reaching the goals. Pt requested for LSW to call her son Henrry to provide an update.   
LSW met with pt, dtr, son, and son's mikaela Reynoso during training per pt and family request. The upcoming discharge was discussed and they all confirmed they feel ready for pt to return home. HHC vs OP was discussed and given freedom of choice, pt would like [facility] at discharge. Pt requested for [place] to be called at [phone] to schedule visits. LSW did notify HHC. No new DME needed. Pt declined to complete patient satisfaction survey at this time. Electronically signed by JUAN CARLOS Eli, MARYURI on 3/21/2025 at 3:57 PM    
1154)  Quality of Gait 2: reciprocal gait pattern, with minimal foot clearance, minimal heel strike and toe off, decreased step length (03/15/25 1154)  Distance: 25' x 2 (03/15/25 1154)  Comments: Increased MARINA with ambualtion without AD vs with WW. Mild LOB with turn to sit in chair, improved safety on 2nd attempt. (03/15/25 1154)  Ambulation  Surface: Level surface (03/20/25 1008)  Device: Rolling walker (03/20/25 1008)  Distance: 300' (03/20/25 1008)  Activity: Within Unit (03/20/25 1008)  Additional Factors: Verbal cues;Hand placement cues;Increased time to complete (03/20/25 1008)  Assistance Level: Modified independent (03/20/25 1008)  Gait Deviations: Slow hernesto;Narrow base of support;Unsteady gait;Path deviations (03/20/25 1008)  Skilled Clinical Factors: improved ability to complete and tolerate increased gait distance this date, no LOB or instability noted. good safety with use of WW and minimal pain reported throughout. (03/20/25 1008)  Stairs:  Stairs/Curb  Stairs?: Yes (03/15/25 1345)  Stairs  # Steps : 16 (03/15/25 1345)  Stairs Height: 6\" (03/15/25 1345)  Rails: Bilateral (03/15/25 1345)  Stairs  Stair Height: 6'' (03/20/25 1008)  Device: One handrail (03/20/25 1008)  Number of Stairs: 12 (03/20/25 1008)  Additional Factors: Verbal cues;Hand placement cues;Reciprocal going up;Reciprocal going down;Increased time to complete (03/20/25 1008)  Assistance Level: Supervision (03/20/25 1008)  Skilled Clinical Factors: improved ability to complete, no safety concerns or pain limitations noted. supervision for safety. pt completes with double grasp on R ascending rail as well as double grasp on L descending rail. (03/20/25 1008)  W/C mobility:     LTG:  Long Term Goal 1: Pt to complete bed mobility with indep  Long Term Goal 2: Pt to complete tranfsers with indep  Long Term Goal 3: Pt to ambulate 50-150ft with LRD and indep  Long Term Goal 4: Pt to manage 3 steps with HR and SBA  PT Treatment Time:  1.5

## 2025-03-21 NOTE — TELEPHONE ENCOUNTER
[facility] calling to see if this pts pcp will follow up [facility] health orders     Any questions call Ph.[phone]

## 2025-03-21 NOTE — DISCHARGE INSTR - COC
Continuity of Care Form    Patient Name: Fern Recio   :  1940  MRN:  [id number]    Admit date:  3/10/2025  Discharge date:  25    Code Status Order: Full Code   Advance Directives:     Admitting Physician:  Tameka Blanca DO  PCP: Debi Malone PA-C    Discharging Nurse: Sandy QUIROS RN  Discharging Hospital Unit/Room#: R251/R251-01  Discharging Unit Phone Number:     Emergency Contact:   Extended Emergency Contact Information  Primary Emergency Contact: brea zheng  Home Phone: [phone]  Mobile Phone: [phone]  Relation: Child   needed? No  Secondary Emergency Contact: Henrry Arce  Home Phone: [phone]  Relation: Child    Past Surgical History:  Past Surgical History:   Procedure Laterality Date    BLADDER SUSPENSION      BREAST BIOPSY Right 2014    malignant    BREAST BIOPSY Right 2023    malignant (mastectomy)    BREAST LUMPECTOMY Right 2014    Lumpectomy with SLNB, Dr jun FERGUSON Dr in [facility]      EYE SURGERY      bilateral cataract surgery    FRACTURE SURGERY      \"as child right leg\"    MASTECTOMY Right     malignant    MASTECTOMY, MODIFIED RADICAL Right 2023    RIGHT BREAST MASTECTOMY MODIFIED RADICAL performed by Julienne Toth [facility]    TONSILLECTOMY      TUBAL LIGATION      US BIOPSY LYMPH NODE  2023    US LYMPH NODE BIOPSY 2023 [facility]       Immunization History:   Immunization History   Administered Date(s) Administered    COVID-19, PFIZER Bivalent, DO NOT Dilute, (age 12y+), IM, 30 mcg/0.3 mL 10/14/2022, 2023    COVID-19, PFIZER PURPLE top, DILUTE for use, (age 12 y+), 30mcg/0.3mL 2021, 2021, 10/22/2021    COVID-19, PFIZER, , (age 12y+), IM, 30mcg/0.3mL 12/15/2023, 10/16/2024    Influenza Vaccine, unspecified formulation 2007, 2011, 2015, 2016    Influenza Virus Vaccine 2007, 2011, 10/20/2014, 10/01/2019,

## 2025-03-21 NOTE — PLAN OF CARE
Problem: Chronic Conditions and Co-morbidities  Goal: Patient's chronic conditions and co-morbidity symptoms are monitored and maintained or improved  3/21/2025 1616 by Brandy Fuller RN  Outcome: Progressing  3/21/2025 0351 by Nicole Saunders RN  Outcome: Progressing     Problem: Discharge Planning  Goal: Discharge to home or other facility with appropriate resources  3/21/2025 1616 by Brandy Fuller RN  Outcome: Progressing  3/21/2025 0351 by Nicole Saunders RN  Outcome: Progressing     Problem: Safety - Adult  Goal: Free from fall injury  3/21/2025 1616 by Brandy Fuller RN  Outcome: Progressing  3/21/2025 0351 by Nicole Saunders RN  Outcome: Progressing     Problem: ABCDS Injury Assessment  Goal: Absence of physical injury  3/21/2025 1616 by Brandy Fuller RN  Outcome: Progressing  3/21/2025 0351 by Nicole Saunders RN  Outcome: Progressing     Problem: Pain  Goal: Verbalizes/displays adequate comfort level or baseline comfort level  3/21/2025 1616 by Brandy Fuller RN  Outcome: Progressing  3/21/2025 0351 by Nicloe Saunders RN  Outcome: Progressing     Problem: Skin/Tissue Integrity  Goal: Skin integrity remains intact  3/21/2025 1616 by Brandy Fuller RN  Outcome: Progressing  3/21/2025 0351 by Nicole Saunders RN  Outcome: Progressing

## 2025-03-21 NOTE — PROGRESS NOTES
Patient has significant dizziness and BPPV-unrelieved with inpatient physical therapy will transition to outpatient vestibular therapy

## 2025-03-21 NOTE — FLOWSHEET NOTE
Patient alert, oriented and cooperative. Complains of nausea and dizziness throughout the day, zofran and antivert moderately effective. Denies any further needs or complaints at this time. Call light within reach.

## 2025-03-22 PROCEDURE — 6370000000 HC RX 637 (ALT 250 FOR IP): Performed by: STUDENT IN AN ORGANIZED HEALTH CARE EDUCATION/TRAINING PROGRAM

## 2025-03-22 PROCEDURE — 6370000000 HC RX 637 (ALT 250 FOR IP): Performed by: PHYSICAL MEDICINE & REHABILITATION

## 2025-03-22 PROCEDURE — 6370000000 HC RX 637 (ALT 250 FOR IP): Performed by: INTERNAL MEDICINE

## 2025-03-22 PROCEDURE — 1180000000 HC REHAB R&B

## 2025-03-22 RX ADMIN — EZETIMIBE 10 MG: 10 TABLET ORAL at 09:24

## 2025-03-22 RX ADMIN — Medication 100 MG: at 09:24

## 2025-03-22 RX ADMIN — LOSARTAN POTASSIUM 100 MG: 100 TABLET, FILM COATED ORAL at 09:24

## 2025-03-22 RX ADMIN — ASPRIN AND EXTENDED-RELEASE DIPYRIDAMOLE 1 CAPSULE: 25; 200 CAPSULE ORAL at 09:25

## 2025-03-22 RX ADMIN — MECLIZINE HYDROCHLORIDE 12.5 MG: 25 TABLET ORAL at 06:43

## 2025-03-22 RX ADMIN — ACETAMINOPHEN 500 MG: 500 TABLET ORAL at 21:09

## 2025-03-22 RX ADMIN — Medication 2000 UNITS: at 17:11

## 2025-03-22 RX ADMIN — MECLIZINE HYDROCHLORIDE 12.5 MG: 25 TABLET ORAL at 21:10

## 2025-03-22 RX ADMIN — HYDROCHLOROTHIAZIDE 25 MG: 25 TABLET ORAL at 09:25

## 2025-03-22 RX ADMIN — ASPRIN AND EXTENDED-RELEASE DIPYRIDAMOLE 1 CAPSULE: 25; 200 CAPSULE ORAL at 21:10

## 2025-03-22 RX ADMIN — ACETAMINOPHEN 500 MG: 500 TABLET ORAL at 06:44

## 2025-03-22 RX ADMIN — AMLODIPINE BESYLATE 10 MG: 10 TABLET ORAL at 09:24

## 2025-03-22 ASSESSMENT — PAIN SCALES - GENERAL
PAINLEVEL_OUTOF10: 3
PAINLEVEL_OUTOF10: 0
PAINLEVEL_OUTOF10: 3
PAINLEVEL_OUTOF10: 0

## 2025-03-22 NOTE — PLAN OF CARE
Problem: Chronic Conditions and Co-morbidities  Goal: Patient's chronic conditions and co-morbidity symptoms are monitored and maintained or improved  3/22/2025 1526 by Natalie Sanders RN  Outcome: Progressing  3/22/2025 0328 by Rena Rao RN  Outcome: Progressing  3/22/2025 0327 by Rena Rao RN  Outcome: Progressing  Flowsheets (Taken 3/21/2025 2145)  Care Plan - Patient's Chronic Conditions and Co-Morbidity Symptoms are Monitored and Maintained or Improved: Monitor and assess patient's chronic conditions and comorbid symptoms for stability, deterioration, or improvement     Problem: Discharge Planning  Goal: Discharge to home or other facility with appropriate resources  3/22/2025 1526 by Natalie Sanders RN  Outcome: Progressing  3/22/2025 0328 by Rena Rao RN  Outcome: Progressing  3/22/2025 0327 by Rena Rao RN  Outcome: Progressing  Flowsheets (Taken 3/21/2025 2145)  Discharge to home or other facility with appropriate resources: Identify barriers to discharge with patient and caregiver     Problem: Safety - Adult  Goal: Free from fall injury  3/22/2025 1526 by Natalie Sanders RN  Outcome: Progressing  3/22/2025 0328 by Rena Rao RN  Outcome: Progressing  3/22/2025 0327 by Rena Rao RN  Outcome: Progressing

## 2025-03-22 NOTE — PLAN OF CARE
Problem: Chronic Conditions and Co-morbidities  Goal: Patient's chronic conditions and co-morbidity symptoms are monitored and maintained or improved  3/22/2025 0327 by Rena Rao RN  Outcome: Progressing  Flowsheets (Taken 3/21/2025 2145)  Care Plan - Patient's Chronic Conditions and Co-Morbidity Symptoms are Monitored and Maintained or Improved: Monitor and assess patient's chronic conditions and comorbid symptoms for stability, deterioration, or improvement  3/21/2025 1616 by Brandy Fuller RN  Outcome: Progressing     Problem: Discharge Planning  Goal: Discharge to home or other facility with appropriate resources  3/22/2025 0327 by Rena Rao RN  Outcome: Progressing  Flowsheets (Taken 3/21/2025 2145)  Discharge to home or other facility with appropriate resources: Identify barriers to discharge with patient and caregiver  3/21/2025 1616 by Brandy Fuller RN  Outcome: Progressing     Problem: Safety - Adult  Goal: Free from fall injury  3/22/2025 0327 by Rena Rao RN  Outcome: Progressing  3/21/2025 1616 by Brandy Fuller RN  Outcome: Progressing     Problem: ABCDS Injury Assessment  Goal: Absence of physical injury  3/22/2025 0327 by Rena Rao RN  Outcome: Progressing  3/21/2025 1616 by Brandy Fuller RN  Outcome: Progressing     Problem: Pain  Goal: Verbalizes/displays adequate comfort level or baseline comfort level  3/22/2025 0327 by Rena Rao RN  Outcome: Progressing  3/21/2025 1616 by Brandy Fuller RN  Outcome: Progressing     Problem: Skin/Tissue Integrity  Goal: Skin integrity remains intact  Description: 1.  Monitor for areas of redness and/or skin breakdown  2.  Assess vascular access sites hourly  3.  Every 4-6 hours minimum:  Change oxygen saturation probe site  4.  Every 4-6 hours:  If on nasal continuous positive airway pressure, respiratory therapy assess nares and determine need for appliance change or

## 2025-03-23 VITALS
RESPIRATION RATE: 18 BRPM | WEIGHT: 154 LBS | BODY MASS INDEX: 25.66 KG/M2 | OXYGEN SATURATION: 96 % | TEMPERATURE: 98.2 F | DIASTOLIC BLOOD PRESSURE: 66 MMHG | SYSTOLIC BLOOD PRESSURE: 152 MMHG | HEART RATE: 67 BPM | HEIGHT: 65 IN

## 2025-03-23 PROCEDURE — 97116 GAIT TRAINING THERAPY: CPT

## 2025-03-23 PROCEDURE — 6370000000 HC RX 637 (ALT 250 FOR IP): Performed by: INTERNAL MEDICINE

## 2025-03-23 PROCEDURE — 6370000000 HC RX 637 (ALT 250 FOR IP): Performed by: PHYSICAL MEDICINE & REHABILITATION

## 2025-03-23 PROCEDURE — 97110 THERAPEUTIC EXERCISES: CPT

## 2025-03-23 PROCEDURE — 6370000000 HC RX 637 (ALT 250 FOR IP): Performed by: STUDENT IN AN ORGANIZED HEALTH CARE EDUCATION/TRAINING PROGRAM

## 2025-03-23 RX ORDER — AMLODIPINE BESYLATE 10 MG/1
10 TABLET ORAL DAILY
Qty: 30 TABLET | Refills: 3 | Status: SHIPPED | OUTPATIENT
Start: 2025-03-24 | End: 2025-03-23

## 2025-03-23 RX ORDER — AMLODIPINE BESYLATE 10 MG/1
10 TABLET ORAL DAILY
Qty: 30 TABLET | Refills: 3 | Status: SHIPPED | OUTPATIENT
Start: 2025-03-24

## 2025-03-23 RX ORDER — HYDROCHLOROTHIAZIDE 25 MG/1
25 TABLET ORAL DAILY
Qty: 30 TABLET | Refills: 3 | Status: SHIPPED | OUTPATIENT
Start: 2025-03-24 | End: 2025-03-23

## 2025-03-23 RX ORDER — MECLIZINE HCL 12.5 MG 12.5 MG/1
12.5 TABLET ORAL 3 TIMES DAILY PRN
Qty: 30 TABLET | Refills: 0 | Status: SHIPPED | OUTPATIENT
Start: 2025-03-23 | End: 2025-04-02

## 2025-03-23 RX ORDER — HYDROCHLOROTHIAZIDE 25 MG/1
25 TABLET ORAL DAILY
Qty: 30 TABLET | Refills: 3 | Status: SHIPPED | OUTPATIENT
Start: 2025-03-24

## 2025-03-23 RX ORDER — LOSARTAN POTASSIUM 100 MG/1
100 TABLET ORAL DAILY
Qty: 30 TABLET | Refills: 3 | Status: SHIPPED | OUTPATIENT
Start: 2025-03-24

## 2025-03-23 RX ORDER — MECLIZINE HCL 12.5 MG 12.5 MG/1
12.5 TABLET ORAL 3 TIMES DAILY PRN
Qty: 30 TABLET | Refills: 0 | Status: SHIPPED | OUTPATIENT
Start: 2025-03-23 | End: 2025-03-23

## 2025-03-23 RX ORDER — LOSARTAN POTASSIUM 100 MG/1
100 TABLET ORAL DAILY
Qty: 30 TABLET | Refills: 3 | Status: SHIPPED | OUTPATIENT
Start: 2025-03-24 | End: 2025-03-23

## 2025-03-23 RX ADMIN — ASPRIN AND EXTENDED-RELEASE DIPYRIDAMOLE 1 CAPSULE: 25; 200 CAPSULE ORAL at 10:47

## 2025-03-23 RX ADMIN — LOSARTAN POTASSIUM 100 MG: 100 TABLET, FILM COATED ORAL at 10:47

## 2025-03-23 RX ADMIN — EZETIMIBE 10 MG: 10 TABLET ORAL at 10:47

## 2025-03-23 RX ADMIN — Medication 100 MG: at 10:46

## 2025-03-23 RX ADMIN — ACETAMINOPHEN 500 MG: 500 TABLET ORAL at 06:43

## 2025-03-23 RX ADMIN — HYDROCHLOROTHIAZIDE 25 MG: 25 TABLET ORAL at 10:48

## 2025-03-23 RX ADMIN — AMLODIPINE BESYLATE 10 MG: 10 TABLET ORAL at 10:47

## 2025-03-23 RX ADMIN — MECLIZINE HYDROCHLORIDE 12.5 MG: 25 TABLET ORAL at 10:47

## 2025-03-23 ASSESSMENT — PAIN SCALES - GENERAL
PAINLEVEL_OUTOF10: 0
PAINLEVEL_OUTOF10: 2

## 2025-03-23 NOTE — PLAN OF CARE
Problem: Chronic Conditions and Co-morbidities  Goal: Patient's chronic conditions and co-morbidity symptoms are monitored and maintained or improved  3/23/2025 1302 by Natalie Sanders RN  Outcome: Adequate for Discharge  Flowsheets (Taken 3/23/2025 1252)  Care Plan - Patient's Chronic Conditions and Co-Morbidity Symptoms are Monitored and Maintained or Improved: Update acute care plan with appropriate goals if chronic or comorbid symptoms are exacerbated and prevent overall improvement and discharge  3/23/2025 0216 by Rena Rao RN  Outcome: Progressing  Flowsheets (Taken 3/22/2025 2106)  Care Plan - Patient's Chronic Conditions and Co-Morbidity Symptoms are Monitored and Maintained or Improved: Monitor and assess patient's chronic conditions and comorbid symptoms for stability, deterioration, or improvement     Problem: Discharge Planning  Goal: Discharge to home or other facility with appropriate resources  3/23/2025 1302 by Natalie Sanders RN  Outcome: Adequate for Discharge  Flowsheets (Taken 3/23/2025 1252)  Discharge to home or other facility with appropriate resources: Refer to discharge planning if patient needs post-hospital services based on physician order or complex needs related to functional status, cognitive ability or social support system  3/23/2025 0216 by Rena Rao RN  Outcome: Progressing  Flowsheets (Taken 3/22/2025 2106)  Discharge to home or other facility with appropriate resources: Identify barriers to discharge with patient and caregiver     Problem: Safety - Adult  Goal: Free from fall injury  3/23/2025 1302 by Natalie Sanders RN  Outcome: Adequate for Discharge  3/23/2025 0216 by Rena Rao RN  Outcome: Progressing     Problem: ABCDS Injury Assessment  Goal: Absence of physical injury  3/23/2025 1302 by Natalie Sanders RN  Outcome: Adequate for Discharge  3/23/2025 0216 by Rena Rao RN  Outcome: Progressing     Problem: Pain  Goal:

## 2025-03-23 NOTE — PLAN OF CARE
Problem: Chronic Conditions and Co-morbidities  Goal: Patient's chronic conditions and co-morbidity symptoms are monitored and maintained or improved  3/23/2025 0216 by Rena Rao RN  Outcome: Progressing  Flowsheets (Taken 3/22/2025 2106)  Care Plan - Patient's Chronic Conditions and Co-Morbidity Symptoms are Monitored and Maintained or Improved: Monitor and assess patient's chronic conditions and comorbid symptoms for stability, deterioration, or improvement  3/22/2025 1526 by Natalie Sanders RN  Outcome: Progressing  Flowsheets (Taken 3/22/2025 0930)  Care Plan - Patient's Chronic Conditions and Co-Morbidity Symptoms are Monitored and Maintained or Improved: Collaborate with multidisciplinary team to address chronic and comorbid conditions and prevent exacerbation or deterioration     Problem: Discharge Planning  Goal: Discharge to home or other facility with appropriate resources  3/23/2025 0216 by Rena Rao RN  Outcome: Progressing  Flowsheets (Taken 3/22/2025 2106)  Discharge to home or other facility with appropriate resources: Identify barriers to discharge with patient and caregiver  3/22/2025 1526 by Natalie Sanders RN  Outcome: Progressing  Flowsheets (Taken 3/22/2025 0930)  Discharge to home or other facility with appropriate resources: Identify barriers to discharge with patient and caregiver     Problem: Safety - Adult  Goal: Free from fall injury  3/23/2025 0216 by Rena Rao RN  Outcome: Progressing  3/22/2025 1526 by Natalie Sanders RN  Outcome: Progressing     Problem: ABCDS Injury Assessment  Goal: Absence of physical injury  Outcome: Progressing     Problem: Pain  Goal: Verbalizes/displays adequate comfort level or baseline comfort level  Outcome: Progressing  Flowsheets  Taken 3/22/2025 1923 by Rena Rao RN  Verbalizes/displays adequate comfort level or baseline comfort level:   Encourage patient to monitor pain and request assistance   Assess pain

## 2025-03-23 NOTE — DISCHARGE SUMMARY
DISCHARGE SUMMARY    Hospital Course: The patient was admitted to the Rehabilitation Unit to address ADL and mobility deficits-as detailed above and below.  The patient was enrolled in acute PT, OT program.  Weekly team meetings were held to assess functional progress toward their goals-and modify the therapy program.  The patient's medical, emotional, psychosocial and functional issues were addressed.  The patient progressed in the rehab program and is now ready for discharge home.  Refer to functional assessments summary report for detailed functional status.  Refer to the medical problem list below to see the medical issues addressed.  The social and DC complexities are detailed in the DC planning section below.    Greater than 3 5 minutes was spent on coordinating patients discharge including follow-up care, medications and patient/family education.  Extended time needed because of the potential use of controlled medications are high risk medications and a high risk population individual.  Patient and family were instructed to use lowest effective dose of these medications and slowly titrate off over the next 2 to 4 weeks.  They are not to combine opiates with sedatives.     I reviewed her [place] prescription monitoring service data sheets in hopes of eliminating polypharmacy and weaning to the lowest effective dose of pain medications and eliminating the concomitant use of benzodiazepines.  I see   no medications of concern.  I see    no habits of combining sedatives and narcotics.  Controlled Substance Monitoring:    Acute and Chronic Pain Monitoring:   RX Monitoring Acute Pain Prescriptions Periodic Controlled Substance Monitoring   3/20/2025   2:32 PM Prescription exceeds daily limit for a specific reason. See comments or note.;Severe pain not adequately treated with lower dose.;Not required given exclusionary diagnoses... Possible medication side effects, risk of tolerance/dependence & alternative treatments

## 2025-03-24 ENCOUNTER — TELEPHONE (OUTPATIENT)
Age: 85
End: 2025-03-24

## 2025-03-24 NOTE — TELEPHONE ENCOUNTER
Care Transitions Initial Follow Up Call    Outreach made within 2 business days of discharge: Yes    Patient: Fern Recio Patient : 1940   MRN: [id number]  Reason for Admission: Impaired mobility  adlS DT rhabdomyolysis status post fall DT gait ataxIA/vertigo   Discharge Date: 3/23/25       Spoke with: PT     Discharge department/facility: [facility]    TCM Interactive Patient Contact:  Was patient able to fill all prescriptions: Yes  Was patient instructed to bring all medications to the follow-up visit: Yes  Is patient taking all medications as directed in the discharge summary? Yes  Does patient understand their discharge instructions: Yes  Does patient have questions or concerns that need addressed prior to 7-14 day follow up office visit: no    Additional needs identified to be addressed with provider  No needs identified             Scheduled appointment with PCP within 7-14 days    Follow Up  Future Appointments   Date Time Provider Department Center   2025 10:30 AM Debi Malone PA-C [facility]   2025  1:45 PM Mariam Holden MD [facility] Bridget [facility]   12/10/2025 11:00 AM [facility]   2026  1:00 PM Arnaldo Toth MD [facility] -       Dariana Huerta MA

## 2025-03-24 NOTE — PROGRESS NOTES
Hospitalist Progress Note      PCP: Debi Malone PA-C    Date of Admission: 3/10/2025    Chief Complaint: no new complains    Subjective: patient in chair, looks comfortable     Medications:  Reviewed    Infusion Medications   Scheduled Medications    losartan  100 mg Oral Daily    amLODIPine  10 mg Oral Daily    Vitamin D  2,000 Units Oral Dinner    cyanocobalamin  1,000 mcg IntraMUSCular Weekly    coenzyme Q10  100 mg Oral Daily    lidocaine  3 patch TransDERmal Daily    acetaminophen  500 mg Oral 3 times per day    aspirin-dipyridamole  1 capsule Oral BID    ezetimibe  10 mg Oral Daily    enoxaparin  40 mg SubCUTAneous Daily     PRN Meds: meclizine, oxyCODONE, acetaminophen, bisacodyl, sodium phosphate, ondansetron **OR** ondansetron, melatonin, polyethylene glycol, hydrALAZINE    No intake or output data in the 24 hours ending 03/19/25 1138    Exam:    BP (!) 165/66   Pulse 65   Temp 97.9 °F (36.6 °C) (Oral)   Resp 16   Ht 1.651 m (5' 5\")   Wt 69.9 kg (154 lb)   SpO2 96%   BMI 25.63 kg/m²     General appearance: No apparent distress, appears stated age and cooperative.  HEENT: Pupils equal, round, and reactive to light. Conjunctivae/corneas clear.  Neck: Supple, with full range of motion. No jugular venous distention. Trachea midline.  Respiratory:  Normal respiratory effort. Clear to auscultation, bilaterally without Rales/Wheezes/Rhonchi.  Cardiovascular: Regular rate and rhythm with normal S1/S2 without murmurs, rubs or gallops.  Abdomen: Soft, non-tender, non-distended with normal bowel sounds.  Musculoskeletal: No clubbing, cyanosis or edema bilaterally.  Full range of motion without deformity.  Skin: Skin color, texture, turgor normal.  No rashes or lesions.  Neurologic:  Neurovascularly intact without any focal sensory/motor deficits. Cranial nerves: II-XII intact, grossly non-focal.  Psychiatric: Alert and oriented, thought content appropriate, normal insight  Capillary Refill: Brisk,< 3 
    Hospitalist Progress Note      PCP: Debi Malone PA-C    Date of Admission: 3/10/2025    Chief Complaint: weakness    Subjective: patient in chair, awake/alert     Medications:  Reviewed    Infusion Medications   Scheduled Medications    hydroCHLOROthiazide  25 mg Oral Daily    losartan  100 mg Oral Daily    amLODIPine  10 mg Oral Daily    Vitamin D  2,000 Units Oral Dinner    cyanocobalamin  1,000 mcg IntraMUSCular Weekly    coenzyme Q10  100 mg Oral Daily    lidocaine  3 patch TransDERmal Daily    acetaminophen  500 mg Oral 3 times per day    aspirin-dipyridamole  1 capsule Oral BID    ezetimibe  10 mg Oral Daily     PRN Meds: meclizine, oxyCODONE, acetaminophen, bisacodyl, sodium phosphate, ondansetron **OR** ondansetron, melatonin, polyethylene glycol, hydrALAZINE    No intake or output data in the 24 hours ending 03/23/25 1058    Exam:    BP (!) 152/66   Pulse 67   Temp 98.2 °F (36.8 °C) (Oral)   Resp 18   Ht 1.651 m (5' 5\")   Wt 69.9 kg (154 lb)   SpO2 96%   BMI 25.63 kg/m²     General appearance: No apparent distress, appears stated age and cooperative.  HEENT: Pupils equal, round, and reactive to light. Conjunctivae/corneas clear.  Neck: Supple, with full range of motion. No jugular venous distention. Trachea midline.  Respiratory:  Normal respiratory effort. Clear to auscultation, bilaterally without Rales/Wheezes/Rhonchi.  Cardiovascular: Regular rate and rhythm with normal S1/S2 without murmurs, rubs or gallops.  Abdomen: Soft, non-tender, non-distended with normal bowel sounds.  Musculoskeletal: No clubbing, cyanosis or edema bilaterally.  Full range of motion without deformity.  Skin: Skin color, texture, turgor normal.  No rashes or lesions.  Neurologic:  Neurovascularly intact without any focal sensory/motor deficits. Cranial nerves: II-XII intact, grossly non-focal.  Psychiatric: Alert and oriented, thought content appropriate, normal insight  Capillary Refill: Brisk,< 3 seconds 
    Hospitalist Progress Note      PCP: Debi Malone PA-C    Date of Admission: 3/10/2025    Chief Complaint: weakness/dizziness    Subjective: patient back from PT    Medications:  Reviewed    Infusion Medications   Scheduled Medications    hydroCHLOROthiazide  25 mg Oral Daily    losartan  100 mg Oral Daily    amLODIPine  10 mg Oral Daily    Vitamin D  2,000 Units Oral Dinner    cyanocobalamin  1,000 mcg IntraMUSCular Weekly    coenzyme Q10  100 mg Oral Daily    lidocaine  3 patch TransDERmal Daily    acetaminophen  500 mg Oral 3 times per day    aspirin-dipyridamole  1 capsule Oral BID    ezetimibe  10 mg Oral Daily     PRN Meds: meclizine, oxyCODONE, acetaminophen, bisacodyl, sodium phosphate, ondansetron **OR** ondansetron, melatonin, polyethylene glycol, hydrALAZINE      Intake/Output Summary (Last 24 hours) at 3/21/2025 1514  Last data filed at 3/21/2025 0631  Gross per 24 hour   Intake --   Output 700 ml   Net -700 ml       Exam:    BP (!) 175/69   Pulse 63   Temp 98.1 °F (36.7 °C) (Oral)   Resp 16   Ht 1.651 m (5' 5\")   Wt 69.9 kg (154 lb)   SpO2 96%   BMI 25.63 kg/m²     General appearance: No apparent distress, appears stated age and cooperative.  HEENT: Pupils equal, round, and reactive to light. Conjunctivae/corneas clear.  Neck: Supple, with full range of motion. No jugular venous distention. Trachea midline.  Respiratory:  Normal respiratory effort. Clear to auscultation, bilaterally without Rales/Wheezes/Rhonchi.  Cardiovascular: Regular rate and rhythm with normal S1/S2 without murmurs, rubs or gallops.  Abdomen: Soft, non-tender, non-distended with normal bowel sounds.  Musculoskeletal: No clubbing, cyanosis or edema bilaterally.  Full range of motion without deformity.  Skin: Skin color, texture, turgor normal.  No rashes or lesions.  Neurologic:  Neurovascularly intact without any focal sensory/motor deficits. Cranial nerves: II-XII intact, grossly non-focal.  Psychiatric: Alert and 
    Hospitalist Progress Note      PCP: Debi Malone PA-C    Date of Admission: 3/10/2025    Chief Complaint: weakness/dizziness    Subjective: patient back from PT    Medications:  Reviewed    Infusion Medications   Scheduled Medications    losartan  100 mg Oral Daily    amLODIPine  10 mg Oral Daily    Vitamin D  2,000 Units Oral Dinner    cyanocobalamin  1,000 mcg IntraMUSCular Weekly    coenzyme Q10  100 mg Oral Daily    lidocaine  3 patch TransDERmal Daily    acetaminophen  500 mg Oral 3 times per day    aspirin-dipyridamole  1 capsule Oral BID    ezetimibe  10 mg Oral Daily    enoxaparin  40 mg SubCUTAneous Daily    meclizine  12.5 mg Oral BID     PRN Meds: oxyCODONE, acetaminophen, bisacodyl, sodium phosphate, ondansetron **OR** ondansetron, melatonin, polyethylene glycol, hydrALAZINE    No intake or output data in the 24 hours ending 03/18/25 1433    Exam:    BP (!) 163/80   Pulse 63   Temp 97.6 °F (36.4 °C) (Oral)   Resp 18   Ht 1.651 m (5' 5\")   Wt 69.9 kg (154 lb)   SpO2 98%   BMI 25.63 kg/m²     General appearance: No apparent distress, appears stated age and cooperative.  HEENT: Pupils equal, round, and reactive to light. Conjunctivae/corneas clear.  Neck: Supple, with full range of motion. No jugular venous distention. Trachea midline.  Respiratory:  Normal respiratory effort. Clear to auscultation, bilaterally without Rales/Wheezes/Rhonchi.  Cardiovascular: Regular rate and rhythm with normal S1/S2 without murmurs, rubs or gallops.  Abdomen: Soft, non-tender, non-distended with normal bowel sounds.  Musculoskeletal: No clubbing, cyanosis or edema bilaterally.  Full range of motion without deformity.  Skin: Skin color, texture, turgor normal.  No rashes or lesions.  Neurologic:  Neurovascularly intact without any focal sensory/motor deficits. Cranial nerves: II-XII intact, grossly non-focal.  Psychiatric: Alert and oriented, thought content appropriate, normal insight  Capillary Refill: Brisk,< 
    Hospitalist Progress Note      PCP: Debi Malone PA-C    Date of Admission: 3/10/2025    Chief Complaint: weakness/dizziness    Subjective: patient eating lunch    Medications:  Reviewed    Infusion Medications   Scheduled Medications    [START ON 3/18/2025] losartan  100 mg Oral Daily    amLODIPine  10 mg Oral Daily    Vitamin D  2,000 Units Oral Dinner    cyanocobalamin  1,000 mcg IntraMUSCular Weekly    coenzyme Q10  100 mg Oral Daily    lidocaine  3 patch TransDERmal Daily    acetaminophen  500 mg Oral 3 times per day    aspirin-dipyridamole  1 capsule Oral BID    ezetimibe  10 mg Oral Daily    enoxaparin  40 mg SubCUTAneous Daily    meclizine  12.5 mg Oral BID     PRN Meds: oxyCODONE, acetaminophen, bisacodyl, sodium phosphate, potassium chloride **OR** potassium alternative oral replacement **OR** potassium chloride, magnesium sulfate, ondansetron **OR** ondansetron, melatonin, polyethylene glycol, hydrALAZINE    No intake or output data in the 24 hours ending 03/17/25 1218    Exam:    BP (!) 163/61   Pulse 70   Temp 97.7 °F (36.5 °C) (Oral)   Resp 17   Ht 1.651 m (5' 5\")   Wt 69.9 kg (154 lb)   SpO2 97%   BMI 25.63 kg/m²     General appearance: No apparent distress, appears stated age and cooperative.  HEENT: Pupils equal, round, and reactive to light. Conjunctivae/corneas clear.  Neck: Supple, with full range of motion. No jugular venous distention. Trachea midline.  Respiratory:  Normal respiratory effort. Clear to auscultation, bilaterally without Rales/Wheezes/Rhonchi.  Cardiovascular: Regular rate and rhythm with normal S1/S2 without murmurs, rubs or gallops.  Abdomen: Soft, non-tender, non-distended with normal bowel sounds.  Musculoskeletal: No clubbing, cyanosis or edema bilaterally.  Full range of motion without deformity.  Skin: Skin color, texture, turgor normal.  No rashes or lesions.  Neurologic:  Neurovascularly intact without any focal sensory/motor deficits. Cranial nerves: II-XII 
  [facility] Neurology Daily Progress Note  Name: Fern Recio  Age: 84 y.o.  Gender: female  CodeStatus: Full Code  Allergies: Sulfa Antibiotics  Iodinated Contrast Media  Lactose Intolerance (Gi)  Statins    Chief Complaint:No chief complaint on file.    Primary Care Provider: Debi Malone PA-C  InpatientTreatment Team: Treatment Team:   Tameka Blanca DO Patel, Arnaldo ERAZO, MD Galvan, Tomi ZIMMERMAN, MD Burgess, Dolly, MSW, LSW  Mandi Tsai, KATEY Reed, Triny GUAMAN, Brandy Guerra, KATEY Vergara, Bonnie GUY, OTR/L  Marium Pavon  Admission Date: 3/10/2025      HPI   Pt seen and examined on rehab for neurology follow-up.  Patient is alert and oriented x 3, no acute distress, cooperative.  No tremors at rest.  No rigidity or cogwheeling.  Orthostatic blood pressures borderline.    Vitals:    03/17/25 0750   BP: (!) 163/61   Pulse: 70   Resp: 17   Temp:    SpO2:         Review of Systems   Constitutional:  Negative for appetite change, chills, fatigue and fever.   HENT:  Negative for hearing loss and trouble swallowing.    Eyes:  Negative for visual disturbance.   Respiratory:  Negative for cough, chest tightness, shortness of breath and wheezing.    Cardiovascular:  Negative for chest pain, palpitations and leg swelling.   Gastrointestinal:  Negative for nausea and vomiting.   Musculoskeletal:  Positive for gait problem.   Skin:  Negative for color change and rash.   Neurological:  Positive for tremors and light-headedness. Negative for dizziness, seizures, syncope, facial asymmetry, speech difficulty, weakness, numbness and headaches.   Psychiatric/Behavioral:  Negative for agitation, confusion and hallucinations. The patient is not nervous/anxious.          Physical Exam  Constitutional:       General: She is not in acute distress.     Appearance: She is not diaphoretic.   HENT:      Head: Normocephalic and atraumatic.   Eyes:      Extraocular Movements: Extraocular movements intact.      Pupils: 
  [facility] Neurology Daily Progress Note  Name: Fern Recio  Age: 84 y.o.  Gender: female  CodeStatus: Full Code  Allergies: Sulfa Antibiotics  Iodinated Contrast Media  Lactose Intolerance (Gi)  Statins    Chief Complaint:No chief complaint on file.    Primary Care Provider: Debi Malone PA-C  InpatientTreatment Team: Treatment Team:   Tameka Blanca DO Patel, MD Bibi Frederick Mark, MD Medina, Dolly, MSW, LSW  Silvestre, Steff GUAMAN, ABIGAIL Reed, Triny GUAMAN, LUCINDA  Jeffry, Levon, OT  Vergara, Bonnie S, OTR/L  Admission Date: 3/10/2025      HPI   Pt seen and examined on rehab for neurology follow-up.  Patient is alert and oriented x 3, no acute distress, cooperative.  No tremors at rest.  No rigidity or cogwheeling.  Orthostatic blood pressures borderline.    Vitals:    03/19/25 0802   BP: (!) 165/66   Pulse: 65   Resp: 16   Temp: 97.9 °F (36.6 °C)   SpO2: 96%        Review of Systems   Constitutional:  Negative for appetite change, chills, fatigue and fever.   HENT:  Negative for hearing loss and trouble swallowing.    Eyes:  Negative for visual disturbance.   Respiratory:  Negative for cough, chest tightness, shortness of breath and wheezing.    Cardiovascular:  Negative for chest pain, palpitations and leg swelling.   Gastrointestinal:  Negative for nausea and vomiting.   Musculoskeletal:  Positive for gait problem.   Skin:  Negative for color change and rash.   Neurological:  Positive for tremors and light-headedness. Negative for dizziness, seizures, syncope, facial asymmetry, speech difficulty, weakness, numbness and headaches.   Psychiatric/Behavioral:  Negative for agitation, confusion and hallucinations. The patient is not nervous/anxious.          Physical Exam  Constitutional:       General: She is not in acute distress.     Appearance: She is not diaphoretic.   HENT:      Head: Normocephalic and atraumatic.   Eyes:      Extraocular Movements: Extraocular movements intact.      Pupils: Pupils are 
 83 yo female  complains of severe acute on chronic progressive fatigue and bilateral lower extremity pain partially relieved by rest, PT, OT and meds   and exacerbated by exertion and recent rhabdomyolysis after falling at home due to gait instability.      Imaging was negative for fracture.  Blood pressure was very high on admission consistently systolic over 200s cannot remember she had taken her blood pressure medications that day.  She does have a history of vertigo and increased falls at home.  It was felt that she fell due to her vertigo.  Troponins were found to be elevated.  She was treated for hypertensive urgency and stabilized medically.    Subjective:  The patient complains of severe acute on chronic progressive fatigue and generalized weakness and bodyaches partially relieved by rest, medications, PT,  OT, hydration   and rest and exacerbated by recent fall with rhabdomyolysis and uncontrolled systolic blood pressure.      I am concerned about patient’s medical complexities and barriers to advancing in rehab goals including focusing on balance, falls prevention and blood pressure control.        I reviewed current care and plans for further care with other rehab providers including nursing and case management.  According to recent nursing note,  \"B/P remains elevated. {See flowsheet) IV 22 gauge started in L hand . Hydralazine 10 mg IVP given as ordered. Call bell in reach \".    She is hoping to move dc to this Sunday.  BP meds adjusted.  She is doing well in therapy and is happy to have her discharge date moved      ROS x10:  The patient also complains of severely impaired mobility and activities of daily living.  Otherwise no new problems with vision, hearing, nose, mouth, throat, dermal, cardiovascular, GI, , pulmonary, musculoskeletal, psychiatric or neurological. See also Acute Rehab PM&R H&P.       Vital signs:  /63   Pulse 71   Temp 98.8 °F (37.1 °C) (Oral)   Resp 17   Ht 1.651 m (5' 
 83 yo female  complains of severe acute on chronic progressive fatigue and bilateral lower extremity pain partially relieved by rest, PT, OT and meds   and exacerbated by exertion and recent rhabdomyolysis after falling at home due to gait instability.      Imaging was negative for fracture.  Blood pressure was very high on admission consistently systolic over 200s cannot remember she had taken her blood pressure medications that day.  She does have a history of vertigo and increased falls at home.  It was felt that she fell due to her vertigo.  Troponins were found to be elevated.  She was treated for hypertensive urgency and stabilized medically.    Subjective:  The patient complains of severe acute on chronic progressive fatigue and generalized weakness and bodyaches partially relieved by rest, medications, PT,  OT, hydration   and rest and exacerbated by recent fall with rhabdomyolysis and uncontrolled systolic blood pressure.      I am concerned about patient’s medical complexities and barriers to advancing in rehab goals including focusing on balance, falls prevention and blood pressure control.        I reviewed current care and plans for further care with other rehab providers including nursing and case management.  According to recent nursing note,  no substantive notes-- only form notes from the nursing over the past 24 hours any and all other notes reviewed as well.      ROS x10:  The patient also complains of severely impaired mobility and activities of daily living.  Otherwise no new problems with vision, hearing, nose, mouth, throat, dermal, cardiovascular, GI, , pulmonary, musculoskeletal, psychiatric or neurological. See also Acute Rehab PM&R H&P.       Vital signs:  BP (!) 151/66   Pulse 58   Temp 97.3 °F (36.3 °C) (Oral)   Resp 20   Ht 1.651 m (5' 5\")   Wt 69.9 kg (154 lb)   SpO2 97%   BMI 25.63 kg/m²   I/O:   PO/Intake:  fair PO intake,   regular nlow sodium diet    Bowel:   continent 
 83 yo female  complains of severe acute on chronic progressive fatigue and bilateral lower extremity pain partially relieved by rest, PT, OT and meds   and exacerbated by exertion and recent rhabdomyolysis after falling at home due to gait instability.      Imaging was negative for fracture.  Blood pressure was very high on admission consistently systolic over 200s cannot remember she had taken her blood pressure medications that day.  She does have a history of vertigo and increased falls at home.  It was felt that she fell due to her vertigo.  Troponins were found to be elevated.  She was treated for hypertensive urgency and stabilized medically.    Subjective:  The patient complains of severe acute on chronic progressive fatigue and generalized weakness and bodyaches partially relieved by rest, medications, PT,  OT, hydration   and rest and exacerbated by recent fall with rhabdomyolysis and uncontrolled systolic blood pressure.      I am concerned about patient’s medical complexities and barriers to advancing in rehab goals including focusing on balance, falls prevention and blood pressure control.        I reviewed current care and plans for further care with other rehab providers including nursing and case management.  According to recent nursing note,  no substantive notes-- only form notes from the nursing over the past 24 hours any and all other notes reviewed as well.      ROS x10:  The patient also complains of severely impaired mobility and activities of daily living.  Otherwise no new problems with vision, hearing, nose, mouth, throat, dermal, cardiovascular, GI, , pulmonary, musculoskeletal, psychiatric or neurological. See also Acute Rehab PM&R H&P.       Vital signs:  BP (!) 165/66   Pulse 65   Temp 97.9 °F (36.6 °C) (Oral)   Resp 16   Ht 1.651 m (5' 5\")   Wt 69.9 kg (154 lb)   SpO2 96%   BMI 25.63 kg/m²   I/O:   PO/Intake:  fair PO intake,   regular nlow sodium diet    Bowel:   continent 
 83 yo female  complains of severe acute on chronic progressive fatigue and bilateral lower extremity pain partially relieved by rest, PT, OT and meds   and exacerbated by exertion and recent rhabdomyolysis after falling at home due to gait instability.      Imaging was negative for fracture.  Blood pressure was very high on admission consistently systolic over 200s cannot remember she had taken her blood pressure medications that day.  She does have a history of vertigo and increased falls at home.  It was felt that she fell due to her vertigo.  Troponins were found to be elevated.  She was treated for hypertensive urgency and stabilized medically.    Subjective:  The patient complains of severe acute on chronic progressive fatigue and generalized weakness and bodyaches partially relieved by rest, medications, PT,  OT, hydration   and rest and exacerbated by recent fall with rhabdomyolysis and uncontrolled systolic blood pressure.      I am concerned about patient’s medical complexities and barriers to advancing in rehab goals including focusing on balance, falls prevention and blood pressure control.        I reviewed current care and plans for further care with other rehab providers including nursing and case management.  According to recent nursing note, \" complains of pain to BLE. PRN tylenol given. Patient complains of abimbola hip/leg pain. Scheduled tylenol given \".    She complains of constipation I will change MiraLAX from as needed to scheduled.  She is getting much better pain relief with her scheduled Tylenol.    ROS x10:  The patient also complains of severely impaired mobility and activities of daily living.  Otherwise no new problems with vision, hearing, nose, mouth, throat, dermal, cardiovascular, GI, , pulmonary, musculoskeletal, psychiatric or neurological. See also Acute Rehab PM&R H&P.       Vital signs:  BP (!) 149/78   Pulse 65   Temp 97 °F (36.1 °C) (Oral)   Resp 18   Ht 1.651 m (5' 5\")   
 83 yo female  complains of severe acute on chronic progressive fatigue and bilateral lower extremity pain partially relieved by rest, PT, OT and meds   and exacerbated by exertion and recent rhabdomyolysis after falling at home due to gait instability.      Imaging was negative for fracture.  Blood pressure was very high on admission consistently systolic over 200s cannot remember she had taken her blood pressure medications that day.  She does have a history of vertigo and increased falls at home.  It was felt that she fell due to her vertigo.  Troponins were found to be elevated.  She was treated for hypertensive urgency and stabilized medically.    Subjective:  The patient complains of severe acute on chronic progressive fatigue and generalized weakness and bodyaches partially relieved by rest, medications, PT,  OT, hydration   and rest and exacerbated by recent fall with rhabdomyolysis and uncontrolled systolic blood pressure.      I am concerned about patient’s medical complexities and barriers to advancing in rehab goals including focusing on balance, falls prevention and blood pressure control.        I reviewed current care and plans for further care with other rehab providers including nursing and case management.  According to recent nursing note, \"alert and oriented, in wheelchair. Patient denied pain at this time, stated that medications are effective for pain. Patient able to make needs known. \".       ROS x10:  The patient also complains of severely impaired mobility and activities of daily living.  Otherwise no new problems with vision, hearing, nose, mouth, throat, dermal, cardiovascular, GI, , pulmonary, musculoskeletal, psychiatric or neurological. See also Acute Rehab PM&R H&P.       Vital signs:  BP (!) 171/81   Pulse 70   Temp 98.1 °F (36.7 °C) (Oral)   Resp 17   Ht 1.651 m (5' 5\")   Wt 69.9 kg (154 lb)   SpO2 97%   BMI 25.63 kg/m²   I/O:   PO/Intake:  fair PO intake,   regular nlow 
 83 yo female  complains of severe acute on chronic progressive fatigue and bilateral lower extremity pain partially relieved by rest, PT, OT and meds   and exacerbated by exertion and recent rhabdomyolysis after falling at home due to gait instability.  She has significant BPPV unrelieved with our efforts she does not tolerate her vestibular therapy inpatient very well.  We are titrating her Antivert.  The plan is for her to transition to outpatient vestibular therapy     Imaging was negative for fracture.  Blood pressure was very high on admission consistently systolic over 200s cannot remember she had taken her blood pressure medications that day.  She does have a history of vertigo and increased falls at home.  It was felt that she fell due to her vertigo.  Troponins were found to be elevated.  She was treated for hypertensive urgency and stabilized medically.    Subjective:  The patient complains of severe acute on chronic progressive fatigue and generalized weakness and bodyaches partially relieved by rest, medications, PT,  OT, hydration   and rest and exacerbated by recent fall with rhabdomyolysis and uncontrolled systolic blood pressure.      I am concerned about patient’s medical complexities and barriers to advancing in rehab goals including focusing on balance, falls prevention and blood pressure control.        I reviewed current care and plans for further care with other rehab providers including nursing and case management.  According to recent nursing note,  \"Complained of feeling very dizzy following her PT. PRN Antivert given. Pt. Resting on her bed with a blanket covering her head \".           ROS x10:  The patient also complains of severely impaired mobility and activities of daily living.  Otherwise no new problems with vision, hearing, nose, mouth, throat, dermal, cardiovascular, GI, , pulmonary, musculoskeletal, psychiatric or neurological. See also Acute Rehab PM&R H&P.       Vital 
 83 yo female  complains of severe acute on chronic progressive fatigue and bilateral lower extremity pain partially relieved by rest, PT, OT and meds   and exacerbated by exertion and recent rhabdomyolysis after falling at home due to gait instability.  She has significant BPPV unrelieved with our efforts she does not tolerate her vestibular therapy inpatient very well.  We are titrating her Antivert.  The plan is for her to transition to outpatient vestibular therapy   Brandy Fuller, RN  Registered Nurse  Nursing     Flowsheet Note     Signed     Date of Service: 3/21/2025  4:17 PM     Signed         Patient alert, oriented and cooperative. Complains of nausea and dizziness throughout the day, zofran and antivert moderately effective. Denies any further needs or complaints at this time. Call light within reach.                 Imaging was negative for fracture.  Blood pressure was very high on admission consistently systolic over 200s cannot remember she had taken her blood pressure medications that day.  She does have a history of vertigo and increased falls at home.  It was felt that she fell due to her vertigo.  Troponins were found to be elevated.  She was treated for hypertensive urgency and stabilized medically.    Subjective:  The patient complains of severe acute on chronic progressive fatigue and generalized weakness and bodyaches partially relieved by rest, medications, PT,  OT, hydration   and rest and exacerbated by recent fall with rhabdomyolysis and uncontrolled systolic blood pressure.      I am concerned about patient’s medical complexities and barriers to advancing in rehab goals including focusing on balance, falls prevention and blood pressure control.        I reviewed current care and plans for further care with other rehab providers including nursing and case management.  According to recent nursing note,  \"Complained of feeling very dizzy following her PT. PRN Antivert given. Pt. 
 85 yo female  complains of severe acute on chronic progressive fatigue and bilateral lower extremity pain partially relieved by rest, PT, OT and meds   and exacerbated by exertion and recent rhabdomyolysis after falling at home due to gait instability.      Imaging was negative for fracture.  Blood pressure was very high on admission consistently systolic over 200s cannot remember she had taken her blood pressure medications that day.  She does have a history of vertigo and increased falls at home.  It was felt that she fell due to her vertigo.  Troponins were found to be elevated.  She was treated for hypertensive urgency and stabilized medically.    Subjective:  The patient complains of severe acute on chronic progressive fatigue and generalized weakness and bodyaches partially relieved by rest, medications, PT,  OT, hydration   and rest and exacerbated by recent fall with rhabdomyolysis and uncontrolled systolic blood pressure.      I am concerned about patient’s medical complexities and barriers to advancing in rehab goals including focusing on balance, falls prevention and blood pressure control.        I reviewed current care and plans for further care with other rehab providers including nursing and case management.  According to recent nursing note, \"alert and oriented pleasant and cooperative denies any pain or discomfort at this time. Ambulates to bathroom with walker good safety awareness AYAAN maintained \".      We again discussed the fluid hydration of her blood pressure medications in order to prevent orthostasis.  She continues to require the Avasys as she is impulsive and tries to get out of her chair on her own.    ROS x10:  The patient also complains of severely impaired mobility and activities of daily living.  Otherwise no new problems with vision, hearing, nose, mouth, throat, dermal, cardiovascular, GI, , pulmonary, musculoskeletal, psychiatric or neurological. See also Acute Rehab PM&R 
 85 yo female  complains of severe acute on chronic progressive fatigue and bilateral lower extremity pain partially relieved by rest, PT, OT and meds   and exacerbated by exertion and recent rhabdomyolysis after falling at home due to gait instability.      Imaging was negative for fracture.  Blood pressure was very high on admission consistently systolic over 200s cannot remember she had taken her blood pressure medications that day.  She does have a history of vertigo and increased falls at home.  It was felt that she fell due to her vertigo.  Troponins were found to be elevated.  She was treated for hypertensive urgency and stabilized medically.    Subjective:  The patient complains of severe acute on chronic progressive fatigue and generalized weakness and bodyaches partially relieved by rest, medications, PT,  OT, hydration   and rest and exacerbated by recent fall with rhabdomyolysis and uncontrolled systolic blood pressure.      I am concerned about patient’s medical complexities and barriers to advancing in rehab goals including focusing on balance, falls prevention and blood pressure control.        I reviewed current care and plans for further care with other rehab providers including nursing and case management.  According to recent nursing note, \"denies pain. BP this morning is 171/75, 63. BP meds given. Hospitalist notified\".     I am concerned about her slightly elevated blood pressure she is getting increased doses of amlodipine as needed.  It seems to be helping.  BP med adjustment discussed with hospitalist--agree to adj slowly.      ROS x10:  The patient also complains of severely impaired mobility and activities of daily living.  Otherwise no new problems with vision, hearing, nose, mouth, throat, dermal, cardiovascular, GI, , pulmonary, musculoskeletal, psychiatric or neurological. See also Acute Rehab PM&R H&P.       Vital signs:  BP (!) 171/75   Pulse 58   Temp 97.5 °F (36.4 °C)   Resp 18 
 [facility] OCCUPATIONAL THERAPY DISCHARGE SUMMARY- REHAB     Date: 3/24/2025  Patient Name: Fern Recio        MRN: [id number]  Account: [id number]   : 1940  (84 y.o.)  Room: [place] [address]    Diagnosis:  Imp Mob and ADLs due to rhabdomyolysis, proximal weakness with falls    Past Medical History:   Diagnosis Date    Asthma     Asthma in remission     Breast cancer (HCC)     right breast    Breast cancer (HCC)     right breast (mastectomy)    Cardiovascular disease 2023    Cerebral artery occlusion with cerebral infarction (HCC)         Elevated TSH     Hematoma of right thigh 2021    History of artificial lens replacement 2015    History of blood transfusion     -after delivery of first child    History of breast cancer 2014    Invasive ductal cancer right breast, Dr Umana, Dr Lares    History of therapeutic radiation     right breast cancer.    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    Senile cataract 2012    Tendinopathy of right gluteal region 2018    Dr Walt Toth (ortho)    Vitamin D deficiency      Past Surgical History:   Procedure Laterality Date    BLADDER SUSPENSION      BREAST BIOPSY Right 2014    malignant    BREAST BIOPSY Right 2023    malignant (mastectomy)    BREAST LUMPECTOMY Right 2014    Lumpectomy with SLNB, Dr jun FERGUSON Dr in [facility]      EYE SURGERY      bilateral cataract surgery    FRACTURE SURGERY      \"as child right leg\"    MASTECTOMY Right     malignant    MASTECTOMY, MODIFIED RADICAL Right 2023    RIGHT BREAST MASTECTOMY MODIFIED 
Agree with LPN assessment, continue plan of care. Electronically signed by Claire Whelan RN on 3/16/25 at 3:15 PM EDT   
Assessment completed. No c/o pain or distress noted. Call bell in reach.  
B/P remains elevated. {See flowsheet) IV 22 gauge started in L hand . Hydralazine 10 mg IVP given as ordered. Call bell in reach.  
Comprehensive Nutrition Assessment    Type and Reason for Visit:  Initial, Consult    Nutrition Recommendations/Plan:   Modify Current Diet (Suggest DONAVAN diet)     Malnutrition Assessment:  Malnutrition Status:  No malnutrition (03/12/25 1439)      Nutrition Assessment:    Pt is stable from a nutritional standpoint, with verbalized fair to good appetite/intake, currently and pta, with no nutritional complaints. To continue to follow.    Nutrition Related Findings:    PMH-CVA, Breast CA, htn, hld (zetia), OA; admitted to rehab 'due to rhabdomyolysis, proximal weakness with falls'. Labs/meds reviewed. Trace RLE edema noted. Wound Type: None       Current Nutrition Intake & Therapies:    Average Meal Intake: 51-75%     ADULT DIET; Regular; Low Sodium (2 gm); No Beef    Anthropometric Measures:  Height: 165.1 cm (5' 5\")  Ideal Body Weight (IBW): 125 lbs (57 kg)    [facility]: 69.[facility] kg (154 lb[facility]  Current BMI (kg/m2):  25.6  Usual Body Weight: 70.8 kg (156 lb) (4/2024)                          BMI Categories: Overweight (BMI 25.0-29.9)    Nutrition Diagnosis:   No nutrition diagnosis at this time    Nutrition Interventions:   Food and/or Nutrient Delivery: Modify Current Diet (Suggest DONAVAN diet)  Nutrition Education/Counseling: No recommendation at this time  Coordination of Nutrition Care: Continue to monitor while inpatient       Goals:  Goals: PO intake 75% or greater  Type of Goal: New goal       Nutrition Monitoring and Evaluation:      Food/Nutrient Intake Outcomes: Food and Nutrient Intake  Physical Signs/Symptoms Outcomes: Weight, Fluid Status or Edema, Biochemical Data    Discharge Planning:    No discharge needs at this time     Dasha Gardner, RD, LD      
Comprehensive Nutrition Assessment    Type and Reason for Visit:  Reassess    Nutrition Recommendations/Plan:   Modify Current Diet (Suggest DONAVAN diet)     Malnutrition Assessment:  Malnutrition Status:  No malnutrition (03/12/25 1439)      Nutrition Assessment:    Pt remains stable from a nutritional standpoint, with verbalized continued good appetite/intake, with no nutritional complaints. To continue to follow.    Nutrition Related Findings:    PMH-CVA, Breast CA, htn, hld (zetia), OA; admitted to rehab 'due to rhabdomyolysis, proximal weakness with falls'. Labs/meds reviewed. +1 BLE edema noted. HCTZ added 3/19. Wound Type: None       Current Nutrition Intake & Therapies:    Average Meal Intake: %     ADULT DIET; Regular; Low Sodium (2 gm); No Beef    Anthropometric Measures:  Height: 165.1 cm (5' 5\")  Ideal Body Weight (IBW): 125 lbs (57 kg)    Admission Body Weight: 69.9 kg (154 lb) (3/7 bedscale)  Current Body Weight:  (n/a-please obtain updated weight)  Current BMI (kg/m2):  25.6  Usual Body Weight: 70.8 kg (156 lb) (4/2024)                          BMI Categories: Overweight (BMI 25.0-29.9)    Nutrition Diagnosis:   No nutrition diagnosis at this time     Nutrition Interventions:   Food and/or Nutrient Delivery: Modify Current Diet (Suggest DONAVAN diet)  Nutrition Education/Counseling: No recommendation at this time  Coordination of Nutrition Care: Continue to monitor while inpatient       Goals:  Goals: PO intake 75% or greater  Type of Goal: New goal       Nutrition Monitoring and Evaluation:      Food/Nutrient Intake Outcomes: Food and Nutrient Intake  Physical Signs/Symptoms Outcomes: Weight, Fluid Status or Edema, Biochemical Data    Discharge Planning:    No discharge needs at this time     Dasha Gardner, RD, LD      
Facility/Department: [facility] REHAB  Rehabilitation Initial Assessment: Physical Therapy  Room: R251/R251-01    NAME: Fern Recio  : 1940  MRN: [id number]    Date of Service: 3/11/2025    Rehab Diagnosis(es): Impaired mobility adlS DT rhabdomyolysis status post fall DT gait ataxIA/vertigo. [facility] rehab admission 3/10/25  Patient Active Problem List    Diagnosis Date Noted    Status post mastectomy, right 2023    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, right (HCC) 2023    Adenocarcinoma, breast, right (HCC) 2023    Grief reaction 2023    GHAZAL (obstructive sleep apnea) 2022    Hypercalcemia 2022    Essential tremor 2022    Impaired mobility  adlS DT rhabdomyolysis status post fall DT gait ataxIA/vertigo 03/10/2025    Rhabdomyolysis 03/10/2025    Hypertensive urgency 2025    Falls 2025    Statin intolerance 10/07/2024    Gastroesophageal reflux disease without esophagitis 2024    Mixed stress and urge urinary incontinence 2024    Cystocele with prolapse 2024    Urinary frequency 2024    Right hip pain 2023    Post-menopausal 2023    Hyperlipemia 2023    Cardiovascular disease 2023    Cerebrovascular disease, unspecified 2023    Onychomycosis 2023    Pain in toes of both feet 2023    Peripheral vascular disease of foot 2023    Xerosis of skin 2023    Pain in left foot 2022    Plantar fasciitis of left foot 2022    Closed head injury with concussion 2021    Migraines 2020    Essential palatal tremor 2020    Benign paroxysmal vertigo of both ears 2020    Estrogen receptor positive status (ER+) 2018    Hypertensive retinopathy 2015    Retinal pigment epithelial atrophy 2015    Tear film insufficiency 2015    Vitreous degeneration 2015    History of recurrent TIAs 2014    Personal history of malignant 
Facility/Department[place] [facility]  Rehabilitation Initial Assessment: Occupational Therapy  Room: R2/R251-01    NAME: Fern Recio  : 1940  MRN: [id number]    Date of Service: 3/11/2025    Rehab Diagnosis(es): Imp Mob and ADLs due to rhabdomyolysis, proximal weakness with falls  Patient Active Problem List    Diagnosis Date Noted    Status post mastectomy, right 2023    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, right (HCC) 2023    Adenocarcinoma, breast, right (HCC) 2023    Grief reaction 2023    GHAZAL (obstructive sleep apnea) 2022    Hypercalcemia 2022    Essential tremor 2022    Impaired mobility  adlS DT rhabdomyolysis status post fall DT gait ataxIA/vertigo 03/10/2025    Rhabdomyolysis 03/10/2025    Hypertensive urgency 2025    Falls 2025    Statin intolerance 10/07/2024    Gastroesophageal reflux disease without esophagitis 2024    Mixed stress and urge urinary incontinence 2024    Cystocele with prolapse 2024    Urinary frequency 2024    Right hip pain 2023    Post-menopausal 2023    Hyperlipemia 2023    Cardiovascular disease 2023    Cerebrovascular disease, unspecified 2023    Onychomycosis 2023    Pain in toes of both feet 2023    Peripheral vascular disease of foot 2023    Xerosis of skin 2023    Pain in left foot 2022    Plantar fasciitis of left foot 2022    Closed head injury with concussion 2021    Migraines 2020    Essential palatal tremor 2020    Benign paroxysmal vertigo of both ears 2020    Estrogen receptor positive status (ER+) 2018    Hypertensive retinopathy 2015    Retinal pigment epithelial atrophy 2015    Tear film insufficiency 2015    Vitreous degeneration 2015    History of recurrent TIAs 2014    Personal history of malignant neoplasm of breast 2014    Vitamin D 
INDIVIDUALIZED OVERALL REHAB PLAN OF CARE  ADDENDUM TO REHAB PROGRESS NOTE-for audit purposes must also refer to this day's clinical note and combine the information      Date: 3/20/2025  Patient Name: Fern Recio   Room: R251/R251-01    MRN: [id number]    : 1940  (84 y.o.)  Gender: female       Today 3/20/2025 during weekly team meeting, I reviewed the patient Fern Recio in detail with the therapists and nurses involved in patient's care gathering complex physiatric data regarding current medical issues, progress in therapies, factors limiting progress, social issues, psychological issues, ongoing therapeutic plans and discharge planning.  I was present in [place] PM& [place] department [place] [facility]  A  video monitor live feed between the interdisciplinary team participants in the team conference was used to supplement connectivity and facilitate record review and allow immediate access to the EMR to allow real time review of the records and immediately address pertinent issues.   Our program views this an essential process to maintaining the integral rehab physician leadership role in team and the interdisciplinary discussion of our patient.    Legend:  I= independent Im =Modified independent  S=Supervised SB=stand by LEO=set up CG=contact jareth Min= minimal Mod=Moderate Max=maximal Max of 2 =maximal assist of 2 people      CURRENT FUNCTIONAL STATUS:    85 yo female  complains of severe acute on chronic progressive fatigue and bilateral lower extremity pain partially relieved by rest, PT, OT and meds   and exacerbated by exertion and recent rhabdomyolysis after falling at home due to gait instability.      Imaging was negative for fracture.  Blood pressure was very high on admission consistently systolic over 200s cannot remember she had taken her blood pressure medications that day.  She does have a history of vertigo and increased falls at home.  It was felt that she 
[facility]   Facility/Department: [facility] REHAB  Speech Language Pathology  Initial Speech/Language/Cognitive Assessment    NAME:Fern Recio  : 1940 (84 y.o.)   [x]   confirmed    MRN: [id number]  ROOM: [place] [address]  ADMISSION DATE: 3/10/2025  PATIENT DIAGNOSIS(ES): Impaired mobility and activities of daily living [Z74.09, Z78.9]  No chief complaint on file.    Patient Active Problem List    Diagnosis Date Noted    Status post mastectomy, right 2023    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, right (HCC) 2023    Adenocarcinoma, breast, right (HCC) 2023    Grief reaction 2023    GHAZAL (obstructive sleep apnea) 2022    Hypercalcemia 2022    Essential tremor 2022    Impaired mobility  adlS DT rhabdomyolysis status post fall DT gait ataxIA/vertigo 03/10/2025    Rhabdomyolysis 03/10/2025    Hypertensive urgency 2025    Falls 2025    Statin intolerance 10/07/2024    Gastroesophageal reflux disease without esophagitis 2024    Mixed stress and urge urinary incontinence 2024    Cystocele with prolapse 2024    Urinary frequency 2024    Right hip pain 2023    Post-menopausal 2023    Hyperlipemia 2023    Cardiovascular disease 2023    Cerebrovascular disease, unspecified 2023    Onychomycosis 2023    Pain in toes of both feet 2023    Peripheral vascular disease of foot 2023    Xerosis of skin 2023    Pain in left foot 2022    Plantar fasciitis of left foot 2022    Closed head injury with concussion 2021    Migraines 2020    Essential palatal tremor 2020    Benign paroxysmal vertigo of both ears 2020    Estrogen receptor positive status (ER+) 2018    Hypertensive retinopathy 2015    Retinal pigment epithelial atrophy 2015    Tear film insufficiency 2015    Vitreous degeneration 2015    History of recurrent TIAs 
OCCUPATIONAL THERAPY  INPATIENT REHAB TREATMENT NOTE  [facility]      NAME: Fern Recio  : 1940 (84 y.o.)  MRN: [id number]  CODE STATUS: Full Code  Room: R251/R251-01    Date of Service: 3/15/2025    Referring Physician: Dr Blanca  Rehab Diagnosis: Imp Mob and ADLs due to rhabdomyolysis, proximal weakness with falls    Hospital course:   Comments: 83 y.o. female with PMHxof hypertension, CAD on Aggrenox who presented to ED for fall. Patient does not recall whether the fall was mechanical or loss of consciousness. Imaging was negative for fractures. Patient not sure if she took antihypertensive medication on day of fall. Patient's blood pressure has been consistently over 200s. She is complained of headache but denied any lightheadedness or dizziness (however, has Hx of Vertigo). Other than hypokalemia, labs are unremarkable. CT of the head was negative for any acute intracranial abnormality. Troponins were slightly elevated but remain stabel in the 20s.   After meeting with the patient, she stated that she fell because she has vertigo.      Restrictions  Restrictions/Precautions  Restrictions/Precautions: Fall Risk            Position Activity Restriction  Other Position/Activity Restrictions: Limb alert RUE    Patient's date of birth confirmed: Yes    SAFETY:  Safety Devices  Safety Devices in place: Yes  Type of devices: All fall risk precautions in place    SUBJECTIVE:  Subjective  Subjective: Patient's daughter present during session this afternoon. Patient willing to complete IADL in room for OT session.  Pain: 1/10 pain in R hip pre and post OT session    Pain at start of treatment: Yes: 1/10    Pain at end of treatment: Yes: 1/10    Location: L hip  Description: sore  Nursing notified: Declined      OBJECTIVE:         Instrumental ADL's  Instrumental ADLs: Yes  Light Housekeeping  Light Housekeeping Level: Walker (rolling walker)  Light Housekeeping Level of Assistance: Stand by 
OCCUPATIONAL THERAPY  INPATIENT REHAB TREATMENT NOTE  [facility]      NAME: Fern Recio  : 1940 (84 y.o.)  MRN: [id number]  CODE STATUS: Full Code  Room: R251/R251-01    Date of Service: 3/19/2025    Referring Physician: Dr Blanca  Rehab Diagnosis: Imp Mob and ADLs due to rhabdomyolysis, proximal weakness with falls    Hospital course:   Comments: 83 y.o. female with PMHxof hypertension, CAD on Aggrenox who presented to ED for fall. Patient does not recall whether the fall was mechanical or loss of consciousness. Imaging was negative for fractures. Patient not sure if she took antihypertensive medication on day of fall. Patient's blood pressure has been consistently over 200s. She is complained of headache but denied any lightheadedness or dizziness (however, has Hx of Vertigo). Other than hypokalemia, labs are unremarkable. CT of the head was negative for any acute intracranial abnormality. Troponins were slightly elevated but remain stabel in the 20s.   After meeting with the patient, she stated that she fell because she has vertigo.      Restrictions  Restrictions/Precautions  Restrictions/Precautions: Fall Risk                 Patient's date of birth confirmed: Yes    SAFETY:  Safety Devices  Safety Devices in place: Yes  Type of devices: All fall risk precautions in place    SUBJECTIVE:  Subjective  Subjective: \"I thought they were going to want me to go to therapy in my nightgown but I would have refused\" Patient reported about not getting washed and dressed prior to scheduled therapy.    Pain at start of treatment: No    Pain at end of treatment: No      COGNITION:     WFL    OBJECTIVE:     Patient was seen for shower ADL. Please see status below     Grooming/Oral Hygiene  Assistance Level: Modified independent  Skilled Clinical Factors: seated in chair at sink  Upper Extremity Bathing  Assistance Level: Set-up  Lower Extremity Bathing  Assistance Level: Set-up  Upper Extremity 
OCCUPATIONAL THERAPY  INPATIENT REHAB TREATMENT NOTE  [facility]      NAME: Fern Recio  : 1940 (84 y.o.)  MRN: [id number]  CODE STATUS: Full Code  Room: R251/R251-01    Date of Service: 3/19/2025    Referring Physician: Dr Blanca  Rehab Diagnosis: Imp Mob and ADLs due to rhabdomyolysis, proximal weakness with falls    Hospital course:   Comments: 83 y.o. female with PMHxof hypertension, CAD on Aggrenox who presented to ED for fall. Patient does not recall whether the fall was mechanical or loss of consciousness. Imaging was negative for fractures. Patient not sure if she took antihypertensive medication on day of fall. Patient's blood pressure has been consistently over 200s. She is complained of headache but denied any lightheadedness or dizziness (however, has Hx of Vertigo). Other than hypokalemia, labs are unremarkable. CT of the head was negative for any acute intracranial abnormality. Troponins were slightly elevated but remain stabel in the 20s.   After meeting with the patient, she stated that she fell because she has vertigo.      Restrictions  Restrictions/Precautions  Restrictions/Precautions: Fall Risk                 Patient's date of birth confirmed: Yes    SAFETY:  Safety Devices  Safety Devices in place: Yes  Type of devices: All fall risk precautions in place    SUBJECTIVE:  Subjective  Subjective: \"This will be so helpful so you can tell my family on friday during the meeting what I need\"    Pain at start of treatment: No    Pain at end of treatment: No      COGNITION:     WNL for the session     OBJECTIVE:    Tub/Shower Transfers  Type: Tub  Transfer From: Rolling walker  Transfer To: Shower chair with back  Assistance Level: Supervision  Skilled Clinical Factors: Patient completed the transfer twice         Functional Mobility  Device: Rolling walker  Activity: To/From bathroom  Assistance Level: Supervision  Sit to Stand  Assistance Level: Modified independent  Stand to 
OCCUPATIONAL THERAPY  INPATIENT REHAB TREATMENT NOTE  [facility]      NAME: Fern Recio  : 1940 (84 y.o.)  MRN: [id number]  CODE STATUS: Full Code  Room: R251/R251-01    Date of Service: 3/18/2025    Referring Physician: Dr Blanca  Rehab Diagnosis: Imp Mob and ADLs due to rhabdomyolysis, proximal weakness with falls    Hospital course:   Comments: 83 y.o. female with PMHxof hypertension, CAD on Aggrenox who presented to ED for fall. Patient does not recall whether the fall was mechanical or loss of consciousness. Imaging was negative for fractures. Patient not sure if she took antihypertensive medication on day of fall. Patient's blood pressure has been consistently over 200s. She is complained of headache but denied any lightheadedness or dizziness (however, has Hx of Vertigo). Other than hypokalemia, labs are unremarkable. CT of the head was negative for any acute intracranial abnormality. Troponins were slightly elevated but remain stabel in the 20s.   After meeting with the patient, she stated that she fell because she has vertigo.      Restrictions  Restrictions/Precautions  Restrictions/Precautions: Fall Risk            Position Activity Restriction  Other Position/Activity Restrictions: Limb alert RUE    Patient's date of birth confirmed: Yes    SAFETY:  Safety Devices  Safety Devices in place: Yes  Type of devices: All fall risk precautions in place    SUBJECTIVE:  Subjective  Subjective: \"I didn't want to get billed for missing an appointment\"    Pain at start of treatment: No    Pain at end of treatment: No      COGNITION:     WFL    OBJECTIVE:    Patient performed seated table top tasks while working on hand and UE strengthening with no complaints. Patient continues to benefit from repetitive reach during tasks for endurance to increase self care independence     Education:  Education  Education Given To: Patient  Education Provided: Home Exercise Program  Education Method: 
OCCUPATIONAL THERAPY  INPATIENT REHAB TREATMENT NOTE  [facility]      NAME: Fern Recio  : 1940 (84 y.o.)  MRN: [id number]  CODE STATUS: Full Code  Room: R251/R251-01    Date of Service: 3/18/2025    Referring Physician: Dr Blanca  Rehab Diagnosis: Imp Mob and ADLs due to rhabdomyolysis, proximal weakness with falls    Hospital course:   Comments: 83 y.o. female with PMHxof hypertension, CAD on Aggrenox who presented to ED for fall. Patient does not recall whether the fall was mechanical or loss of consciousness. Imaging was negative for fractures. Patient not sure if she took antihypertensive medication on day of fall. Patient's blood pressure has been consistently over 200s. She is complained of headache but denied any lightheadedness or dizziness (however, has Hx of Vertigo). Other than hypokalemia, labs are unremarkable. CT of the head was negative for any acute intracranial abnormality. Troponins were slightly elevated but remain stabel in the 20s.   After meeting with the patient, she stated that she fell because she has vertigo.      Restrictions  Restrictions/Precautions  Restrictions/Precautions: Fall Risk            Position Activity Restriction  Other Position/Activity Restrictions: Limb alert RUE    Patient's date of birth confirmed: Yes    SAFETY:  Safety Devices  Safety Devices in place: Yes  Type of devices: All fall risk precautions in place    SUBJECTIVE:  Subjective  Subjective: \"I was exercising my legs when I got up this morning\"    Pain at start of treatment: Yes: 1/10    Pain at end of treatment: No    Location: thighs  Description: ache  Nursing notified: Not Applicable  Nurse: N/A  Intervention: None    COGNITION:     WFL    OBJECTIVE:     Patient was seen for sponge bath ADL    Grooming/Oral Hygiene  Assistance Level: Modified independent  Upper Extremity Bathing  Assistance Level: Supervision  Lower Extremity Bathing  Assistance Level: Supervision  Upper Extremity 
OCCUPATIONAL THERAPY  INPATIENT REHAB TREATMENT NOTE  [facility]      NAME: Fern Recio  : 1940 (84 y.o.)  MRN: [id number]  CODE STATUS: Full Code  Room: R251/R251-01    Date of Service: 3/14/2025    Referring Physician: Dr Blanca  Rehab Diagnosis: Imp Mob and ADLs due to rhabdomyolysis, proximal weakness with falls    Hospital course:   Comments: 83 y.o. female with PMHxof hypertension, CAD on Aggrenox who presented to ED for fall. Patient does not recall whether the fall was mechanical or loss of consciousness. Imaging was negative for fractures. Patient not sure if she took antihypertensive medication on day of fall. Patient's blood pressure has been consistently over 200s. She is complained of headache but denied any lightheadedness or dizziness (however, has Hx of Vertigo). Other than hypokalemia, labs are unremarkable. CT of the head was negative for any acute intracranial abnormality. Troponins were slightly elevated but remain stabel in the 20s.   After meeting with the patient, she stated that she fell because she has vertigo.      Restrictions  Restrictions/Precautions  Restrictions/Precautions: Fall Risk            Position Activity Restriction  Other Position/Activity Restrictions: Limb alert RUE    Patient's date of birth confirmed: Yes    SAFETY:  Safety Devices  Safety Devices in place: Yes  Type of devices: All fall risk precautions in place    SUBJECTIVE:       Pain at start of treatment: Yes: 3/10    Pain at end of treatment: No    Location: Patient reported that she had pain at the beginning of the session but that she was distracted by the activity and her pain decreased       COGNITION:     WFL    OBJECTIVE:     Patient completed UE strengthening with 1/2 pound weights on B wrists while performing repetitive reach to place Tetris pieces into the base. Patient required extra time to think about not making open spaces in the design.     ASSESSMENT:  Assessment: Patient had 
OCCUPATIONAL THERAPY  INPATIENT REHAB TREATMENT NOTE  [facility]      NAME: Fern Recio  : 1940 (84 y.o.)  MRN: [id number]  CODE STATUS: Full Code  Room: R251/R251-01    Date of Service: 3/17/2025    Referring Physician: Dr Blanca  Rehab Diagnosis: Imp Mob and ADLs due to rhabdomyolysis, proximal weakness with falls    Hospital course:   Comments: 83 y.o. female with PMHxof hypertension, CAD on Aggrenox who presented to ED for fall. Patient does not recall whether the fall was mechanical or loss of consciousness. Imaging was negative for fractures. Patient not sure if she took antihypertensive medication on day of fall. Patient's blood pressure has been consistently over 200s. She is complained of headache but denied any lightheadedness or dizziness (however, has Hx of Vertigo). Other than hypokalemia, labs are unremarkable. CT of the head was negative for any acute intracranial abnormality. Troponins were slightly elevated but remain stabel in the 20s.   After meeting with the patient, she stated that she fell because she has vertigo.      Restrictions  Restrictions/Precautions  Restrictions/Precautions: Fall Risk            Position Activity Restriction  Other Position/Activity Restrictions: Limb alert RUE    Patient's date of birth confirmed: Yes    SAFETY:  Safety Devices  Safety Devices in place: Yes  Type of devices: All fall risk precautions in place (virtual monitor in place)    SUBJECTIVE:  Subjective  Subjective: \"we go back and forth some times\" patient reported when asked about the virtual monitor in her room. Patient reported it is present because she is uncomfortable in her chair and she adjusts and the alarm goes off    Pain at start of treatment: Yes: 1/10    Pain at end of treatment: Yes: 1/10    Location: hip  Description: mild ache  Nursing notified: No  Nurse: N/A  Intervention: None    COGNITION:   Patient was noted to have decreased recall of safety recommendation of 
OCCUPATIONAL THERAPY  INPATIENT REHAB TREATMENT NOTE  [facility]      NAME: Fern Recio  : 1940 (84 y.o.)  MRN: [id number]  CODE STATUS: Full Code  Room: R251/R251-01    Date of Service: 3/13/2025    Referring Physician: Dr Blanca  Rehab Diagnosis: Imp Mob and ADLs due to rhabdomyolysis, proximal weakness with falls    Hospital course:   Comments: 83 y.o. female with PMHxof hypertension, CAD on Aggrenox who presented to ED for fall. Patient does not recall whether the fall was mechanical or loss of consciousness. Imaging was negative for fractures. Patient not sure if she took antihypertensive medication on day of fall. Patient's blood pressure has been consistently over 200s. She is complained of headache but denied any lightheadedness or dizziness (however, has Hx of Vertigo). Other than hypokalemia, labs are unremarkable. CT of the head was negative for any acute intracranial abnormality. Troponins were slightly elevated but remain stabel in the 20s.   After meeting with the patient, she stated that she fell because she has vertigo.      Restrictions  Restrictions/Precautions  Restrictions/Precautions: Fall Risk                 Patient's date of birth confirmed: Yes    SAFETY:       SUBJECTIVE:       Pain at start of treatment: Yes: 5/10    Pain at end of treatment: Yes: 5/10    Location: right hip  Description: increases with movement  Nursing notified: Declined  Nurse: n/a  Intervention: None    COGNITION:     WFL for the session     OBJECTIVE:    Patient used B hands to put together pop beads. When patient reported that she thought she should be working on her below the waist areas she was educated that when she gets up on her feet again she will benefit from increased hand strength in order to hold the walker and to push up out of the chair. Patient reported an understanding and reported that she wasn't trying to be difficult. Patient was encouraged to ask questions at any time. 
OCCUPATIONAL THERAPY  INPATIENT REHAB TREATMENT NOTE  [facility]      NAME: Fern Recio  : 1940 (84 y.o.)  MRN: [id number]  CODE STATUS: Full Code  Room: R251/R251-01    Date of Service: 3/11/2025    Referring Physician: Dr Blanca  Rehab Diagnosis: Imp Mob and ADLs due to rhabdomyolysis, proximal weakness with falls    Hospital course:   Comments: 83 y.o. female with PMHxof hypertension, CAD on Aggrenox who presented to ED for fall. Patient does not recall whether the fall was mechanical or loss of consciousness. Imaging was negative for fractures. Patient not sure if she took antihypertensive medication on day of fall. Patient's blood pressure has been consistently over 200s. She is complained of headache but denied any lightheadedness or dizziness (however, has Hx of Vertigo). Other than hypokalemia, labs are unremarkable. CT of the head was negative for any acute intracranial abnormality. Troponins were slightly elevated but remain stabel in the 20s.   After meeting with the patient, she stated that she fell because she has vertigo.      Restrictions  Restrictions/Precautions  Restrictions/Precautions: Fall Risk     Position Activity Restriction  Other Position/Activity Restrictions: Limb alert RUE    Patient's date of birth confirmed: Yes    SAFETY:  Safety Devices  Safety Devices in place: Yes  Type of devices: All fall risk precautions in place    SUBJECTIVE: \"It's (L shoulder) been poked too many times.\"    Pain at start of treatment: Yes: 5/10    Pain at end of treatment: Yes: 2/10    Location: B hips  Description: ache  Nursing notified: Declined  Intervention: None    COGNITION:  Orientation  Overall Orientation Status: Within Functional Limits  Cognition  Overall Cognitive Status: WFL      Patient's cognition will improve or maintain baseline to safely perform ADLs:    OBJECTIVE:    [place] Activity:  Patient engaged in B UE ROM/strengthening, B FM coordination and cognition to 
OCCUPATIONAL THERAPY  INPATIENT REHAB TREATMENT NOTE  [facility]      NAME: Fern Recio  : 1940 (84 y.o.)  MRN: [id number]  CODE STATUS: Full Code  Room: R251/R251-01    Date of Service: 3/20/2025    Referring Physician: Dr Blanca  Rehab Diagnosis: Imp Mob and ADLs due to rhabdomyolysis, proximal weakness with falls    Hospital course:   Comments: 83 y.o. female with PMHxof hypertension, CAD on Aggrenox who presented to ED for fall. Patient does not recall whether the fall was mechanical or loss of consciousness. Imaging was negative for fractures. Patient not sure if she took antihypertensive medication on day of fall. Patient's blood pressure has been consistently over 200s. She is complained of headache but denied any lightheadedness or dizziness (however, has Hx of Vertigo). Other than hypokalemia, labs are unremarkable. CT of the head was negative for any acute intracranial abnormality. Troponins were slightly elevated but remain stabel in the 20s.   After meeting with the patient, she stated that she fell because she has vertigo.      Restrictions:Fall                    Patient's date of birth confirmed: Yes    SAFETY:  Safety Devices  Safety Devices in place: Yes  Type of devices: All fall risk precautions in place    SUBJECTIVE:\"Oh I have to stand?\"       Pain at start of treatment: No    Pain at end of treatment: No    Location: N/A  Description: N/A  Nursing notified: No  Nurse: N/A  Intervention: Repositioned    COGNITION:         Patient's cognition will improve or maintain baseline to safely perform ADLs:    OBJECTIVE:     Pt participated in standing acts to increase stand tolerance and balance for functional acts completion.  Pt participated in [place] puzzle completion while standing with increased time.  Pt supervision for sit->stand transfer.  Pt required no support for standing acts.                 Education:       Equipment recommendations:         ASSESSMENT:Pt making gains 
OCCUPATIONAL THERAPY  INPATIENT REHAB TREATMENT NOTE  [facility]      NAME: Fern Recio  : 1940 (84 y.o.)  MRN: [id number]  CODE STATUS: Full Code  Room: R251/R251-01    Date of Service: 3/13/2025    Referring Physician: Dr Blanca  Rehab Diagnosis: Imp Mob and ADLs due to rhabdomyolysis, proximal weakness with falls    Hospital course:   Comments: 83 y.o. female with PMHxof hypertension, CAD on Aggrenox who presented to ED for fall. Patient does not recall whether the fall was mechanical or loss of consciousness. Imaging was negative for fractures. Patient not sure if she took antihypertensive medication on day of fall. Patient's blood pressure has been consistently over 200s. She is complained of headache but denied any lightheadedness or dizziness (however, has Hx of Vertigo). Other than hypokalemia, labs are unremarkable. CT of the head was negative for any acute intracranial abnormality. Troponins were slightly elevated but remain stabel in the 20s.   After meeting with the patient, she stated that she fell because she has vertigo.      Restrictions  Restrictions/Precautions  Restrictions/Precautions: Fall Risk            Position Activity Restriction  Other Position/Activity Restrictions: Limb alert RUE    Patient's date of birth confirmed: Yes    SAFETY:  Safety Devices  Safety Devices in place: Yes  Type of devices: All fall risk precautions in place    SUBJECTIVE:  Subjective  Subjective: Patient reported that she is interested in getting her legs stronger    Pain at start of treatment: Yes: 3/10    Pain at end of treatment: Yes: 3/10    Location: right hip  Description: not as bad as it has been   Nursing notified: Declined  Nurse: N/A  Intervention: None    COGNITION:  Patient struggled with the concept of creating a rectangle out of PVC pipes and connectors with a verbal cue provided for the final piece as patient continued to struggle with it. Patient had max difficulty and made 
OCCUPATIONAL THERAPY  INPATIENT REHAB TREATMENT NOTE  [facility]      NAME: Fern Recio  : 1940 (84 y.o.)  MRN: [id number]  CODE STATUS: Full Code  Room: R251/R251-01    Date of Service: 3/12/2025    Referring Physician: Dr Blanca  Rehab Diagnosis: Imp Mob and ADLs due to rhabdomyolysis, proximal weakness with falls    Hospital course:   Comments: 83 y.o. female with PMHxof hypertension, CAD on Aggrenox who presented to ED for fall. Patient does not recall whether the fall was mechanical or loss of consciousness. Imaging was negative for fractures. Patient not sure if she took antihypertensive medication on day of fall. Patient's blood pressure has been consistently over 200s. She is complained of headache but denied any lightheadedness or dizziness (however, has Hx of Vertigo). Other than hypokalemia, labs are unremarkable. CT of the head was negative for any acute intracranial abnormality. Troponins were slightly elevated but remain stabel in the 20s.   After meeting with the patient, she stated that she fell because she has vertigo.      Restrictions  Restrictions/Precautions  Restrictions/Precautions: Fall Risk                 Patient's date of birth confirmed: Yes    SAFETY:  Safety Devices  Safety Devices in place: Yes  Type of devices: All fall risk precautions in place    SUBJECTIVE:   Can I just wash up next time I go to the bathroom?    Pain at start of treatment: Yes: 7/10    Pain at end of treatment: Yes: 7/10    Location: feet and lower legs at the beginning of the session and waist at the end of the session  Description: ache  Nursing notified: Yes  Nurse: Claudia  Intervention: Repositioned    Discussed with Claudia DEL TORO patient's pain level, back itching with mild rash and inability to get her shoes on     COGNITION:     WFL      OBJECTIVE:       Grooming/Oral Hygiene  Assistance Level: Modified independent  Upper Extremity Bathing  Assistance Level: Stand by assist  Lower 
OCCUPATIONAL THERAPY  INPATIENT REHAB TREATMENT NOTE  [facility]      NAME: Fern Recio  : 1940 (84 y.o.)  MRN: [id number]  CODE STATUS: Full Code  Room: R251/R251-01    Date of Service: 3/14/2025    Referring Physician: Dr Blanca  Rehab Diagnosis: Imp Mob and ADLs due to rhabdomyolysis, proximal weakness with falls    Hospital course:   Comments: 83 y.o. female with PMHxof hypertension, CAD on Aggrenox who presented to ED for fall. Patient does not recall whether the fall was mechanical or loss of consciousness. Imaging was negative for fractures. Patient not sure if she took antihypertensive medication on day of fall. Patient's blood pressure has been consistently over 200s. She is complained of headache but denied any lightheadedness or dizziness (however, has Hx of Vertigo). Other than hypokalemia, labs are unremarkable. CT of the head was negative for any acute intracranial abnormality. Troponins were slightly elevated but remain stabel in the 20s.   After meeting with the patient, she stated that she fell because she has vertigo.      Restrictions  Restrictions/Precautions  Restrictions/Precautions: Fall Risk                 Patient's date of birth confirmed: Yes    SAFETY:  Safety Devices  Safety Devices in place: Yes  Type of devices: All fall risk precautions in place    SUBJECTIVE:       Pain at start of treatment: Yes: 4/10    Pain at end of treatment: Yes: /10    Location: hip and low back  Description: Ache  Nursing notified: Yes  Nurse: Mandi DEL TORO  Intervention: Other: warm shower    Discussed with Mandi DEL TORO that following removal of pain patches with yesterday's date it was noted that patient had reddened areas on her skin where the patches had touched. Patient also has minimal rash between her scapula that patient reported itches at times     COGNITION:     WFL      OBJECTIVE:     Patient was seen for shower ADL. Please see status below    Grooming/Oral Hygiene  Assistance 
OCCUPATIONAL THERAPY  INPATIENT REHAB TREATMENT NOTE  [facility]      NAME: Fern Recio  : 1940 (84 y.o.)  MRN: [id number]  CODE STATUS: Full Code  Room: R251/R251-01    Date of Service: 3/20/2025    Referring Physician: Dr Blanca  Rehab Diagnosis: Imp Mob and ADLs due to rhabdomyolysis, proximal weakness with falls    Hospital course:   Comments: 83 y.o. female with PMHxof hypertension, CAD on Aggrenox who presented to ED for fall. Patient does not recall whether the fall was mechanical or loss of consciousness. Imaging was negative for fractures. Patient not sure if she took antihypertensive medication on day of fall. Patient's blood pressure has been consistently over 200s. She is complained of headache but denied any lightheadedness or dizziness (however, has Hx of Vertigo). Other than hypokalemia, labs are unremarkable. CT of the head was negative for any acute intracranial abnormality. Troponins were slightly elevated but remain stabel in the 20s.   After meeting with the patient, she stated that she fell because she has vertigo.      Restrictions  Restrictions/Precautions  Restrictions/Precautions: Fall Risk            Position Activity Restriction  Other Position/Activity Restrictions: Limb alert RUE    Patient's date of birth confirmed: Yes    SAFETY:  Safety Devices  Safety Devices in place: Yes  Type of devices: All fall risk precautions in place    SUBJECTIVE:  Subjective  Subjective: Patient reported that she owns a small 4 cup  and makes her own coffee at home    Pain at start of treatment: No    Pain at end of treatment: No      COGNITION:  Orientation  Overall Orientation Status: Within Functional Limits  Orientation Level: Oriented X4  Cognition  Overall Cognitive Status: WFL      Pt's current cognitive status is:  Comprehension: Mod I  Expression: Mod I  Social Interaction: Mod I  Problem Solving: Supervision  Memory: Mod I    OBJECTIVE:     Patient completed a 
OCCUPATIONAL THERAPY  INPATIENT REHAB TREATMENT NOTE  [facility]      NAME: Fern Recio  : 1940 (84 y.o.)  MRN: [id number]  CODE STATUS: Full Code  Room: R251/R251-01    Date of Service: 3/20/2025    Referring Physician: Dr Blanca  Rehab Diagnosis: Imp Mob and ADLs due to rhabdomyolysis, proximal weakness with falls    Hospital course:   Comments: 83 y.o. female with PMHxof hypertension, CAD on Aggrenox who presented to ED for fall. Patient does not recall whether the fall was mechanical or loss of consciousness. Imaging was negative for fractures. Patient not sure if she took antihypertensive medication on day of fall. Patient's blood pressure has been consistently over 200s. She is complained of headache but denied any lightheadedness or dizziness (however, has Hx of Vertigo). Other than hypokalemia, labs are unremarkable. CT of the head was negative for any acute intracranial abnormality. Troponins were slightly elevated but remain stabel in the 20s.   After meeting with the patient, she stated that she fell because she has vertigo.      Restrictions  Restrictions/Precautions  Restrictions/Precautions: Fall Risk            Position Activity Restriction  Other Position/Activity Restrictions: Limb alert RUE    Patient's date of birth confirmed: Yes    SAFETY:  Safety Devices  Safety Devices in place: Yes  Type of devices: All fall risk precautions in place    SUBJECTIVE:  Subjective  Subjective: \"I can do these\"    Pain at start of treatment: No    Pain at end of treatment: No      COGNITION:  Orientation  Overall Orientation Status: Within Functional Limits  Orientation Level: Oriented X4  Cognition  Overall Cognitive Status: WFL    OBJECTIVE:    Education:  Education  Education Given To: Patient  Education Provided: Home Exercise Program  Education Provided Comments: Patient was provided with a written UE strengthening HEP. Patient was able to read the directions and complete the 
OCCUPATIONAL THERAPY  INPATIENT REHAB TREATMENT NOTE  [facility]      NAME: Fern Recio  : 1940 (84 y.o.)  MRN: [id number]  CODE STATUS: Full Code  Room: R251/R251-01    Date of Service: 3/13/2025    Referring Physician: Dr Blanca  Rehab Diagnosis: Imp Mob and ADLs due to rhabdomyolysis, proximal weakness with falls    Hospital course:   Comments: 83 y.o. female with PMHxof hypertension, CAD on Aggrenox who presented to ED for fall. Patient does not recall whether the fall was mechanical or loss of consciousness. Imaging was negative for fractures. Patient not sure if she took antihypertensive medication on day of fall. Patient's blood pressure has been consistently over 200s. She is complained of headache but denied any lightheadedness or dizziness (however, has Hx of Vertigo). Other than hypokalemia, labs are unremarkable. CT of the head was negative for any acute intracranial abnormality. Troponins were slightly elevated but remain stabel in the 20s.   After meeting with the patient, she stated that she fell because she has vertigo.      Restrictions  Restrictions/Precautions  Restrictions/Precautions: Fall Risk            Position Activity Restriction  Other Position/Activity Restrictions: Limb alert RUE    Patient's date of birth confirmed: Yes    SAFETY:  Safety Devices  Safety Devices in place: Yes  Type of devices: All fall risk precautions in place    SUBJECTIVE:  Subjective  Subjective: Patient reported that she is interested in getting her legs stronger    Pain at start of treatment: Yes: 5/10    Pain at end of treatment: Yes: 5/10    Location: right hip  Description: soreness   Nursing notified: Declined  Nurse: N/A  Intervention: None    COGNITION:     WFL      OBJECTIVE:     Patient performed reaching anterior while in the chair to drop items into a container just out of her reach. Patient tolerated anterior weight shifts with no increase in pain reported and improved ability 
OCCUPATIONAL THERAPY  INPATIENT REHAB TREATMENT NOTE  [facility]      NAME: Fern Recio  : 1940 (84 y.o.)  MRN: [id number]  CODE STATUS: Full Code  Room: R251/R251-01    Date of Service: 3/15/2025    Referring Physician: Dr Blanca  Rehab Diagnosis: Imp Mob and ADLs due to rhabdomyolysis, proximal weakness with falls    Hospital course:   Comments: 83 y.o. female with PMHxof hypertension, CAD on Aggrenox who presented to ED for fall. Patient does not recall whether the fall was mechanical or loss of consciousness. Imaging was negative for fractures. Patient not sure if she took antihypertensive medication on day of fall. Patient's blood pressure has been consistently over 200s. She is complained of headache but denied any lightheadedness or dizziness (however, has Hx of Vertigo). Other than hypokalemia, labs are unremarkable. CT of the head was negative for any acute intracranial abnormality. Troponins were slightly elevated but remain stabel in the 20s.   After meeting with the patient, she stated that she fell because she has vertigo.      Restrictions  Restrictions/Precautions  Restrictions/Precautions: Fall Risk            Position Activity Restriction  Other Position/Activity Restrictions: Limb alert RUE    Patient's date of birth confirmed: Yes    SAFETY:  Safety Devices  Safety Devices in place: Yes  Type of devices: All fall risk precautions in place    SUBJECTIVE:  Subjective  Subjective: \"I would love to shower today.\"  Pain: 3/10 pre and post OT session in L hip and R foot    Pain at start of treatment: Yes: 3/10    Pain at end of treatment: Yes: 3/10    Location: L hip, R foot   Description: sore  Nursing notified: No  Intervention: RN provided pain medication prior to OT session    COGNITION:  Orientation  Overall Orientation Status: Within Functional Limits  Orientation Level: Oriented X4  Cognition  Overall Cognitive Status: WFL      Pt's current cognitive status 
OCCUPATIONAL THERAPY  INPATIENT REHAB TREATMENT NOTE  [facility]      NAME: Fern Recio  : 1940 (84 y.o.)  MRN: [id number]  CODE STATUS: Full Code  Room: R251/R251-01    Date of Service: 3/18/2025    Referring Physician: Dr Blanca  Rehab Diagnosis: Imp Mob and ADLs due to rhabdomyolysis, proximal weakness with falls    Hospital course:   Comments: 83 y.o. female with PMHxof hypertension, CAD on Aggrenox who presented to ED for fall. Patient does not recall whether the fall was mechanical or loss of consciousness. Imaging was negative for fractures. Patient not sure if she took antihypertensive medication on day of fall. Patient's blood pressure has been consistently over 200s. She is complained of headache but denied any lightheadedness or dizziness (however, has Hx of Vertigo). Other than hypokalemia, labs are unremarkable. CT of the head was negative for any acute intracranial abnormality. Troponins were slightly elevated but remain stabel in the 20s.   After meeting with the patient, she stated that she fell because she has vertigo.      Restrictions  Restrictions/Precautions  Restrictions/Precautions: Fall Risk            Position Activity Restriction  Other Position/Activity Restrictions: Limb alert RUE    Patient's date of birth confirmed: Yes    SAFETY:  Safety Devices  Safety Devices in place: Yes  Type of devices: All fall risk precautions in place    SUBJECTIVE:  Subjective  Subjective: Pt states she found out she cannot do things on her own in her room since they put the \"spy\" in her room.    Pain at start of treatment: No    Pain at end of treatment: No    COGNITION:  Orientation  Overall Orientation Status: Within Functional Limits  Orientation Level: Oriented X4  Cognition  Overall Cognitive Status: WFL      OBJECTIVE:     Pt was clipping fingernails via table top upon arrival, briefly looked over nails for any skin integrity issues and observed pt clip last nail into napkins.  
OCCUPATIONAL THERAPY  INPATIENT REHAB TREATMENT NOTE  [facility]      NAME: Fern Recio  : 1940 (84 y.o.)  MRN: [id number]  CODE STATUS: Full Code  Room: R251/R251-01    Date of Service: 3/21/2025    Referring Physician: Dr Blanca  Rehab Diagnosis: Imp Mob and ADLs due to rhabdomyolysis, proximal weakness with falls    Hospital course:   Comments: 83 y.o. female with PMHxof hypertension, CAD on Aggrenox who presented to ED for fall. Patient does not recall whether the fall was mechanical or loss of consciousness. Imaging was negative for fractures. Patient not sure if she took antihypertensive medication on day of fall. Patient's blood pressure has been consistently over 200s. She is complained of headache but denied any lightheadedness or dizziness (however, has Hx of Vertigo). Other than hypokalemia, labs are unremarkable. CT of the head was negative for any acute intracranial abnormality. Troponins were slightly elevated but remain stabel in the 20s.   After meeting with the patient, she stated that she fell because she has vertigo.      Restrictions  Restrictions/Precautions  Restrictions/Precautions: Fall Risk            Position Activity Restriction  Other Position/Activity Restrictions: Limb alert RUE    Patient's date of birth confirmed: Yes    SAFETY:  Safety Devices  Safety Devices in place: Yes  Type of devices: All fall risk precautions in place    SUBJECTIVE:  Subjective  Subjective: \"i am so excited to go home\"    Pain at start of treatment: No    Pain at end of treatment: No    COGNITION:  Orientation  Overall Orientation Status: Within Functional Limits  Orientation Level: Oriented X4  Cognition  Overall Cognitive Status: WFL      Pt's current cognitive status is: WFL     OBJECTIVE     Completed family training this date with pt, pt's daughter, son, and daughter in law. Discussed at lengths plan for discharge, reccommended DME, and safety precautions to maintain while at 
Patient alert and oriented pleasant and cooperative complains ofd not sleeping well melatonin given.AYAAN maintained for patient safety.External catheter in place due to stress incontinence.  
Patient alert and oriented pleasant and cooperative denies any pain or discomfort at this time. Ambulates to bathroom with walker good safety awareness AYAAN maintained.  
Patient alert and oriented, able to make needs known. Patient has BLE edema, complains of right leg/hip pain. Patient medicated with PRN Roxicodone,effective Electronically signed by Mandi Tsai RN on 3/14/2025 at 7:28 PM    
Patient alert and oriented, in wheelchair. Patient denied pain at this time, stated that medications are effective for pain. Patient able to make needs known. Electronically signed by Mandi Tsai RN on 3/13/2025 at 5:16 PM    
Patient complains of pain to BLE. PRN tylenol given.  Electronically signed by Steff Silvestre LPN on 3/11/2025 at 9:57 AM    Patient complains of abimbola hip/leg pain. Scheduled tylenol given.  Electronically signed by Steff Silvestre LPN on 3/11/2025 at 3:49 PM    
Patient denies pain. BP this morning is 171/75, 63. BP meds given. Hospitalist nofified.  Electronically signed by Steff Silvestre LPN on 3/16/2025 at 9:35 AM    BP at this time is 131/69, 70.   Electronically signed by Steff Silvestre LPN on 3/16/2025 at 1:23 PM    Patient states she has 2/10 pain in back. Scheduled tylenol given.  Electronically signed by Steff Silvestre LPN on 3/16/2025 at 1:25 PM    Ortho bp done.  Electronically signed by Steff Silvestre LPN on 3/16/2025 at 6:37 PM          
Patient is resting in bed with c/o a headache. PRN Tylenol administered and was effective. Pt denies dizziness, blurred vision and or numbness/ tingling at this time. LS clear with diminished bases bilaterally.   
Patient is very impulsive and has gotten up out of her chair several times in a 30 minute span. An order was placed for a virtual  d/t increased fall risk. Patient assisted into to bed @  and bed alarm activated.   
Patient's bp this morning is 190/57, 58. She is also complaining of 3/10 headache. Tylenol given. Lisinopril and Amlodipine given. Message sent to hospitalist.   Electronically signed by Steff Silvestre LPN on 3/15/2025 at 8:34 AM    Patient's bp at 1007  was 176/67, 58. Updated hospitalist. He ordered a one time dose of amlodipine 5mg and increased tomorrow's dose to 10mg. At this time, bp is 165/60, 57. Amlodipine given. Will continue to monitor.  Electronically signed by Steff Silvestre LPN on 3/15/2025 at 11:38 AM    BP taken at 1506 was 140/57, 59.  Electronically signed by Steff Silvestre LPN on 3/15/2025 at 3:54 PM        
Physical Therapy  Facility/Department: [facility] RE[facility]  Rehabilitation Discharge note    NAME: Fern Recio  : 1940  MRN: [id number]    Date of discharge: 3/23/25        Past Medical History:   Diagnosis Date    Asthma     Asthma in remission     Breast cancer (HCC)     right breast    Breast cancer (HCC)     right breast (mastectomy)    Cardiovascular disease 2023    Cerebral artery occlusion with cerebral infarction (HCC)         Elevated TSH     Hematoma of right thigh 2021    History of artificial lens replacement 2015    History of blood transfusion     -after delivery of first child    History of breast cancer 2014    Invasive ductal cancer right breast, Dr Umana, Dr Lares    History of therapeutic radiation     right breast cancer.    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    Senile cataract 2012    Tendinopathy of right gluteal region 2018    Dr Walt Toth (ortho)    Vitamin D deficiency      Past Surgical History:   Procedure Laterality Date    BLADDER SUSPENSION      BREAST BIOPSY Right 2014    malignant    BREAST BIOPSY Right 2023    malignant (mastectomy)    BREAST LUMPECTOMY Right 2014    Lumpectomy with SLNB, Dr jun FERGUSON Dr [place]    COLONOSCOPY      EYE SURGERY      bilateral cataract surgery    FRACTURE SURGERY      \"as child right leg\"    MASTECTOMY Right     malignant    MASTECTOMY, MODIFIED RADICAL Right 2023    RIGHT BREAST MASTECTOMY MODIFIED RADICAL performed by Julienne Toth MD at [facility] OR    TONSILLECTOMY      TUBAL LIGATION      US BIOPSY LYMPH NODE  
Physical Therapy  Physical Therapy Rehab Treatment Note  Facility/Department: [facility]  [facility]       NAME: Fern Recio  : 1940 (84 y.o.)  MRN: [id number]  CODE STATUS: Full Code    Date of Service: 3/15/2025  Chart Reviewed: Yes  General  General Comments: My right hip and left foot is sore and ache.    Restrictions:          SUBJECTIVE:   Subjective: I'm tired. But the therapy is helping, I know I need to keep working on my legs so that I can walk.    Pain   0/10      OBJECTIVE:                  Transfers  Sit to Stand: Stand by assistance  Stand to Sit: Stand by assistance    Ambulation  Surface: Level tile  Device: Rollator  Assistance: Stand by assistance  Quality of Gait: Reciprocal gait pattern with improved speed and consistancy with use of Rollator vs WW  Distance: 300ft  More Ambulation?: Yes  Ambulation 2  Surface - 2: level tile  Device 2: No device  Quality of Gait 2: reciprocal gait pattern, with minimal foot clearance, minimal heel strike and toe off, decreased step length  Distance: 25' x 2  Comments: Increased MARINA with ambualtion without AD vs with WW. Mild LOB with turn to sit in chair, improved safety on 2nd attempt.    Stairs/Curb  Stairs?: Yes  Stairs  # Steps : 16  Stairs Height: 6\"  Rails: Bilateral              PT Exercises  Exercise Treatment: Standing Sink Ex. performed at outside of // bars x 10 ea.  PROM Exercises: seated HS/gastroc stretch x3 30 sec hold BLE  A/AROM Exercises: seated AP/LAQ/Marches/hip abd/hip add x15-20 BLE  Resistive Exercises: seated YTB HS curls/hip abd x20 BLE  Static Standing Balance Exercises: Balannce drills performed standing at outside of // bars NBOS, Tandem stance, SLS 15\" holds with variety of UE support needed.  Dynamic Standing Balance Exercises: Gait drills performed with obsticle course, performed with use of Rollator and without AD.                        ASSESSMENT/PROGRESS TOWARDS GOALS:   Assessment: Pt continues to progress 
Physical Therapy Missed Treatment   Facility/Department: [facility] MED SURG R251/R251-01    NAME: Fern Recio    : 1940 (84 y.o.)  MRN: [id number]    Account: [id number]  Gender: female    Chart reviewed, attempted PT at 1000. Patient unavailable 2° to:    [x] Pt declined- pt resting in chair upon arrival visibly emotional. Pt acknowledges awareness of scheduled PT session this AM, however politely declines and requests to defer session d/t her dog passing away this morning. Missed 30 mins of scheduled session this date.     Will attempt PT treatment again at earliest convenience.      Electronically signed by Guzman Medina PTA on 3/22/25 at 10:07 AM EDT     
Physical Therapy Rehab Treatment Note  [place] [facility]       NAME: Fern Recio  : 1940 (84 y.o.)  MRN: [id number]  CODE STATUS: Full Code    Date of Service: 3/21/2025       Restrictions:  Restrictions/Precautions: Fall Risk  Position Activity Restriction  Other Position/Activity Restrictions: Limb alert RUE       SUBJECTIVE:   Subjective: \"I have been dizzy since last night\"    Pain  Pain: reports pain in R hamstring but does not rate      OBJECTIVE:     Transfers  Surface: Wheelchair  Additional Factors: Verbal cues;Hand placement cues;Increased time to complete  Device: Walker  Sit to Stand  Assistance Level: Modified independent  Skilled Clinical Factors: improved ability to complete this date  Stand to Sit  Assistance Level: Modified independent  Skilled Clinical Factors: good safety  Car Transfer  Assistance Level: Modified independent  Skilled Clinical Factors: good ability to complete without safety concern    Ambulation  Surface: Level surface;Uneven surface  Device: Rolling walker  Distance: 300', 35' x 2 WW  Activity: Within Unit  Additional Factors: Verbal cues;Hand placement cues;Increased time to complete  Assistance Level: Modified independent  Gait Deviations: Slow hernesto  Skilled Clinical Factors: improved ability to complete and tolerate increased gait distance this date, no LOB or instability noted. good safety with use of WW and minimal pain reported throughout.    Stairs  Stair Height: 6''  Device: One handrail  Number of Stairs: 4  Additional Factors: Verbal cues;Hand placement cues;Reciprocal going up;Reciprocal going down;Increased time to complete  Assistance Level: Supervision  Skilled Clinical Factors: improved ability to complete, no safety concerns or pain limitations noted. supervision for safety. pt completes with double grasp on R ascending rail as well as double grasp on L descending rail.    Activity Tolerance  Activity Tolerance: 
Physical Therapy Rehab Treatment Note  Facility/Department: [facility]  [facility]       NAME: Fern Recio  : 1940 (84 y.o.)  MRN: [id number]  CODE STATUS: Full Code    Date of Service: 3/11/2025       Restrictions:  Restrictions/Precautions: Fall Risk       SUBJECTIVE:   Subjective: \"I am a little better\"    Pain  Pain: 8/10 pain pre/post session- received meds      OBJECTIVE:     Transfers  Surface: Wheelchair  Additional Factors: Verbal cues;Hand placement cues;Increased time to complete  Device: Walker  Sit to Stand  Assistance Level: Minimal assistance;Moderate assistance  Skilled Clinical Factors: vc's for hand placement and forward weight shift, cues for upright posture in standing with Min-ModA lifting assist required to complete  Stand to Sit  Assistance Level: Minimal assistance;Moderate assistance  Skilled Clinical Factors: assist for hand placement and controlled descent.    Ambulation  Surface: Level surface  Device: Rolling walker  Distance: 15'  Activity: Within Unit  Additional Factors: Verbal cues;Hand placement cues;Increased time to complete  Assistance Level: Moderate assistance  Gait Deviations: Slow hernesto;Narrow base of support;Unsteady gait;Path deviations  Skilled Clinical Factors: vc's and physical assist to advance WW to promote improved reciprocal pattern and gait fluidity, pt limited by pain in bilateral feet throughout distance but responds well to encoruagement.    PT Exercises  Exercise Treatment: seated ankle ROM and hip flexion stretch- increased pain noted throughout multiple trials.  PROM Exercises: seated HS/gastroc stretch x3 30 sec hold BLE  Dynamic Standing Balance Exercises: standing weight shifts at WW x10 ea direction     Activity Tolerance  Activity Tolerance: Patient limited by pain    ASSESSMENT/PROGRESS TOWARDS GOALS:   Assessment  Assessment: Pt requires increased time and effort to complete all tasks d/t increased pain throughout. Pleasant and 
Physical Therapy Rehab Treatment Note  Facility/Department[facility]  [facility]       NAME: Fern Recio  : 1940 (84 y.o.)  MRN: [id number]  CODE STATUS: Full Code    Date of Service: 3/13/2025       Restrictions:  Restrictions/Precautions: Fall Risk  Position Activity Restriction  Other Position/Activity Restrictions: Limb alert RUE       SUBJECTIVE:   Subjective: \"I need changed\"    Pain  Pain: 5/10 pain pre/post session- received meds      OBJECTIVE:     Transfers  Surface: Wheelchair  Additional Factors: Verbal cues;Hand placement cues;Increased time to complete  Device: Walker  Sit to Stand  Assistance Level: Minimal assistance;Moderate assistance  Skilled Clinical Factors: vc's for hand placement and forward weight shift, cues for upright posture in standing with Min-ModA lifting assist required to complete  Stand to Sit  Assistance Level: Minimal assistance;Moderate assistance  Skilled Clinical Factors: assist for hand placement and controlled descent.    Ambulation  Surface: Level surface  Device: Rolling walker  Distance: 30'  Activity: Within Unit  Additional Factors: Verbal cues;Hand placement cues;Increased time to complete  Assistance Level: Minimal assistance  Gait Deviations: Slow hernesto;Narrow base of support;Unsteady gait;Path deviations  Skilled Clinical Factors: vc's and physical assist to advance WW to promote improved reciprocal pattern and gait fluidity, pt limited by pain in bilateral feet throughout distance but responds well to encoruagement. able to complete second trial of gait this session with improved tolerance as opposed to previous.    PT Exercises  PROM Exercises: seated HS/gastroc stretch x3 30 sec hold BLE  es: standing hygeine management at grab bar x2 ~2 min each  Dynamic Standing Balance Exercises: standing weight shifts at WW x10 ea direction    Activity Tolerance  Activity Tolerance: Patient limited by pain    ASSESSMENT/PROGRESS TOWARDS GOALS: 
Physical Therapy Rehab Treatment Note  Facility/Department[facility] [place]  [facility]       NAME: Fern Recio  : 1940 (84 y.o.)  MRN: [id number]  CODE STATUS: Full Code    Date of Service: 3/14/2025       Restrictions:  Restrictions/Precautions: Fall Risk  Position Activity Restriction  Other Position/Activity Restrictions: Limb alert RUE       SUBJECTIVE:   Subjective: \"I am doing better\"    Pain  Pain: 5/10 pain pre/post session- received meds      OBJECTIVE:     Bed Mobility  Overall Assistance Level: Stand By Assist  Additional Factors: Verbal cues;Increased time to complete;Head of bed flat;Without handrails  Roll Left  Assistance Level: Stand by assist  Roll Right  Assistance Level: Stand by assist  Sit to Supine  Assistance Level: Stand by assist  Skilled Clinical Factors: increased time and effort d/t pain, dizziness between transitions  Supine to Sit  Assistance Level: Stand by assist  Skilled Clinical Factors: increased time and effort d/t pain, dizziness between transitions  Scooting  Assistance Level: Stand by assist  Skilled Clinical Factors: scooting along EOB    Transfers  Surface: Wheelchair;To mat;From mat;To bed;From bed  Additional Factors: Verbal cues;Hand placement cues;Increased time to complete  Device: Walker  Sit to Stand  Assistance Level: Stand by assist  Skilled Clinical Factors: vc's for hand placement and forward weight shift, improved ability to complete this date despite increased pain  Stand to Sit  Assistance Level: Stand by assist  Skilled Clinical Factors: vc's for hand placement and controlled descent.    Ambulation  Surface: Level surface  Device: Rolling walker  Distance: 30', 15' x 2  Activity: Within Unit  Additional Factors: Verbal cues;Hand placement cues;Increased time to complete  Assistance Level: Minimal assistance  Gait Deviations: Slow hernesto;Narrow base of support;Unsteady gait;Path deviations  Skilled Clinical Factors: vc's and physical assist to 
Physical Therapy Rehab Treatment Note  [facility] [place] [facility]  [facility]       NAME: Fern Recio  : 1940 (84 y.o.)  MRN: [id number]  CODE STATUS: Full Code    Date of Service: 3/20/2025       Restrictions:  Restrictions/Precautions: Fall Risk  Position Activity Restriction  Other Position/Activity Restrictions: Limb alert RUE       SUBJECTIVE:   Subjective: \"My stomach is nauseated\"    Pain  Pain: denies pain pre/post session      OBJECTIVE:     Transfers  Surface: Wheelchair  Additional Factors: Verbal cues;Hand placement cues;Increased time to complete  Device: Walker  Sit to Stand  Assistance Level: Modified independent  Skilled Clinical Factors: improved ability to complete this date  Stand to Sit  Assistance Level: Modified independent  Skilled Clinical Factors: good safety  Car Transfer  Assistance Level: Modified independent  Skilled Clinical Factors: good ability to complete without safety concern    Ambulation  Surface: Level surface;Ramp  Device: Rolling walker  Distance: 300', 150' WW, 35' x 2 no AD  Activity: Within Unit  Additional Factors: Verbal cues;Hand placement cues;Increased time to complete  Assistance Level: Modified independent;Stand by assist  Gait Deviations: Slow hernesto;Narrow base of support;Unsteady gait;Path deviations  Skilled Clinical Factors: improved ability to complete and tolerate increased gait distance this date, no LOB or instability noted. good safety with use of WW and minimal pain reported throughout. SBA with no AD. completes ramp without difficulty.    PT Exercises  Exercise Treatment: Standing Sink Ex. performed at outside of // bars x 15 ea.  PROM Exercises: seated HS/gastroc stretch x3 30 sec hold BLE  A/AROM Exercises: seated AP/LAQ/Marches/hip abd/hip add x15-20 BLE #2 weight  Resistive Exercises: RTB HS curls/hip abd x10 BLE  Static Standing Balance Exercises: standing bag toss with no UE support- focus on maintaining balance 
Physical Therapy Rehab Treatment Note  [facility] [place] [facility]  [facility]       NAME: Fern Recio  : 1940 (84 y.o.)  MRN: [id number]  CODE STATUS: Full Code    Date of Service: 3/17/2025       Restrictions:  Restrictions/Precautions: Fall Risk  Position Activity Restriction  Other Position/Activity Restrictions: Limb alert RUE       SUBJECTIVE:   Subjective: \"I am doing okay\"    Pain  Pain: denies pain pre/post session      OBJECTIVE:     Transfers  Surface: Wheelchair  Additional Factors: Verbal cues;Hand placement cues;Increased time to complete  Device: Walker  Sit to Stand  Assistance Level: Supervision  Skilled Clinical Factors: vc's for hand placement and forward weight shift, improved ability to complete this date  Stand to Sit  Assistance Level: Supervision  Skilled Clinical Factors: vc's for hand placement  Car Transfer  Assistance Level: Supervision  Skilled Clinical Factors: good ability to complete without safety concern, supervision for safety    Ambulation  Surface: Level surface  Device: Rolling walker  Distance: 100', 40'  x 2  Activity: Within Unit  Additional Factors: Verbal cues;Hand placement cues;Increased time to complete  Assistance Level: Stand by assist  Gait Deviations: Slow hernesto;Narrow base of support;Unsteady gait;Path deviations  Skilled Clinical Factors: improved ability to complete and tolerate increased gait distance this date, no LOB or instability noted. good safety with use of WW and minimal pain reported throughout.    Stairs  Stair Height: 6''  Device: Bilateral handrails  Number of Stairs: 4  Additional Factors: Verbal cues;Hand placement cues;Reciprocal going up;Reciprocal going down;Increased time to complete  Assistance Level: Stand by assist  Skilled Clinical Factors: improved ability to complete, no safety concerns or pain limitations noted. SBA for safety.    PT Exercises  PROM Exercises: seated HS/gastroc stretch x3 30 sec hold 
Physical Therapy Rehab Treatment Note  [facility] [place] [facility]  [facility]       NAME: Fern Recio  : 1940 (84 y.o.)  MRN: [id number]  CODE STATUS: Full Code    Date of Service: 3/21/2025       Restrictions:  Restrictions/Precautions: Fall Risk  Position Activity Restriction  Other Position/Activity Restrictions: Limb alert RUE       SUBJECTIVE:   Subjective: \"I have been dizzy since last night\"    Pain  Pain: denies pain pre/post session      OBJECTIVE:   Bed Mobility  Overall Assistance Level: Modified Independent  Additional Factors: Verbal cues;Increased time to complete;Head of bed flat;Without handrails  Roll Left  Assistance Level: Modified independent  Roll Right  Assistance Level: Modified independent  Sit to Supine  Assistance Level: Modified independent  Supine to Sit  Assistance Level: Modified independent  Scooting  Assistance Level: Modified independent    Transfers  Surface: Wheelchair  Additional Factors: Verbal cues;Hand placement cues;Increased time to complete  Device: Walker  Sit to Stand  Assistance Level: Modified independent  Skilled Clinical Factors: improved ability to complete this date  Stand to Sit  Assistance Level: Modified independent  Skilled Clinical Factors: good safety    Ambulation  Surface: Level surface;Uneven surface  Device: Rolling walker  Distance: 300', 35' x 2 WW, 60' no AD  Activity: Within Unit  Additional Factors: Verbal cues;Hand placement cues;Increased time to complete  Assistance Level: Modified independent;Stand by assist  Gait Deviations: Slow hernesto;Narrow base of support;Unsteady gait;Path deviations  Skilled Clinical Factors: improved ability to complete and tolerate increased gait distance this date, no LOB or instability noted. good safety with use of WW and minimal pain reported throughout. SBA with no AD.    Stairs  Stair Height: 6''  Device: One handrail  Number of Stairs: 4  Additional Factors: Verbal cues;Hand placement 
Physical Therapy Rehab Treatment Note  [facility] [place] [facility]  [facility]       NAME: Fern Recio  : 1940 (84 y.o.)  MRN: [id number]  CODE STATUS: Full Code    Date of Service: 3/18/2025       Restrictions:  Restrictions/Precautions: Fall Risk  Position Activity Restriction  Other Position/Activity Restrictions: Limb alert RUE       SUBJECTIVE:   Subjective: \"I am doing okay\"    Pain  Pain: denies pain pre/post session      OBJECTIVE:     Transfers  Surface: Wheelchair;To chair with arms;From chair with arms  Additional Factors: Verbal cues;Hand placement cues;Increased time to complete  Device: Walker  Sit to Stand  Assistance Level: Modified independent  Stand to Sit  Assistance Level: Modified independent  Car Transfer  Assistance Level: Modified independent  Skilled Clinical Factors: good ability to complete without safety concern    Ambulation  Surface: Level surface  Device: Rolling walker  Distance: 350', 300'  Activity: Within Unit  Additional Factors: Verbal cues;Hand placement cues;Increased time to complete  Assistance Level: Supervision  Gait Deviations: Slow hernesto;Narrow base of support;Unsteady gait;Path deviations  Skilled Clinical Factors: improved ability to complete and tolerate increased gait distance this date, no LOB or instability noted. good safety with use of WW and minimal pain reported throughout.    Stairs  Stair Height: 6''  Device: Bilateral handrails  Number of Stairs: 8  Additional Factors: Verbal cues;Hand placement cues;Reciprocal going up;Reciprocal going down;Increased time to complete  Assistance Level: Supervision  Skilled Clinical Factors: improved ability to complete, no safety concerns or pain limitations noted. supervision for safety.    PT Exercises  Exercise Treatment: Standing Sink Ex. performed at outside of // bars x 15 ea.  PROM Exercises: seated HS/gastroc stretch x3 30 sec hold BLE  A/AROM Exercises: seated AP/LAQ/Marches/hip abd/hip 
Physical Therapy Rehab Treatment Note  [facility] [facility] [place] [facility]  [facility]       NAME: Fern Recio  : 1940 (84 y.o.)  MRN: [id number]  CODE STATUS: Full Code    Date of Service: 3/19/2025       Restrictions:  Restrictions/Precautions: Fall Risk  Position Activity Restriction  Other Position/Activity Restrictions: Limb alert RUE       SUBJECTIVE:   Subjective: \"I am ready to go\"    Pain  Pain: denies pain pre/post session      OBJECTIVE:     Transfers  Surface: Wheelchair  Additional Factors: Verbal cues;Hand placement cues;Increased time to complete  Device: Walker  Sit to Stand  Assistance Level: Modified independent  Skilled Clinical Factors: improved ability to complete this date  Stand to Sit  Assistance Level: Modified independent  Skilled Clinical Factors: good safety    Ambulation  Surface: Level surface  Device: Rolling walker  Distance: 300' x 2  Activity: Within Unit  Additional Factors: Verbal cues;Hand placement cues;Increased time to complete  Assistance Level: Modified independent  Gait Deviations: Slow hernesto;Narrow base of support;Unsteady gait;Path deviations  Skilled Clinical Factors: improved ability to complete and tolerate increased gait distance this date, no LOB or instability noted. good safety with use of WW and minimal pain reported throughout.    Stairs  Stair Height: 6''  Device: One handrail  Number of Stairs: 8  Additional Factors: Verbal cues;Hand placement cues;Reciprocal going up;Reciprocal going down;Increased time to complete  Assistance Level: Supervision  Skilled Clinical Factors: improved ability to complete, no safety concerns or pain limitations noted. supervision for safety. pt completes with double grasp on R ascending rail as well as double grasp on L descending rail.    PT Exercises  Exercise Treatment: Standing Sink Ex. performed at outside of // bars x 15 ea.  PROM Exercises: seated HS/gastroc stretch x3 30 sec hold BLE  Resistive Exercises: 
Physical Therapy Rehab Treatment Note  [facility]ility/[facility] [place] [facility]  [facility]       NAME: Fern Recio  : 1940 (84 y.o.)  MRN: [id number]  CODE STATUS: Full Code    Date of Service: 3/12/2025       Restrictions:  Restrictions/Precautions: Fall Risk  Position Activity Restriction  Other Position/Activity Restrictions: Limb alert RUE       SUBJECTIVE:   Subjective: \"You make me move\"    Pain  Pain: 5/10 pain pre/post session- received meds      OBJECTIVE:     Transfers  Surface: Wheelchair;To mat;From mat  Additional Factors: Verbal cues;Hand placement cues;Increased time to complete  Device: Walker  Sit to Stand  Assistance Level: Minimal assistance;Moderate assistance  Skilled Clinical Factors: vc's for hand placement and forward weight shift, cues for upright posture in standing with Min-ModA lifting assist required to complete  Stand to Sit  Assistance Level: Minimal assistance;Moderate assistance  Skilled Clinical Factors: assist for hand placement and controlled descent.    Ambulation  Surface: Level surface  Device: Rolling walker  Distance: 15' x 2  Activity: Within Unit  Additional Factors: Verbal cues;Hand placement cues;Increased time to complete  Assistance Level: Minimal assistance  Gait Deviations: Slow hernesto;Narrow base of support;Unsteady gait;Path deviations  Skilled Clinical Factors: vc's and physical assist to advance WW to promote improved reciprocal pattern and gait fluidity, pt limited by pain in bilateral feet throughout distance but responds well to encoruagement. able to complete second trial of gait this session with improved tolerance as opposed to previous.    PT Exercises  Exercise Treatment: seated ankle ROM and hip flexion stretch- increased pain noted throughout multiple trials.  PROM Exercises: seated HS/gastroc stretch x3 30 sec hold BLE  Resistive Exercises: seated YTB HS curls/hip abd x10 BLE    Activity Tolerance  Activity Tolerance: Patient limited by 
Physical Therapy Rehab Treatment Note  Facility/Department: [facility] [place] [facility]  [facility]       NAME: Fern Recio  : 1940 (84 y.o.)  MRN: [id number]  CODE STATUS: Full Code    Date of Service: 3/13/2025       Restrictions:  Restrictions/Precautions: Fall Risk  Position Activity Restriction  Other Position/Activity Restrictions: Limb alert RUE       SUBJECTIVE:   Subjective: \"I had to get dressed\"    Pain  Pain: 5/10 pain pre/post session- received meds      OBJECTIVE:     Transfers  Surface: Wheelchair  Additional Factors: Verbal cues;Hand placement cues;Increased time to complete  Device: Walker  Sit to Stand  Assistance Level: Minimal assistance;Moderate assistance  Skilled Clinical Factors: vc's for hand placement and forward weight shift, cues for upright posture in standing with Min-ModA lifting assist required to complete  Stand to Sit  Assistance Level: Minimal assistance;Moderate assistance  Skilled Clinical Factors: assist for hand placement and controlled descent.    Ambulation  Surface: Level surface  Device: Rolling walker  Distance: 30', 20'  Activity: Within Unit  Additional Factors: Verbal cues;Hand placement cues;Increased time to complete  Assistance Level: Minimal assistance  Gait Deviations: Slow hernesto;Narrow base of support;Unsteady gait;Path deviations  Skilled Clinical Factors: vc's and physical assist to advance WW to promote improved reciprocal pattern and gait fluidity, pt limited by pain in bilateral feet throughout distance but responds well to encoruagement. able to complete second trial of gait this session with improved tolerance as opposed to previous.    PT Exercises  Exercise Treatment: seated ankle ROM and hip flexion stretch- increased pain noted throughout multiple trials.  PROM Exercises: seated HS/gastroc stretch x3 30 sec hold BLE  A/AROM Exercises: seated AP/LAQ/Marches/hip abd/hip add x10 BLE  Resistive Exercises: seated YTB HS curls/hip abd x10 BLE  Dynamic 
Physical Therapy Rehab Treatment Note  Facility/Department: [facility] [place] [facility]  [facility]       NAME: Fern Recio  : 1940 (84 y.o.)  MRN: [id number]  CODE STATUS: Full Code    Date of Service: 3/11/2025       Restrictions:  Restrictions/Precautions: Fall Risk       SUBJECTIVE:   Subjective: \"Uh oh\"    Pain  Pain: 8/10 pain pre/post session- received meds      OBJECTIVE:     Transfers  Surface: Wheelchair  Additional Factors: Verbal cues;Hand placement cues;Increased time to complete  Device: Walker  Sit to Stand  Assistance Level: Minimal assistance  Skilled Clinical Factors: vc's for hand placement and forward weight shift, cues for upright posture in standing with Duyen lifting assist required to complete  Stand to Sit  Assistance Level: Minimal assistance  Skilled Clinical Factors: assist for hand placement and controlled descent.    PT Exercises  PROM Exercises: seated HS/gastroc stretch x3 30 sec hold BLE  A/AROM Exercises: seated AP/LAQ/Marches/hip abd/hip add x10 BLE     Activity Tolerance  Activity Tolerance: Patient limited by pain    ASSESSMENT/PROGRESS TOWARDS GOALS:   Assessment  Assessment: Pt requires increased time and effort to complete all tasks d/t increased pain throughout. Pleasant and motivated despite increased pain. Continue to focus on improved ROM and stretching for pain relief and improved tolerance to tasks.  Activity Tolerance: Patient limited by pain    Goals:  See PT eval for goals.    PLAN OF CARE/Safety:   Safety Devices  Type of Devices: All fall risk precautions in place;Call light within reach;Chair alarm in place;Left in chair      Therapy Time:   Individual   Time In 1030   Time Out 1100   Minutes 30      Minutes: 30  Transfer/Bed mobility training: 10  Gait training:  Neuro re education:  Therapeutic ex: 20      Guzman Medina PTA, 25 at 11:56 AM             
Physical Therapy Rehab Treatment Note  Facility/Department: [facility] [place] [facility]  [facility]       NAME: Fern Recio  : 1940 (84 y.o.)  MRN: [id number]  CODE STATUS: Full Code    Date of Service: 3/18/2025       Restrictions:  Restrictions/Precautions: Fall Risk  Position Activity Restriction  Other Position/Activity Restrictions: Limb alert RUE       SUBJECTIVE:   Subjective: \"I am doing okay\"    Pain  Pain: denies pain pre/post session      OBJECTIVE:     Transfers  Surface: Wheelchair  Additional Factors: Verbal cues;Hand placement cues;Increased time to complete  Device: Walker  Sit to Stand  Assistance Level: Modified independent  Skilled Clinical Factors: vc's for hand placement and forward weight shift, improved ability to complete this date  Stand to Sit  Assistance Level: Modified independent  Skilled Clinical Factors: vc's for hand placement    Ambulation  Surface: Level surface  Device: Rolling walker  Distance: 300', 75'  Activity: Within Unit  Additional Factors: Verbal cues;Hand placement cues;Increased time to complete  Assistance Level: Supervision  Gait Deviations: Slow hernesto;Narrow base of support;Unsteady gait;Path deviations  Skilled Clinical Factors: improved ability to complete and tolerate increased gait distance this date, no LOB or instability noted. good safety with use of WW and minimal pain reported throughout.    PT Exercises  PROM Exercises: seated HS/gastroc stretch x3 30 sec hold BLE  Resistive Exercises: MREs knee flex/ext x10 BLE  Functional Mobility Circuit Training: STS x5 with focus on technique and endurance  Dynamic Standing Balance Exercises: gait drills with use of bolsters figure eight drills x2 laps     Activity Tolerance  Activity Tolerance: Patient tolerated evaluation without incident    ASSESSMENT/PROGRESS TOWARDS GOALS:   Assessment  Assessment: Pt with good progress towards goals this date, tolerates multiple trials of increased gait distance without 
Physical Therapy Rehab Treatment Note  Facility/Department: [facility] [place] [facility]  [facility]       NAME: Fern Recio  : 1940 (84 y.o.)  MRN: [id number]  CODE STATUS: Full Code    Date of Service: 3/19/2025       Restrictions:  Restrictions/Precautions: Fall Risk  Position Activity Restriction  Other Position/Activity Restrictions: Limb alert RUE       SUBJECTIVE:   Subjective: \"I am ready to go home\"    Pain  Pain: denies pain pre/post session      OBJECTIVE:     Transfers  Surface: Wheelchair  Additional Factors: Verbal cues;Hand placement cues;Increased time to complete  Device: Walker  Sit to Stand  Assistance Level: Modified independent  Skilled Clinical Factors: improved ability to complete this date  Stand to Sit  Assistance Level: Modified independent  Skilled Clinical Factors: good safety  Car Transfer  Assistance Level: Modified independent  Skilled Clinical Factors: good ability to complete without safety concern    Ambulation  Surface: Level surface  Device: Rolling walker  Distance: 300', 35' x 2 WW, 100' no AD  Activity: Within Unit  Additional Factors: Verbal cues;Hand placement cues;Increased time to complete  Assistance Level: Modified independent;Stand by assist  Gait Deviations: Slow hernesto;Narrow base of support;Unsteady gait;Path deviations  Skilled Clinical Factors: improved ability to complete and tolerate increased gait distance this date, no LOB or instability noted. good safety with use of WW and minimal pain reported throughout. completed additional 100' trial with use of no AD as this is what pt used PTA and able to complete at SBA level.    PT Exercises  PROM Exercises: seated HS/gastroc stretch x3 30 sec hold BLE  Resistive Exercises: RTB HS curls/hip abd x10 BLE     Activity Tolerance  Activity Tolerance: Patient tolerated treatment well    ASSESSMENT/PROGRESS TOWARDS GOALS:   Assessment  Assessment: Pt meeting all goals at this time, able to tolerate increased gait distance 
Physical Therapy Rehab Treatment Note  [place] [facility] [place] [facility]  Room: R2/R251-01       NAME: Fern Recio  : 1940 (84 y.o.)  MRN: [id number]  CODE STATUS: Full Code    Date of Service: 3/20/2025    Comments: Pt presenting to therapy gym. Reports of dizziness when laying down.     Restrictions:  Restrictions/Precautions: Fall Risk     SUBJECTIVE: \"It feels like the room is spinning.\"    Pain: Denies pre and post session pain.     OBJECTIVE:   Vertebral artery cleared. Demonstrates adequate cervical extension, however stiffness B rotation.   R hallpike completed with + nystagmus. Pt squeezing eyes shut - unable to identify torsional vs lateral. Repositioned. Repeat reveals residual dizziness with nystagmus. Repositioned again.     ASSESSMENT:  Pt + for BPPV. Repositioned for R PCBPPV. However further assessment planned for tomorrow.    PLAN OF CARE/Safety:   Safety Devices  Type of Devices: All fall risk precautions in place;Chair alarm in place;Left in chair  All fall risk precautions in place    Long Term Goals  Long Term Goal 1: Pt to complete bed mobility with indep  Long Term Goal 2: Pt to complete tranfsers with indep  Long Term Goal 3: Pt to ambulate 50-150ft with LRD and indep  Long Term Goal 4: Pt to manage 3 steps with HR and SBA    Therapy Time:   Individual   Time In 1350   Time Out 1400   Minutes 10      (canalith repositioning)         Kristen Zacarias, PT, 25 at 2:06 PM           
Physical Therapy Rehab Treatment Note  [place] [facility] [place]  [facility]       NAME: Fern Recio  : 1940 (84 y.o.)  MRN: [id number]  CODE STATUS: Full Code    Date of Service: 3/14/2025       Restrictions:  Restrictions/Precautions: Fall Risk  Position Activity Restriction  Other Position/Activity Restrictions: Limb alert RUE       SUBJECTIVE:   Subjective: \"I am tired\"    Pain  Pain: 5/10 pain pre/post session- received meds      OBJECTIVE:     Transfers  Surface: Wheelchair  Additional Factors: Verbal cues;Hand placement cues;Increased time to complete  Device: Walker  Sit to Stand  Assistance Level: Stand by assist  Skilled Clinical Factors: vc's for hand placement and forward weight shift, improved ability to complete this date despite increased pain  Stand to Sit  Assistance Level: Stand by assist  Skilled Clinical Factors: vc's for hand placement and controlled descent.    Ambulation  Surface: Level surface  Device: Rolling walker  Distance: 60'  Activity: Within Unit  Additional Factors: Verbal cues;Hand placement cues;Increased time to complete  Assistance Level: Minimal assistance  Gait Deviations: Slow hernesto;Narrow base of support;Unsteady gait;Path deviations  Skilled Clinical Factors: vc's and physical assist to advance WW to promote improved reciprocal pattern and gait fluidity, pt limited by pain in bilateral feet throughout distance but responds well to encoruagement. able to complete second trial of gait this session with improved tolerance as opposed to previous.    PT Exercises  PROM Exercises: seated HS/gastroc stretch x3 30 sec hold BLE  A/AROM Exercises: seated AP/LAQ/Marches/hip abd/hip add x10 BLE  Resistive Exercises: seated YTB HS curls/hip abd x10 BLE  Dynamic Standing Balance Exercises: standing weight shifts/marches at WW x10 ea direction    Activity Tolerance  Activity Tolerance: Patient limited by pain    ASSESSMENT/PROGRESS TOWARDS GOALS: 
Physical Therapy Rehab Treatment Note  [facility] [place] [facility]  [facility]       NAME: Fern Recio  : 1940 (84 y.o.)  MRN: [id number]  CODE STATUS: Full Code    Date of Service: 3/17/2025       Restrictions:  Restrictions/Precautions: Fall Risk  Position Activity Restriction  Other Position/Activity Restrictions: Limb alert RUE       SUBJECTIVE:   Subjective: \"I am doing okay\"    Pain  Pain: denies pain pre/post session      OBJECTIVE:     Bed Mobility  Overall Assistance Level: Modified Independent  Additional Factors: Verbal cues;Increased time to complete;Head of bed flat;Without handrails  Roll Left  Assistance Level: Modified independent  Roll Right  Assistance Level: Modified independent  Skilled Clinical Factors: increased dizziness d/t vertigo requiring increased time in position for dizziness to settle  Sit to Supine  Assistance Level: Modified independent  Supine to Sit  Assistance Level: Modified independent  Scooting  Assistance Level: Modified independent    Transfers  Surface: Wheelchair;To mat;From mat;To chair with arms;From chair with arms  Additional Factors: Verbal cues;Hand placement cues;Increased time to complete  Device: Walker  Sit to Stand  Assistance Level: Supervision  Skilled Clinical Factors: vc's for hand placement and forward weight shift, improved ability to complete this date  Stand to Sit  Assistance Level: Supervision  Skilled Clinical Factors: vc's for hand placement    Ambulation  Surface: Level surface  Device: Rolling walker  Distance: 100', 125', 35' x 2 WW, 60' no AD  Activity: Within Unit  Additional Factors: Verbal cues;Hand placement cues;Increased time to complete  Assistance Level: Stand by assist;Minimal assistance  Gait Deviations: Slow hernesto;Narrow base of support;Unsteady gait;Path deviations  Skilled Clinical Factors: improved ability to complete and tolerate increased gait distance this date, no LOB or instability noted. good safety 
Physical Therapy Rehab Treatment Note  Facility/Department: [facility]  [facility]       NAME: Fern Recio  : 1940 (84 y.o.)  MRN: [id number]  CODE STATUS: Full Code    Date of Service: 3/12/2025       Restrictions:  Restrictions/Precautions: Fall Risk  Position Activity Restriction  Other Position/Activity Restrictions: Limb alert RUE       SUBJECTIVE:   Subjective: \"I was young once\"    Pain  Pain: 5/10 pain pre/post session- received meds      OBJECTIVE:     Transfers  Surface: Wheelchair  Additional Factors: Verbal cues;Hand placement cues;Increased time to complete  Device: Walker  Sit to Stand  Assistance Level: Minimal assistance;Moderate assistance  Skilled Clinical Factors: vc's for hand placement and forward weight shift, cues for upright posture in standing with Min-ModA lifting assist required to complete  Stand to Sit  Assistance Level: Minimal assistance;Moderate assistance  Skilled Clinical Factors: assist for hand placement and controlled descent.    Ambulation  Surface: Level surface  Device: Rolling walker  Distance: 15', 5'  Activity: Within Unit  Additional Factors: Verbal cues;Hand placement cues;Increased time to complete  Assistance Level: Moderate assistance  Gait Deviations: Slow hernesto;Narrow base of support;Unsteady gait;Path deviations  Skilled Clinical Factors: vc's and physical assist to advance WW to promote improved reciprocal pattern and gait fluidity, pt limited by pain in bilateral feet throughout distance but responds well to encoruagement. attempted second trial of gait but increased L foot pain contributed to need of wc to be brought up behind for pt to return to seated.    PT Exercises  Exercise Treatment: seated ankle ROM and hip flexion stretch- increased pain noted throughout multiple trials.  PROM Exercises: seated HS/gastroc stretch x3 30 sec hold BLE  A/AROM Exercises: seated AP/LAQ/Marches/hip abd/hip add x10 BLE  Dynamic Standing Balance Exercises: 
Physical Therapy Rehab Treatment Note  [facility]  [facility]       NAME: Fern Recio  : 1940 (84 y.o.)  MRN: [id number]  CODE STATUS: Full Code    Date of Service: 3/23/2025       Restrictions:  Restrictions/Precautions: Fall Risk  Position Activity Restriction  Other Position/Activity Restrictions: Limb alert RUE       SUBJECTIVE:   Subjective: \"I leave at 2pm, but I am ready to go now.\"    Pain  Pain: Pt reported no pain pre an post tx.      OBJECTIVE:     Transfers  Surface: Wheelchair  Additional Factors: Verbal cues;Hand placement cues;Increased time to complete  Device: Walker  Sit to Stand  Assistance Level: Modified independent  Skilled Clinical Factors: improved ability to complete this date  Stand to Sit  Assistance Level: Modified independent  Skilled Clinical Factors: good safety  Car Transfer  Assistance Level: Modified independent  Skilled Clinical Factors: good ability to complete without safety concern    Ambulation  Surface: Level surface;Uneven surface  Device: Rolling walker  Distance: 300', 35' x 2 WW  Activity: Within Unit  Additional Factors: Verbal cues;Hand placement cues;Increased time to complete  Assistance Level: Modified independent  Gait Deviations: Slow hernesto  Skilled Clinical Factors: improved ability to complete and tolerate increased gait distance this date, no LOB or instability noted. good safety with use of WW and minimal pain reported throughout.    Stairs  Stair Height: 6''  Device: One handrail  Number of Stairs: 8  Additional Factors: Verbal cues;Hand placement cues;Reciprocal going up;Reciprocal going down;Increased time to complete  Assistance Level: Supervision  Skilled Clinical Factors: improved ability to complete, no safety concerns or pain limitations noted. supervision for safety. pt completes with double grasp on R ascending rail as well as double grasp on L descending rail.    PT Exercises  PROM Exercises: seated 
Physical Therapy Rehab Treatment Note  Facility/Department[facility]  [facility]       NAME: Fern Recio  : 1940 (84 y.o.)  MRN: [id number]  CODE STATUS: Full Code    Date of Service: 3/15/2025  Chart Reviewed: Yes  General  General Comments: My right hip and left foot is sore and ache.    Restrictions:          SUBJECTIVE: Subjective: I'm tired. But the therapy is helping, I know I need to keep working on my legs so that I can walk.          Post Treatment Pain Screenin/10       OBJECTIVE:   Overall Orientation Status: Within Functional Limits  Orientation Level: Oriented X4                     Transfers  Sit to Stand: Stand by assistance  Stand to Sit: Stand by assistance    Ambulation  Surface: Level tile  Device: Rolling Walker  Assistance: Minimal assistance (Steading assist.)  More Ambulation?: Yes  Ambulation 2  Surface - 2: level tile  Device 2: No device  Quality of Gait 2: reciprocal gait pattern, with minimal foot clearance, minimal heel strike and toe off, decreased step length  Distance: 25' x 2  Comments: Increased MARINA with ambualtion without AD vs with WW. Mild LOB with turn to sit in chair, improved safety on 2nd attempt.     PT Exercsies   Standing Sink Ex. performed at outside of // bars x 10 ea.      seated HS/gastroc stretch x3 30 sec hold BLE      seated AP/LAQ/Marches/hip abd/hip add x15-20 BLE       seated YTB HS curls/hip abd x20 BLE   Balannce drills performed standing at outside of // bars NBOS, Tandem stance, SLS 15\" holds with variety of UE support needed.     ASSESSMENT/PROGRESS TOWARDS GOALS:  Assessment: Progressed ambualtion today in both distance and reduction of AD with focus on functional distance length with monitoring pt's fatigue levels. Pt with one mild LOB today with approach and sit to chair on first attempt without AD. Improved on 2nd attempt with no additional LOB this tx session. increased exercises with pt performing both in seated and standing 
Pt c/o bilat foot pain not relieved by scheduled Tylenol, PRN oxycodone ordered by Dr. Blanca, given at 1211, pt states effective pain relief obtained. OOB to w/c in room with call light in reach. Electronically signed by Dariana Sprague RN on 3/12/2025 at 4:46 PM    
Pt. Complained of feeling very dizzy following her PT. PRN Antivert given. Pt. Resting on her bed with a blanket covering her head.    
Pt. Law Gilbert 12 years old was found in the yard by her son and believes she is dying. We were able to connect pt. With her son so that she could say goodbye via face time. Pt. Was tearful but glad to say good bye.    
Report given from off going RN. Care assumed. Pt reading a book in bed. Call bell in reach.  
Report given from off going RN. Care assumed. Pt resting in bed reading a book. Albino mares in reach.  
STS x10 with focus on technique and endurance     Activity Tolerance  Activity Tolerance: Patient tolerated treatment well    ASSESSMENT/PROGRESS TOWARDS GOALS:   Assessment  Assessment: Pt continues to demonstrate consistent mobility at Klaudia-Supervision level without LOB or safety concern. Minimal fatigue noted with increased distance or completing flight of stairs. Pt reports she feels she is at her baseline, however is in agreement that wheeled walker will be safest to use at home upon DC.  Activity Tolerance: Patient tolerated treatment well    Goals:  Long Term Goals  Long Term Goal 1: Pt to complete bed mobility with indep  Long Term Goal 2: Pt to complete tranfsers with indep  Long Term Goal 3: Pt to ambulate 50-150ft with LRD and indep  Long Term Goal 4: Pt to manage 3 steps with HR and SBA  Patient Goals   Patient Goals : \"I want to walk better.\"    PLAN OF CARE/Safety:   Safety Devices  Type of Devices: All fall risk precautions in place;Chair alarm in place;Left in chair      Therapy Time:   Individual   Time In 1000   Time Out 1030   Minutes 30      Minutes: 30  Transfer/Bed mobility trainin  Gait training: 15  Neuro re education:  Therapeutic ex: 10      Guzman Medina PTA, 25 at 10:40 AM             
Supervision  Skilled Clinical Factors: did not need to go at time of the session  Toilet Transfers  Technique:  (ambulating)  Equipment: Grab bars  Assistance Level: Supervision  Skilled Clinical Factors: ww level       Functional Mobility  Device: Rolling walker  Activity: To/From bathroom  Assistance Level: Supervision  Sit to Stand  Assistance Level: Supervision  Stand to Sit  Assistance Level: Supervision    Patient used B hands to take apart and put together a wooden car kit and despite patient reporting that she is better at taking things apart than putting them together she did well with the task      Education:  Education  Education Given To: Patient  Education Provided: Safety  Education Provided Comments: verbal cue to push up from the wheelchair and then reach for the ww  Education Method: Verbal;Demonstration  Barriers to Learning: None  Skilled Clinical Factors: Although patient reported an understanding she may need continued education  Patient required the same cue in the pm     ASSESSMENT:  Assessment: Patient able to work through dizziness and complete the tasks provided  Activity Tolerance: Patient tolerated treatment well      PLAN OF CARE:  Strengthening, Balance training, Functional mobility training, Endurance training, Pain management, Safety education & training, Patient/Caregiver education & training, Equipment evaluation, education, & procurement, Self-Care / ADL, Home management training  continue with OT plan of care    Patient goals : \"walking again\"  Time Frame for Long Term Goals : Within 2 weeks patient to demonstrate progress in the following areas in order to meet long term goals as stated in the initial evaluation  Long Term Goal 1: increase transfer ability  Long Term Goal 2: improve self care independence  Long Term Goal 3: increase balance for ADLs/IADLs        Therapy Time:   Individual Group Co-Treat   Time In 1300       Time Out 1330         Minutes 30           
fell due to her vertigo.  Troponins were found to be elevated.  She was treated for hypertensive urgency and stabilized medically.    NURSING ISSUES:    c/o bilat foot pain not relieved by scheduled Tylenol, PRN oxycodone ordered by Dr. Blanca, given at 1211, pt states effective pain relief obtained. OOB to w/c in room with call light in reach.      Nursing will continue to focus on bowel and bladder continence transitioning toward independence by time of discharge.  Monitoring post void residuals monitoring for severe constipation and bowel obstruction.      Barriers to progress and discharge complex medical conditions and severe pain      Bowel function- constipation  Plans to address- laxative     Bladder function-  continent    Plans to address- schedule voids before bed and therapy     Skin deficits- dressing changes   Plans to address- special mattress     Hydration/Nutritional deficits- monitoring for dysphagia  Plans to address-Push PO, assist with feeds as needed      BP- decline in BP intake is a concern  Plans to address- check ortho BPs before therapies-and use abdominal binder and TEDs as needed    Pt and Family training goals-  teach home technology options like Lesley use and home assistive devices      Focus on achieving ADL goals with co-treating with OT when possible. Focus on cognition and co treat with SLP when possible.      PHYSICAL THERAPY  Bed mobility:  Bed mobility  Bridging: Substantial/Maximal assistance (03/11/25 1357)  Rolling to Left: Partial/Moderate assistance (03/11/25 1357)  Rolling to Right: Partial/Moderate assistance (03/11/25 1357)  Supine to Sit: Substantial/Maximal assistance (03/11/25 1357)  Sit to Supine: Substantial/Maximal assistance (03/11/25 1357)  Bed Mobility Comments: Assist with step by step sequencing for initiation and completion of all transitions. Increased time and effort to complete. (03/11/25 1357)  Transfers:  Transfers  Sit to Stand: Substantial/Maximal 
independent  Lower Extremity Bathing  Assistance Level: Modified independent  Upper Extremity Dressing  Assistance Level: Modified independent  Lower Extremity Dressing  Assistance Level: Modified independent  Putting On/Taking Off Footwear  Assistance Level: Modified independent  Toileting  Assistance Level: Modified independent  Toilet Transfers  Technique:  (ambulating)  Equipment: Grab bars  Assistance Level: Supervision  Skilled Clinical Factors: ambulated to bathroom with FWW  Tub/Shower Transfers  Type: Shower  Transfer From: Rolling walker  Transfer To: Shower chair without back  Assistance Level: Supervision     Functional Mobility  Device: Rolling walker  Activity: To/From bathroom  Assistance Level: Modified independent  Sit to Stand  Assistance Level: Modified independent  Stand to Sit  Assistance Level: Modified independent    Education:  Education  Education Given To: Patient  Education Provided: Role of Therapy;Plan of Care  Education Method: Verbal  Barriers to Learning: None  Education Outcome: Verbalized understanding    Equipment recommendations:  OT Equipment Recommendations  Other: continue to assess      ASSESSMENT:  Assessment: Patient tolerated the session well- completed all ADLs with MOD I  Activity Tolerance: Patient tolerated treatment well      PLAN OF CARE:  Strengthening, Balance training, Functional mobility training, Endurance training, Pain management, Safety education & training, Patient/Caregiver education & training, Equipment evaluation, education, & procurement, Self-Care / ADL, Home management training  continue with OT plan of care    Patient goals : \"walking again\"  Time Frame for Long Term Goals : Within 2 weeks patient to demonstrate progress in the following areas in order to meet long term goals as stated in the initial evaluation  Long Term Goal 1: increase transfer ability  Long Term Goal 2: improve self care independence  Long Term Goal 3: increase balance for 
edema  Neurologic: Non focal    Recent Labs     03/12/25  0701   WBC 8.1   HGB 12.7        Recent Labs     03/12/25  0701      K 3.5      CO2 23   BUN 21   CREATININE 0.59   GLUCOSE 98     Troponin T: No results for input(s): \"TROPONINI\" in the last 72 hours.    ABGs: No results found for: \"PHART\", \"PO2ART\", \"FLF6ADT\"  INR: No results for input(s): \"INR\" in the last 72 hours.  URINALYSIS:No results for input(s): \"NITRITE\", \"COLORU\", \"PHUR\", \"LABCAST\", \"WBCUA\", \"RBCUA\", \"MUCUS\", \"TRICHOMONAS\", \"YEAST\", \"BACTERIA\", \"CLARITYU\", \"SPECGRAV\", \"LEUKOCYTESUR\", \"UROBILINOGEN\", \"BILIRUBINUR\", \"BLOODU\", \"GLUCOSEU\", \"AMORPHOUS\" in the last 72 hours.    Invalid input(s): \"KETONESU\"  -----------------------------------------------------------------   No results found.    Assessment and Plan   Vertigo; falls:  Per primary team; denies vertigo today; continue antivert for now  HTN:  Doing well today; will consider increasing amlodipine if still hypertensive but needs at least another day to give this dose of amlodipine a chance: usually takes 2-3d for full effect  Breast cancer history:  Outpatient follow up  HLD:  Home meds  CVA history:  Resume home aggrenox  Functional Status: Fall precautions. Up with assistance. PT OT  Diet: Cardiac   DVT ppx: Lovenox   Disposition: Dependent on hospital course. Will discharge once medically stable. SW on board for discharge planning.     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 
G25.0    Hypercalcemia E83.52    GHAZAL (obstructive sleep apnea) G47.33    Adenocarcinoma, breast, right (HCC) C50.911    Grief reaction F43.21    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, right (HCC) C50.911, C77.3    Status post mastectomy, right Z90.11    Hyperlipemia E78.5    Cardiovascular disease I25.10    Cerebrovascular disease, unspecified I67.9    Right hip pain M25.551    Post-menopausal Z78.0    Onychomycosis B35.1    Pain in left foot M79.672    Pain in toes of both feet M79.674, M79.675    Peripheral vascular disease of foot I73.9    Plantar fasciitis of left foot M72.2    Xerosis of skin L85.3    Cystocele with prolapse N81.4    Urinary frequency R35.0    Gastroesophageal reflux disease without esophagitis K21.9    Mixed stress and urge urinary incontinence N39.46    Statin intolerance Z78.9    Falls R29.6    Hypertensive urgency I16.0    Impaired mobility  adlS DT rhabdomyolysis status post fall DT gait ataxIA/vertigo Z74.09, Z78.9    Rhabdomyolysis M62.82    Parkinson disease (HCC) G20.A1    Tremor R25.1    Abnormality of gait as late effect of stroke I69.398, R26.9       Christelle Jara MD, MD  Admitting Hospitalist    Emergency Contact:        
medication side effects, risk of tolerance/dependence & alternative treatments discussed.;No signs of potential drug abuse or diversion identified.;Assessed functional status (ability to engage in work or other purposeful activities, the pain intensity and its interference with activities of daily living, quality of family life and social activities, and the physical activity);Obtaining appropriate analgesic effect of treatment.   OA is severe we will trial prednisone for 5 days.    Skin healing and breakdown risk:  continue pressure relief program.  Daily skin exams and reports from nursing.  Fatigue due to nutritional and hydration deficiency:  titrate vitamin B12 vitamin D and CoQ10 continue to monitor I&O’s, calorie counts prn, dietary consult prn.  Add healthy snack at night.  Acute episodic insomnia with situational adjustment disorder:  prn Ambien, monitor for day time sedation.  Falls risk elevated:  patient to use call light to get nursing assistance to get up, bed and chair alarm.  Elevated DVT risk: progressive activities in PT, continue prophylaxis KERVIN hose, elevation and meds-see MAR-Lovenox.  Complex discharge planning:  [place] March 25, 2025 home pain weekly team meeting every Thursday to re-assess progress towards goals, discuss and address social, psychological and medical comorbidities and to address difficulties they may be having progressing in therapy.  Patient and family education is in progress.  The patient is to follow-up with their family physician after discharge.        Complex Active General Medical Issues that complicate care Assess & Plan:    Hypertension with recent hypertensive urgency hyperlipidemia-Acute rehab to monitor heart rate and rhythm with the option of telemetry and the effects of chronotropic medication with respect to increasing physical activity and exercise in PT, OT, ADLs with medication titration to lowest effective dosing.  Continue blood signs every shift focusing on 
and I will have neurology see her.     Focus on balancing physical with emotional rehabilitation.  Involve rehabilitation psychology and spiritual care if patient is agreeable.  Monitor blood pressure closely.  She had been having low blood pressure which we felt was related to the Parkinson's now her blood pressure seems a little on the high side we will consult medical consider cardiology.          Tameka Blanca D.O., PM&R     Attending    [phone]   [facility]

## 2025-03-27 ENCOUNTER — TELEPHONE (OUTPATIENT)
Age: 85
End: 2025-03-27

## 2025-03-27 NOTE — TELEPHONE ENCOUNTER
Mayito with [facility] states that she started care for skilled nursing on 3/27/2025, and PT, OT  will be out to see the patient soon.

## 2025-03-31 ENCOUNTER — TELEPHONE (OUTPATIENT)
Age: 85
End: 2025-03-31

## 2025-04-03 ENCOUNTER — OFFICE VISIT (OUTPATIENT)
Age: 85
End: 2025-04-03

## 2025-04-03 VITALS
SYSTOLIC BLOOD PRESSURE: 110 MMHG | OXYGEN SATURATION: 98 % | DIASTOLIC BLOOD PRESSURE: 56 MMHG | BODY MASS INDEX: 25.66 KG/M2 | HEIGHT: 65 IN | WEIGHT: 154 LBS | HEART RATE: 73 BPM

## 2025-04-03 DIAGNOSIS — H81.13 BENIGN PAROXYSMAL VERTIGO OF BOTH EARS: ICD-10-CM

## 2025-04-03 DIAGNOSIS — M25.471 ANKLE EDEMA, BILATERAL: ICD-10-CM

## 2025-04-03 DIAGNOSIS — I16.0 HYPERTENSIVE URGENCY: Primary | ICD-10-CM

## 2025-04-03 DIAGNOSIS — C77.3 BREAST CANCER METASTASIZED TO AXILLARY LYMPH NODE, RIGHT: ICD-10-CM

## 2025-04-03 DIAGNOSIS — Z09 HOSPITAL DISCHARGE FOLLOW-UP: ICD-10-CM

## 2025-04-03 DIAGNOSIS — M25.472 ANKLE EDEMA, BILATERAL: ICD-10-CM

## 2025-04-03 DIAGNOSIS — C50.911 BREAST CANCER METASTASIZED TO AXILLARY LYMPH NODE, RIGHT: ICD-10-CM

## 2025-04-03 ASSESSMENT — PATIENT HEALTH QUESTIONNAIRE - PHQ9
SUM OF ALL RESPONSES TO PHQ QUESTIONS 1-9: 0
SUM OF ALL RESPONSES TO PHQ QUESTIONS 1-9: 0
2. FEELING DOWN, DEPRESSED OR HOPELESS: NOT AT ALL
SUM OF ALL RESPONSES TO PHQ QUESTIONS 1-9: 0
SUM OF ALL RESPONSES TO PHQ QUESTIONS 1-9: 0
1. LITTLE INTEREST OR PLEASURE IN DOING THINGS: NOT AT ALL

## 2025-04-03 ASSESSMENT — ENCOUNTER SYMPTOMS
SHORTNESS OF BREATH: 0
BACK PAIN: 0
ABDOMINAL PAIN: 0
NAUSEA: 0
VOMITING: 0
APNEA: 0
SINUS PAIN: 0
SINUS PRESSURE: 0
CHEST TIGHTNESS: 0
DIARRHEA: 0
COUGH: 0
SORE THROAT: 0

## 2025-04-03 ASSESSMENT — VISUAL ACUITY: OU: 1

## 2025-04-03 NOTE — PROGRESS NOTES
Advance Care Planning     Advance Care Planning (ACP) Physician/NP/PA Conversation    Date of Conversation: 4/3/2025  Conducted with: Patient with Decision Making Capacity  Other persons present: Caregiver Rocio    Healthcare Decision Maker:   Primary Decision Maker: Henrry Arce - Randi - [phone]         Care Preferences:    Hospitalization:  \"If your health worsens and it becomes clear that your chance of recovery is unlikely, what would be your preference regarding hospitalization?\"  The patient would prefer hospitalization.    Ventilation:  \"If you were unable to breath on your own and your chance of recovery was unlikely, what would be your preference about the use of a ventilator (breathing machine) if it was available to you?\"  The patient would desire the use of a ventilator.    Resuscitation:  \"In the event your heart stopped as a result of an underlying serious health condition, would you want attempts made to restart your heart, or would you prefer a natural death?\"  Yes, attempt to resuscitate.    treatment goals and end of life care preferences (vegetative state/imminent death)    Conversation Outcomes / Follow-Up Plan:  ACP in process - information provided, considering goals and options  Reviewed DNR/DNI and patient elects Full Code (Attempt Resuscitation)    Length of Voluntary ACP Conversation in minutes:  <16 minutes (Non-Billable)    ALYCIA Dove

## 2025-04-03 NOTE — PROGRESS NOTES
Post-Discharge Transitional Care Follow Up      Fern Recio   YOB: 1940    Date of Office Visit:  4/3/2025  Date of Hospital Admission: 3/10/25  Date of Hospital Discharge: 3/23/25  Readmission Risk Score (high >=14%. Medium >=10%):Readmission Risk Score: 13.1      Care management risk score Rising risk (score 2-5) and Complex Care (Scores >=6): No Risk Score On File     Non face to face  following discharge, date last encounter closed (first attempt may have been earlier): 03/24/2025     Call initiated 2 business days of discharge: Yes     Hypertensive urgency  Benign paroxysmal vertigo of both ears  Ankle edema, bilateral  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, right  - followed by Dr. Toth. She is doing surveillance at this time. No further treatments at this time.   Assessment & Plan  1. Hypertension.  - Blood pressure readings have been elevated at home, with a recent reading of 159/92.  - Current medication regimen includes losartan, hydrochlorothiazide, and amlodipine 10 mg. The amlodipine dosage was increased from 5 mg to 10 mg, which may contribute to some swelling.  - Advised to continue wearing compression stockings during the day and remove them at night. Blood pressure will be monitored at home, and significant changes should be reported.  - If the swelling persists or becomes painful, the amlodipine dosage may be reduced or discontinued.    2. Dizziness.  - Experiences dizziness, which may be related to blood pressure fluctuations and history of vertigo.  - Vestibular therapy is planned to address vertigo.  - Advised to use a cane for support while at home and a rollator for longer distances outdoors.  - If vestibular therapy is not included in the home program or if an outpatient setup is not arranged, she should inform us so we can facilitate the process.    3. Ankle edema.  - Noticed mild swelling in her ankles today, with one ankle more swollen than the other.

## 2025-04-07 ENCOUNTER — OFFICE VISIT (OUTPATIENT)
Age: 85
End: 2025-04-07
Payer: MEDICARE

## 2025-04-07 VITALS
SYSTOLIC BLOOD PRESSURE: 138 MMHG | HEIGHT: 65 IN | BODY MASS INDEX: 25.66 KG/M2 | WEIGHT: 154 LBS | RESPIRATION RATE: 16 BRPM | HEART RATE: 78 BPM | DIASTOLIC BLOOD PRESSURE: 82 MMHG | OXYGEN SATURATION: 98 %

## 2025-04-07 DIAGNOSIS — I25.10 CARDIOVASCULAR DISEASE: ICD-10-CM

## 2025-04-07 DIAGNOSIS — R29.6 FALLS: ICD-10-CM

## 2025-04-07 DIAGNOSIS — I10 ESSENTIAL HYPERTENSION: ICD-10-CM

## 2025-04-07 DIAGNOSIS — H61.23 EXCESSIVE CERUMEN IN EAR CANAL, BILATERAL: ICD-10-CM

## 2025-04-07 DIAGNOSIS — G20.A1 PARKINSON'S DISEASE WITHOUT DYSKINESIA OR FLUCTUATING MANIFESTATIONS (HCC): Primary | ICD-10-CM

## 2025-04-07 DIAGNOSIS — E78.5 HYPERLIPIDEMIA, UNSPECIFIED HYPERLIPIDEMIA TYPE: ICD-10-CM

## 2025-04-07 PROCEDURE — G2211 COMPLEX E/M VISIT ADD ON: HCPCS | Performed by: PHYSICIAN ASSISTANT

## 2025-04-07 PROCEDURE — 1036F TOBACCO NON-USER: CPT | Performed by: PHYSICIAN ASSISTANT

## 2025-04-07 PROCEDURE — 1159F MED LIST DOCD IN RCRD: CPT | Performed by: PHYSICIAN ASSISTANT

## 2025-04-07 PROCEDURE — G8427 DOCREV CUR MEDS BY ELIG CLIN: HCPCS | Performed by: PHYSICIAN ASSISTANT

## 2025-04-07 PROCEDURE — 1090F PRES/ABSN URINE INCON ASSESS: CPT | Performed by: PHYSICIAN ASSISTANT

## 2025-04-07 PROCEDURE — 99214 OFFICE O/P EST MOD 30 MIN: CPT | Performed by: PHYSICIAN ASSISTANT

## 2025-04-07 PROCEDURE — G8417 CALC BMI ABV UP PARAM F/U: HCPCS | Performed by: PHYSICIAN ASSISTANT

## 2025-04-07 PROCEDURE — 1126F AMNT PAIN NOTED NONE PRSNT: CPT | Performed by: PHYSICIAN ASSISTANT

## 2025-04-07 PROCEDURE — 1111F DSCHRG MED/CURRENT MED MERGE: CPT | Performed by: PHYSICIAN ASSISTANT

## 2025-04-07 PROCEDURE — 1123F ACP DISCUSS/DSCN MKR DOCD: CPT | Performed by: PHYSICIAN ASSISTANT

## 2025-04-07 ASSESSMENT — ENCOUNTER SYMPTOMS
DIARRHEA: 0
SHORTNESS OF BREATH: 0
BACK PAIN: 0
BLOOD IN STOOL: 0
VOMITING: 0
ABDOMINAL DISTENTION: 0
ABDOMINAL PAIN: 0
PHOTOPHOBIA: 0
TROUBLE SWALLOWING: 0
CHEST TIGHTNESS: 0
NAUSEA: 0

## 2025-04-07 NOTE — PROGRESS NOTES
Subjective  Fern Recio, 84 y.o. female presents today with:  Chief Complaint   Patient presents with    Follow-up     General follow up pt states she was  in [facility]        HPI  Last OV with me: 10/7/24.   6 month follow up with me. Patient is doing very well.     Had recent hospital follow up with my colleague.   Patient states that she has not been experiencing ant recent edema within her LE. Educated patient on the side effects of her amlodipine. She wears panty-hose as well to help with the edema.   Home PT has helped with walking/exercises.  Has [facility] PT twice weekly.     Patient states that her BBVP has been well controlled with Antivert. She avoids sudden positional changes as well. She states that her left ear hurts at times, she hears \"bubbling and squeaking\" within that ear.     Compliant with her medications.    Responded extremely well to leqvio injection.  She cannot tolerate statin medications.     GHAZAL--using CPAP nightly, followed by pulmonology.   Sleeping well.    No recurrent UTI, has not needed keflex.     She sees Dr. Julienne del angel for breast CA.  At this time, they are doing active surveillance.  Has upcoming mammogram and appointment in November.     BP controlled. No chest pain, dizziness.     Review of Systems   Constitutional:  Negative for activity change, appetite change, chills, fatigue, fever and unexpected weight change.   HENT:  Negative for congestion, hearing loss, nosebleeds and trouble swallowing.    Eyes:  Negative for photophobia and visual disturbance.   Respiratory:  Negative for chest tightness and shortness of breath.    Cardiovascular:  Negative for chest pain, palpitations and leg swelling.   Gastrointestinal:  Negative for abdominal distention, abdominal pain, blood in stool, diarrhea, nausea and vomiting.   Endocrine: Negative for cold intolerance and heat intolerance.   Genitourinary:  Negative for dysuria, frequency, hematuria, menstrual problem, pelvic

## 2025-04-15 ENCOUNTER — PATIENT MESSAGE (OUTPATIENT)
Age: 85
End: 2025-04-15

## 2025-04-15 DIAGNOSIS — H81.13 BENIGN PAROXYSMAL VERTIGO OF BOTH EARS: Primary | ICD-10-CM

## 2025-04-17 ENCOUNTER — TELEPHONE (OUTPATIENT)
Age: 85
End: 2025-04-17

## 2025-04-17 NOTE — TELEPHONE ENCOUNTER
Malena from [facility] called bc there are outstanding orders for the pts care that need to be signed.   P: [phone]  F:[phone]  E: [email or web address]    Case# [id number]   [id number]

## 2025-04-22 ENCOUNTER — HOSPITAL ENCOUNTER (OUTPATIENT)
Dept: PHYSICAL THERAPY | Age: 85
Setting detail: THERAPIES SERIES
Discharge: HOME | End: 2025-04-22
Payer: MEDICARE

## 2025-04-22 PROCEDURE — 97162 PT EVAL MOD COMPLEX 30 MIN: CPT

## 2025-04-22 NOTE — PLAN OF CARE
Physical Therapy Evaluation/Plan of Care   [facility]   [place] [id number]  Dept: [phone]  Dept Fax: [phone]  Loc: [phone]    Physical Therapy: Initial Evaluation    General Information    Patient: Fern Recio (84 y.o.     female)   Examination Date: 2025   :  1940 ;    Confirmed: Yes MRN: [id number]  CSN: [id number]   Insurance: Payor: MEDICARE / Plan: MEDICARE PART A AND B / Product Type: *No Product type* /   Insurance ID: [id number] - (Medicare)  PT Insurance Information: Medicare Secondary Insurance (if applicable): [facility]    Referring Physician: Debi Malone PA-C       Visits to Date/Visits Approved:   (BMN)    No Show/Cancelled Appts: 0 / 0     Medical Diagnosis: Benign paroxysmal vertigo of both ears [H81.13]        Treatment Diagnosis: BPPV, impaired cervical ROM and strength      SUBJECTIVE:     Onset date: years ago       Subjective/ Mechanism of Injury:   Subjective: Patient reports dizziness started years ago that is off and on.  Reports she feels ''spinning\" intermittently. Increased symptoms with bending down. Reports dizziness with rolling over on her right side.  Decreased symptoms with antivert medication. Reports she fell in March and was in the hospital.    Description of Dizziness:   [x]  Vertigo/ spinning  []  Lightheadedness   []  Disequilibrium/ off balance   []  Other:     Initial Episode: years ago    Recurring Episodes: yes    Hearing Loss: yes  Tinnitus: no   Fullness: yes    Visual changes: no Blurred Vision: no Diplopia: no      Recent Falls: yes  Near Falls: yes          Interventions for current problem: inpatient rehab     Pain:   Current: 0/10       Imaging results (If Applicable): No results found.    Past Medical History  Past Medical History:   Diagnosis Date    Asthma     Asthma in remission     Breast cancer 2014    right breast

## 2025-04-23 NOTE — TELEPHONE ENCOUNTER
Malena called back to check the status of the orders to be signed. She was informed that Debi is out of the office until next week. She will try to call back on Tuesday. Bc the orders range from 20-25 days overdue.

## 2025-04-25 ENCOUNTER — HOSPITAL ENCOUNTER (OUTPATIENT)
Dept: PHYSICAL THERAPY | Age: 85
Setting detail: THERAPIES SERIES
Discharge: HOME | End: 2025-04-25
Payer: MEDICARE

## 2025-04-25 PROCEDURE — 97110 THERAPEUTIC EXERCISES: CPT

## 2025-04-25 NOTE — PROGRESS NOTES
[facility]  Outpatient Physical Therapy    Treatment Note        Date: 2025  Patient: Fern Recio  : 1940   Confirmed: Yes  MRN: [id number]  Referring Provider: Debi Malone PA-C    Medical Diagnosis: Benign paroxysmal vertigo of both ears [H81.13]       Treatment Diagnosis: BPPV, impaired cervical ROM and strength    Visit Information:  Insurance: Payor: MEDICARE / Plan: MEDICARE PART A AND B / Product Type: *No Product type* /   PT Visit Information  PT Insurance Information: Medicare  Total # of Visits Approved:  (BMN)  Total # of Visits to Date: 2  Plan of Care/Certification Expiration Date: 25  No Show: 0  Progress Note Due Date: 25  Canceled Appointment: 0  Progress Note Counter:     Subjective Information:  Subjective: Pt reports no dizziness/ spinning, \" If I dont get too excited,I'm ok \"  HEP Compliance:  [] Good [x] Fair [] Poor [] Reports not doing due to:      Pain Screening  Patient Currently in Pain: Denies    Treatment:  Exercises:  Exercises  Exercise 3: cervical AROM x 10  Exercise 4: cervical isometrics  Exercise 5: chin tucks 5\" x 10  Exercise 6: posture ex scapular retraction, shrugs with post rolls x 10 ea  Exercise 7: pec stretch W x 2@ 20 secs, Low x 1@ 20 secs  Exercise 20: HEP: cervical rom, chin tucks       *Indicates exercise, modality, or manual techniques to be initiated when appropriate    Objective Measures:      Strength: [x] NT  [] MMT completed:     ROM: [x] NT  [] ROM measurements:      LTG 1 Current Status:: : Reports no dizziness or spinning     Assessment:   Body Structures, Functions, Activity Limitations Requiring Skilled Therapeutic Intervention: Vestibular Impairment, Decreased ROM, Decreased strength, Decreased endurance, Decreased posture  Assessment: Initiated pt's first  treatment and focused on Cervcial ROM and strength as well as postural rom and strength. Pt performed all ex's well and was given all for HEP with

## 2025-04-27 ENCOUNTER — PATIENT MESSAGE (OUTPATIENT)
Age: 85
End: 2025-04-27

## 2025-04-28 ENCOUNTER — OFFICE VISIT (OUTPATIENT)
Age: 85
End: 2025-04-28
Payer: MEDICARE

## 2025-04-28 ENCOUNTER — HOSPITAL ENCOUNTER (OUTPATIENT)
Dept: INFUSION THERAPY | Age: 85
Setting detail: INFUSION SERIES
Discharge: HOME | End: 2025-04-28
Payer: MEDICARE

## 2025-04-28 VITALS
OXYGEN SATURATION: 97 % | TEMPERATURE: 97.2 F | SYSTOLIC BLOOD PRESSURE: 156 MMHG | DIASTOLIC BLOOD PRESSURE: 68 MMHG | RESPIRATION RATE: 16 BRPM | HEART RATE: 84 BPM

## 2025-04-28 VITALS
HEART RATE: 83 BPM | HEIGHT: 65 IN | DIASTOLIC BLOOD PRESSURE: 72 MMHG | BODY MASS INDEX: 25.83 KG/M2 | SYSTOLIC BLOOD PRESSURE: 132 MMHG | OXYGEN SATURATION: 98 % | RESPIRATION RATE: 17 BRPM | TEMPERATURE: 97.8 F | WEIGHT: 155 LBS

## 2025-04-28 DIAGNOSIS — G47.33 OSA ON CPAP: Primary | ICD-10-CM

## 2025-04-28 DIAGNOSIS — E78.5 HYPERLIPIDEMIA, UNSPECIFIED HYPERLIPIDEMIA TYPE: ICD-10-CM

## 2025-04-28 DIAGNOSIS — I10 HYPERTENSION, UNSPECIFIED TYPE: ICD-10-CM

## 2025-04-28 DIAGNOSIS — I25.10 CARDIOVASCULAR DISEASE: Primary | ICD-10-CM

## 2025-04-28 DIAGNOSIS — I67.9 CEREBROVASCULAR DISEASE, UNSPECIFIED: ICD-10-CM

## 2025-04-28 PROCEDURE — G8427 DOCREV CUR MEDS BY ELIG CLIN: HCPCS | Performed by: INTERNAL MEDICINE

## 2025-04-28 PROCEDURE — 1123F ACP DISCUSS/DSCN MKR DOCD: CPT | Performed by: INTERNAL MEDICINE

## 2025-04-28 PROCEDURE — 1036F TOBACCO NON-USER: CPT | Performed by: INTERNAL MEDICINE

## 2025-04-28 PROCEDURE — 99213 OFFICE O/P EST LOW 20 MIN: CPT | Performed by: INTERNAL MEDICINE

## 2025-04-28 PROCEDURE — 6360000002 HC RX W HCPCS: Performed by: PHYSICIAN ASSISTANT

## 2025-04-28 PROCEDURE — 1159F MED LIST DOCD IN RCRD: CPT | Performed by: INTERNAL MEDICINE

## 2025-04-28 PROCEDURE — G8417 CALC BMI ABV UP PARAM F/U: HCPCS | Performed by: INTERNAL MEDICINE

## 2025-04-28 PROCEDURE — 99212 OFFICE O/P EST SF 10 MIN: CPT | Performed by: INTERNAL MEDICINE

## 2025-04-28 PROCEDURE — 96372 THER/PROPH/DIAG INJ SC/IM: CPT

## 2025-04-28 PROCEDURE — 1090F PRES/ABSN URINE INCON ASSESS: CPT | Performed by: INTERNAL MEDICINE

## 2025-04-28 RX ADMIN — INCLISIRAN 284 MG: 284 INJECTION, SOLUTION SUBCUTANEOUS at 12:33

## 2025-04-28 NOTE — PROGRESS NOTES
PATIENT VISIT-PULMONARY/SLEEP    4/28/2025     REFERRING PHYSICIAN:  Debi Malone PA-C     REASON FOR REFERRAL:  GHAZAL    HPI:     Fern Recio is a 84 y.o. female who was referred to sleep and pulmonary clinic for evaluation.   She underwent a recent sleep study which showed mild case of obstructive sleep apnea.  Overall AHI was 5.  REM AHI was 13.  Sleep efficiency was 75%.  There was no major desaturations.  She is tired and sleepy during the day and she dozes off easily.  Huguenot Sleepiness Scale is 14.  She has a history of stroke in the past.  She has a history of hypertension as well.  She denies dozing off behind the wheels.  No accident or near misses in the past.     1/4/23:    Comes back for follow up.  She underwent CPAP titration study which found pressure 6 cm of H2O was adequate for her.  She is here to discuss results.  She has been feeling about the same with excessive daytime sleepiness.  She felt refreshed after the CPAP titration study.      3/21/23:    Received her CPAP machine has been using it for few weeks.  Compliance is great.  Averaging about 5 hours and 30 minutes.  AHI below 5.  There is no significant leak from the mask.  She takes off the machine sometimes at end of her night.  She is refreshed and does not doze off easily.         3/20/24:    Has not been using her machine regularly like before.  She is averaging between 2 and 3 hours.  AHI is still normal on the machine.  Apparently she had a viral infection and had nasal congestion and headache and stuffy nose and sinuses and has not been using it good for the last months.  She is back using it more lately and averaging about 4 to 5 hours overnight.      4/28/25:      Comes back for a follow up. Has been using the machine regulary. Averaging 5 hours/night. AHI is normal.  Weight has been about the same.  Benefiting from the machine.  Has been refreshed.       Past Medical History   Past Medical History:   Diagnosis

## 2025-04-28 NOTE — PROGRESS NOTES
Leqvio injection given subq to patient's LLQ. Patient tolerated well. She left unit ambulatory. All equipment used in care has been cleaned.    Electronically signed by Brigitte Henderson RN on 4/28/2025 at 12:36 PM

## 2025-04-28 NOTE — PROGRESS NOTES
[facility]     PT arrived via:  [x] Ambulatory         [] Wheelchair  []With transport                [] Other:     [x]PT oriented to room and call light in reach.   [x] Vital signs obtained and are stable.   [x]Medication education provided and reviewed with patient.             .

## 2025-04-29 RX ORDER — HYDROCHLOROTHIAZIDE 25 MG/1
25 TABLET ORAL DAILY
Qty: 90 TABLET | Refills: 3 | Status: SHIPPED | OUTPATIENT
Start: 2025-04-29

## 2025-04-29 RX ORDER — LOSARTAN POTASSIUM 100 MG/1
100 TABLET ORAL DAILY
Qty: 90 TABLET | Refills: 3 | Status: SHIPPED | OUTPATIENT
Start: 2025-04-29

## 2025-04-29 RX ORDER — AMLODIPINE BESYLATE 10 MG/1
10 TABLET ORAL DAILY
Qty: 90 TABLET | Refills: 3 | Status: SHIPPED | OUTPATIENT
Start: 2025-04-29

## 2025-04-29 RX ORDER — MECLIZINE HCL 12.5 MG 12.5 MG/1
12.5 TABLET ORAL 2 TIMES DAILY
Qty: 90 TABLET | Refills: 2 | Status: SHIPPED | OUTPATIENT
Start: 2025-04-29

## 2025-04-29 NOTE — TELEPHONE ENCOUNTER
Comments:     Last Office Visit (last PCP visit):   4/7/2025    Next Visit Date:  Future Appointments   Date Time Provider Department Center   4/30/2025  1:00 PM Tierney Weiss, PTA [facility]   5/2/2025  1:00 PM Michaela Kelly, PT [facility]   5/5/2025  1:00 PM Michaela Kelly, PT [facility]   5/7/2025  1:00 PM Windy Loera, PTA [facility]   10/7/2025 11:00 AM Debi Malone PA-C [facility]   12/10/2025 11:00 AM Julienne Toth MD [facility]   2/4/2026  1:00 PM Arnaldo Toth MD [place] [facility] -   4/28/2026  1:15 PM [facility]         Rx requested:  Requested Prescriptions     Pending Prescriptions Disp Refills    amLODIPine (NORVASC) 10 MG tablet 90 tablet 3     Sig: Take 1 tablet by mouth daily    hydroCHLOROthiazide (HYDRODIURIL) 25 MG tablet 90 tablet 3     Sig: Take 1 tablet by mouth daily    losartan (COZAAR) 100 MG tablet 90 tablet 3     Sig: Take 1 tablet by mouth daily    meclizine (ANTIVERT) 12.5 MG tablet 90 tablet 2     Sig: Take 1 tablet by mouth in the morning and at bedtime Half a tablet

## 2025-04-30 ENCOUNTER — HOSPITAL ENCOUNTER (OUTPATIENT)
Dept: PHYSICAL THERAPY | Age: 85
Setting detail: THERAPIES SERIES
Discharge: HOME | End: 2025-04-30
Payer: MEDICARE

## 2025-04-30 PROCEDURE — 97110 THERAPEUTIC EXERCISES: CPT

## 2025-04-30 PROCEDURE — 97112 NEUROMUSCULAR REEDUCATION: CPT

## 2025-04-30 NOTE — PROGRESS NOTES
[facility]  Outpatient Physical Therapy    Treatment Note        Date: 2025  Patient: Fern Recio  : 1940   Confirmed: Yes  MRN: [id number]  Referring Provider: Debi Malone PA-C    Medical Diagnosis: Benign paroxysmal vertigo of both ears [H81.13]       Treatment Diagnosis: BPPV, impaired cervical ROM and strength    Visit Information:  Insurance: Payor: MEDICARE / Plan: MEDICARE PART A AND B / Product Type: *No Product type* /   PT Visit Information  PT Insurance Information: Medicare  Total # of Visits Approved:  (BMN)  Total # of Visits to Date: 3  Plan of Care/Certification Expiration Date: 25  No Show: 0  Progress Note Due Date: 25  Canceled Appointment: 0  Progress Note Counter: 3/8    Subjective Information:  Subjective: Patient reports decreased pain and dizziness only occurring when first getting up in the morning. Patient reports not taking a Meclizine for the first time. Had ears cleaned yesterday when having her Hearing Aids checked.  HEP Compliance:  [x] Good [] Fair [] Poor [] Reports not doing due to:               Pain Screening  Patient Currently in Pain: No    Treatment:  Exercises:  Exercises  Exercise 1: CRM x 1 (+ Hallpike on right)  Exercise 3: cervical AROM x 10  Exercise 6: Posture ex scapular retraction, shrugs with post rolls x 10 ea  Exercise 7: Pec stretch W x 20secs x 3  Exercise 20: HEP: Continue current            *Indicates exercise, modality, or manual techniques to be initiated when appropriate              Assessment:   Body Structures, Functions, Activity Limitations Requiring Skilled Therapeutic Intervention: Vestibular Impairment, Decreased ROM, Decreased strength, Decreased endurance, Decreased posture  Assessment: Patient presenting to treatment without taking an Anitvert beforehand. Patient presenting with Min dizziness/lightheadedness, assessed Rt Hallpike with patient reporting dizziness (~ 15secs) however no nystagamus

## 2025-05-02 ENCOUNTER — HOSPITAL ENCOUNTER (OUTPATIENT)
Dept: PHYSICAL THERAPY | Age: 85
Setting detail: THERAPIES SERIES
Discharge: HOME | End: 2025-05-02
Payer: MEDICARE

## 2025-05-02 PROCEDURE — 97110 THERAPEUTIC EXERCISES: CPT

## 2025-05-02 PROCEDURE — 95992 CANALITH REPOSITIONING PROC: CPT

## 2025-05-02 NOTE — PROGRESS NOTES
[facility]  Outpatient Physical Therapy   Treatment Note        Date: 2025  Patient: Fern Recio  : 1940   Confirmed: Yes  MRN: [id number]  Referring Provider: Debi Malone PA-C      Medical Diagnosis: Benign paroxysmal vertigo of both ears [H81.13]      Treatment Diagnosis: BPPV, impaired cervical ROM and strength    Visit Information:  Insurance: Payor: MEDICARE / Plan: MEDICARE PART A AND B / Product Type: *No Product type* /   PT Visit Information  PT Insurance Information: Medicare  Total # of Visits Approved:  (BMN)  Total # of Visits to Date: 4  Plan of Care/Certification Expiration Date: 25  No Show: 0  Progress Note Due Date: 25  Canceled Appointment: 0  Progress Note Counter:     Subjective Information:  Subjective: Patient reports she has been doing well since coming here, except for today.  HEP Compliance:  [x] Good [] Fair [] Poor [] Reports not doing due to:             Pain Screening  Patient Currently in Pain: No    Treatment:  Exercises:  Exercises  Exercise 1: CRM x 1 (+ Hallpike on right)  Exercise 3: cervical AROM x 10  Exercise 4: cervical isometric 5 sec hold x 5, 4 way  Exercise 5: chin tucks 3\" x 10  Exercise 6: Posture ex scapular retraction, shrugs with post rolls x 12 ea  Exercise 7: Pec stretch W x 20secs x 3  Exercise 20: HEP: cervical isometrics     *Indicates exercise, modality, or manual techniques to be initiated when appropriate    Objective Measures:           LTG 2 Current Status::  + Hallpike on right             Assessment:   Body Structures, Functions, Activity Limitations Requiring Skilled Therapeutic Intervention: Vestibular Impairment, Decreased ROM, Decreased strength, Decreased endurance, Decreased posture  Assessment: Patient reports she is dizzy again but only with turning.  Focused the beginning of treatment with cervical ROM.  Patient denies any discomfort with cervical ROM exercises and strengthening.  Added cervical

## 2025-05-04 DIAGNOSIS — M75.02 ADHESIVE CAPSULITIS OF LEFT SHOULDER: Primary | ICD-10-CM

## 2025-05-05 ENCOUNTER — HOSPITAL ENCOUNTER (OUTPATIENT)
Dept: PHYSICAL THERAPY | Age: 85
Setting detail: THERAPIES SERIES
Discharge: HOME | End: 2025-05-05
Payer: MEDICARE

## 2025-05-05 PROCEDURE — 97140 MANUAL THERAPY 1/> REGIONS: CPT

## 2025-05-05 PROCEDURE — 97110 THERAPEUTIC EXERCISES: CPT

## 2025-05-05 NOTE — PROGRESS NOTES
[facility]  Outpatient Physical Therapy   Treatment Note        Date: 2025  Patient: Fern Recio  : 1940   Confirmed: Yes  MRN: [id number]  Referring Provider: Debi Malone PA-C      Medical Diagnosis: Benign paroxysmal vertigo of both ears [H81.13]      Treatment Diagnosis: BPPV, impaired cervical ROM and strength    Visit Information:  Insurance: Payor: MEDICARE / Plan: MEDICARE PART A AND B / Product Type: *No Product type* /   PT Visit Information  PT Insurance Information: Medicare  Total # of Visits Approved:  (BMN)  Total # of Visits to Date: 5  Plan of Care/Certification Expiration Date: 25  No Show: 0  Progress Note Due Date: 25  Canceled Appointment: 0  Progress Note Counter:     Subjective Information:  Subjective: Patient reports she only had a small episode of dizziness since last visit.  HEP Compliance:  [x] Good [] Fair [] Poor [] Reports not doing due to:             Pain Screening  Patient Currently in Pain: No    Treatment:  Exercises:  Exercises  Exercise 1: Hallpike negative  Exercise 3: cervical AROM x 10  Exercise 4: cervical isometric 5 sec hold x 6, 4 way  Exercise 5: chin tucks x 12  Exercise 6: scapular retraction 5 sec hold x 15  Exercise 7: Pec stretch W x 20secs x 3  Exercise 20: HEP: continue with current       Manual:   Manual Therapy  Soft Tissue Mobilizaton: STM cervical and upper trap x 10 minutes       *Indicates exercise, modality, or manual techniques to be initiated when appropriate    Objective Measures:            AROM Cervical Spine   Cervical flexion: 54  Cervical extension: 40  Cervical right lateral: 30  Cervical left lateral: 25             LTG 2 Current Status::  negative Hallpike on the right          Assessment:   Body Structures, Functions, Activity Limitations Requiring Skilled Therapeutic Intervention: Vestibular Impairment, Decreased ROM, Decreased strength, Decreased endurance, Decreased posture  Assessment:

## 2025-05-07 ENCOUNTER — HOSPITAL ENCOUNTER (OUTPATIENT)
Dept: PHYSICAL THERAPY | Age: 85
Setting detail: THERAPIES SERIES
Discharge: HOME | End: 2025-05-07
Payer: MEDICARE

## 2025-05-07 PROCEDURE — 97140 MANUAL THERAPY 1/> REGIONS: CPT

## 2025-05-07 PROCEDURE — 97110 THERAPEUTIC EXERCISES: CPT

## 2025-05-19 ENCOUNTER — HOSPITAL ENCOUNTER (OUTPATIENT)
Dept: PHYSICAL THERAPY | Age: 85
Setting detail: THERAPIES SERIES
Discharge: HOME | End: 2025-05-19
Payer: MEDICARE

## 2025-05-19 PROCEDURE — 97162 PT EVAL MOD COMPLEX 30 MIN: CPT

## 2025-05-19 NOTE — PLAN OF CARE
Goals : \"rid of dizziness\"; \"decrease pain in left shoulder\"    Short Term Goals Completed by 3 weeks Goal Status   STG 1Patient will reports </= 3/10 pain in left shoulder. New   STG 2 Patient will be independent with HEP. New     Long Term Goals Completed by 4 weeks Goal Status   LTG 1 Patient will report no dizziness with change in position. In progress   LTG 2 Patient will demonstrate negative Hallpike for improved tolerance with bending down. In progress   LTG 3 Patient will increase cervical AROM >/= 5 degrees for improved functional tolerance. In progress, Partially met   LTG 4 DHI </= 20 to demonstrate functional improvements. In progress   LTG 5 Patient will increase left shoulder ROM to WFLs for improved functional tolerance. New   LTG 6 Patient will increase strength in left UE >/= 4+/5 for improved lifting tolerance. New   LTG 7 UEFI >/= 68/80 to demonstrate functional improvements. New     TREATMENT PLAN   Plan         Treatment may include any combination of the following: Strengthening, ROM, Endurance training, Neuromuscular re-education, Manual, Home exercise program, Safety education & training, Patient/Caregiver education & training, Modalities  Modalities: Heat/Cold     Frequency / Duration:  Patient to be seen 2 times per week for 4 weeks  Plan Comment: add left shoulder pain, hold dizziness unless patient has symptoms            Patient Education:  [x] Plans / Goals, Benefits discussed  [x] Home exercise program []Dry Needling []Evaluative Findings [x]Anatomy of condition []Insurance []Activities [] Safety []Assistive Device   [] Gait [] Transfers []Bed mobility []Posture [] Body mechanics []Ergonomics   [] Other:     Method of Education: [x] Verbal  [] Demo  [] Written  Other:   Comprehension of Education:  [x] Verbalizes understanding.  [] Demonstrates understanding.  [] Needs Review.  [] Demonstrates / verbalizes understanding of HEP / Ed previously given.    POST-PAIN     Pain Rating (0-10

## 2025-05-21 ENCOUNTER — HOSPITAL ENCOUNTER (OUTPATIENT)
Dept: PHYSICAL THERAPY | Age: 85
Setting detail: THERAPIES SERIES
Discharge: HOME | End: 2025-05-21
Payer: MEDICARE

## 2025-05-21 PROCEDURE — 97110 THERAPEUTIC EXERCISES: CPT

## 2025-05-21 NOTE — PROGRESS NOTES
[facility]  Outpatient Physical Therapy    Treatment Note        Date: 2025  Patient: Fern Recio  : 1940   Confirmed: Yes  MRN: [id number]  Referring Provider: Debi Malone PA-C    Medical Diagnosis: Adhesive capsulitis of left shoulder [M75.02]       Treatment Diagnosis: BPPV, impaired cervical ROM and strength    Visit Information:  Insurance: Payor: MEDICARE / Plan: MEDICARE PART A AND B / Product Type: *No Product type* /   PT Visit Information  PT Insurance Information: Medicare  Total # of Visits Approved:  (BMN)  Total # of Visits to Date: 8  Plan of Care/Certification Expiration Date: 08/15/25  No Show: 0  Progress Note Due Date: 25  Canceled Appointment: 0  Progress Note Counter:     Subjective Information:  Subjective: Patient reports compliance with HEP.  HEP Compliance:  [x] Good [] Fair [] Poor [] Reports not doing due to:    Pain Screening  Patient Currently in Pain: Denies    Treatment:  Exercises:  Exercises  Exercise 1: table slides flexion, scaption 5 sec hold x 5, abimbola  Exercise 2: pulleys 2 minute flexion/ 2 minute abduction  Exercise 3: posture ex*  Exercise 4: wand ex*  Exercise 5: pec stretch*  Exercise 6: wall slides 5\"x10 flexion/scaption  Exercise 7: finger ladder flexion/abduction x4 ea  Exercise 8: rows/lats*  Exercise 9: PROM left shoulder*  Exercise 20: HEP: table slides    Assessment:   Body Structures, Functions, Activity Limitations Requiring Skilled Therapeutic Intervention: Vestibular Impairment, Decreased ROM, Decreased strength, Decreased endurance, Decreased posture  Assessment: Guided patient through exercise progressions for left shoulder ROM to improve functional reach. Patient required intermittent cues for postural awareness and to maintain pain free range. She c/o mild discomfort and soreness at end range flexion/abduction. Reviewed exercises for HEP and provided paper copy. Patient verbalizes good understanding.  Treatment

## 2025-05-28 ENCOUNTER — HOSPITAL ENCOUNTER (OUTPATIENT)
Dept: PHYSICAL THERAPY | Age: 85
Setting detail: THERAPIES SERIES
Discharge: HOME | End: 2025-05-28
Payer: MEDICARE

## 2025-05-28 PROCEDURE — 97140 MANUAL THERAPY 1/> REGIONS: CPT

## 2025-05-28 PROCEDURE — 97110 THERAPEUTIC EXERCISES: CPT

## 2025-05-28 NOTE — PROGRESS NOTES
[facility]  Outpatient Physical Therapy   Treatment Note        Date: 2025  Patient: Fern Recio  : 1940   Confirmed: Yes  MRN: [id number]  Referring Provider: Debi Malone PA-C      Medical Diagnosis: Adhesive capsulitis of left shoulder [M75.02]      Treatment Diagnosis: BPPV, impaired cervical ROM and strength    Visit Information:  Insurance: Payor: MEDICARE / Plan: MEDICARE PART A AND B / Product Type: *No Product type* /   PT Visit Information  PT Insurance Information: Medicare  Total # of Visits to Date: 9  Plan of Care/Certification Expiration Date: 08/15/25  No Show: 0  Progress Note Due Date: 25  Canceled Appointment: 0  Progress Note Counter: 3/8    Subjective Information:  Subjective: Pt reported she is doing better since the last she was here. The only time the L shoulder is \"bothering\" her is when she is sleeping or late at night.  HEP Compliance:  [x] Good [] Fair [] Poor [] Reports not doing due to:               Pain Screening  Patient Currently in Pain: Denies    Treatment:  Exercises:  Exercises  Exercise 2: pulleys 2 minute flexion/ 2 minute abduction  Exercise 3: posture ex x 10 ea  Exercise 4: wand ex seated: Flex/ abd/ ER/ IR/ Ext  x 10 ea  Exercise 5: pec stretch at wall 30\" x 3  Exercise 6: wall slides 5\"x10 flexion/ scaption  Exercise 7: finger ladder flexion - 19/abduction - 16 x 5 ea  Exercise 9: PROM left shoulder*  Exercise 20: HEP: table slides       Manual:   Manual Therapy  Soft Tissue Mobilizaton: STM left bicep x 8 min total  Treatment Reasoning  Limitations addressed: Tissue extensibility, Painful spasm      *Indicates exercise, modality, or manual techniques to be initiated when appropriate      Assessment:   Body Structures, Functions, Activity Limitations Requiring Skilled Therapeutic Intervention: Vestibular Impairment, Decreased ROM, Decreased strength, Decreased endurance, Decreased posture  Assessment: Progressed pt with wand

## 2025-05-30 ENCOUNTER — HOSPITAL ENCOUNTER (OUTPATIENT)
Dept: PHYSICAL THERAPY | Age: 85
Setting detail: THERAPIES SERIES
Discharge: HOME | End: 2025-05-30
Payer: MEDICARE

## 2025-05-30 PROCEDURE — 97140 MANUAL THERAPY 1/> REGIONS: CPT

## 2025-05-30 PROCEDURE — 97110 THERAPEUTIC EXERCISES: CPT

## 2025-05-30 NOTE — PROGRESS NOTES
[facility]  Outpatient Physical Therapy    Treatment Note        Date: 2025  Patient: Fern Recio  : 1940   Confirmed: Yes  MRN: [id number]  Referring Provider: Debi Malone PA-C    Medical Diagnosis: Adhesive capsulitis of left shoulder [M75.02]       Treatment Diagnosis: BPPV, impaired cervical ROM and strength    Visit Information:  Insurance: Payor: MEDICARE / Plan: MEDICARE PART A AND B / Product Type: *No Product type* /   PT Visit Information  PT Insurance Information: Medicare  Total # of Visits to Date: 10  Plan of Care/Certification Expiration Date: 08/15/25  No Show: 0  Progress Note Due Date: 25  Canceled Appointment: 0  Progress Note Counter:     Subjective Information:  Subjective: Patient reports \"it's nice to sleep without pain at night\".  HEP Compliance:  [x] Good [] Fair [] Poor [] Reports not doing due to:                Treatment:  Exercises:  Exercises  Exercise 1: HEP table slides flexion, scaption 5 sec hold x 5, abimbola  Exercise 2: pulleys 2 minute flexion/ 2 minute abduction  Exercise 3: posture ex x 15 ea  Exercise 4: wand ex seated: Flex/ abd/ ER/ IR/ Ext  x 10 ea  Exercise 5: pec stretch at wall 30\" x 3  Exercise 6: wall slides 5\"x10 flexion/ scaption  Exercise 7: finger ladder flexion - 19/abduction - 16 x 5 ea  Exercise 8: rows/lats*NV if appropriate  Exercise 9: gentle left shoulder rom x8 min /setaed  Exercise 20: HEP: table slides       *Indicates exercise, modality, or manual techniques to be initiated when appropriate    Objective Measures:      Strength: [] NT  [x] MMT completed:        Strength RUE  R Shoulder Flexion: 4-/5  R Shoulder Extension: 4+/5  R Shoulder ABduction: 4/5  R Shoulder Internal Rotation: 4/5  R Shoulder External Rotation: 4/5  R Elbow Flexion: 4/5  R Elbow Extension: 4/5  Strength LUE  L Shoulder Flexion: 3+/5, 4-/5  L Shoulder Extension: 4+/5  L Shoulder ABduction: 3+/5, 4-/5  L Shoulder Internal Rotation: 4/5, 4+/5  L

## 2025-06-04 ENCOUNTER — HOSPITAL ENCOUNTER (OUTPATIENT)
Dept: PHYSICAL THERAPY | Age: 85
Setting detail: THERAPIES SERIES
Discharge: HOME | End: 2025-06-04
Payer: MEDICARE

## 2025-06-04 PROCEDURE — 97110 THERAPEUTIC EXERCISES: CPT

## 2025-06-04 ASSESSMENT — PAIN DESCRIPTION - DESCRIPTORS: DESCRIPTORS: SORE;TIGHTNESS

## 2025-06-04 ASSESSMENT — PAIN SCALES - GENERAL: PAINLEVEL_OUTOF10: 2

## 2025-06-04 ASSESSMENT — PAIN DESCRIPTION - ORIENTATION: ORIENTATION: LEFT

## 2025-06-04 ASSESSMENT — PAIN DESCRIPTION - LOCATION: LOCATION: SHOULDER

## 2025-06-04 ASSESSMENT — PAIN DESCRIPTION - PAIN TYPE: TYPE: CHRONIC PAIN

## 2025-06-04 NOTE — PROGRESS NOTES
[facility]  Outpatient Physical Therapy    Treatment Note        Date: 2025  Patient: Fern Recio  : 1940   Confirmed: Yes  MRN: [id number]  Referring Provider: Debi Malone PA-C   Secondary Referring Provider (If applicable):     Medical Diagnosis: Adhesive capsulitis of left shoulder [M75.02]    Treatment Diagnosis: BPPV, impaired cervical ROM and strength    Visit Information:  Insurance: Payor: MEDICARE / Plan: MEDICARE PART A AND B / Product Type: *No Product type* /   PT Visit Information  PT Insurance Information: Medicare  Total # of Visits to Date:   Plan of Care/Certification Expiration Date: 08/15/25  No Show: 0  Progress Note Due Date: 25  Canceled Appointment: 0  Progress Note Counter:     Subjective Information:  Subjective: I am doing okay very little pain at this time.  HEP Compliance:  [x] Good [] Fair [] Poor [] Reports not doing due to:    Pain Screening  Patient Currently in Pain: Yes  Pain Assessment: 0-10  Pain Level: 2  Pain Type: Chronic pain  Pain Location: Shoulder  Pain Orientation: Left  Pain Descriptors: Sore, Tightness    Treatment:  Exercises:  Exercises  Exercise 1: HEP table slides flexion, scaption 5 sec hold x 5, abimbola  Exercise 2: pulleys 2 minute flexion/ 2 minute abduction  Exercise 3: posture ex x 15 ea  Exercise 4: wand ex seated: Flex/ abd/ ER/ IR/ Ext  x 10 ea  Exercise 5: pec stretch at wall 30\" x 3  Exercise 6: wall slides 5\"x10 flexion/ scaption  Exercise 7: finger ladder flexion - 21 /abduction - 19 x 5 ea  Exercise 8: rows/lats YTB x 10  Exercise 9: gentle left shoulder rom x8 min /setaed  Exercise 20: HEP: table slides       *Indicates exercise, modality, or manual techniques to be initiated when appropriate    Objective Measures:         Assessment:   Body Structures, Functions, Activity Limitations Requiring Skilled Therapeutic Intervention: Vestibular Impairment, Decreased ROM, Decreased strength, Decreased endurance,

## 2025-06-06 ENCOUNTER — HOSPITAL ENCOUNTER (OUTPATIENT)
Dept: PHYSICAL THERAPY | Age: 85
Setting detail: THERAPIES SERIES
Discharge: HOME | End: 2025-06-06
Payer: MEDICARE

## 2025-06-06 PROCEDURE — 97140 MANUAL THERAPY 1/> REGIONS: CPT

## 2025-06-06 PROCEDURE — 97110 THERAPEUTIC EXERCISES: CPT

## 2025-06-06 ASSESSMENT — PAIN SCALES - GENERAL: PAINLEVEL_OUTOF10: 2

## 2025-06-06 ASSESSMENT — PAIN DESCRIPTION - PAIN TYPE: TYPE: CHRONIC PAIN

## 2025-06-06 ASSESSMENT — PAIN DESCRIPTION - LOCATION: LOCATION: SHOULDER

## 2025-06-06 ASSESSMENT — PAIN DESCRIPTION - ORIENTATION: ORIENTATION: LEFT

## 2025-06-06 ASSESSMENT — PAIN DESCRIPTION - DESCRIPTORS: DESCRIPTORS: TIGHTNESS

## 2025-06-06 NOTE — PROGRESS NOTES
[facility]  Outpatient Physical Therapy    Treatment Note        Date: 2025  Patient: Fern Recio  : 1940   Confirmed: Yes  MRN: [id number]  Referring Provider: Debi Malone PA-C    Medical Diagnosis: Adhesive capsulitis of left shoulder [M75.02]       Treatment Diagnosis: BPPV, impaired cervical ROM and strength    Visit Information:  Insurance: Payor: MEDICARE / Plan: MEDICARE PART A AND B / Product Type: *No Product type* /   PT Visit Information  PT Insurance Information: Medicare  Total # of Visits to Date: 12  Plan of Care/Certification Expiration Date: 08/15/25  No Show: 0  Progress Note Due Date: 25  Canceled Appointment: 0  Progress Note Counter:     Subjective Information:  Subjective: Patient reports improved function, \"everything is getting easier. Pain is worse in the morning but feels better as the day goes on.\"  HEP Compliance:  [x] Good [] Fair [] Poor [] Reports not doing due to:               Pain Screening  Patient Currently in Pain: Yes  Pain Assessment: 0-10  Pain Level: 2  Pain Type: Chronic pain  Pain Location: Shoulder  Pain Orientation: Left  Pain Descriptors: Tightness    Treatment:  Exercises:  Exercises  Exercise 1: table slides:hep  Exercise 2: pulleys 2 minute flexion/ 2 minute abduction  Exercise 3: posture ex:hep  Exercise 4: wand ex seated: Flex/ abd/ ER/ IR/ Ext  x 10 ea  Exercise 5: pec stretch :hep  Exercise 7: finger ladder flexion - 21 /abduction - 20 x 3 ea  Exercise 8: rows/lats YTB x 15 x2  Exercise 10: arom seated, IR/ER x10  Exercise 20: HEP: table slides, posture ex, pec stretch, rows/lats     Manual:   Manual Therapy  Soft Tissue Mobilizaton: STM left bicep x 8 min total -supine  Other: ktape, left shoulder for deltoid support and pain relief, hand out given, patient educated  Treatment Reasoning  Limitations addressed: Tissue extensibility, Painful spasm    *Indicates exercise, modality, or manual techniques to be initiated when

## 2025-06-11 ENCOUNTER — HOSPITAL ENCOUNTER (OUTPATIENT)
Dept: PHYSICAL THERAPY | Age: 85
Setting detail: THERAPIES SERIES
Discharge: HOME | End: 2025-06-11
Payer: MEDICARE

## 2025-06-11 PROCEDURE — 97140 MANUAL THERAPY 1/> REGIONS: CPT

## 2025-06-11 PROCEDURE — 97110 THERAPEUTIC EXERCISES: CPT

## 2025-06-11 ASSESSMENT — PAIN DESCRIPTION - ORIENTATION: ORIENTATION: LEFT

## 2025-06-11 ASSESSMENT — PAIN SCALES - GENERAL: PAINLEVEL_OUTOF10: 1

## 2025-06-11 ASSESSMENT — PAIN DESCRIPTION - LOCATION: LOCATION: SHOULDER

## 2025-06-11 NOTE — PROGRESS NOTES
pain, hold dizziness unless patient has symptoms  Pt to continue current HEP.  See objective section for any therapeutic exercise changes, additions or modifications this date.    Therapy Time:      PT Individual Minutes  Time In: 1503  Time Out: 1542  Minutes: 39  Timed Code Treatment Minutes: 38 Minutes  Procedure Minutes:0  Timed Activity Minutes Units   Ther Ex 30 2   Manual  8 1     Electronically signed by Gisele Roberts PT on 6/11/25 at 3:24 PM EDT

## 2025-06-13 ENCOUNTER — HOSPITAL ENCOUNTER (OUTPATIENT)
Dept: PHYSICAL THERAPY | Age: 85
Setting detail: THERAPIES SERIES
Discharge: HOME | End: 2025-06-13
Payer: MEDICARE

## 2025-06-13 PROCEDURE — 97110 THERAPEUTIC EXERCISES: CPT

## 2025-06-13 ASSESSMENT — PAIN DESCRIPTION - DESCRIPTORS: DESCRIPTORS: ACHING

## 2025-06-13 ASSESSMENT — PAIN SCALES - GENERAL: PAINLEVEL_OUTOF10: 1

## 2025-06-13 ASSESSMENT — PAIN DESCRIPTION - LOCATION: LOCATION: SHOULDER

## 2025-06-13 ASSESSMENT — PAIN DESCRIPTION - ORIENTATION: ORIENTATION: LEFT

## 2025-06-13 NOTE — PROGRESS NOTES
[facility]  Outpatient Physical Therapy   Treatment Note        Date: 2025  Patient: Fern Recio  : 1940   Confirmed: Yes  MRN: [id number]  Referring Provider: Debi Malone PA-C      Medical Diagnosis: Adhesive capsulitis of left shoulder [M75.02]      Treatment Diagnosis: BPPV, impaired cervical ROM and strength    Visit Information:  Insurance: Payor: MEDICARE / Plan: MEDICARE PART A AND B / Product Type: *No Product type* /   PT Visit Information  PT Insurance Information: Medicare  Total # of Visits to Date: 14  Plan of Care/Certification Expiration Date: 08/15/25  No Show: 0  Progress Note Due Date: 25  Canceled Appointment: 0  Progress Note Counter:     Subjective Information:  Subjective: Pt reports compliance with HEP.  Pt reports L shoulder symptoms are manageable with current HEP and feels comfortable with discharging with self management  HEP Compliance:  [x] Good [] Fair [] Poor [] Reports not doing due to:    Pain Screening  Patient Currently in Pain: Yes  Pain Assessment: 0-10  Pain Level: 1  Pain Location: Shoulder  Pain Orientation: Left  Pain Descriptors: Aching    Treatment:  Exercises:  Exercises  Exercise 2: pulleys 2 minute flexion/ 2 minute abduction  Exercise 3: Shoulder flexion/abd x10ea abimbola (progress with soup )  Exercise 4: Serratus punches in supine x10  Exercise 6: wall slides 5\"x10 flexion/ scaption  Exercise 7: MMT, ROM and goal assessment 15 min  Exercise 8: rows/lats YTB x 15  Exercise 9: IR towel stretch 20s x 3  Exercise 10: Shoulder IR/ER with YTB x10 on Lt  Exercise 20: HEP: sidelying shoulder abd and ER  Treatment Reasoning  Limitations addressed: Strength, Flexibility, Pain modulation, Posture  *Indicates exercise, modality, or manual techniques to be initiated when appropriate    Objective Measures:   STG 1 Current Status:: 1/10 currently, <3/10 stiffness in am upon waking  STG 2 Current Status:: Indep HEP, educated on progression

## 2025-06-13 NOTE — PROGRESS NOTES
[facility]  PHYSICAL THERAPY PLAN OF CARE                                    [facility] [place] [address]     Ph: [phone]  Fax: [phone]    [] Certification  [] Recertification []  Plan of Care  [] Progress Note [x] Discharge      Referring Provider: Debi Malone PA-C    From:  Dasha Wharton, PT     Patient: Fern Recio (85 y.o. female) : 1940 Date: 2025   Medical Diagnosis: Adhesive capsulitis of left shoulder [M75.02]    Treatment Diagnosis: BPPV, impaired cervical ROM and strength    Plan of Care/Certification Expiration Date: 08/15/25   Progress Report Period from: 2025  to 2025    Visits to Date: 14 No Show: 0 Cancelled Appts: 0    OBJECTIVE:   Short Term Goals - Time Frame for Short Term Goals: 3 weeks    Goals Current/Discharge status  Status   Short Term Goal 1: Patient will reports </= 3/10 pain in left shoulder.  STG 1 Current Status:: 1/10 currently, <3/10 stiffness in am upon waking   Met   Short Term Goal 2: Patient will be independent with HEP.  STG 2 Current Status:: Indep HEP, educated on progression   Met   Long Term Goals - Time Frame for Long Term Goals : 4 weeks  Goals Current/ Discharge status Status   Long Term Goal 1: Patient will report no dizziness with change in position. LTG 1 Current Status:: No dizziness reported   Met   Long Term Goal 2: Patient will demonstrate negative Hallpike for improved tolerance with bending down. LTG 2 Current Status:: 5/5 negative Hallpike on the right   Met   Long Term Goal 3: Patient will increase cervical AROM >/= 5 degrees for improved functional tolerance. LTG 3 Current Status:: cervical flexion 60, Ext 45,  R SB 30 L 25   Partially met   Long Term Goal 4: DHI </= 20 to demonstrate functional improvements. LTG 4 Current Status:: 14 points   Met   Long Term Goal 5: Patient will increase left shoulder ROM to WFLs for improved functional tolerance. LTG 5 Current Status:: IR reach L1 with effort and

## 2025-06-27 DIAGNOSIS — E78.5 HYPERLIPIDEMIA, UNSPECIFIED HYPERLIPIDEMIA TYPE: ICD-10-CM

## 2025-06-27 NOTE — TELEPHONE ENCOUNTER
Comments:     Last Office Visit (last PCP visit):   4/7/2025    Next Visit Date:  Future Appointments   Date Time Provider Department Center   10/7/2025 11:00 AM Debi Malone PA-C [facility]   12/10/2025 11:00 AM Julienne Toth MD [facility]   2/4/2026  1:00 PM Arnaldo Toth MD [place] [facility] -   4/28/2026  1:15 PM [facility]         Rx requested:  Requested Prescriptions     Pending Prescriptions Disp Refills    niacin (NIASPAN) 500 MG extended release tablet [Pharmacy Med Name: NIACIN ER TAB 500MG] 270 tablet 3     Sig: Take 1 tablet by mouth in the morning, at noon, and at bedtime

## 2025-06-29 RX ORDER — NIACIN 500 MG/1
500 TABLET, EXTENDED RELEASE ORAL 3 TIMES DAILY
Qty: 270 TABLET | Refills: 3 | Status: SHIPPED | OUTPATIENT
Start: 2025-06-29

## 2025-07-07 RX ORDER — HYDRALAZINE HYDROCHLORIDE 10 MG/1
TABLET, FILM COATED ORAL
Qty: 90 TABLET | Refills: 3 | Status: SHIPPED | OUTPATIENT
Start: 2025-07-07

## 2025-08-26 ENCOUNTER — PATIENT MESSAGE (OUTPATIENT)
Age: 85
End: 2025-08-26

## 2025-08-26 DIAGNOSIS — I67.9 CEREBROVASCULAR DISEASE: ICD-10-CM

## 2025-08-26 DIAGNOSIS — E78.2 MIXED HYPERLIPIDEMIA: ICD-10-CM

## 2025-08-26 DIAGNOSIS — Z78.9 STATIN INTOLERANCE: Primary | ICD-10-CM

## 2025-08-29 RX ORDER — INCLISIRAN 284 MG/1.5ML
INJECTION, SOLUTION SUBCUTANEOUS
Qty: 1.5 ML | Refills: 5 | Status: SHIPPED | OUTPATIENT
Start: 2025-08-29

## (undated) DEVICE — GOWN,AURORA,NONRNF,XL,30/CS: Brand: MEDLINE

## (undated) DEVICE — PLASMABLADE X PS210-030S-LIGHT 3.0SL: Brand: PLASMABLADE™ X

## (undated) DEVICE — YANKAUER,BULB TIP,W/O VENT,RIGID,STERILE: Brand: MEDLINE

## (undated) DEVICE — GAUZE,SPONGE,FLUFF,6"X6.75",STRL,10/TRAY: Brand: MEDLINE

## (undated) DEVICE — SUTURE VCRL SZ 3-0 L27IN ABSRB UD L26MM SH 1/2 CIR J416H

## (undated) DEVICE — GLOVE SURG SZ 65 THK91MIL LTX FREE SYN POLYISOPRENE

## (undated) DEVICE — PACK,LAPAROTOMY,NO GOWNS: Brand: MEDLINE

## (undated) DEVICE — GOWN,AURORA,NONREINFORCED,LARGE: Brand: MEDLINE

## (undated) DEVICE — TOWEL,OR,DSP,ST,BLUE,STD,4/PK,20PK/CS: Brand: MEDLINE

## (undated) DEVICE — DRAPE,MEDI-SLUSH,STERILE: Brand: MEDLINE

## (undated) DEVICE — SUTURE MCRYL SZ 4-0 L27IN ABSRB UD L19MM PS-2 1/2 CIR PRIM Y426H

## (undated) DEVICE — COUNTER NDL 40 COUNT HLD 70 FOAM BLK ADH W/ MAG

## (undated) DEVICE — AGENT HEMSTAT 3GM OXIDIZED REGENERATED CELOS ABSRB FOR CONT (ORDER MULTIPLES OF 5EA)

## (undated) DEVICE — 4-PORT MANIFOLD: Brand: NEPTUNE 2

## (undated) DEVICE — SPONGE,LAP,18"X18",DLX,XR,ST,5/PK,40/PK: Brand: MEDLINE

## (undated) DEVICE — LABEL MED MINI W/ MARKER

## (undated) DEVICE — MARKER SURG SKIN GENTIAN VLT REG TIP W/ 6IN RUL

## (undated) DEVICE — SUTURE PERMAHAND SZ 3-0 L30IN NONABSORBABLE BLK SH L26MM K832H

## (undated) DEVICE — GLOVE ORANGE PI 7   MSG9070

## (undated) DEVICE — TUBING, SUCTION, 9/32" X 12', STRAIGHT: Brand: MEDLINE INDUSTRIES, INC.

## (undated) DEVICE — KIT DRN FLAT W/ 100CC EVAC 10MM FULL PERF

## (undated) DEVICE — APPLICATOR MEDICATED 26 CC SOLUTION HI LT ORNG CHLORAPREP

## (undated) DEVICE — SYRINGE IRRIG 60ML SFT PLIABLE BLB EZ TO GRP 1 HND USE W/

## (undated) DEVICE — ELECTRODE PT RET AD L9FT HI MOIST COND ADH HYDRGEL CORDED

## (undated) DEVICE — SHEET,DRAPE,53X77,STERILE: Brand: MEDLINE

## (undated) DEVICE — STAPLER SKIN H3.9MM WIRE DIA0.58MM CRWN 6.9MM 35 STPL FIX

## (undated) DEVICE — ADHESIVE SKIN CLSR 0.7ML TOP DERMBND ADV

## (undated) DEVICE — SUTURE NONABSORBABLE MONOFILAMENT 3-0 PS-1 18 IN BLK ETHILON 1663H